# Patient Record
Sex: FEMALE | Race: WHITE | Employment: FULL TIME | URBAN - METROPOLITAN AREA
[De-identification: names, ages, dates, MRNs, and addresses within clinical notes are randomized per-mention and may not be internally consistent; named-entity substitution may affect disease eponyms.]

---

## 2017-03-20 ENCOUNTER — OFFICE VISIT (OUTPATIENT)
Dept: FAMILY MEDICINE CLINIC | Facility: CLINIC | Age: 52
End: 2017-03-20

## 2017-03-20 VITALS
DIASTOLIC BLOOD PRESSURE: 72 MMHG | WEIGHT: 163 LBS | HEART RATE: 84 BPM | BODY MASS INDEX: 28.88 KG/M2 | HEIGHT: 63 IN | SYSTOLIC BLOOD PRESSURE: 113 MMHG

## 2017-03-20 DIAGNOSIS — L71.9 ROSACEA: ICD-10-CM

## 2017-03-20 DIAGNOSIS — E78.00 HIGH CHOLESTEROL: ICD-10-CM

## 2017-03-20 DIAGNOSIS — E55.9 VITAMIN D DEFICIENCY: ICD-10-CM

## 2017-03-20 DIAGNOSIS — E66.09 NON MORBID OBESITY DUE TO EXCESS CALORIES: ICD-10-CM

## 2017-03-20 DIAGNOSIS — Z00.00 ANNUAL PHYSICAL EXAM: Primary | ICD-10-CM

## 2017-03-20 PROCEDURE — 99396 PREV VISIT EST AGE 40-64: CPT | Performed by: FAMILY MEDICINE

## 2017-03-20 RX ORDER — DOXEPIN HYDROCHLORIDE 50 MG/1
1 CAPSULE ORAL DAILY
COMMUNITY
End: 2020-11-16

## 2017-03-20 RX ORDER — GARLIC EXTRACT 500 MG
1 CAPSULE ORAL DAILY
COMMUNITY
End: 2018-08-03 | Stop reason: ALTCHOICE

## 2017-03-20 RX ORDER — DOXYCYCLINE 100 MG/1
CAPSULE ORAL
Refills: 1 | COMMUNITY
Start: 2017-01-30 | End: 2017-11-05 | Stop reason: ALTCHOICE

## 2017-03-20 NOTE — PROGRESS NOTES
Physical    Has been working out a lot. Patient's past medical surgical family social history was reviewed.     Review of Systems  Allergic: no environmental allergies or food allergies  Cardiovascular: no chest pain, irregular heartbeat, or palpitations 25-Hydroxy [E]; Future    3. Non morbid obesity due to excess calories  improving    4. High cholesterol  Will rechecking    5. Don Tran    In [place].

## 2017-03-27 ENCOUNTER — APPOINTMENT (OUTPATIENT)
Dept: LAB | Age: 52
End: 2017-03-27
Attending: FAMILY MEDICINE
Payer: COMMERCIAL

## 2017-03-27 DIAGNOSIS — Z00.00 ANNUAL PHYSICAL EXAM: ICD-10-CM

## 2017-03-27 DIAGNOSIS — E55.9 VITAMIN D DEFICIENCY: ICD-10-CM

## 2017-03-27 LAB
CHOLEST SERPL-MCNC: 258 MG/DL (ref 110–200)
GLUCOSE SERPL-MCNC: 93 MG/DL (ref 70–99)
HDLC SERPL-MCNC: 72 MG/DL
LDLC SERPL CALC-MCNC: 170 MG/DL (ref 0–99)
NONHDLC SERPL-MCNC: 186 MG/DL
TRIGL SERPL-MCNC: 82 MG/DL (ref 1–149)

## 2017-03-27 PROCEDURE — 80061 LIPID PANEL: CPT

## 2017-03-27 PROCEDURE — 36415 COLL VENOUS BLD VENIPUNCTURE: CPT

## 2017-03-27 PROCEDURE — 82306 VITAMIN D 25 HYDROXY: CPT

## 2017-03-27 PROCEDURE — 82947 ASSAY GLUCOSE BLOOD QUANT: CPT

## 2017-03-29 LAB — 25(OH)D3 SERPL-MCNC: 25.7 NG/ML

## 2017-04-27 RX ORDER — ERGOCALCIFEROL 1.25 MG/1
CAPSULE ORAL
Qty: 12 CAPSULE | Refills: 3 | Status: SHIPPED | OUTPATIENT
Start: 2017-04-27 | End: 2018-03-17

## 2017-05-30 ENCOUNTER — OFFICE VISIT (OUTPATIENT)
Dept: FAMILY MEDICINE CLINIC | Facility: CLINIC | Age: 52
End: 2017-05-30

## 2017-05-30 ENCOUNTER — TELEPHONE (OUTPATIENT)
Dept: FAMILY MEDICINE CLINIC | Facility: CLINIC | Age: 52
End: 2017-05-30

## 2017-05-30 VITALS
DIASTOLIC BLOOD PRESSURE: 80 MMHG | TEMPERATURE: 99 F | BODY MASS INDEX: 32.78 KG/M2 | RESPIRATION RATE: 14 BRPM | OXYGEN SATURATION: 98 % | HEART RATE: 82 BPM | HEIGHT: 63 IN | SYSTOLIC BLOOD PRESSURE: 120 MMHG | WEIGHT: 185 LBS

## 2017-05-30 DIAGNOSIS — J02.9 ACUTE PHARYNGITIS, UNSPECIFIED ETIOLOGY: ICD-10-CM

## 2017-05-30 DIAGNOSIS — Z87.891 FORMER SMOKER: ICD-10-CM

## 2017-05-30 DIAGNOSIS — J06.9 VIRAL URI WITH COUGH: Primary | ICD-10-CM

## 2017-05-30 PROCEDURE — 87880 STREP A ASSAY W/OPTIC: CPT | Performed by: NURSE PRACTITIONER

## 2017-05-30 PROCEDURE — 87081 CULTURE SCREEN ONLY: CPT | Performed by: NURSE PRACTITIONER

## 2017-05-30 PROCEDURE — 99202 OFFICE O/P NEW SF 15 MIN: CPT | Performed by: NURSE PRACTITIONER

## 2017-05-30 RX ORDER — BROMPHENIRAMINE MALEATE, PSEUDOEPHEDRINE HYDROCHLORIDE, AND DEXTROMETHORPHAN HYDROBROMIDE 2; 30; 10 MG/5ML; MG/5ML; MG/5ML
10 SYRUP ORAL 4 TIMES DAILY PRN
Qty: 120 ML | Refills: 0 | Status: SHIPPED | OUTPATIENT
Start: 2017-05-30 | End: 2017-11-05 | Stop reason: ALTCHOICE

## 2017-05-30 NOTE — TELEPHONE ENCOUNTER
Actions Requested: no appts available.   Advised [place]  Situation/Background   Problem: nasal congestion,    Onset: last friday   Associated Symptoms: nasal congestion, dry cough, sore throat   History of Same:    Precipitated By: travel out of the country

## 2017-05-30 NOTE — TELEPHONE ENCOUNTER
LESLI Pt seen in the clinic with Suzanna Horner NP today. Prescribed Tklnffaao-Ivxegics-LZ 30-2-10mg. Called pt to f/u if symptoms not better or worsen.

## 2017-05-30 NOTE — PROGRESS NOTES
CHIEF COMPLAINT:   Patient presents with:  Cough/URI      HPI:   Manisha Mcghee is a 54year old female who presents for uri symptoms for  4 days. Patient reports sore throat, congestion, dry cough. Symptoms have been worsening since onset.   Treating delmar /80 mmHg  Pulse 82  Temp(Src) 98.7 °F (37.1 °C) (Oral)  Resp 14  Ht 63\"  Wt 185 lb  BMI 32.78 kg/m2  SpO2 98%  LMP 05/20/2017 (Exact Date)  GENERAL: well developed, well nourished,in no apparent distress  SKIN: no rashes,no suspicious lesions  HEAD: · Hydrate! (cold or hot based on comfort). Drink lots of water or other non dehydrating liquids to help with illness. Salty foods, soups and tea can help with throat pain.    · Hand washing-use hand  or wash hands frequently, cover your cough or · You may use acetaminophen or ibuprofen to control pain and fever, unless another medicine was prescribed.  (Note: If you have chronic liver or kidney disease, have ever had a stomach ulcer or gastrointestinal bleeding, or are taking blood-thinning medicin Sore throats happen for many reasons, such as colds, allergies, and infections caused by viruses or bacteria. In any case, your throat becomes red and sore.  Your goal for self-care is to reduce your discomfort while giving your throat a chance to heal. · A temperature over 101°F (38.3°C)  · White spots on the throat  · Great difficulty swallowing  · Trouble breathing  · A skin rash  · Recent exposure to someone else with strep bacteria  · Severe hoarseness and swollen glands in the neck or jaw   Date [place]

## 2017-05-30 NOTE — PATIENT INSTRUCTIONS
· Hydrate! (cold or hot based on comfort). Drink lots of water or other non dehydrating liquids to help with illness. Salty foods, soups and tea can help with throat pain.    · Hand washing-use hand  or wash hands frequently, cover your cough o chronic liver or kidney disease, have ever had a stomach ulcer or gastrointestinal bleeding, or are taking blood-thinning medicines, talk with your healthcare provider before using these medicines.) Aspirin should never be given to anyone under 18 years of heal.    Moisten and soothe your throat  Tips include the following:  · Try a sip of water first thing after waking up. · Keep your throat moist by drinking 6 or more glasses of clear liquids every day. · Run a cool-air humidifier in your room overnight. Reviewed: 8/1/2016  © 0499-0603 [address], [address] [place], [place] [address] [employer]. All rights reserved. This information is not intended as a substitute for professional medical care.  Always follow your healthcare professional's instruct

## 2017-05-30 NOTE — TELEPHONE ENCOUNTER
Pt called in stating she has a dry cough and a lot of phlegm. Pt asking if Dr. [facility] can prescribe her something to help her relieve symptoms, please advise.

## 2017-06-14 ENCOUNTER — PATIENT MESSAGE (OUTPATIENT)
Dept: FAMILY MEDICINE CLINIC | Facility: CLINIC | Age: 52
End: 2017-06-14

## 2017-06-19 ENCOUNTER — TELEPHONE (OUTPATIENT)
Dept: FAMILY MEDICINE CLINIC | Facility: CLINIC | Age: 52
End: 2017-06-19

## 2017-06-19 DIAGNOSIS — E78.00 HIGH CHOLESTEROL: Primary | ICD-10-CM

## 2017-06-19 RX ORDER — ROSUVASTATIN CALCIUM 10 MG/1
10 TABLET, COATED ORAL NIGHTLY
Qty: 90 TABLET | Refills: 3 | Status: SHIPPED | OUTPATIENT
Start: 2017-06-19 | End: 2018-07-06

## 2017-06-19 NOTE — TELEPHONE ENCOUNTER
From: Yohana Mejía  To: Abhilash Cuevas DO  Sent: 6/14/2017 10:18 AM CDT  Subject: Prescription Question    Hello! In the past, I requested to be taken off the cholesterol pills do to a few side effects.  Now I would like to know if I can put back

## 2017-09-15 ENCOUNTER — OFFICE VISIT (OUTPATIENT)
Dept: PULMONOLOGY | Facility: CLINIC | Age: 52
End: 2017-09-15

## 2017-09-15 VITALS
SYSTOLIC BLOOD PRESSURE: 119 MMHG | OXYGEN SATURATION: 98 % | BODY MASS INDEX: 34.66 KG/M2 | RESPIRATION RATE: 18 BRPM | DIASTOLIC BLOOD PRESSURE: 76 MMHG | HEIGHT: 63 IN | WEIGHT: 195.63 LBS | HEART RATE: 84 BPM

## 2017-09-15 DIAGNOSIS — G47.33 OSA (OBSTRUCTIVE SLEEP APNEA): Primary | ICD-10-CM

## 2017-09-15 PROCEDURE — 99243 OFF/OP CNSLTJ NEW/EST LOW 30: CPT | Performed by: INTERNAL MEDICINE

## 2017-09-15 PROCEDURE — 99212 OFFICE O/P EST SF 10 MIN: CPT | Performed by: INTERNAL MEDICINE

## 2017-09-15 NOTE — PROGRESS NOTES
Annita Rodriguez , [address], [place]    Report of Consultation    Joan Day Patient Status:  Outpatient    10/31/1965 MRN [id number]   Location [address] [facility], [address] Attending No att. providers found   Hosp Day # 0 PCP Sheryl Wayne. Laboratory Data:    Lab Results  Component Value Date   CREATSERUM 0.69 09/21/2015   BUN 12 09/21/2015    09/21/2015   K 3.7 09/21/2015    (H) 09/21/2015   CO2 27 09/21/2015   GLU 93 03/27/2017   CA 8.7 09/21/2015   ALB 3.6 09/21/2015   TP

## 2017-09-18 ENCOUNTER — APPOINTMENT (OUTPATIENT)
Dept: LAB | Age: 52
End: 2017-09-18
Attending: FAMILY MEDICINE
Payer: COMMERCIAL

## 2017-09-18 DIAGNOSIS — E78.00 HIGH CHOLESTEROL: ICD-10-CM

## 2017-09-18 LAB
ALBUMIN SERPL BCP-MCNC: 3.9 G/DL (ref 3.5–4.8)
ALBUMIN/GLOB SERPL: 1.6 {RATIO} (ref 1–2)
ALP SERPL-CCNC: 54 U/L (ref 32–100)
ALT SERPL-CCNC: 15 U/L (ref 14–54)
ANION GAP SERPL CALC-SCNC: 9 MMOL/L (ref 0–18)
AST SERPL-CCNC: 15 U/L (ref 15–41)
BILIRUB SERPL-MCNC: 0.7 MG/DL (ref 0.3–1.2)
BUN SERPL-MCNC: 13 MG/DL (ref 8–20)
BUN/CREAT SERPL: 21 (ref 10–20)
CALCIUM SERPL-MCNC: 8.7 MG/DL (ref 8.5–10.5)
CHLORIDE SERPL-SCNC: 103 MMOL/L (ref 95–110)
CHOLEST SERPL-MCNC: 193 MG/DL (ref 110–200)
CO2 SERPL-SCNC: 24 MMOL/L (ref 22–32)
CREAT SERPL-MCNC: 0.62 MG/DL (ref 0.5–1.5)
GLOBULIN PLAS-MCNC: 2.5 G/DL (ref 2.5–3.7)
GLUCOSE SERPL-MCNC: 95 MG/DL (ref 70–99)
HDLC SERPL-MCNC: 77 MG/DL
LDLC SERPL CALC-MCNC: 100 MG/DL (ref 0–99)
NONHDLC SERPL-MCNC: 116 MG/DL
OSMOLALITY UR CALC.SUM OF ELEC: 282 MOSM/KG (ref 275–295)
POTASSIUM SERPL-SCNC: 3.8 MMOL/L (ref 3.3–5.1)
PROT SERPL-MCNC: 6.4 G/DL (ref 5.9–8.4)
SODIUM SERPL-SCNC: 136 MMOL/L (ref 136–144)
TRIGL SERPL-MCNC: 81 MG/DL (ref 1–149)

## 2017-09-18 PROCEDURE — 80053 COMPREHEN METABOLIC PANEL: CPT

## 2017-09-18 PROCEDURE — 80061 LIPID PANEL: CPT

## 2017-09-18 PROCEDURE — 36415 COLL VENOUS BLD VENIPUNCTURE: CPT

## 2017-09-21 ENCOUNTER — TELEPHONE (OUTPATIENT)
Dept: CASE MANAGEMENT | Age: 52
End: 2017-09-21

## 2017-09-21 DIAGNOSIS — G47.33 OSA (OBSTRUCTIVE SLEEP APNEA): Primary | ICD-10-CM

## 2017-09-21 NOTE — TELEPHONE ENCOUNTER
Pt's health plan denying \"In House\" sleep study, because pt does not have comorbid conditions that meet criteria. However, health plan does cover and will authorize a \"Home\" sleep study.  If you agree, please place new order for \"Home\" sleep study CPT

## 2017-09-26 NOTE — TELEPHONE ENCOUNTER
Managed Care: order for home sleep study entered, please process and contact pt when good to schedule.

## 2017-10-16 ENCOUNTER — HOSPITAL ENCOUNTER (OUTPATIENT)
Dept: MAMMOGRAPHY | Age: 52
Discharge: HOME | End: 2017-10-16
Attending: OBSTETRICS & GYNECOLOGY
Payer: COMMERCIAL

## 2017-10-16 DIAGNOSIS — Z12.31 VISIT FOR SCREENING MAMMOGRAM: ICD-10-CM

## 2017-10-16 PROCEDURE — 77067 SCR MAMMO BI INCL CAD: CPT | Performed by: OBSTETRICS & GYNECOLOGY

## 2017-10-24 ENCOUNTER — OFFICE VISIT (OUTPATIENT)
Dept: SLEEP CENTER | Age: 52
End: 2017-10-24
Attending: INTERNAL MEDICINE
Payer: COMMERCIAL

## 2017-10-24 DIAGNOSIS — G47.33 OSA (OBSTRUCTIVE SLEEP APNEA): Primary | ICD-10-CM

## 2017-10-24 PROCEDURE — 95806 SLEEP STUDY UNATT&RESP EFFT: CPT

## 2017-10-27 NOTE — PROCEDURES
[address] [place] [address]  Accredited by the [facility] ([facility])    PATIENT'S NAME: Daljit Wheatgerman   ATTENDING PHYSICIAN: Nas Weber. Adore Majano MD   REFERRING PHYSICIAN: Nas Weber.  Adore Majano MD   PATIENT ACCOUNT #: [id number] LOCATION:  titration. 2.   Weight loss. 3.   Avoid alcohol. 4.   Avoid sedating drug. 5.   Patient should not drive if at all sleepy. Please do not hesitate to contact me if there is any question whatsoever regarding interpretation of this study.     Dictated

## 2017-11-05 ENCOUNTER — OFFICE VISIT (OUTPATIENT)
Dept: FAMILY MEDICINE CLINIC | Facility: CLINIC | Age: 52
End: 2017-11-05

## 2017-11-05 VITALS
RESPIRATION RATE: 14 BRPM | TEMPERATURE: 98 F | SYSTOLIC BLOOD PRESSURE: 110 MMHG | OXYGEN SATURATION: 98 % | HEIGHT: 63 IN | DIASTOLIC BLOOD PRESSURE: 70 MMHG | WEIGHT: 191 LBS | BODY MASS INDEX: 33.84 KG/M2 | HEART RATE: 90 BPM

## 2017-11-05 DIAGNOSIS — J06.9 VIRAL URI WITH COUGH: Primary | ICD-10-CM

## 2017-11-05 PROCEDURE — 99213 OFFICE O/P EST LOW 20 MIN: CPT | Performed by: NURSE PRACTITIONER

## 2017-11-05 RX ORDER — BROMPHENIRAMINE MALEATE, PSEUDOEPHEDRINE HYDROCHLORIDE, AND DEXTROMETHORPHAN HYDROBROMIDE 2; 30; 10 MG/5ML; MG/5ML; MG/5ML
10 SYRUP ORAL 4 TIMES DAILY PRN
Qty: 120 ML | Refills: 0 | Status: SHIPPED | OUTPATIENT
Start: 2017-11-05 | End: 2018-08-03 | Stop reason: ALTCHOICE

## 2017-11-05 NOTE — PATIENT INSTRUCTIONS
· Take antibiotics as directed. Finish all the medication even if you feel better. · Probiotics or yogurt daily during antibiotic use will help decrease stomach upset and restore good bacteria to the gut. Separate times by at least 2-4 hours.     · Hydrat virus, and they are generally not prescribed for this condition. Home care  · If symptoms are severe, rest at home for the first 2 to 3 days. When you resume activity, don't let yourself get too tired.   · Avoid being exposed to cigarette smoke (yours or substitute for professional medical care. Always follow your healthcare professional's instructions.

## 2017-11-05 NOTE — PROGRESS NOTES
CHIEF COMPLAINT:   Patient presents with:  Sore Throat  Cough/URI      HPI:   Surinder Sotomayor is a 50year old female who presents for uri symptoms for  5 days. Patient reports sore throat only at the beginning of sx's, congestion, dry cough.  Symptoms ha /70 (BP Location: Right arm, Patient Position: Sitting, Cuff Size: large)   Pulse 90   Temp 98 °F (36.7 °C) (Oral)   Resp 14   Ht 63\"   Wt 191 lb   LMP 10/09/2017 (Within Days)   SpO2 98%   BMI 33.83 kg/m²   GENERAL: well developed, well nourished,i · Hydrate! (cold or hot based on comfort). Drink lots of water or other non dehydrating liquids to help with illness. Salty foods, soups and tea can help with throat pain.    · Hand washing-use hand  or wash hands frequently, cover your cough or sn · Avoid being exposed to cigarette smoke (yours or others’).   · You may use acetaminophen or ibuprofen to control pain and fever, unless another medicine was prescribed. If you have chronic liver or kidney disease, have ever had a stomach ulcer or gastroin

## 2017-11-10 ENCOUNTER — OFFICE VISIT (OUTPATIENT)
Dept: FAMILY MEDICINE CLINIC | Facility: CLINIC | Age: 52
End: 2017-11-10

## 2017-11-10 VITALS
BODY MASS INDEX: 33.84 KG/M2 | WEIGHT: 191 LBS | SYSTOLIC BLOOD PRESSURE: 124 MMHG | RESPIRATION RATE: 14 BRPM | HEART RATE: 90 BPM | DIASTOLIC BLOOD PRESSURE: 80 MMHG | OXYGEN SATURATION: 99 % | TEMPERATURE: 98 F | HEIGHT: 63 IN

## 2017-11-10 DIAGNOSIS — R05.9 COUGH: ICD-10-CM

## 2017-11-10 DIAGNOSIS — J01.40 ACUTE NON-RECURRENT PANSINUSITIS: Primary | ICD-10-CM

## 2017-11-10 PROCEDURE — 99213 OFFICE O/P EST LOW 20 MIN: CPT | Performed by: NURSE PRACTITIONER

## 2017-11-10 RX ORDER — PREDNISONE 20 MG/1
20 TABLET ORAL 2 TIMES DAILY
Qty: 6 TABLET | Refills: 0 | Status: SHIPPED | OUTPATIENT
Start: 2017-11-10 | End: 2017-11-13

## 2017-11-10 RX ORDER — CEFDINIR 300 MG/1
300 CAPSULE ORAL 2 TIMES DAILY
Qty: 20 CAPSULE | Refills: 0 | Status: SHIPPED | OUTPATIENT
Start: 2017-11-10 | End: 2017-11-20

## 2017-11-10 RX ORDER — BENZONATATE 200 MG/1
200 CAPSULE ORAL 3 TIMES DAILY PRN
Qty: 15 CAPSULE | Refills: 0 | Status: SHIPPED | OUTPATIENT
Start: 2017-11-10 | End: 2017-11-15

## 2017-11-10 RX ORDER — CEFDINIR 300 MG/1
300 CAPSULE ORAL 2 TIMES DAILY
Qty: 20 CAPSULE | Refills: 0 | Status: SHIPPED | OUTPATIENT
Start: 2017-11-10 | End: 2017-11-10

## 2017-11-10 NOTE — PROGRESS NOTES
CHIEF COMPLAINT:   Patient presents with:  URI      HPI:   Fatemeh Mcintosh is a 50year old female who presents for upper respiratory symptoms for  10 days.  Diagnosed with cold on 11/5 but symptoms persist.   Symptoms are described as scratchy,  a tickle GENERAL: patient feels sick, but does not report fever, chills, sweats or fatigue. SKIN: no rashes or abnormal skin lesions  HEAD: patient denies mild sinus pressure per HPI. EYES: patient reports stable vision, no eye symptoms.   EARS: patient reports mi LUNGS: Initially a mild wheeze in left upper lobe that cleared. Overall lungs are clear to auscultation bilaterally, no wheezes or rhonchi. Breathing is non labored.   CARDIO: RRR without murmur  NEURO: Motor function and sensation grossly intact bilaterall The sinuses are air-filled spaces within the bones of the face. They connect to the inside of the nose. Sinusitis is an inflammation of the tissue lining the sinus cavity. Sinus inflammation can occur during a cold.  It can also be due to allergies to polle · Do not use nasal rinses or irrigation during an acute sinus infection, unless told to by your health care provider. Rinsing may spread the infection to other sinuses.   · Use acetaminophen or ibuprofen to control pain, unless another pain medicine was pre · Sit on a chair with both feet on the floor. · Take a slow, deep breath through your nose. Hold for 2 counts. · Lean forward slightly. · Cough twice—2 short coughs. · Relax for a few seconds.   Repeat the steps as needed.   The “roberto” technique  Here i

## 2017-11-10 NOTE — PATIENT INSTRUCTIONS
Sinusitis (Antibiotic Treatment)    The sinuses are air-filled spaces within the bones of the face. They connect to the inside of the nose. Sinusitis is an inflammation of the tissue lining the sinus cavity. Sinus inflammation can occur during a cold.  It · Do not use nasal rinses or irrigation during an acute sinus infection, unless told to by your health care provider. Rinsing may spread the infection to other sinuses.   · Use acetaminophen or ibuprofen to control pain, unless another pain medicine was pre · Sit on a chair with both feet on the floor. · Take a slow, deep breath through your nose. Hold for 2 counts. · Lean forward slightly. · Cough twice—2 short coughs. · Relax for a few seconds.   Repeat the steps as needed.   The “roberto” technique  Here i

## 2017-11-14 ENCOUNTER — TELEPHONE (OUTPATIENT)
Dept: PULMONOLOGY | Facility: CLINIC | Age: 52
End: 2017-11-14

## 2017-11-14 DIAGNOSIS — G47.33 OSA (OBSTRUCTIVE SLEEP APNEA): Primary | ICD-10-CM

## 2017-11-14 NOTE — TELEPHONE ENCOUNTER
Pt stts it is ok to leave a detailed message. Pt aware that once Dr. Judson Dumont reviews PSG she will receive a follow up call.

## 2017-11-15 NOTE — TELEPHONE ENCOUNTER
Please inform the patient that she has mild sleep apnea  Still recommend CPAP therapy  Please give the patient order for CPAP titration sleep study  And then set up the patient with a CPAP as directed

## 2017-11-16 NOTE — TELEPHONE ENCOUNTER
Left pt a message notifying her of below. Pt aware to wait until she hears from our [facility] care [phone] dept to verify if a PA is needed.

## 2017-11-21 NOTE — TELEPHONE ENCOUNTER
Rylan notified that cpap titration was approved and she can schedule the titration. Kimberly barahona she was already notified.

## 2017-11-22 ENCOUNTER — TELEPHONE (OUTPATIENT)
Dept: PULMONOLOGY | Facility: CLINIC | Age: 52
End: 2017-11-22

## 2017-11-22 NOTE — TELEPHONE ENCOUNTER
Fatimah/[facility] states that she will fax form over to this office for prior auth. Cpap titration has not been approved (see 11/14/17 TE also) and is still in progress. Pt could be approved for APAP at home but CPAP will require clinicals/prior auth. Please call.

## 2017-11-29 ENCOUNTER — TELEPHONE (OUTPATIENT)
Dept: PULMONOLOGY | Facility: CLINIC | Age: 52
End: 2017-11-29

## 2017-11-29 NOTE — TELEPHONE ENCOUNTER
Ronny Golden from [facility] called and states sleep study has been denied; Reference # [id number]. If you would like a zyle-hp-aaat, please call [phone].  Thank you, [facility]

## 2017-11-29 NOTE — TELEPHONE ENCOUNTER
Lima Mcneal @ [facility] called & states sleep study has been denied. If you would like a zylf-dl-hfbg, please call [phone] (Reference # [id number]).  Thank you, Managed Care

## 2018-03-20 NOTE — TELEPHONE ENCOUNTER
Refill Protocol Appointment Criteria  · Appointment scheduled in the past 12 months or in the next 3 months  Recent Outpatient Visits            4 months ago Acute non-recurrent pansinusitis    Paul Read NP    Offic

## 2018-03-21 RX ORDER — ERGOCALCIFEROL 1.25 MG/1
CAPSULE ORAL
Qty: 12 CAPSULE | Refills: 11 | Status: CANCELLED
Start: 2018-03-21

## 2018-03-21 RX ORDER — ERGOCALCIFEROL 1.25 MG/1
CAPSULE ORAL
Qty: 12 CAPSULE | Refills: 11 | Status: SHIPPED | OUTPATIENT
Start: 2018-03-21 | End: 2018-08-03

## 2018-07-06 RX ORDER — ROSUVASTATIN CALCIUM 10 MG/1
10 TABLET, COATED ORAL NIGHTLY
Qty: 90 TABLET | Refills: 0 | Status: SHIPPED | OUTPATIENT
Start: 2018-07-06 | End: 2018-08-03

## 2018-07-06 NOTE — TELEPHONE ENCOUNTER
Refill passed per [facility] protocol.   Cholesterol Medications  Protocol Criteria:  · Appointment scheduled in the past 12 months or in the next 3 months  · ALT & LDL on file in the past 12 months  · ALT result < 80  · LDL result <130   Recent Outpat

## 2018-08-03 ENCOUNTER — LAB ENCOUNTER (OUTPATIENT)
Dept: LAB | Age: 53
End: 2018-08-03
Attending: FAMILY MEDICINE
Payer: COMMERCIAL

## 2018-08-03 ENCOUNTER — OFFICE VISIT (OUTPATIENT)
Dept: FAMILY MEDICINE CLINIC | Facility: CLINIC | Age: 53
End: 2018-08-03
Payer: COMMERCIAL

## 2018-08-03 VITALS
SYSTOLIC BLOOD PRESSURE: 117 MMHG | HEART RATE: 79 BPM | DIASTOLIC BLOOD PRESSURE: 76 MMHG | TEMPERATURE: 98 F | HEIGHT: 63 IN | BODY MASS INDEX: 34.55 KG/M2 | WEIGHT: 195 LBS

## 2018-08-03 DIAGNOSIS — E78.00 HIGH CHOLESTEROL: ICD-10-CM

## 2018-08-03 DIAGNOSIS — Z12.31 VISIT FOR SCREENING MAMMOGRAM: ICD-10-CM

## 2018-08-03 DIAGNOSIS — E55.9 VITAMIN D DEFICIENCY: ICD-10-CM

## 2018-08-03 DIAGNOSIS — Z00.00 ANNUAL PHYSICAL EXAM: ICD-10-CM

## 2018-08-03 DIAGNOSIS — Z72.0 TOBACCO ABUSE: ICD-10-CM

## 2018-08-03 DIAGNOSIS — Z00.00 ANNUAL PHYSICAL EXAM: Primary | ICD-10-CM

## 2018-08-03 DIAGNOSIS — Z12.11 SCREENING FOR COLON CANCER: ICD-10-CM

## 2018-08-03 LAB
ALBUMIN SERPL BCP-MCNC: 4.1 G/DL (ref 3.5–4.8)
ALBUMIN/GLOB SERPL: 1.7 {RATIO} (ref 1–2)
ALP SERPL-CCNC: 55 U/L (ref 32–100)
ALT SERPL-CCNC: 48 U/L (ref 14–54)
ANION GAP SERPL CALC-SCNC: 7 MMOL/L (ref 0–18)
AST SERPL-CCNC: 40 U/L (ref 15–41)
BASOPHILS # BLD: 0 K/UL (ref 0–0.2)
BASOPHILS NFR BLD: 0 %
BILIRUB SERPL-MCNC: 0.5 MG/DL (ref 0.3–1.2)
BUN SERPL-MCNC: 9 MG/DL (ref 8–20)
BUN/CREAT SERPL: 13.2 (ref 10–20)
CALCIUM SERPL-MCNC: 9.1 MG/DL (ref 8.5–10.5)
CHLORIDE SERPL-SCNC: 105 MMOL/L (ref 95–110)
CHOLEST SERPL-MCNC: 182 MG/DL (ref 110–200)
CO2 SERPL-SCNC: 27 MMOL/L (ref 22–32)
CREAT SERPL-MCNC: 0.68 MG/DL (ref 0.5–1.5)
EOSINOPHIL # BLD: 0.2 K/UL (ref 0–0.7)
EOSINOPHIL NFR BLD: 4 %
ERYTHROCYTE [DISTWIDTH] IN BLOOD BY AUTOMATED COUNT: 15.1 % (ref 11–15)
GLOBULIN PLAS-MCNC: 2.4 G/DL (ref 2.5–3.7)
GLUCOSE SERPL-MCNC: 91 MG/DL (ref 70–99)
HCT VFR BLD AUTO: 39.4 % (ref 35–48)
HDLC SERPL-MCNC: 80 MG/DL
HGB BLD-MCNC: 13 G/DL (ref 12–16)
LDLC SERPL CALC-MCNC: 84 MG/DL (ref 0–99)
LYMPHOCYTES # BLD: 1.7 K/UL (ref 1–4)
LYMPHOCYTES NFR BLD: 37 %
MCH RBC QN AUTO: 28.8 PG (ref 27–32)
MCHC RBC AUTO-ENTMCNC: 33.1 G/DL (ref 32–37)
MCV RBC AUTO: 87.2 FL (ref 80–100)
MONOCYTES # BLD: 0.4 K/UL (ref 0–1)
MONOCYTES NFR BLD: 9 %
NEUTROPHILS # BLD AUTO: 2.3 K/UL (ref 1.8–7.7)
NEUTROPHILS NFR BLD: 50 %
NONHDLC SERPL-MCNC: 102 MG/DL
OSMOLALITY UR CALC.SUM OF ELEC: 286 MOSM/KG (ref 275–295)
PATIENT FASTING: YES
PLATELET # BLD AUTO: 175 K/UL (ref 140–400)
PMV BLD AUTO: 11 FL (ref 7.4–10.3)
POTASSIUM SERPL-SCNC: 3.8 MMOL/L (ref 3.3–5.1)
PROT SERPL-MCNC: 6.5 G/DL (ref 5.9–8.4)
RBC # BLD AUTO: 4.52 M/UL (ref 3.7–5.4)
SODIUM SERPL-SCNC: 139 MMOL/L (ref 136–144)
TRIGL SERPL-MCNC: 91 MG/DL (ref 1–149)
TSH SERPL-ACNC: 1.8 UIU/ML (ref 0.45–5.33)
WBC # BLD AUTO: 4.6 K/UL (ref 4–11)

## 2018-08-03 PROCEDURE — 36415 COLL VENOUS BLD VENIPUNCTURE: CPT

## 2018-08-03 PROCEDURE — 80053 COMPREHEN METABOLIC PANEL: CPT

## 2018-08-03 PROCEDURE — 84443 ASSAY THYROID STIM HORMONE: CPT

## 2018-08-03 PROCEDURE — 80061 LIPID PANEL: CPT

## 2018-08-03 PROCEDURE — 99396 PREV VISIT EST AGE 40-64: CPT | Performed by: FAMILY MEDICINE

## 2018-08-03 PROCEDURE — 82306 VITAMIN D 25 HYDROXY: CPT

## 2018-08-03 PROCEDURE — 85025 COMPLETE CBC W/AUTO DIFF WBC: CPT

## 2018-08-03 RX ORDER — ROSUVASTATIN CALCIUM 10 MG/1
10 TABLET, COATED ORAL NIGHTLY
Qty: 90 TABLET | Refills: 3 | Status: SHIPPED | OUTPATIENT
Start: 2018-08-03 | End: 2018-10-29

## 2018-08-03 RX ORDER — ERGOCALCIFEROL 1.25 MG/1
CAPSULE ORAL
Qty: 12 CAPSULE | Refills: 11 | Status: SHIPPED | OUTPATIENT
Start: 2018-08-03 | End: 2019-02-25 | Stop reason: ALTCHOICE

## 2018-08-03 NOTE — PROGRESS NOTES
REASON FOR VISIT:    Rosetta Fried is a 50year old female who presents for an [address].         Patient Active Problem List:     Rosacea     High cholesterol     Vitamin D deficiency     Non morbid obesity due to excess calories    Gene specific diagnoses    ALLERGIES:     Contrave [Naltrexon*    RASH    Comment:rash  Wellbutrin [Bupropi*    RASH    Comment:rash  Simvastatin             RASH  CURRENT MEDICATIONS:     Current Outpatient Prescriptions:  Rosuvastatin Calcium 10 MG Oral Tab T distress, obese  SKIN: no rashes, no suspicious lesions  HEENT: atraumatic, normocephalic, ears and throat are clear  EYES:PERRLA, EOMI, conjunctiva are clear.  normal optic disc  NECK: supple, no adenopathy, no bruits  CHEST: no chest tenderness  BREAST: d reminders to display for this patient. HPV: There are no preventive care reminders to display for this patient. Tdap: There are no preventive care reminders to display for this patient.   Shingles: Shingrix shingles vaccine is due    Influenza Annually

## 2018-08-06 LAB — 25(OH)D3 SERPL-MCNC: 40.5 NG/ML

## 2018-08-30 ENCOUNTER — TELEPHONE (OUTPATIENT)
Dept: GASTROENTEROLOGY | Facility: CLINIC | Age: 53
End: 2018-08-30

## 2018-08-30 ENCOUNTER — OFFICE VISIT (OUTPATIENT)
Dept: GASTROENTEROLOGY | Facility: CLINIC | Age: 53
End: 2018-08-30
Payer: COMMERCIAL

## 2018-08-30 VITALS
HEIGHT: 63 IN | BODY MASS INDEX: 34.37 KG/M2 | DIASTOLIC BLOOD PRESSURE: 85 MMHG | WEIGHT: 194 LBS | HEART RATE: 85 BPM | SYSTOLIC BLOOD PRESSURE: 138 MMHG

## 2018-08-30 DIAGNOSIS — R19.8 GLOBUS SENSATION: ICD-10-CM

## 2018-08-30 DIAGNOSIS — R19.5 LOOSE STOOLS: ICD-10-CM

## 2018-08-30 DIAGNOSIS — Z12.11 ENCOUNTER FOR SCREENING COLONOSCOPY: Primary | ICD-10-CM

## 2018-08-30 DIAGNOSIS — Z12.11 SCREEN FOR COLON CANCER: Primary | ICD-10-CM

## 2018-08-30 PROCEDURE — 99244 OFF/OP CNSLTJ NEW/EST MOD 40: CPT | Performed by: PHYSICIAN ASSISTANT

## 2018-08-30 PROCEDURE — 99212 OFFICE O/P EST SF 10 MIN: CPT | Performed by: PHYSICIAN ASSISTANT

## 2018-08-30 RX ORDER — OMEGA-3-ACID ETHYL ESTERS 1 G/1
1 CAPSULE, LIQUID FILLED ORAL DAILY
COMMUNITY
End: 2019-12-27 | Stop reason: ALTCHOICE

## 2018-08-30 RX ORDER — POLYETHYLENE GLYCOL 3350, SODIUM CHLORIDE, SODIUM BICARBONATE, POTASSIUM CHLORIDE 420; 11.2; 5.72; 1.48 G/4L; G/4L; G/4L; G/4L
POWDER, FOR SOLUTION ORAL
Qty: 1 BOTTLE | Refills: 0 | Status: SHIPPED | OUTPATIENT
Start: 2018-08-30 | End: 2019-01-29 | Stop reason: ALTCHOICE

## 2018-08-30 NOTE — H&P
[address] [facility] [address] Gastroenterology                                                                                                  Clinic History and Physical     Pa pains other than knee pain from working. A few years ago, she felt her throat would close when she would eat soup with rice. She cannot eat anything spicy because her \"mouth closes\" and she is not able to eat it. She denies history of food impaction. 3   ergocalciferol 36892 units Oral Cap TAKE 1 CAPSULE BY MOUTH ONCE A WEEK. Disp: 12 capsule Rfl: 11   multivitamin Oral Tab Take 1 tablet by mouth daily.  Disp:  Rfl:        Allergies:    Contrave [Naltrexon*    RASH    Comment:rash  Wellbutrin [Bupropi* anemia noted on most recent labs. Patient does report a vague GI history of ?experiencing rectal bleeding in 2001 and was diagnosed with ?fistula and had a proctoscopy and anal fistulectomy performed at [facility].  This was perhaps repeated in 2008 however t including but not limited to the risks of bleeding, infection, pain, death, as well as the risks of anesthesia and perforation all leading to prolonged hospitalization, surgical intervention, or even death.  I also specifically mentioned the miss rate of EG

## 2018-08-30 NOTE — TELEPHONE ENCOUNTER
Faxed signed PRISCILLA form to [facility] to see if we can obtain records from pt's proctoscopy and anal fistulectomy from 2001 and possibly 2008.     Fax: [phone]

## 2018-08-30 NOTE — TELEPHONE ENCOUNTER
Scheduled for:  Colonoscopy 36672 w/ random bx of colon to r/o microscopic colitis & EGD 73350 with possible diliation  Provider Name: Dr. Cobb Paci  Date:  12/13/18  Location:  [facility]  Sedation:  MAC  Time:  8:00 am, arrival 7:00 am  Prep: Trilyte  Meds/Allergies

## 2018-08-30 NOTE — PATIENT INSTRUCTIONS
1. Schedule colonoscopy with random biopsies of the colon and upper endoscopy with possible dilatation with MAC sedation @ [facility] with Dr. Angle Moyer. 2.  bowel prep from pharmacy - I have prescribed Trilyte split dose preparation    3.  Continue all medic

## 2018-08-31 ENCOUNTER — TELEPHONE (OUTPATIENT)
Dept: GASTROENTEROLOGY | Facility: CLINIC | Age: 53
End: 2018-08-31

## 2018-08-31 NOTE — TELEPHONE ENCOUNTER
Pt wanted to know if she had to fast with labs, I wasn't sure I called lab patient was put on hold but she stated she could get a call back and if wasn't able to pick to leave a detailed message on her cell.      Spoke to lab they stated no fasting was need

## 2018-08-31 NOTE — TELEPHONE ENCOUNTER
Fax confirmation came back as \"failure\". Found alternative fax number.  Signed PRISCILLA faxed to [phone]

## 2018-09-17 ENCOUNTER — APPOINTMENT (OUTPATIENT)
Dept: LAB | Age: 53
End: 2018-09-17
Attending: PHYSICIAN ASSISTANT
Payer: COMMERCIAL

## 2018-09-17 DIAGNOSIS — R19.8 GLOBUS SENSATION: ICD-10-CM

## 2018-09-17 DIAGNOSIS — Z12.11 ENCOUNTER FOR SCREENING COLONOSCOPY: ICD-10-CM

## 2018-09-17 DIAGNOSIS — R19.5 LOOSE STOOLS: ICD-10-CM

## 2018-09-17 LAB — IGA SERPL-MCNC: 169 MG/DL (ref 68–378)

## 2018-09-17 PROCEDURE — 82784 ASSAY IGA/IGD/IGG/IGM EACH: CPT

## 2018-09-17 PROCEDURE — 36415 COLL VENOUS BLD VENIPUNCTURE: CPT

## 2018-09-17 PROCEDURE — 83516 IMMUNOASSAY NONANTIBODY: CPT

## 2018-09-19 LAB — TTG IGA SER-ACNC: 0.3 U/ML (ref ?–7)

## 2018-10-22 ENCOUNTER — HOSPITAL ENCOUNTER (OUTPATIENT)
Dept: MAMMOGRAPHY | Age: 53
Discharge: HOME | End: 2018-10-22
Attending: FAMILY MEDICINE
Payer: COMMERCIAL

## 2018-10-22 DIAGNOSIS — Z12.31 VISIT FOR SCREENING MAMMOGRAM: ICD-10-CM

## 2018-10-22 DIAGNOSIS — Z00.00 ANNUAL PHYSICAL EXAM: ICD-10-CM

## 2018-10-22 PROCEDURE — 77063 BREAST TOMOSYNTHESIS BI: CPT | Performed by: FAMILY MEDICINE

## 2018-10-22 PROCEDURE — 77067 SCR MAMMO BI INCL CAD: CPT | Performed by: FAMILY MEDICINE

## 2018-10-29 ENCOUNTER — TELEPHONE (OUTPATIENT)
Dept: FAMILY MEDICINE CLINIC | Facility: CLINIC | Age: 53
End: 2018-10-29

## 2018-10-29 RX ORDER — ATORVASTATIN CALCIUM 10 MG/1
10 TABLET, FILM COATED ORAL NIGHTLY
Qty: 90 TABLET | Refills: 3 | Status: SHIPPED | OUTPATIENT
Start: 2018-10-29 | End: 2019-10-08

## 2018-10-29 NOTE — TELEPHONE ENCOUNTER
Pharmacy stating that pt os requesting a alternative to Rosuvastatin Calcium 10 mg tab. Per pharmacy pt is currently paying $90 out of pocket for medication.  Per pharmacy possible alternatives would be Simvastatin 40MG tab ($6 out of pocket) or Atorvastati

## 2018-11-04 ENCOUNTER — OFFICE VISIT (OUTPATIENT)
Dept: FAMILY MEDICINE CLINIC | Facility: CLINIC | Age: 53
End: 2018-11-04
Payer: COMMERCIAL

## 2018-11-04 DIAGNOSIS — H61.21 IMPACTED CERUMEN OF RIGHT EAR: Primary | ICD-10-CM

## 2018-11-04 PROCEDURE — 99213 OFFICE O/P EST LOW 20 MIN: CPT | Performed by: NURSE PRACTITIONER

## 2018-11-04 NOTE — PATIENT INSTRUCTIONS
Earwax Removal    The ear canal makes earwax from the canal’s lining. The ears make wax to lubricate and protect the ear canal. The ear canal is the tube that connects the middle ear to the outside of the ear.  The wax protects the ear from bacteria, infe · Don’t use cotton swabs in your ears. Cotton swabs may push wax deeper into the ear canal or damage the eardrum.  Use cotton gauze or a wet washcloth  to gently remove wax on the outside of the ear and around the opening to the ear canal.  · Don't use any © 0550-3626 [employer] [address] [facility], [facility] [place]. All rights reserved. This information is not intended as a substitute for professional medical care. Always follow your healthcare professional's instructions.         Impacte Objects repeatedly placed in the ear can also cause impacted earwax. For example, putting cotton swabs in the ear may push the wax deeper into the ear. Over time, this may cause blockage.  Hearing aids, swimming plugs, and swim molds can cause the same prob · Not using cotton swabs in the ear  When to call the healthcare provider  Call your healthcare provider if you have symptoms of impacted earwax.  Also call right away if you have severe symptoms after earwax removal. These may include bleeding or severe ea

## 2018-11-04 NOTE — PROGRESS NOTES
CHIEF COMPLAINT:   Patient presents with:  Ear Problem      HPI:   Duong Jernigan is a 48year old female who presents to clinic today with complaints of ear feeling clogged for 1 week.  Patient reports feeling like there was water in her ear one week Right TM: large amount of impacted cerumen occluding ear canal. Unable to visualize TM. Left TM: clear gray, no bulging, no retraction,no effusion, bony landmarks intact.   NOSE: nostrils patent, no nasal discharge, nasal mucosa pink and noninflamed  THRO Healthcare providers can remove earwax safely. It is important to stay still during the procedure to avoid damage to the ear canal. But removing earwax generally doesn’t hurt.  You will not usually need anesthesia or pain medicine when the provider removes 3. Place the recommended number of drops in the ear. Wet a cotton ball with the medicine. Gently put the cotton ball into the ear opening. Follow-up care  Follow up with your healthcare provider, or as directed.   When to seek medical advice  Call the prov Earwax can build up because of many health conditions. Some cause a physical blockage. Others cause too much earwax to be made.  Health conditions that can cause earwax buildup include:  · Use of cotton swabs to clean deep in the ear canal  · Bony blockage · Ringing in the ears  · Bleeding from the ear  Talk with your healthcare provider about which risks apply most to you. Don’t use these at home  Healthcare providers do not advise use of ear candles or ear vacuum kits.  These methods are not shown to work

## 2018-12-12 ENCOUNTER — TELEPHONE (OUTPATIENT)
Dept: GASTROENTEROLOGY | Facility: CLINIC | Age: 53
End: 2018-12-12

## 2018-12-12 NOTE — TELEPHONE ENCOUNTER
I spoke to Emir at [facility]. After sending an urgent request for tomorrow's Colon and EGD, the procedure is approved. Ref # [id number].

## 2018-12-13 ENCOUNTER — HOSPITAL ENCOUNTER (OUTPATIENT)
Facility: HOSPITAL | Age: 53
Setting detail: HOSPITAL OUTPATIENT SURGERY
Discharge: HOME | End: 2018-12-13
Attending: INTERNAL MEDICINE | Admitting: INTERNAL MEDICINE
Payer: COMMERCIAL

## 2018-12-13 ENCOUNTER — ANESTHESIA (OUTPATIENT)
Dept: ENDOSCOPY | Facility: HOSPITAL | Age: 53
End: 2018-12-13
Payer: COMMERCIAL

## 2018-12-13 ENCOUNTER — ANESTHESIA EVENT (OUTPATIENT)
Dept: ENDOSCOPY | Facility: HOSPITAL | Age: 53
End: 2018-12-13
Payer: COMMERCIAL

## 2018-12-13 DIAGNOSIS — Z12.11 SCREEN FOR COLON CANCER: ICD-10-CM

## 2018-12-13 DIAGNOSIS — R19.8 GLOBUS SENSATION: ICD-10-CM

## 2018-12-13 DIAGNOSIS — R19.5 LOOSE STOOLS: ICD-10-CM

## 2018-12-13 PROBLEM — K31.7 GASTRIC POLYPS: Status: ACTIVE | Noted: 2018-12-13

## 2018-12-13 PROBLEM — K64.8 INTERNAL HEMORRHOIDS WITHOUT COMPLICATION: Status: ACTIVE | Noted: 2018-12-13

## 2018-12-13 PROBLEM — K31.9 GASTROPATHY: Status: ACTIVE | Noted: 2018-12-13

## 2018-12-13 PROBLEM — K57.30 DIVERTICULOSIS LARGE INTESTINE W/O PERFORATION OR ABSCESS W/O BLEEDING: Status: ACTIVE | Noted: 2018-12-13

## 2018-12-13 PROCEDURE — 0DB68ZX EXCISION OF STOMACH, VIA NATURAL OR ARTIFICIAL OPENING ENDOSCOPIC, DIAGNOSTIC: ICD-10-PCS | Performed by: INTERNAL MEDICINE

## 2018-12-13 PROCEDURE — 45378 DIAGNOSTIC COLONOSCOPY: CPT | Performed by: INTERNAL MEDICINE

## 2018-12-13 PROCEDURE — 43239 EGD BIOPSY SINGLE/MULTIPLE: CPT | Performed by: INTERNAL MEDICINE

## 2018-12-13 PROCEDURE — 0DB78ZX EXCISION OF STOMACH, PYLORUS, VIA NATURAL OR ARTIFICIAL OPENING ENDOSCOPIC, DIAGNOSTIC: ICD-10-PCS | Performed by: INTERNAL MEDICINE

## 2018-12-13 PROCEDURE — 0DJD8ZZ INSPECTION OF LOWER INTESTINAL TRACT, VIA NATURAL OR ARTIFICIAL OPENING ENDOSCOPIC: ICD-10-PCS | Performed by: INTERNAL MEDICINE

## 2018-12-13 RX ORDER — SODIUM CHLORIDE, SODIUM LACTATE, POTASSIUM CHLORIDE, CALCIUM CHLORIDE 600; 310; 30; 20 MG/100ML; MG/100ML; MG/100ML; MG/100ML
INJECTION, SOLUTION INTRAVENOUS CONTINUOUS
Status: DISCONTINUED | OUTPATIENT
Start: 2018-12-13 | End: 2018-12-13

## 2018-12-13 RX ORDER — MIDAZOLAM HYDROCHLORIDE 1 MG/ML
INJECTION INTRAMUSCULAR; INTRAVENOUS AS NEEDED
Status: DISCONTINUED | OUTPATIENT
Start: 2018-12-13 | End: 2018-12-13 | Stop reason: SURG

## 2018-12-13 RX ORDER — SODIUM CHLORIDE, SODIUM LACTATE, POTASSIUM CHLORIDE, CALCIUM CHLORIDE 600; 310; 30; 20 MG/100ML; MG/100ML; MG/100ML; MG/100ML
INJECTION, SOLUTION INTRAVENOUS CONTINUOUS
Status: CANCELLED | OUTPATIENT
Start: 2018-12-13

## 2018-12-13 RX ORDER — NALOXONE HYDROCHLORIDE 0.4 MG/ML
80 INJECTION, SOLUTION INTRAMUSCULAR; INTRAVENOUS; SUBCUTANEOUS AS NEEDED
Status: CANCELLED | OUTPATIENT
Start: 2018-12-13 | End: 2018-12-13

## 2018-12-13 RX ADMIN — MIDAZOLAM HYDROCHLORIDE 2 MG: 1 INJECTION INTRAMUSCULAR; INTRAVENOUS at 08:05:00

## 2018-12-13 RX ADMIN — SODIUM CHLORIDE, SODIUM LACTATE, POTASSIUM CHLORIDE, CALCIUM CHLORIDE: 600; 310; 30; 20 INJECTION, SOLUTION INTRAVENOUS at 08:04:00

## 2018-12-13 RX ADMIN — SODIUM CHLORIDE, SODIUM LACTATE, POTASSIUM CHLORIDE, CALCIUM CHLORIDE: 600; 310; 30; 20 INJECTION, SOLUTION INTRAVENOUS at 08:37:00

## 2018-12-13 NOTE — OPERATIVE REPORT
[facility]    PATIENT'S NAME: Dorina Raygoza   ATTENDING PHYSICIAN: Hailey Chang MD   OPERATING PHYSICIAN: Hailey Chang MD   PATIENT ACCOUNT#:   [id number]    LOCATION:  [facility] ENDO POOL ROOM [address] [place] [address] RECOMMENDATIONS:    1. Next colonoscopy for screening purposes in 10 years, unless other signs, symptoms or family history develop in the interim. 2.   See EGD report to follow.      Dictated By Edd Manzano MD  d: 12/13/2018 08:28:3

## 2018-12-13 NOTE — ANESTHESIA POSTPROCEDURE EVALUATION
Patient: Manisha Mcghee    Procedure Summary     Date:  12/13/18 Room / Location:  [address] [place] [address] ENDOSCOPY 05 / [address] [place] [address] ENDOSCOPY    Anesthesia Start:  0804 Anesthesia Stop:  0845    Procedures:       COLONOSCOPY (N/A )      ESOPHAGOGASTRODUODENOSCOPY (EGD) (N/A ) Dorian Meredith

## 2018-12-13 NOTE — H&P
History & Physical Examination    Patient Name: Vickey Reid  MRN: [id number]  [facility]: [id number]  YOB: 1965    Diagnosis: Colorectal screening, change in bowel habits, globus dysphagia      Medications Prior to Admission:  atorvastatin 10 Francisca Corrigan, [address] [place] [address] - Gastroenterology  12/13/2018  7:56 AM

## 2018-12-13 NOTE — BRIEF OP NOTE
Pre-Operative Diagnosis: Globus dyphagia, Screen for colon cancer     Post-Operative Diagnosis:   Uncomplicated diverticulosis, internal hemorrhoids; gastropathy, fundic glandular appearing polyps     Procedure Performed:   Procedure(s):  COLONOSCOPY   eso

## 2018-12-13 NOTE — OPERATIVE REPORT
[facility]    PATIENT'S NAME: Shilakenya Susy   ATTENDING PHYSICIAN: Victor Manuel Larson MD   OPERATING PHYSICIAN: Victor Manuel Larson MD   PATIENT ACCOUNT#:   [id number]    LOCATION:  [facility] ROOM [address] [place] [address] gastric fundus. Biopsies x4 were taken of the gastric antrum, and biopsies x4 were taken of the gastric body. Retroflexion with the endoscope showed a normal cardia and GE junction.   The pylorus was normal.    4.   The duodenum showed a normal bulb and s

## 2018-12-13 NOTE — ANESTHESIA PREPROCEDURE EVALUATION
Anesthesia PreOp Note    HPI:     Jeanne Jain is a 48year old female who presents for preoperative consultation requested by: Suha Mckeon MD    Date of Surgery: 12/13/2018    Procedure(s):  COLONOSCOPY  ESOPHAGOGASTRODUODENOSCOPY (EG Number of children: Not on file      Years of education: Not on file      Highest education level: Not on file    Social Needs      Financial resource strain: Not on file      Food insecurity - worry: Not on file      Food insecurity - inability: Not on fi patient and answered all questions. The patient desires to proceed with surgery and anesthesia as planned.    Discussed plan with:  CRNA      I have informed [place] and/or legal guardian or family member of the nature of the anesthetic plan, bene

## 2018-12-14 VITALS
WEIGHT: 185 LBS | HEIGHT: 63 IN | DIASTOLIC BLOOD PRESSURE: 82 MMHG | SYSTOLIC BLOOD PRESSURE: 119 MMHG | BODY MASS INDEX: 32.78 KG/M2 | HEART RATE: 69 BPM | RESPIRATION RATE: 13 BRPM | OXYGEN SATURATION: 100 %

## 2018-12-16 ENCOUNTER — TELEPHONE (OUTPATIENT)
Dept: GASTROENTEROLOGY | Facility: CLINIC | Age: 53
End: 2018-12-16

## 2018-12-17 NOTE — TELEPHONE ENCOUNTER
Entered into Epic:Recall colon in 10 years per Dr. Alexx Hoskins. Last Colon done 12/13/18, next due 12/13/28. Snapshot updated. Letter mailed.

## 2019-01-29 ENCOUNTER — OFFICE VISIT (OUTPATIENT)
Dept: FAMILY MEDICINE CLINIC | Facility: CLINIC | Age: 54
End: 2019-01-29
Payer: COMMERCIAL

## 2019-01-29 VITALS
SYSTOLIC BLOOD PRESSURE: 132 MMHG | RESPIRATION RATE: 18 BRPM | OXYGEN SATURATION: 98 % | HEIGHT: 63 IN | HEART RATE: 94 BPM | TEMPERATURE: 99 F | DIASTOLIC BLOOD PRESSURE: 70 MMHG | WEIGHT: 193 LBS | BODY MASS INDEX: 34.2 KG/M2

## 2019-01-29 DIAGNOSIS — J11.1 INFLUENZA DUE TO UNIDENTIFIED INFLUENZA VIRUS WITH OTHER RESPIRATORY MANIFESTATIONS: Primary | ICD-10-CM

## 2019-01-29 PROCEDURE — 99213 OFFICE O/P EST LOW 20 MIN: CPT | Performed by: NURSE PRACTITIONER

## 2019-01-29 RX ORDER — BROMPHENIRAMINE MALEATE, PSEUDOEPHEDRINE HYDROCHLORIDE, AND DEXTROMETHORPHAN HYDROBROMIDE 2; 30; 10 MG/5ML; MG/5ML; MG/5ML
10 SYRUP ORAL 4 TIMES DAILY PRN
Qty: 120 ML | Refills: 0 | Status: SHIPPED | OUTPATIENT
Start: 2019-01-29 | End: 2019-02-19 | Stop reason: ALTCHOICE

## 2019-01-29 NOTE — PROGRESS NOTES
CHIEF COMPLAINT:   Patient presents with:  Cough/URI      HPI:   Elvira De Guzman is a 48year old female who presents for sudden onset of flu-like symptoms upon arrival in [facility]. Symptoms began 4 days ago.   Patient reports body aches, chills,  congestio Tobacco comment: Social smoker. Pt does not remember when she stopped    Alcohol use: No    Drug use: No        REVIEW OF SYSTEMS:   GENERAL: feels chilled, feverish.   normal appetite  SKIN: no rashes or abnormal skin lesions  HEENT: See HPI  LUNGS: de Signed Prescriptions Disp Refills   • Ytczropwq-Klaxffju-MK (BROMFED DM) 30-2-10 MG/5ML Oral Syrup 120 mL 0     Sig: Take 10 mL by mouth 4 (four) times daily as needed (Take mainly at bedtime.  Causes drowsiness. ).        Risks, benefits, and side effects o The flu starts 1 to 3 days after you are exposed to the flu virus. It may last for 1 to 2 weeks but many people feel tired or fatigued for many weeks afterward. You usually don’t need to take antibiotics unless you have a complication.  This might be an ear · Cough with lots of colored mucus (sputum) or blood in your mucus  · Chest pain, shortness of breath, wheezing, or trouble breathing  · Severe headache, or face, neck, or ear pain  · New rash with fever  · Fever of 100.4°F (38°C) or higher, or as directed How a flu vaccine protects you    There are many types (strains) of flu viruses. Medical experts predict which strains are most likely to make people sick each year. This varies from year to year. Flu vaccines are made from these strains.  With the shot, in The flu vaccine is available as a regular shot and a high-strength shot. Your healthcare provider will recommend the vaccine that is best for you. Flu shot  The shot is available in a few different forms.  There is a high-dose vaccine for those over age 72

## 2019-01-29 NOTE — PATIENT INSTRUCTIONS
Many symptoms of Influenza/Flu. Flu is a virus, and not helped by antibiotics  The  antiviral Tamiflu can help shorten symptoms if started within 48 hrs of onset of symptoms. Consider OSCILLOCOCCINUM to decrease flu symptoms/duration.   Take it 3 TI these guidelines when caring for yourself at home:  · Avoid being around cigarette smoke, whether yours or other people’s. · Acetaminophen or ibuprofen will help ease your fever, muscle aches, and headache.  Don’t give aspirin to anyone younger than 18 who information is not intended as a substitute for professional medical care. Always follow your healthcare professional's instructions. Getting a Flu Vaccine  The flu (influenza) is caused by a virus that is easily spread.  A flu vaccine is your best c antibodies as well as younger people do. So a special high-strength flu vaccine is given to people older than 65. Your healthcare provider can tell you which type of flu vaccine is right for you. Who should get the flu vaccine?   The CDC recommends that in over the last several flu seasons. In the past, it was meant for people ages 3 to 52. Date Last Reviewed: 10/1/2017  © 6069-9168 [employer]. [address] [facility], [address] [place] [address]. All rights reserved.  This information is not intended as

## 2019-02-19 ENCOUNTER — OFFICE VISIT (OUTPATIENT)
Dept: FAMILY MEDICINE CLINIC | Facility: CLINIC | Age: 54
End: 2019-02-19
Payer: COMMERCIAL

## 2019-02-19 VITALS
SYSTOLIC BLOOD PRESSURE: 115 MMHG | OXYGEN SATURATION: 100 % | TEMPERATURE: 100 F | HEART RATE: 90 BPM | DIASTOLIC BLOOD PRESSURE: 78 MMHG | BODY MASS INDEX: 34.02 KG/M2 | RESPIRATION RATE: 18 BRPM | HEIGHT: 63 IN | WEIGHT: 192 LBS

## 2019-02-19 DIAGNOSIS — J06.9 VIRAL URI WITH COUGH: Primary | ICD-10-CM

## 2019-02-19 PROCEDURE — 99213 OFFICE O/P EST LOW 20 MIN: CPT | Performed by: NURSE PRACTITIONER

## 2019-02-19 RX ORDER — BENZONATATE 200 MG/1
200 CAPSULE ORAL 3 TIMES DAILY PRN
Qty: 30 CAPSULE | Refills: 0 | Status: SHIPPED | OUTPATIENT
Start: 2019-02-19 | End: 2019-03-01

## 2019-02-19 NOTE — PROGRESS NOTES
CHIEF COMPLAINT:   Patient presents with:  Cough/URI      HPI:   Heavenly Boykin is a 48year old female who presents for uri symptoms for  4 days. Patient reports sore throat only at the beginning of sx's, congestion, dry cough.  Symptoms have been worse Tobacco comment: Social smoker.  Pt does not remember when she stopped    Alcohol use: No    Drug use: No        REVIEW OF SYSTEMS:   GENERAL:  feels well otherwise  SKIN: no rashes or abnormal skin lesions  HEENT: See HPI  LUNGS: denies shortness of br The patient is asked to return if sx's persist or worsen. The patient indicates understanding of these issues and agrees to the plan.     OTC decongestants, throat lozenges and Tylenol     Patient Instructions           Comfort measures:  · Hydrate! (cold You have a viral upper respiratory illness (URI), which is another term for the common cold. This illness is contagious during the first few days. It is spread through the air by coughing and sneezing.  It may also be spread by direct contact (touching the · Cough with lots of colored sputum (mucus)  · Severe headache; face, neck, or ear pain  · Difficulty swallowing due to throat pain  · Fever of 100.4°F (38°C) or higher, or as directed by your healthcare provider  Call 911  Call 911 if any of these occur: · Cough medicines are available but it is unclear how well they actually work. · Take acetaminophen or an NSAID, such as ibuprofen, to ease throat pain  Ease digestive problems  · Put fluids back into your body.  Take frequent sips of clear liquids such as

## 2019-02-19 NOTE — PATIENT INSTRUCTIONS
Comfort measures:  · Hydrate! (cold or hot based on comfort). Drink lots of water or other non dehydrating liquids to help with illness. Salty foods, soups and tea can help with throat pain.    · Hand washing-use hand  or wash hands frequentl You have a viral upper respiratory illness (URI), which is another term for the common cold. This illness is contagious during the first few days. It is spread through the air by coughing and sneezing.  It may also be spread by direct contact (touching the · Cough with lots of colored sputum (mucus)  · Severe headache; face, neck, or ear pain  · Difficulty swallowing due to throat pain  · Fever of 100.4°F (38°C) or higher, or as directed by your healthcare provider  Call 911  Call 911 if any of these occur: · Cough medicines are available but it is unclear how well they actually work. · Take acetaminophen or an NSAID, such as ibuprofen, to ease throat pain  Ease digestive problems  · Put fluids back into your body.  Take frequent sips of clear liquids such as

## 2019-02-25 ENCOUNTER — OFFICE VISIT (OUTPATIENT)
Dept: FAMILY MEDICINE CLINIC | Facility: CLINIC | Age: 54
End: 2019-02-25
Payer: COMMERCIAL

## 2019-02-25 VITALS
HEIGHT: 64 IN | SYSTOLIC BLOOD PRESSURE: 120 MMHG | HEART RATE: 88 BPM | DIASTOLIC BLOOD PRESSURE: 85 MMHG | BODY MASS INDEX: 32.78 KG/M2 | WEIGHT: 192 LBS | TEMPERATURE: 98 F

## 2019-02-25 DIAGNOSIS — Z00.00 ANNUAL PHYSICAL EXAM: Primary | ICD-10-CM

## 2019-02-25 DIAGNOSIS — K31.7 GASTRIC POLYPS: ICD-10-CM

## 2019-02-25 DIAGNOSIS — E78.00 HIGH CHOLESTEROL: ICD-10-CM

## 2019-02-25 DIAGNOSIS — Z72.0 TOBACCO ABUSE: ICD-10-CM

## 2019-02-25 DIAGNOSIS — E66.09 NON MORBID OBESITY DUE TO EXCESS CALORIES: ICD-10-CM

## 2019-02-25 DIAGNOSIS — K57.30 DIVERTICULOSIS LARGE INTESTINE W/O PERFORATION OR ABSCESS W/O BLEEDING: ICD-10-CM

## 2019-02-25 DIAGNOSIS — E55.9 VITAMIN D DEFICIENCY: ICD-10-CM

## 2019-02-25 PROCEDURE — 99396 PREV VISIT EST AGE 40-64: CPT | Performed by: FAMILY MEDICINE

## 2019-02-25 RX ORDER — ALBUTEROL SULFATE 90 UG/1
2 AEROSOL, METERED RESPIRATORY (INHALATION) EVERY 6 HOURS PRN
Qty: 1 INHALER | Refills: 0 | Status: SHIPPED | OUTPATIENT
Start: 2019-02-25 | End: 2019-07-26

## 2019-02-25 NOTE — PROGRESS NOTES
Has uri  I've been getting better. REASON FOR VISIT:    Andrew Handy is a 48year old female who presents for an [address]         Patient Active Problem List:     Rosacea     High cholesterol     Vitamin D deficiency     Non morbid diagnoses    ALLERGIES:     Contrave [Naltrexon*    RASH    Comment:rash  Wellbutrin [Bupropi*    RASH    Comment:rash  Simvastatin             RASH  CURRENT MEDICATIONS:     Current Outpatient Medications:  Albuterol Sulfate  (90 Base) MCG/[facility] shortness of breath with exertion  CARDIOVASCULAR: denies chest pain on exertion  GI: denies abdominal pain, denies heartburn  : denies dysuria, vaginal discharge or itching, periods regular   MUSCULOSKELETAL: denies back pain  NEURO: denies headaches  P HORMONE; Future  - VITAMIN D, 25-HYDROXY; Future    4. Tobacco abuse  discussed    5. Non morbid obesity due to excess calories  Declined referral    6. Diverticulosis large intestine w/o perforation or abscess w/o bleeding  Continue fiber    7.  Gastric po

## 2019-03-11 ENCOUNTER — HOSPITAL (OUTPATIENT)
Dept: OTHER | Age: 54
End: 2019-03-11

## 2019-03-25 ENCOUNTER — LAB ENCOUNTER (OUTPATIENT)
Dept: LAB | Age: 54
End: 2019-03-25
Attending: FAMILY MEDICINE
Payer: COMMERCIAL

## 2019-03-25 DIAGNOSIS — E55.9 VITAMIN D DEFICIENCY: ICD-10-CM

## 2019-03-25 DIAGNOSIS — E78.00 HIGH CHOLESTEROL: ICD-10-CM

## 2019-03-25 DIAGNOSIS — Z00.00 ANNUAL PHYSICAL EXAM: ICD-10-CM

## 2019-03-25 LAB
ALBUMIN SERPL-MCNC: 3.9 G/DL (ref 3.4–5)
ALBUMIN/GLOB SERPL: 1.1 {RATIO} (ref 1–2)
ALP LIVER SERPL-CCNC: 71 U/L (ref 41–108)
ALT SERPL-CCNC: 19 U/L (ref 13–56)
ANION GAP SERPL CALC-SCNC: 5 MMOL/L (ref 0–18)
AST SERPL-CCNC: 13 U/L (ref 15–37)
BASOPHILS # BLD AUTO: 0.02 X10(3) UL (ref 0–0.2)
BASOPHILS NFR BLD AUTO: 0.4 %
BILIRUB SERPL-MCNC: 0.4 MG/DL (ref 0.1–2)
BUN BLD-MCNC: 12 MG/DL (ref 7–18)
BUN/CREAT SERPL: 20.3 (ref 10–20)
CALCIUM BLD-MCNC: 9.1 MG/DL (ref 8.5–10.1)
CHLORIDE SERPL-SCNC: 108 MMOL/L (ref 98–107)
CHOLEST SMN-MCNC: 193 MG/DL (ref ?–200)
CO2 SERPL-SCNC: 28 MMOL/L (ref 21–32)
CREAT BLD-MCNC: 0.59 MG/DL (ref 0.55–1.02)
DEPRECATED RDW RBC AUTO: 48.2 FL (ref 35.1–46.3)
EOSINOPHIL # BLD AUTO: 0.17 X10(3) UL (ref 0–0.7)
EOSINOPHIL NFR BLD AUTO: 3.6 %
ERYTHROCYTE [DISTWIDTH] IN BLOOD BY AUTOMATED COUNT: 14.7 % (ref 11–15)
GLOBULIN PLAS-MCNC: 3.4 G/DL (ref 2.8–4.4)
GLUCOSE BLD-MCNC: 99 MG/DL (ref 70–99)
HCT VFR BLD AUTO: 41.1 % (ref 35–48)
HDLC SERPL-MCNC: 94 MG/DL (ref 40–59)
HGB BLD-MCNC: 13.2 G/DL (ref 12–16)
IMM GRANULOCYTES # BLD AUTO: 0.01 X10(3) UL (ref 0–1)
IMM GRANULOCYTES NFR BLD: 0.2 %
LDLC SERPL CALC-MCNC: 84 MG/DL (ref ?–100)
LYMPHOCYTES # BLD AUTO: 1.78 X10(3) UL (ref 1–4)
LYMPHOCYTES NFR BLD AUTO: 38.2 %
M PROTEIN MFR SERPL ELPH: 7.3 G/DL (ref 6.4–8.2)
MCH RBC QN AUTO: 28.7 PG (ref 26–34)
MCHC RBC AUTO-ENTMCNC: 32.1 G/DL (ref 31–37)
MCV RBC AUTO: 89.3 FL (ref 80–100)
MONOCYTES # BLD AUTO: 0.41 X10(3) UL (ref 0.1–1)
MONOCYTES NFR BLD AUTO: 8.8 %
NEUTROPHILS # BLD AUTO: 2.27 X10 (3) UL (ref 1.5–7.7)
NEUTROPHILS # BLD AUTO: 2.27 X10(3) UL (ref 1.5–7.7)
NEUTROPHILS NFR BLD AUTO: 48.8 %
NONHDLC SERPL-MCNC: 99 MG/DL (ref ?–130)
OSMOLALITY SERPL CALC.SUM OF ELEC: 292 MOSM/KG (ref 275–295)
PLATELET # BLD AUTO: 219 10(3)UL (ref 150–450)
POTASSIUM SERPL-SCNC: 4.1 MMOL/L (ref 3.5–5.1)
RBC # BLD AUTO: 4.6 X10(6)UL (ref 3.8–5.3)
SODIUM SERPL-SCNC: 141 MMOL/L (ref 136–145)
TRIGL SERPL-MCNC: 75 MG/DL (ref 30–149)
TSI SER-ACNC: 1.12 MIU/ML (ref 0.36–3.74)
VLDLC SERPL CALC-MCNC: 15 MG/DL (ref 0–30)
WBC # BLD AUTO: 4.7 X10(3) UL (ref 4–11)

## 2019-03-25 PROCEDURE — 82306 VITAMIN D 25 HYDROXY: CPT

## 2019-03-25 PROCEDURE — 80061 LIPID PANEL: CPT

## 2019-03-25 PROCEDURE — 36415 COLL VENOUS BLD VENIPUNCTURE: CPT

## 2019-03-25 PROCEDURE — 84443 ASSAY THYROID STIM HORMONE: CPT

## 2019-03-25 PROCEDURE — 80053 COMPREHEN METABOLIC PANEL: CPT

## 2019-03-25 PROCEDURE — 85025 COMPLETE CBC W/AUTO DIFF WBC: CPT

## 2019-03-27 ENCOUNTER — TELEPHONE (OUTPATIENT)
Dept: INTERNAL MEDICINE CLINIC | Facility: CLINIC | Age: 54
End: 2019-03-27

## 2019-03-27 DIAGNOSIS — E78.5 HYPERLIPIDEMIA, UNSPECIFIED HYPERLIPIDEMIA TYPE: Primary | ICD-10-CM

## 2019-03-27 LAB — 25(OH)D3 SERPL-MCNC: 40.7 NG/ML (ref 30–100)

## 2019-03-27 NOTE — TELEPHONE ENCOUNTER
----- Message from Tawanna Hayward DO sent at 3/26/2019  5:26 PM CDT -----  CBC thyroid cholesterol CMP all excellent. Continue on the current medication regimen repeat labs 6 months.   Lipids CMP diagnosis hyper lipidemia      Jitendra KING DO  P

## 2019-03-28 NOTE — TELEPHONE ENCOUNTER
Patient informed of results ( identified name and ) and recommendations, as per provider's result note copied below. Patient voiced understanding and agrees.   Pt asking CT done at [place]; have been scanned to chart, please review and

## 2019-03-28 NOTE — TELEPHONE ENCOUNTER
Calcium score was good. There is no significant calcium buildup around the heart as one would see if there was a lot of cholesterol being positive.   Very good continue on her current medication

## 2019-07-26 ENCOUNTER — NURSE TRIAGE (OUTPATIENT)
Dept: FAMILY MEDICINE CLINIC | Facility: CLINIC | Age: 54
End: 2019-07-26

## 2019-07-26 ENCOUNTER — OFFICE VISIT (OUTPATIENT)
Dept: FAMILY MEDICINE CLINIC | Facility: CLINIC | Age: 54
End: 2019-07-26
Payer: COMMERCIAL

## 2019-07-26 VITALS
OXYGEN SATURATION: 97 % | SYSTOLIC BLOOD PRESSURE: 128 MMHG | DIASTOLIC BLOOD PRESSURE: 79 MMHG | RESPIRATION RATE: 16 BRPM | TEMPERATURE: 98 F | HEIGHT: 63 IN | WEIGHT: 185 LBS | HEART RATE: 72 BPM | BODY MASS INDEX: 32.78 KG/M2

## 2019-07-26 DIAGNOSIS — S39.012A LOW BACK STRAIN, INITIAL ENCOUNTER: Primary | ICD-10-CM

## 2019-07-26 PROCEDURE — 99213 OFFICE O/P EST LOW 20 MIN: CPT | Performed by: NURSE PRACTITIONER

## 2019-07-26 RX ORDER — DOXYCYCLINE 100 MG/1
CAPSULE ORAL
Refills: 1 | COMMUNITY
Start: 2019-07-04 | End: 2019-12-27 | Stop reason: ALTCHOICE

## 2019-07-26 RX ORDER — CYCLOBENZAPRINE HCL 10 MG
10 TABLET ORAL 3 TIMES DAILY PRN
Qty: 21 TABLET | Refills: 0 | Status: SHIPPED | OUTPATIENT
Start: 2019-07-26 | End: 2019-08-02

## 2019-07-26 RX ORDER — NAPROXEN 500 MG/1
500 TABLET ORAL 2 TIMES DAILY WITH MEALS
Qty: 14 TABLET | Refills: 0 | Status: SHIPPED | OUTPATIENT
Start: 2019-07-26 | End: 2019-08-02

## 2019-07-26 NOTE — TELEPHONE ENCOUNTER
Action Requested: Summary for Provider     []  Critical Lab, Recommendations Needed  [x] Need Additional Advice  []   FYI    []   Need Orders  [x] Need Medications Sent to Pharmacy  []  Other     SUMMARY: Per protocol advised home care or OV.  Patient denie

## 2019-07-26 NOTE — PROGRESS NOTES
CHIEF COMPLAINT:     Patient presents with:  Low Back Pain      HPI:   Jeronimo Musa is a 48year old female who is here for complaints of back pain for 2 days. Pain is located at right low back. Pain is described as aching and tight.  Severity is moder REVIEW OF SYSTEMS:   GENERAL: feels well otherwise, good appetite  SKIN: denies any unusual skin lesions  LUNGS: denies shortness of breath   CARDIOVASCULAR: denies chest pain or palpitations  GI: denies abdominal pain, N/VC/D.   Denies heartburn  : no dy Sig: Take 1 tablet (10 mg total) by mouth 3 (three) times daily as needed for Muscle spasms. • naproxen 500 MG Oral Tab 14 tablet 0     Sig: Take 1 tablet (500 mg total) by mouth 2 (two) times daily with meals for 7 days.          Patient Instructions Seek medical care right away if:  · You can't stand or walk. · You have a temperature over 100.4°F (38.0°C)  · You have frequent, painful, or bloody urination. · You have severe abdominal pain. · You have a sharp, stabbing pain.   · Your pain is constant

## 2019-07-26 NOTE — PATIENT INSTRUCTIONS
Self-Care for Low Back Pain    Most people have low back pain now and then. In many cases, it isn’t serious and self-care can help. Sometimes low back pain can be a sign of a bigger problem.  Call your healthcare provider if your pain returns often or get · You have pain, tingling, or numbness in your leg. · You feel pain in a new area of your back. · You notice that the pain isn’t decreasing after more than a week. Date Last Reviewed: 3/1/2018  © 0148-7234 [employer].  [address] [facility]

## 2019-07-29 RX ORDER — NAPROXEN 500 MG/1
500 TABLET ORAL 2 TIMES DAILY WITH MEALS
Qty: 14 TABLET | Refills: 0 | OUTPATIENT
Start: 2019-07-29 | End: 2019-08-05

## 2019-07-29 NOTE — TELEPHONE ENCOUNTER
Pt requesting refill of Naproxen, protocol failed, unable to approve:     Patient's PCP, is not any of the  providers in office       Future Appointments   Date Time Provider Gabby Bunn   7/29/2019  8:50 AM [facility] Collins [facility]

## 2019-08-09 NOTE — TELEPHONE ENCOUNTER
[employer] message sent. RN please call pt and clarify if she is requesting refill as was d/c'd from med list on 2/25/19:    ergocalciferol 67432 units Oral Cap (Discontinued) 12 capsule 11 8/3/2018 2/25/2019    Sig: TAKE 1 CAPSULE BY MOUTH ONCE A WEEK.     Se

## 2019-08-12 RX ORDER — ERGOCALCIFEROL 1.25 MG/1
CAPSULE ORAL
Qty: 12 CAPSULE | Refills: 3 | Status: SHIPPED | OUTPATIENT
Start: 2019-08-12 | End: 2020-06-24

## 2019-08-12 NOTE — TELEPHONE ENCOUNTER
Aia 16, per pt reply on MyChart she has continued taking ergocalciferol that is being requested for refill, although it shows as being d/c'd from med lsit on 2/25/19 as \"therapy completed\" by a CMA. Please advise regarding pended Rx.      Last vitamin D leve

## 2019-09-09 ENCOUNTER — APPOINTMENT (OUTPATIENT)
Dept: LAB | Age: 54
End: 2019-09-09
Attending: FAMILY MEDICINE
Payer: COMMERCIAL

## 2019-09-09 DIAGNOSIS — E78.5 HYPERLIPIDEMIA, UNSPECIFIED HYPERLIPIDEMIA TYPE: ICD-10-CM

## 2019-09-09 LAB
ALBUMIN SERPL-MCNC: 3.9 G/DL (ref 3.4–5)
ALBUMIN/GLOB SERPL: 1.2 {RATIO} (ref 1–2)
ALP LIVER SERPL-CCNC: 67 U/L (ref 41–108)
ALT SERPL-CCNC: 19 U/L (ref 13–56)
ANION GAP SERPL CALC-SCNC: 6 MMOL/L (ref 0–18)
AST SERPL-CCNC: 10 U/L (ref 15–37)
BILIRUB SERPL-MCNC: 0.5 MG/DL (ref 0.1–2)
BUN BLD-MCNC: 12 MG/DL (ref 7–18)
BUN/CREAT SERPL: 16.9 (ref 10–20)
CALCIUM BLD-MCNC: 9.2 MG/DL (ref 8.5–10.1)
CHLORIDE SERPL-SCNC: 109 MMOL/L (ref 98–112)
CHOLEST SMN-MCNC: 224 MG/DL (ref ?–200)
CO2 SERPL-SCNC: 27 MMOL/L (ref 21–32)
CREAT BLD-MCNC: 0.71 MG/DL (ref 0.55–1.02)
GLOBULIN PLAS-MCNC: 3.3 G/DL (ref 2.8–4.4)
GLUCOSE BLD-MCNC: 98 MG/DL (ref 70–99)
HDLC SERPL-MCNC: 82 MG/DL (ref 40–59)
LDLC SERPL CALC-MCNC: 119 MG/DL (ref ?–100)
M PROTEIN MFR SERPL ELPH: 7.2 G/DL (ref 6.4–8.2)
NONHDLC SERPL-MCNC: 142 MG/DL (ref ?–130)
OSMOLALITY SERPL CALC.SUM OF ELEC: 294 MOSM/KG (ref 275–295)
PATIENT FASTING: YES
PATIENT FASTING: YES
POTASSIUM SERPL-SCNC: 3.9 MMOL/L (ref 3.5–5.1)
SODIUM SERPL-SCNC: 142 MMOL/L (ref 136–145)
TRIGL SERPL-MCNC: 113 MG/DL (ref 30–149)
VLDLC SERPL CALC-MCNC: 23 MG/DL (ref 0–30)

## 2019-09-09 PROCEDURE — 36415 COLL VENOUS BLD VENIPUNCTURE: CPT

## 2019-09-09 PROCEDURE — 80053 COMPREHEN METABOLIC PANEL: CPT

## 2019-09-09 PROCEDURE — 80061 LIPID PANEL: CPT

## 2019-09-10 DIAGNOSIS — E78.00 HIGH CHOLESTEROL: Primary | ICD-10-CM

## 2019-10-08 ENCOUNTER — OFFICE VISIT (OUTPATIENT)
Dept: FAMILY MEDICINE CLINIC | Facility: CLINIC | Age: 54
End: 2019-10-08
Payer: COMMERCIAL

## 2019-10-08 ENCOUNTER — TELEPHONE (OUTPATIENT)
Dept: OTHER | Age: 54
End: 2019-10-08

## 2019-10-08 VITALS
HEIGHT: 63 IN | HEART RATE: 74 BPM | BODY MASS INDEX: 33.31 KG/M2 | WEIGHT: 188 LBS | DIASTOLIC BLOOD PRESSURE: 81 MMHG | SYSTOLIC BLOOD PRESSURE: 126 MMHG

## 2019-10-08 DIAGNOSIS — L71.9 ROSACEA: ICD-10-CM

## 2019-10-08 DIAGNOSIS — E78.00 HIGH CHOLESTEROL: Primary | ICD-10-CM

## 2019-10-08 PROCEDURE — 99214 OFFICE O/P EST MOD 30 MIN: CPT | Performed by: FAMILY MEDICINE

## 2019-10-08 PROCEDURE — 99212 OFFICE O/P EST SF 10 MIN: CPT | Performed by: FAMILY MEDICINE

## 2019-10-08 RX ORDER — ATORVASTATIN CALCIUM 10 MG/1
10 TABLET, FILM COATED ORAL NIGHTLY
Qty: 90 TABLET | Refills: 3 | Status: SHIPPED | OUTPATIENT
Start: 2019-10-08 | End: 2020-10-21

## 2019-10-08 RX ORDER — METRONIDAZOLE 7.5 MG/G
GEL TOPICAL
Qty: 1 TUBE | Refills: 2 | Status: SHIPPED | OUTPATIENT
Start: 2019-10-08 | End: 2019-12-27 | Stop reason: ALTCHOICE

## 2019-10-08 RX ORDER — METRONIDAZOLE 7.5 MG/G
GEL VAGINAL
Qty: 70 G | Refills: 5 | Status: SHIPPED | OUTPATIENT
Start: 2019-10-08 | End: 2020-01-28

## 2019-10-08 NOTE — TELEPHONE ENCOUNTER
Th patient was seen today 10/8/19 :  Has rosacea  Has redness on face.     She went to the pharmacy to  the script and when she got home  The medication was for;  metRONIDAZOLE 0.75 % Vaginal Gel    Attempted to contact the pharmacy to see if can be

## 2019-10-08 NOTE — PROGRESS NOTES
Was off meds  A few days before the test  Ct rock score 0  Is on 10 mg of atorvastatin. Has rosacea  Has redness on face. Exam  Has redness on face    A/p  1. High cholesterol  Continue med    2.  Rosacea  metrogel  Discussed tx options  Wanted topical

## 2019-10-28 ENCOUNTER — HOSPITAL ENCOUNTER (OUTPATIENT)
Dept: MAMMOGRAPHY | Age: 54
Discharge: HOME | End: 2019-10-28
Attending: OBSTETRICS & GYNECOLOGY
Payer: COMMERCIAL

## 2019-10-28 DIAGNOSIS — Z12.31 ENCOUNTER FOR SCREENING MAMMOGRAM FOR MALIGNANT NEOPLASM OF BREAST: ICD-10-CM

## 2019-10-28 PROCEDURE — 77067 SCR MAMMO BI INCL CAD: CPT | Performed by: OBSTETRICS & GYNECOLOGY

## 2019-10-28 PROCEDURE — 77063 BREAST TOMOSYNTHESIS BI: CPT | Performed by: OBSTETRICS & GYNECOLOGY

## 2019-12-27 ENCOUNTER — OFFICE VISIT (OUTPATIENT)
Dept: FAMILY MEDICINE CLINIC | Facility: CLINIC | Age: 54
End: 2019-12-27
Payer: COMMERCIAL

## 2019-12-27 VITALS
BODY MASS INDEX: 32.78 KG/M2 | TEMPERATURE: 98 F | SYSTOLIC BLOOD PRESSURE: 115 MMHG | WEIGHT: 185 LBS | HEART RATE: 80 BPM | HEIGHT: 63 IN | RESPIRATION RATE: 16 BRPM | DIASTOLIC BLOOD PRESSURE: 76 MMHG | OXYGEN SATURATION: 98 %

## 2019-12-27 DIAGNOSIS — J06.9 VIRAL URI WITH COUGH: Primary | ICD-10-CM

## 2019-12-27 PROCEDURE — 99213 OFFICE O/P EST LOW 20 MIN: CPT | Performed by: NURSE PRACTITIONER

## 2019-12-27 RX ORDER — BENZONATATE 200 MG/1
200 CAPSULE ORAL 3 TIMES DAILY PRN
Qty: 30 CAPSULE | Refills: 0 | Status: SHIPPED | OUTPATIENT
Start: 2019-12-27 | End: 2020-01-06

## 2019-12-27 NOTE — PROGRESS NOTES
CHIEF COMPLAINT:   Patient presents with:  Sore Throat  Cough      HPI:   Daysi Boyd is a 48year old female who presents for uri symptoms for  3 days. Patient reports sore throat only at the beginning of sx's, congestion, dry cough.  Symptoms have b no rashes or abnormal skin lesions  HEENT: See HPI  LUNGS: denies shortness of breath or wheezing, See HPI  CARDIOVASCULAR: denies chest pain or palpitations   GI: denies N/V/C or abdominal pain, appetite normal  NEURO: no headaches    EXAM:   /76 (B measures:  · Hydrate! (cold or hot based on comfort). Drink lots of water or other non dehydrating liquids to help with illness. Salty foods, soups and tea can help with throat pain.    · Hand washing-use hand  or wash hands frequently, cover your Antibiotics will not kill a virus, and they are generally not prescribed for this condition. Home care  · If symptoms are severe, rest at home for the first 2 to 3 days. When you resume activity, don't let yourself get too tired. · Don't smoke.  If you ne breathing  · Coughing up blood  · Very severe pain with swallowing, especially if it goes along with a muffled voice   Date Last Reviewed: 6/1/2018  © 0274-9359 [employer]. 1407 [facility], [facility] [place]. All rights reserved.  This Avoid drinks that have a lot of sugar in them, such as juices and sodas. These can make diarrhea worse. Older children and adults can drink sports drinks. · As your appetite returns, you can resume your normal diet.  Ask your healthcare provider if there a

## 2019-12-27 NOTE — PATIENT INSTRUCTIONS
Comfort measures:  · Hydrate! (cold or hot based on comfort). Drink lots of water or other non dehydrating liquids to help with illness. Salty foods, soups and tea can help with throat pain.    · Hand washing-use hand  or wash hands frequentl several weeks. Antibiotics will not kill a virus, and they are generally not prescribed for this condition. Home care  · If symptoms are severe, rest at home for the first 2 to 3 days. When you resume activity, don't let yourself get too tired.   · Don't s breathing  · Coughing up blood  · Very severe pain with swallowing, especially if it goes along with a muffled voice   Date Last Reviewed: 6/1/2018  © 8401-0345 [employer]. 1407 [facility] [place]. All rights reserved.  This Avoid drinks that have a lot of sugar in them, such as juices and sodas. These can make diarrhea worse. Older children and adults can drink sports drinks. · As your appetite returns, you can resume your normal diet.  Ask your healthcare provider if there a

## 2020-01-13 ENCOUNTER — OFFICE VISIT (OUTPATIENT)
Dept: FAMILY MEDICINE CLINIC | Facility: CLINIC | Age: 55
End: 2020-01-13
Payer: COMMERCIAL

## 2020-01-13 VITALS
RESPIRATION RATE: 16 BRPM | DIASTOLIC BLOOD PRESSURE: 59 MMHG | HEART RATE: 97 BPM | SYSTOLIC BLOOD PRESSURE: 101 MMHG | TEMPERATURE: 100 F

## 2020-01-13 DIAGNOSIS — R68.89 FLU-LIKE SYMPTOMS: Primary | ICD-10-CM

## 2020-01-13 DIAGNOSIS — J10.1 INFLUENZA A: ICD-10-CM

## 2020-01-13 DIAGNOSIS — J11.1 BRONCHITIS WITH FLU: ICD-10-CM

## 2020-01-13 LAB
OPERATOR ID: ABNORMAL
POCT INFLUENZA A: POSITIVE
POCT INFLUENZA B: NEGATIVE

## 2020-01-13 PROCEDURE — 87502 INFLUENZA DNA AMP PROBE: CPT | Performed by: NURSE PRACTITIONER

## 2020-01-13 PROCEDURE — 99213 OFFICE O/P EST LOW 20 MIN: CPT | Performed by: NURSE PRACTITIONER

## 2020-01-13 RX ORDER — OSELTAMIVIR PHOSPHATE 75 MG/1
75 CAPSULE ORAL 2 TIMES DAILY
Qty: 10 CAPSULE | Refills: 0 | Status: SHIPPED | OUTPATIENT
Start: 2020-01-13 | End: 2020-01-18

## 2020-01-13 RX ORDER — ALBUTEROL SULFATE 90 UG/1
1 AEROSOL, METERED RESPIRATORY (INHALATION) EVERY 6 HOURS PRN
Qty: 1 INHALER | Refills: 0 | Status: SHIPPED | OUTPATIENT
Start: 2020-01-13 | End: 2020-01-20

## 2020-01-13 NOTE — PROGRESS NOTES
CHIEF COMPLAINT:   Patient presents with:  URI: seen 12/27/19 not better      HPI:   Jeronimo Musa is a 48year old female who presents subsequent visit for upper respiratory symptoms for > 3+ weeks.  Patient evaluated 12/27/19 diagnosed with viral URI, areas  HEAD: atraumatic, normocephalic.     EYES: conjunctiva clear, EOM intact  EARS: TM's intact, non- bulging,  No retraction, no fluid, bony landmarks present  NOSE: Nostrils patent, scant nasal discharge, nasal mucosa mildly inflamed and edematous  THR

## 2020-01-28 ENCOUNTER — TELEPHONE (OUTPATIENT)
Dept: FAMILY MEDICINE CLINIC | Facility: CLINIC | Age: 55
End: 2020-01-28

## 2020-01-28 RX ORDER — METRONIDAZOLE 7.5 MG/G
GEL TOPICAL
Qty: 1 TUBE | Refills: 1 | Status: SHIPPED | OUTPATIENT
Start: 2020-01-28 | End: 2020-11-16

## 2020-01-28 NOTE — TELEPHONE ENCOUNTER
Per pharmacy alternative is being requesting for the following medication. •  metRONIDAZOLE 0.75 % Vaginal Gel, Apply daily, Disp: 70 g, Rfl: 5    Pharmacy comments:  Alternative requested: Pt is stating this is to be applied topically, not vaginal. Pl

## 2020-01-28 NOTE — TELEPHONE ENCOUNTER
Called the pharmacy and they advised that this is not supposed to be vaginal it is topical. Called the patient to ask what she is using this for and she advised it's for her face and that she usually uses gel.  Per Uyen at [facility], it comes as a cream, topica

## 2020-01-30 NOTE — TELEPHONE ENCOUNTER
The patient sent a mychart about the medication. I called the [facility] pharmacy and is ready for  the patient was informed via ams AGhart.

## 2020-06-24 RX ORDER — ERGOCALCIFEROL 1.25 MG/1
50000 CAPSULE ORAL WEEKLY
Qty: 12 CAPSULE | Refills: 0 | Status: SHIPPED | OUTPATIENT
Start: 2020-06-24 | End: 2020-10-21

## 2020-06-24 NOTE — TELEPHONE ENCOUNTER
This is being sent to you without review by the Triage staff due to the high call volumes created by the COVID-19 virus, per the email sent by Dr. Douglas Jacob on 3/19/20. Thank you for your support.     Centralized Nurse Triage Team

## 2020-08-11 ENCOUNTER — OFFICE VISIT (OUTPATIENT)
Dept: FAMILY MEDICINE CLINIC | Facility: CLINIC | Age: 55
End: 2020-08-11
Payer: COMMERCIAL

## 2020-08-11 VITALS
WEIGHT: 188 LBS | DIASTOLIC BLOOD PRESSURE: 83 MMHG | SYSTOLIC BLOOD PRESSURE: 126 MMHG | HEIGHT: 63 IN | BODY MASS INDEX: 33.31 KG/M2 | HEART RATE: 83 BPM

## 2020-08-11 DIAGNOSIS — E78.00 HIGH CHOLESTEROL: ICD-10-CM

## 2020-08-11 DIAGNOSIS — Z76.89 ENCOUNTER TO ESTABLISH CARE: ICD-10-CM

## 2020-08-11 DIAGNOSIS — L71.9 ROSACEA: Primary | ICD-10-CM

## 2020-08-11 PROCEDURE — 99213 OFFICE O/P EST LOW 20 MIN: CPT | Performed by: FAMILY MEDICINE

## 2020-08-11 PROCEDURE — 3008F BODY MASS INDEX DOCD: CPT | Performed by: FAMILY MEDICINE

## 2020-08-11 PROCEDURE — 3079F DIAST BP 80-89 MM HG: CPT | Performed by: FAMILY MEDICINE

## 2020-08-11 PROCEDURE — 99212 OFFICE O/P EST SF 10 MIN: CPT | Performed by: FAMILY MEDICINE

## 2020-08-11 PROCEDURE — 3074F SYST BP LT 130 MM HG: CPT | Performed by: FAMILY MEDICINE

## 2020-08-11 RX ORDER — DOXYCYCLINE HYCLATE 100 MG/1
100 CAPSULE ORAL 2 TIMES DAILY
Qty: 60 CAPSULE | Refills: 4 | Status: SHIPPED | OUTPATIENT
Start: 2020-08-11 | End: 2020-10-21

## 2020-08-11 NOTE — PROGRESS NOTES
Surinder Sotomayor is a 48year old female. Patient presents with:  Establish Care  Rosacea: requesting doxycycline     HPI:   New pt to me. Saw Dr. Kari Nagy   Reports metrogel is drying out her skin too much for the rosacea.  She would like to go anita apparent distress  SKIN: bilateral cheeks and foreheads mild erythema and scattered acne    ASSESSMENT AND PLAN:   1. Rosacea  Trial of doxycycline BID. Explained increased sensitivity to the sun. 2. Encounter to establish care      3.  High cholesterol

## 2020-08-17 ENCOUNTER — LAB ENCOUNTER (OUTPATIENT)
Dept: LAB | Age: 55
End: 2020-08-17
Attending: FAMILY MEDICINE
Payer: COMMERCIAL

## 2020-08-17 DIAGNOSIS — E78.00 HIGH CHOLESTEROL: ICD-10-CM

## 2020-08-17 LAB
ALBUMIN SERPL-MCNC: 3.7 G/DL (ref 3.4–5)
ALBUMIN/GLOB SERPL: 1 {RATIO} (ref 1–2)
ALP LIVER SERPL-CCNC: 86 U/L (ref 41–108)
ALT SERPL-CCNC: 17 U/L (ref 13–56)
ANION GAP SERPL CALC-SCNC: 5 MMOL/L (ref 0–18)
AST SERPL-CCNC: 6 U/L (ref 15–37)
BILIRUB SERPL-MCNC: 0.4 MG/DL (ref 0.1–2)
BUN BLD-MCNC: 17 MG/DL (ref 7–18)
BUN/CREAT SERPL: 24.6 (ref 10–20)
CALCIUM BLD-MCNC: 8.8 MG/DL (ref 8.5–10.1)
CHLORIDE SERPL-SCNC: 109 MMOL/L (ref 98–112)
CHOLEST SMN-MCNC: 194 MG/DL (ref ?–200)
CO2 SERPL-SCNC: 28 MMOL/L (ref 21–32)
CREAT BLD-MCNC: 0.69 MG/DL (ref 0.55–1.02)
GLOBULIN PLAS-MCNC: 3.6 G/DL (ref 2.8–4.4)
GLUCOSE BLD-MCNC: 98 MG/DL (ref 70–99)
HDLC SERPL-MCNC: 85 MG/DL (ref 40–59)
LDLC SERPL CALC-MCNC: 93 MG/DL (ref ?–100)
M PROTEIN MFR SERPL ELPH: 7.3 G/DL (ref 6.4–8.2)
NONHDLC SERPL-MCNC: 109 MG/DL (ref ?–130)
OSMOLALITY SERPL CALC.SUM OF ELEC: 296 MOSM/KG (ref 275–295)
PATIENT FASTING Y/N/NP: YES
PATIENT FASTING Y/N/NP: YES
POTASSIUM SERPL-SCNC: 3.9 MMOL/L (ref 3.5–5.1)
SODIUM SERPL-SCNC: 142 MMOL/L (ref 136–145)
TRIGL SERPL-MCNC: 81 MG/DL (ref 30–149)
TSI SER-ACNC: 2.22 MIU/ML (ref 0.36–3.74)
VLDLC SERPL CALC-MCNC: 16 MG/DL (ref 0–30)

## 2020-08-17 PROCEDURE — 80053 COMPREHEN METABOLIC PANEL: CPT

## 2020-08-17 PROCEDURE — 84443 ASSAY THYROID STIM HORMONE: CPT

## 2020-08-17 PROCEDURE — 36415 COLL VENOUS BLD VENIPUNCTURE: CPT

## 2020-08-17 PROCEDURE — 80061 LIPID PANEL: CPT

## 2020-08-24 NOTE — PROGRESS NOTES
Kimberly - Cholesterol is much better on the low dose atorvastatin.  Continue the same. - Dr. Michael Cline

## 2020-10-01 ENCOUNTER — TELEPHONE (OUTPATIENT)
Dept: FAMILY MEDICINE CLINIC | Facility: CLINIC | Age: 55
End: 2020-10-01

## 2020-10-01 DIAGNOSIS — Z12.31 BREAST CANCER SCREENING BY MAMMOGRAM: Primary | ICD-10-CM

## 2020-10-01 NOTE — TELEPHONE ENCOUNTER
Dr. Delvin Andre, patient is requesting a mammogram order. Last mammogram was completed 10/28/2019. Please advise on order. CONCLUSION:       BI-RADS CATEGORY:     DIAGNOSTIC CATEGORY 2--BENIGN FINDING NO CHANGE FROM COMPARISON ASSESSMENT.        RECOMMENDATION

## 2020-10-03 NOTE — TELEPHONE ENCOUNTER
From   Francis Ramos To   Arthur Farmer Sent and Delivered   10/1/2020 10:33 AM   Last Read in MyChart   10/2/2020  9:07 AM by Fatemeh Mcintosh

## 2020-10-20 ENCOUNTER — TELEPHONE (OUTPATIENT)
Dept: FAMILY MEDICINE CLINIC | Facility: CLINIC | Age: 55
End: 2020-10-20

## 2020-10-20 NOTE — TELEPHONE ENCOUNTER
Patient is changing pharmacies, the pharmacy told her to contact us to have scripts sent over    ergocalciferol 1.25 MG (03235 UT) Oral Cap    Doxycycline Hyclate 100 MG Oral Cap     atorvastatin 10 MG Oral Tab

## 2020-10-21 ENCOUNTER — IMMUNIZATION (OUTPATIENT)
Dept: FAMILY MEDICINE CLINIC | Facility: CLINIC | Age: 55
End: 2020-10-21
Payer: COMMERCIAL

## 2020-10-21 DIAGNOSIS — Z23 NEED FOR VACCINATION: ICD-10-CM

## 2020-10-21 PROCEDURE — 90471 IMMUNIZATION ADMIN: CPT | Performed by: PHYSICIAN ASSISTANT

## 2020-10-21 PROCEDURE — 90686 IIV4 VACC NO PRSV 0.5 ML IM: CPT | Performed by: PHYSICIAN ASSISTANT

## 2020-10-21 RX ORDER — ATORVASTATIN CALCIUM 10 MG/1
10 TABLET, FILM COATED ORAL NIGHTLY
Qty: 90 TABLET | Refills: 3 | Status: SHIPPED | OUTPATIENT
Start: 2020-10-21

## 2020-10-21 RX ORDER — DOXYCYCLINE HYCLATE 100 MG/1
100 CAPSULE ORAL 2 TIMES DAILY
Qty: 60 CAPSULE | Refills: 4 | Status: SHIPPED | OUTPATIENT
Start: 2020-10-21 | End: 2021-02-08

## 2020-10-21 RX ORDER — ERGOCALCIFEROL 1.25 MG/1
50000 CAPSULE ORAL WEEKLY
Qty: 12 CAPSULE | Refills: 0 | Status: SHIPPED | OUTPATIENT
Start: 2020-10-21 | End: 2021-01-11

## 2020-11-09 ENCOUNTER — HOSPITAL ENCOUNTER (OUTPATIENT)
Dept: MAMMOGRAPHY | Age: 55
Discharge: HOME | End: 2020-11-09
Attending: FAMILY MEDICINE
Payer: COMMERCIAL

## 2020-11-09 DIAGNOSIS — Z12.31 BREAST CANCER SCREENING BY MAMMOGRAM: ICD-10-CM

## 2020-11-09 PROCEDURE — 77067 SCR MAMMO BI INCL CAD: CPT | Performed by: FAMILY MEDICINE

## 2020-11-09 PROCEDURE — 77063 BREAST TOMOSYNTHESIS BI: CPT | Performed by: FAMILY MEDICINE

## 2020-11-16 ENCOUNTER — OFFICE VISIT (OUTPATIENT)
Dept: FAMILY MEDICINE CLINIC | Facility: CLINIC | Age: 55
End: 2020-11-16
Payer: COMMERCIAL

## 2020-11-16 VITALS
HEIGHT: 63 IN | HEART RATE: 80 BPM | WEIGHT: 194 LBS | TEMPERATURE: 98 F | SYSTOLIC BLOOD PRESSURE: 111 MMHG | BODY MASS INDEX: 34.37 KG/M2 | DIASTOLIC BLOOD PRESSURE: 73 MMHG

## 2020-11-16 DIAGNOSIS — E55.9 VITAMIN D DEFICIENCY: ICD-10-CM

## 2020-11-16 DIAGNOSIS — Z00.00 ANNUAL PHYSICAL EXAM: Primary | ICD-10-CM

## 2020-11-16 DIAGNOSIS — M65.312 TRIGGER FINGER OF LEFT THUMB: ICD-10-CM

## 2020-11-16 DIAGNOSIS — E78.00 HIGH CHOLESTEROL: ICD-10-CM

## 2020-11-16 PROCEDURE — 83036 HEMOGLOBIN GLYCOSYLATED A1C: CPT | Performed by: FAMILY MEDICINE

## 2020-11-16 PROCEDURE — 3078F DIAST BP <80 MM HG: CPT | Performed by: FAMILY MEDICINE

## 2020-11-16 PROCEDURE — 3008F BODY MASS INDEX DOCD: CPT | Performed by: FAMILY MEDICINE

## 2020-11-16 PROCEDURE — 3074F SYST BP LT 130 MM HG: CPT | Performed by: FAMILY MEDICINE

## 2020-11-16 PROCEDURE — 36416 COLLJ CAPILLARY BLOOD SPEC: CPT | Performed by: FAMILY MEDICINE

## 2020-11-16 PROCEDURE — 99396 PREV VISIT EST AGE 40-64: CPT | Performed by: FAMILY MEDICINE

## 2020-11-16 NOTE — PROGRESS NOTES
Juan De Souza - Your hemoglobin A1C was in the pre-diabetes range. Diabetes is greater than 6.5 % so not diabetic. Be better with diet and exercise for weight loss.  Will monitor yearly. - Dr. Norma Escobar

## 2020-11-16 NOTE — PROGRESS NOTES
HPI:   Edwina Stafford is a 50year old female who presents for a complete physical exam.    Pt here for regular follow up. Staying active at work. Reports swelling in left hand thumb for 2 months. Reports off and on.  Finger seems to get stuck   [place] denies shortness of breath with exertion, denies orthopnea  CARDIOVASCULAR: denies chest pain on exertion  GI: denies abdominal pain,denies heartburn  : denies dysuria, vaginal discharge or itching,irregular menses  MUSCULOSKELETAL: denies back pain  DEXTER

## 2020-11-24 ENCOUNTER — TELEPHONE (OUTPATIENT)
Dept: FAMILY MEDICINE CLINIC | Facility: CLINIC | Age: 55
End: 2020-11-24

## 2020-11-24 NOTE — TELEPHONE ENCOUNTER
Patient states the form, \"Health Rewards\", has to be updated due to the form was rejected because the dates of the tests was not entered on the form, requesting callback to discuss.

## 2020-11-25 ENCOUNTER — PATIENT MESSAGE (OUTPATIENT)
Dept: FAMILY MEDICINE CLINIC | Facility: CLINIC | Age: 55
End: 2020-11-25

## 2020-11-25 NOTE — TELEPHONE ENCOUNTER
Pt calling back and informed of msg below and she will try to upload it if not she will drop it off at the clinic this week.

## 2020-11-25 NOTE — TELEPHONE ENCOUNTER
Patient called requesting  that the date on the health rewards form be updated. Per patient, the form was rejected because the date on the form was missing. EM FM ADO LPN/CMA:   Please print form and put date on form.   Please send patient a message w

## 2020-12-28 ENCOUNTER — OFFICE VISIT (OUTPATIENT)
Dept: SURGERY | Facility: CLINIC | Age: 55
End: 2020-12-28
Payer: COMMERCIAL

## 2020-12-28 VITALS — HEART RATE: 88 BPM | SYSTOLIC BLOOD PRESSURE: 133 MMHG | DIASTOLIC BLOOD PRESSURE: 88 MMHG | RESPIRATION RATE: 18 BRPM

## 2020-12-28 DIAGNOSIS — M67.442 GANGLION OF LEFT HAND: ICD-10-CM

## 2020-12-28 DIAGNOSIS — M65.312 TRIGGER THUMB OF LEFT HAND: Primary | ICD-10-CM

## 2020-12-28 PROCEDURE — 99212 OFFICE O/P EST SF 10 MIN: CPT | Performed by: PLASTIC SURGERY

## 2020-12-28 PROCEDURE — 3079F DIAST BP 80-89 MM HG: CPT | Performed by: PLASTIC SURGERY

## 2020-12-28 PROCEDURE — 3075F SYST BP GE 130 - 139MM HG: CPT | Performed by: PLASTIC SURGERY

## 2020-12-28 PROCEDURE — 20550 NJX 1 TENDON SHEATH/LIGAMENT: CPT | Performed by: PLASTIC SURGERY

## 2020-12-28 PROCEDURE — 99203 OFFICE O/P NEW LOW 30 MIN: CPT | Performed by: PLASTIC SURGERY

## 2020-12-28 RX ORDER — BETAMETHASONE SODIUM PHOSPHATE AND BETAMETHASONE ACETATE 3; 3 MG/ML; MG/ML
6 INJECTION, SUSPENSION INTRA-ARTICULAR; INTRALESIONAL; INTRAMUSCULAR; SOFT TISSUE ONCE
Status: COMPLETED | OUTPATIENT
Start: 2020-12-28 | End: 2020-12-28

## 2020-12-28 RX ADMIN — BETAMETHASONE SODIUM PHOSPHATE AND BETAMETHASONE ACETATE 6 MG: 3; 3 INJECTION, SUSPENSION INTRA-ARTICULAR; INTRALESIONAL; INTRAMUSCULAR; SOFT TISSUE at 14:30:00

## 2020-12-28 NOTE — H&P
Portia Lizama is a 50year old female that presents with Patient presents with:  Pain: Trigger LTH   .     REFERRED BY:  Altagracia Mcfarland    Pacemaker: No  Latex Allergy: no  Coumadin: No  Plavix: No  Other anticoagulants: No  Cardiac stents: No    RICHARDSON MEDICATIONS  Current Outpatient Medications   Medication Sig Dispense Refill   • Diclofenac Sodium 1 % Transdermal Gel Apply 2 g topically 4 (four) times daily.  1 Tube 0   • Doxycycline Hyclate 100 MG Oral Cap Take 1 capsule (100 mg total) by mouth 2 ( necessitating another injection or surgery. The entire digit may be numb for one to two hours after the injection. It may take several days for the injection to take effect.   If symptoms persist or triggering is still bothersome, the patient will make an

## 2020-12-28 NOTE — PROGRESS NOTES
After evaluation by Dr. Ev Chavez, orders verified for 2cc celestone injection to left thumb  1cc/6mg Betamethasone and 1cc/10mg 1% Lidocaine drawn up into one syringe for injection  Consent obtained and witnessed, and time-out performed by RN.   Pt's anitra

## 2021-01-11 RX ORDER — ERGOCALCIFEROL 1.25 MG/1
CAPSULE ORAL
Qty: 12 CAPSULE | Refills: 0 | Status: SHIPPED | OUTPATIENT
Start: 2021-01-11 | End: 2021-04-05

## 2021-01-25 ENCOUNTER — OFFICE VISIT (OUTPATIENT)
Dept: SURGERY | Facility: CLINIC | Age: 56
End: 2021-01-25
Payer: COMMERCIAL

## 2021-01-25 DIAGNOSIS — M67.442 GANGLION OF LEFT HAND: ICD-10-CM

## 2021-01-25 DIAGNOSIS — M65.312 TRIGGER THUMB OF LEFT HAND: Primary | ICD-10-CM

## 2021-01-25 PROCEDURE — 99214 OFFICE O/P EST MOD 30 MIN: CPT | Performed by: PLASTIC SURGERY

## 2021-01-25 NOTE — PROGRESS NOTES
Received injection LOV 12/28 [address] [place] [address] trigger  Pt states improvement in pain but complains LTH continues to lock and is painful when gripping objects.   Ganglion volar MP no changes    Left thumb trigger injection 28 December with some improvement, but she charley

## 2021-02-08 ENCOUNTER — TELEPHONE (OUTPATIENT)
Dept: FAMILY MEDICINE CLINIC | Facility: CLINIC | Age: 56
End: 2021-02-08

## 2021-02-08 RX ORDER — DOXYCYCLINE HYCLATE 100 MG/1
100 CAPSULE ORAL 2 TIMES DAILY
Qty: 60 CAPSULE | Refills: 0 | Status: SHIPPED | OUTPATIENT
Start: 2021-02-08 | End: 2021-05-19

## 2021-02-08 NOTE — TELEPHONE ENCOUNTER
Patient requesting for medocation to be transferred to [facility] on [address] in [facility]     Doxycycline Hyclate 100 MG Oral Cap

## 2021-02-09 NOTE — TELEPHONE ENCOUNTER
Confirmed that there was a refill left at [facility].  Cancelled it and sent rx to [facility] per patient request.

## 2021-04-05 RX ORDER — ERGOCALCIFEROL 1.25 MG/1
CAPSULE ORAL
Qty: 12 CAPSULE | Refills: 0 | Status: SHIPPED | OUTPATIENT
Start: 2021-04-05 | End: 2021-06-26

## 2021-05-19 RX ORDER — DOXYCYCLINE HYCLATE 100 MG/1
CAPSULE ORAL
Qty: 60 CAPSULE | Refills: 0 | Status: SHIPPED | OUTPATIENT
Start: 2021-05-19 | End: 2021-08-20

## 2021-06-26 RX ORDER — ERGOCALCIFEROL 1.25 MG/1
CAPSULE ORAL
Qty: 12 CAPSULE | Refills: 0 | Status: SHIPPED | OUTPATIENT
Start: 2021-06-26 | End: 2021-09-22

## 2021-08-20 ENCOUNTER — TELEPHONE (OUTPATIENT)
Dept: FAMILY MEDICINE CLINIC | Facility: CLINIC | Age: 56
End: 2021-08-20

## 2021-08-20 NOTE — TELEPHONE ENCOUNTER
Please review. Protocol failed / No Protocol. Requested Prescriptions   Pending Prescriptions Disp Refills    doxycycline Hyclate 100 MG Oral Cap 60 capsule 0     Sig: Take 1 capsule (100 mg total) by mouth 2 (two) times daily.         There is no refill

## 2021-08-21 RX ORDER — DOXYCYCLINE HYCLATE 100 MG/1
100 CAPSULE ORAL 2 TIMES DAILY
Qty: 60 CAPSULE | Refills: 3 | Status: SHIPPED | OUTPATIENT
Start: 2021-08-21 | End: 2022-01-24

## 2021-09-22 RX ORDER — ERGOCALCIFEROL 1.25 MG/1
CAPSULE ORAL
Qty: 12 CAPSULE | Refills: 0 | Status: SHIPPED | OUTPATIENT
Start: 2021-09-22 | End: 2021-12-13

## 2021-09-28 NOTE — TELEPHONE ENCOUNTER
The patient called back. Informed needs an appointment. Looks like a physical appointment in November. The call was transferred to the call center to schedule a physical appointment.

## 2021-12-13 RX ORDER — ERGOCALCIFEROL 1.25 MG/1
50000 CAPSULE ORAL WEEKLY
Qty: 12 CAPSULE | Refills: 0 | Status: SHIPPED | OUTPATIENT
Start: 2021-12-13 | End: 2022-03-09

## 2022-01-24 ENCOUNTER — OFFICE VISIT (OUTPATIENT)
Dept: FAMILY MEDICINE CLINIC | Facility: CLINIC | Age: 57
End: 2022-01-24
Payer: COMMERCIAL

## 2022-01-24 VITALS
DIASTOLIC BLOOD PRESSURE: 71 MMHG | HEART RATE: 77 BPM | BODY MASS INDEX: 34.95 KG/M2 | TEMPERATURE: 97 F | SYSTOLIC BLOOD PRESSURE: 110 MMHG | HEIGHT: 62.75 IN | WEIGHT: 194.81 LBS

## 2022-01-24 DIAGNOSIS — Z00.00 ANNUAL PHYSICAL EXAM: Primary | ICD-10-CM

## 2022-01-24 DIAGNOSIS — E55.9 VITAMIN D DEFICIENCY: ICD-10-CM

## 2022-01-24 DIAGNOSIS — L71.9 ROSACEA: ICD-10-CM

## 2022-01-24 DIAGNOSIS — E78.00 HIGH CHOLESTEROL: ICD-10-CM

## 2022-01-24 DIAGNOSIS — Z12.31 VISIT FOR SCREENING MAMMOGRAM: ICD-10-CM

## 2022-01-24 PROCEDURE — 3074F SYST BP LT 130 MM HG: CPT | Performed by: FAMILY MEDICINE

## 2022-01-24 PROCEDURE — 99396 PREV VISIT EST AGE 40-64: CPT | Performed by: FAMILY MEDICINE

## 2022-01-24 PROCEDURE — 3008F BODY MASS INDEX DOCD: CPT | Performed by: FAMILY MEDICINE

## 2022-01-24 PROCEDURE — 3078F DIAST BP <80 MM HG: CPT | Performed by: FAMILY MEDICINE

## 2022-01-24 RX ORDER — DOXYCYCLINE HYCLATE 100 MG
100 TABLET ORAL 2 TIMES DAILY
COMMUNITY

## 2022-01-24 NOTE — PROGRESS NOTES
HPI:   Marina Higginbotham is a 49year old female who presents for a complete physical exam.    Taking doxy twice a day for rosacea and working well. Been walking for exercise. Was having issues with her feet. Reports it was bad and saw physician for it. pain on exertion  GI: denies abdominal pain,denies heartburn  : denies dysuria, vaginal discharge or itching,irregular menses  MUSCULOSKELETAL: denies back pain  NEURO: denies headaches  PSYCHE: denies depression or anxiety  HEMATOLOGIC: denies hx of ane

## 2022-02-07 ENCOUNTER — TELEPHONE (OUTPATIENT)
Dept: FAMILY MEDICINE CLINIC | Facility: CLINIC | Age: 57
End: 2022-02-07

## 2022-02-08 LAB
ABSOLUTE BASOPHILS: 22 CELLS/UL (ref 0–200)
ABSOLUTE EOSINOPHILS: 99 CELLS/UL (ref 15–500)
ABSOLUTE LYMPHOCYTES: 1260 CELLS/UL (ref 850–3900)
ABSOLUTE MONOCYTES: 366 CELLS/UL (ref 200–950)
ABSOLUTE NEUTROPHILS: 2554 CELLS/UL (ref 1500–7800)
ALBUMIN/GLOBULIN RATIO: 1.8 (CALC) (ref 1–2.5)
ALBUMIN: 4.3 G/DL (ref 3.6–5.1)
ALKALINE PHOSPHATASE: 67 U/L (ref 37–153)
ALT: 13 U/L (ref 6–29)
AST: 16 U/L (ref 10–35)
BASOPHILS: 0.5 %
BILIRUBIN, TOTAL: 0.4 MG/DL (ref 0.2–1.2)
BUN: 13 MG/DL (ref 7–25)
CALCIUM: 9.4 MG/DL (ref 8.6–10.4)
CARBON DIOXIDE: 29 MMOL/L (ref 20–32)
CHLORIDE: 107 MMOL/L (ref 98–110)
CHOL/HDLC RATIO: 2.7 (CALC)
CHOLESTEROL, TOTAL: 207 MG/DL
CREATININE: 0.6 MG/DL (ref 0.5–1.05)
EGFR IF AFRICN AM: 120 ML/MIN/1.73M2
EGFR IF NONAFRICN AM: 103 ML/MIN/1.73M2
EOSINOPHILS: 2.3 %
GLOBULIN: 2.4 G/DL (CALC) (ref 1.9–3.7)
GLUCOSE: 98 MG/DL (ref 65–99)
HDL CHOLESTEROL: 77 MG/DL
HEMATOCRIT: 36.6 % (ref 35–45)
HEMOGLOBIN: 12 G/DL (ref 11.7–15.5)
LDL-CHOLESTEROL: 104 MG/DL (CALC)
LYMPHOCYTES: 29.3 %
MCH: 27.6 PG (ref 27–33)
MCHC: 32.8 G/DL (ref 32–36)
MCV: 84.1 FL (ref 80–100)
MONOCYTES: 8.5 %
MPV: 12.4 FL (ref 7.5–12.5)
NEUTROPHILS: 59.4 %
NON-HDL CHOLESTEROL: 130 MG/DL (CALC)
PLATELET COUNT: 225 THOUSAND/UL (ref 140–400)
POTASSIUM: 4.1 MMOL/L (ref 3.5–5.3)
PROTEIN, TOTAL: 6.7 G/DL (ref 6.1–8.1)
RDW: 14.4 % (ref 11–15)
RED BLOOD CELL COUNT: 4.35 MILLION/UL (ref 3.8–5.1)
SODIUM: 140 MMOL/L (ref 135–146)
TRIGLYCERIDES: 150 MG/DL
TSH W/REFLEX TO FT4: 1.8 MIU/L
VITAMIN B12: 426 PG/ML (ref 200–1100)
VITAMIN D, 25-OH, TOTAL: 64 NG/ML (ref 30–100)
WHITE BLOOD CELL COUNT: 4.3 THOUSAND/UL (ref 3.8–10.8)

## 2022-02-14 ENCOUNTER — HOSPITAL ENCOUNTER (OUTPATIENT)
Dept: MAMMOGRAPHY | Age: 57
Discharge: HOME | End: 2022-02-14
Attending: FAMILY MEDICINE
Payer: COMMERCIAL

## 2022-02-14 DIAGNOSIS — Z12.31 VISIT FOR SCREENING MAMMOGRAM: ICD-10-CM

## 2022-02-14 PROCEDURE — 77063 BREAST TOMOSYNTHESIS BI: CPT | Performed by: FAMILY MEDICINE

## 2022-02-14 PROCEDURE — 77067 SCR MAMMO BI INCL CAD: CPT | Performed by: FAMILY MEDICINE

## 2022-02-14 RX ORDER — ATORVASTATIN CALCIUM 10 MG/1
10 TABLET, FILM COATED ORAL NIGHTLY
Qty: 90 TABLET | Refills: 3 | Status: SHIPPED | OUTPATIENT
Start: 2022-02-14

## 2022-02-14 NOTE — PROGRESS NOTES
Juan Harris - Labs look good except elevated cholesterol.  Please be consistent with taking the statin in evenings. - Dr. Coco Gomez

## 2022-03-09 RX ORDER — ERGOCALCIFEROL 1.25 MG/1
CAPSULE ORAL
Qty: 12 CAPSULE | Refills: 0 | Status: SHIPPED | OUTPATIENT
Start: 2022-03-09

## 2022-06-17 ENCOUNTER — OFFICE VISIT (OUTPATIENT)
Dept: FAMILY MEDICINE CLINIC | Facility: CLINIC | Age: 57
End: 2022-06-17
Payer: COMMERCIAL

## 2022-06-17 VITALS
WEIGHT: 198 LBS | BODY MASS INDEX: 35.08 KG/M2 | OXYGEN SATURATION: 96 % | TEMPERATURE: 97 F | HEART RATE: 98 BPM | SYSTOLIC BLOOD PRESSURE: 120 MMHG | HEIGHT: 63 IN | DIASTOLIC BLOOD PRESSURE: 69 MMHG | RESPIRATION RATE: 16 BRPM

## 2022-06-17 DIAGNOSIS — R09.81 NASAL CONGESTION: ICD-10-CM

## 2022-06-17 DIAGNOSIS — Z11.52 ENCOUNTER FOR SCREENING FOR COVID-19: ICD-10-CM

## 2022-06-17 DIAGNOSIS — U07.1 COVID-19: Primary | ICD-10-CM

## 2022-06-17 DIAGNOSIS — R05.9 COUGH: ICD-10-CM

## 2022-06-17 LAB
OPERATOR ID: ABNORMAL
POCT LOT NUMBER: ABNORMAL
RAPID SARS-COV-2 BY PCR: DETECTED

## 2022-06-17 PROCEDURE — 3008F BODY MASS INDEX DOCD: CPT | Performed by: PHYSICIAN ASSISTANT

## 2022-06-17 PROCEDURE — 3078F DIAST BP <80 MM HG: CPT | Performed by: PHYSICIAN ASSISTANT

## 2022-06-17 PROCEDURE — 3074F SYST BP LT 130 MM HG: CPT | Performed by: PHYSICIAN ASSISTANT

## 2022-06-17 PROCEDURE — 99213 OFFICE O/P EST LOW 20 MIN: CPT | Performed by: PHYSICIAN ASSISTANT

## 2022-06-17 PROCEDURE — U0002 COVID-19 LAB TEST NON-CDC: HCPCS | Performed by: PHYSICIAN ASSISTANT

## 2022-07-31 RX ORDER — ERGOCALCIFEROL 1.25 MG/1
50000 CAPSULE ORAL WEEKLY
Qty: 12 CAPSULE | Refills: 4 | Status: SHIPPED | OUTPATIENT
Start: 2022-07-31 | End: 2023-07-12

## 2022-08-26 ENCOUNTER — OFFICE VISIT (OUTPATIENT)
Dept: FAMILY MEDICINE CLINIC | Facility: CLINIC | Age: 57
End: 2022-08-26
Payer: COMMERCIAL

## 2022-08-26 ENCOUNTER — TELEPHONE (OUTPATIENT)
Dept: FAMILY MEDICINE CLINIC | Facility: CLINIC | Age: 57
End: 2022-08-26

## 2022-08-26 VITALS
RESPIRATION RATE: 22 BRPM | TEMPERATURE: 98 F | OXYGEN SATURATION: 96 % | WEIGHT: 199 LBS | BODY MASS INDEX: 35.26 KG/M2 | SYSTOLIC BLOOD PRESSURE: 113 MMHG | DIASTOLIC BLOOD PRESSURE: 62 MMHG | HEIGHT: 63 IN | HEART RATE: 81 BPM

## 2022-08-26 DIAGNOSIS — H69.82 ACUTE DYSFUNCTION OF LEFT EUSTACHIAN TUBE: Primary | ICD-10-CM

## 2022-08-26 PROCEDURE — 3008F BODY MASS INDEX DOCD: CPT | Performed by: NURSE PRACTITIONER

## 2022-08-26 PROCEDURE — 3074F SYST BP LT 130 MM HG: CPT | Performed by: NURSE PRACTITIONER

## 2022-08-26 PROCEDURE — 99213 OFFICE O/P EST LOW 20 MIN: CPT | Performed by: NURSE PRACTITIONER

## 2022-08-26 PROCEDURE — 3078F DIAST BP <80 MM HG: CPT | Performed by: NURSE PRACTITIONER

## 2022-08-26 NOTE — TELEPHONE ENCOUNTER
Last office visit 1/24/22  Taking doxy twice a day for rosacea and working well. Please review; protocol failed/no protocol.      Requested Prescriptions   Pending Prescriptions Disp Refills    DOXYCYCLINE 100 MG Oral Cap [Pharmacy Med Name: DOXYCYCLINE HYCLATE 100 MG CAP] 60 capsule 3     Sig: TAKE ONE CAPSULE BY MOUTH TWICE A DAY        There is no refill protocol information for this order            [facility] Out[facility]its              [facility]    [facility]    7 months ago Annual physical exam    Lilly Méndez MD    Office Visit    1 year ago Trigger thumb of left hand    [address] [facility],[address], Lissy Rubio MD    Office Visit    1 year ago Trigger thumb of left hand    [address] [place] [address],[address], Lissy Rubio MD    Office Visit    1 year ago Annual physical exam    Jeniffer Mcdonnell, Blanche Chakraborty MD    Office Visit

## 2022-08-27 RX ORDER — DOXYCYCLINE HYCLATE 100 MG/1
100 CAPSULE ORAL 2 TIMES DAILY
Qty: 180 CAPSULE | Refills: 1 | Status: SHIPPED | OUTPATIENT
Start: 2022-08-27

## 2022-08-31 ENCOUNTER — OFFICE VISIT (OUTPATIENT)
Dept: FAMILY MEDICINE CLINIC | Facility: CLINIC | Age: 57
End: 2022-08-31
Payer: COMMERCIAL

## 2022-08-31 VITALS
DIASTOLIC BLOOD PRESSURE: 80 MMHG | HEIGHT: 63 IN | WEIGHT: 200 LBS | HEART RATE: 93 BPM | SYSTOLIC BLOOD PRESSURE: 122 MMHG | BODY MASS INDEX: 35.44 KG/M2

## 2022-08-31 DIAGNOSIS — H65.92 FLUID LEVEL BEHIND TYMPANIC MEMBRANE OF LEFT EAR: Primary | ICD-10-CM

## 2022-08-31 DIAGNOSIS — M54.31 RIGHT SIDED SCIATICA: ICD-10-CM

## 2022-08-31 PROCEDURE — 3008F BODY MASS INDEX DOCD: CPT | Performed by: NURSE PRACTITIONER

## 2022-08-31 PROCEDURE — 3074F SYST BP LT 130 MM HG: CPT | Performed by: NURSE PRACTITIONER

## 2022-08-31 PROCEDURE — 99214 OFFICE O/P EST MOD 30 MIN: CPT | Performed by: NURSE PRACTITIONER

## 2022-08-31 PROCEDURE — 3079F DIAST BP 80-89 MM HG: CPT | Performed by: NURSE PRACTITIONER

## 2022-08-31 RX ORDER — CYCLOBENZAPRINE HCL 5 MG
5 TABLET ORAL 3 TIMES DAILY PRN
Qty: 30 TABLET | Refills: 0 | Status: SHIPPED | OUTPATIENT
Start: 2022-08-31

## 2022-08-31 RX ORDER — METHYLPREDNISOLONE 4 MG/1
TABLET ORAL
Qty: 21 EACH | Refills: 0 | Status: SHIPPED | OUTPATIENT
Start: 2022-08-31

## 2022-09-12 ENCOUNTER — HOSPITAL ENCOUNTER (OUTPATIENT)
Dept: GENERAL RADIOLOGY | Age: 57
Discharge: HOME | End: 2022-09-12
Attending: NURSE PRACTITIONER
Payer: COMMERCIAL

## 2022-09-12 DIAGNOSIS — M54.31 RIGHT SIDED SCIATICA: ICD-10-CM

## 2022-09-12 PROCEDURE — 72110 X-RAY EXAM L-2 SPINE 4/>VWS: CPT | Performed by: NURSE PRACTITIONER

## 2022-10-10 ENCOUNTER — TELEPHONE (OUTPATIENT)
Dept: FAMILY MEDICINE CLINIC | Facility: CLINIC | Age: 57
End: 2022-10-10

## 2022-10-10 NOTE — TELEPHONE ENCOUNTER
Pt will have emily turn in [address] [employer] paperwork for her intermittent FMLA . Pt Is requesting 3 days a week for appts 1 hour a day for 1 month.  Start 9/26/22 - 10/19/22

## 2022-10-13 NOTE — TELEPHONE ENCOUNTER
Pt called to check if we received forms. Informed pt nothing has come through yet. Also informed her she has the option to drop her forms off pt expressed understanding and said she would call back tomorrow to see if we received them.

## 2022-10-13 NOTE — TELEPHONE ENCOUNTER
Pt called to check status of forms. Called pt back and informed her nothing has yet been received from [facility]. Confirmed fax number with pt and reminded her of our turn around time. Pt stated she would contact emily.

## 2022-10-18 NOTE — TELEPHONE ENCOUNTER
I do not understand this FMLA. Is it for those appointments she had for ear pain? Then should just be off for 2 appointments.

## 2022-10-18 NOTE — TELEPHONE ENCOUNTER
Dr Vielka Huitron    Pt is requesting [facility] for Appts. Pt is requesting 3 appts a week lasting 1 hour. Date range 9/26/22 - end 10/19/22. Do you support?

## 2022-10-19 NOTE — TELEPHONE ENCOUNTER
Pt stated its for physical therapy for her back. Pt seen Laurel Shaw regarding this issue should I send to her?

## 2022-10-19 NOTE — TELEPHONE ENCOUNTER
Dr. Primo Irby     Please sign off on form: FMLA  -Highlight the patient and hit \"Chart\" button. -In Chart Review, w/in the Encounter tab - click 1 time on the Telephone call encounter for 10/10/22 Scroll down the telephone encounter.  -Click \"scan on\" blue Hyperlink under \"Media\" heading for FMLA Dr Primo Irby 10/19/22  w/in the telephone enc.  -Click on Acknowledge button at the bottom right corner and left-click onto image, signature stamp appears and drag signature to Provider signature line. Stamp will turn blue. Close window.      Thank you,  Sharon Howard

## 2022-10-24 NOTE — TELEPHONE ENCOUNTER
Patient calling to state Fransisca Beckford has stated they have not received [facility] paperwork asking to have that sent as soon as possible.

## 2022-11-14 NOTE — TELEPHONE ENCOUNTER
Per pt request forms were revised and faxed to [facility] at [phone].

## 2022-12-16 RX ORDER — ERGOCALCIFEROL 1.25 MG/1
CAPSULE ORAL
COMMUNITY

## 2022-12-16 RX ORDER — ROSUVASTATIN CALCIUM 10 MG/1
TABLET, COATED ORAL
COMMUNITY

## 2022-12-16 RX ORDER — ATORVASTATIN CALCIUM 10 MG/1
TABLET, FILM COATED ORAL
COMMUNITY

## 2023-01-24 ENCOUNTER — OFFICE VISIT (OUTPATIENT)
Dept: OBGYN | Age: 58
End: 2023-01-24

## 2023-01-24 VITALS
WEIGHT: 196 LBS | SYSTOLIC BLOOD PRESSURE: 121 MMHG | BODY MASS INDEX: 36.07 KG/M2 | HEART RATE: 98 BPM | DIASTOLIC BLOOD PRESSURE: 85 MMHG | TEMPERATURE: 97.6 F | HEIGHT: 62 IN

## 2023-01-24 DIAGNOSIS — Z12.31 ENCOUNTER FOR SCREENING MAMMOGRAM FOR MALIGNANT NEOPLASM OF BREAST: ICD-10-CM

## 2023-01-24 DIAGNOSIS — Z01.419 GYNECOLOGIC EXAM NORMAL: Primary | ICD-10-CM

## 2023-01-24 PROCEDURE — 99386 PREV VISIT NEW AGE 40-64: CPT | Performed by: OBSTETRICS & GYNECOLOGY

## 2023-01-24 RX ORDER — DOXYCYCLINE HYCLATE 100 MG
100 TABLET ORAL
COMMUNITY

## 2023-01-24 ASSESSMENT — ENCOUNTER SYMPTOMS
CONSTITUTIONAL NEGATIVE: 1
NEUROLOGICAL NEGATIVE: 1
ENDOCRINE NEGATIVE: 1
GASTROINTESTINAL NEGATIVE: 1
RESPIRATORY NEGATIVE: 1
EYES NEGATIVE: 1
ALLERGIC/IMMUNOLOGIC NEGATIVE: 1
HEMATOLOGIC/LYMPHATIC NEGATIVE: 1
PSYCHIATRIC NEGATIVE: 1

## 2023-01-24 ASSESSMENT — PATIENT HEALTH QUESTIONNAIRE - PHQ9
2. FEELING DOWN, DEPRESSED OR HOPELESS: NOT AT ALL
CLINICAL INTERPRETATION OF PHQ2 SCORE: NO FURTHER SCREENING NEEDED
SUM OF ALL RESPONSES TO PHQ9 QUESTIONS 1 AND 2: 0
1. LITTLE INTEREST OR PLEASURE IN DOING THINGS: NOT AT ALL
SUM OF ALL RESPONSES TO PHQ9 QUESTIONS 1 AND 2: 0

## 2023-02-13 ENCOUNTER — OFFICE VISIT (OUTPATIENT)
Dept: FAMILY MEDICINE CLINIC | Facility: CLINIC | Age: 58
End: 2023-02-13

## 2023-02-13 VITALS
BODY MASS INDEX: 35.02 KG/M2 | DIASTOLIC BLOOD PRESSURE: 84 MMHG | SYSTOLIC BLOOD PRESSURE: 131 MMHG | HEIGHT: 63 IN | HEART RATE: 86 BPM | TEMPERATURE: 97 F | WEIGHT: 197.63 LBS

## 2023-02-13 DIAGNOSIS — E55.9 VITAMIN D DEFICIENCY: Primary | ICD-10-CM

## 2023-02-13 DIAGNOSIS — K29.50 MILD CHRONIC GASTRITIS: ICD-10-CM

## 2023-02-13 DIAGNOSIS — Z00.00 ANNUAL PHYSICAL EXAM: ICD-10-CM

## 2023-02-13 DIAGNOSIS — Z12.31 VISIT FOR SCREENING MAMMOGRAM: ICD-10-CM

## 2023-02-13 DIAGNOSIS — M54.31 RIGHT SIDED SCIATICA: ICD-10-CM

## 2023-02-27 ENCOUNTER — HOSPITAL ENCOUNTER (OUTPATIENT)
Dept: MAMMOGRAPHY | Age: 58
Discharge: HOME | End: 2023-02-27
Attending: FAMILY MEDICINE
Payer: COMMERCIAL

## 2023-02-27 DIAGNOSIS — Z12.31 VISIT FOR SCREENING MAMMOGRAM: ICD-10-CM

## 2023-02-27 PROCEDURE — 77067 SCR MAMMO BI INCL CAD: CPT | Performed by: FAMILY MEDICINE

## 2023-02-27 PROCEDURE — 77063 BREAST TOMOSYNTHESIS BI: CPT | Performed by: FAMILY MEDICINE

## 2023-03-28 LAB
ABSOLUTE BASOPHILS: 18 CELLS/UL (ref 0–200)
ABSOLUTE EOSINOPHILS: 152 CELLS/UL (ref 15–500)
ABSOLUTE LYMPHOCYTES: 1279 CELLS/UL (ref 850–3900)
ABSOLUTE MONOCYTES: 423 CELLS/UL (ref 200–950)
ABSOLUTE NEUTROPHILS: 2728 CELLS/UL (ref 1500–7800)
ALBUMIN/GLOBULIN RATIO: 1.6 (CALC) (ref 1–2.5)
ALBUMIN: 4.2 G/DL (ref 3.6–5.1)
ALKALINE PHOSPHATASE: 84 U/L (ref 37–153)
ALT: 12 U/L (ref 6–29)
AST: 14 U/L (ref 10–35)
BASOPHILS: 0.4 %
BILIRUBIN, TOTAL: 0.3 MG/DL (ref 0.2–1.2)
BUN: 14 MG/DL (ref 7–25)
CALCIUM: 9.5 MG/DL (ref 8.6–10.4)
CARBON DIOXIDE: 27 MMOL/L (ref 20–32)
CHLORIDE: 106 MMOL/L (ref 98–110)
CHOL/HDLC RATIO: 2.5 (CALC)
CHOLESTEROL, TOTAL: 186 MG/DL
CREATININE: 0.65 MG/DL (ref 0.5–1.03)
EGFR: 104 ML/MIN/1.73M2
EOSINOPHILS: 3.3 %
GLOBULIN: 2.7 G/DL (CALC) (ref 1.9–3.7)
GLUCOSE: 99 MG/DL (ref 65–99)
HDL CHOLESTEROL: 75 MG/DL
HEMATOCRIT: 36.2 % (ref 35–45)
HEMOGLOBIN: 11.4 G/DL (ref 11.7–15.5)
LDL-CHOLESTEROL: 90 MG/DL (CALC)
LYMPHOCYTES: 27.8 %
MCH: 25.7 PG (ref 27–33)
MCHC: 31.5 G/DL (ref 32–36)
MCV: 81.5 FL (ref 80–100)
MONOCYTES: 9.2 %
MPV: 11.6 FL (ref 7.5–12.5)
NEUTROPHILS: 59.3 %
NON-HDL CHOLESTEROL: 111 MG/DL (CALC)
PLATELET COUNT: 276 THOUSAND/UL (ref 140–400)
POTASSIUM: 4.2 MMOL/L (ref 3.5–5.3)
PROTEIN, TOTAL: 6.9 G/DL (ref 6.1–8.1)
RDW: 15.6 % (ref 11–15)
RED BLOOD CELL COUNT: 4.44 MILLION/UL (ref 3.8–5.1)
SODIUM: 141 MMOL/L (ref 135–146)
TRIGLYCERIDES: 112 MG/DL
TSH W/REFLEX TO FT4: 2.16 MIU/L
VITAMIN B12: 424 PG/ML (ref 200–1100)
VITAMIN D, 25-OH, TOTAL: 74 NG/ML (ref 30–100)
WHITE BLOOD CELL COUNT: 4.6 THOUSAND/UL (ref 3.8–10.8)

## 2023-05-26 ENCOUNTER — PATIENT MESSAGE (OUTPATIENT)
Dept: FAMILY MEDICINE CLINIC | Facility: CLINIC | Age: 58
End: 2023-05-26

## 2023-05-28 NOTE — TELEPHONE ENCOUNTER
Torey Allen RN 5/28/2023 8:46 AM CDT        ----- Message -----  From: Jordan Rodriguez  Sent: 5/26/2023 11:26 AM CDT  To: Em Triage Support  Subject: MRI results     Good morning,  Will I be receiving a call from the doctor so that we can discuss my results? Is therapy necessary ? Or any supplements that I may need to start taking? Please advise. Thank you.   Reji Agarwal

## 2023-06-27 ENCOUNTER — OFFICE VISIT (OUTPATIENT)
Dept: PHYSICAL MEDICINE AND REHAB | Facility: CLINIC | Age: 58
End: 2023-06-27
Payer: COMMERCIAL

## 2023-06-27 VITALS
SYSTOLIC BLOOD PRESSURE: 132 MMHG | HEART RATE: 81 BPM | DIASTOLIC BLOOD PRESSURE: 84 MMHG | OXYGEN SATURATION: 97 % | BODY MASS INDEX: 35 KG/M2 | WEIGHT: 198 LBS

## 2023-06-27 DIAGNOSIS — R29.2 HYPERREFLEXIA: ICD-10-CM

## 2023-06-27 DIAGNOSIS — M54.2 CERVICALGIA: ICD-10-CM

## 2023-06-27 DIAGNOSIS — M54.41 CHRONIC BILATERAL LOW BACK PAIN WITH RIGHT-SIDED SCIATICA: Primary | ICD-10-CM

## 2023-06-27 DIAGNOSIS — G89.29 CHRONIC BILATERAL LOW BACK PAIN WITH RIGHT-SIDED SCIATICA: Primary | ICD-10-CM

## 2023-06-27 PROCEDURE — 3079F DIAST BP 80-89 MM HG: CPT | Performed by: PHYSICAL MEDICINE & REHABILITATION

## 2023-06-27 PROCEDURE — 99204 OFFICE O/P NEW MOD 45 MIN: CPT | Performed by: PHYSICAL MEDICINE & REHABILITATION

## 2023-06-27 PROCEDURE — 3075F SYST BP GE 130 - 139MM HG: CPT | Performed by: PHYSICAL MEDICINE & REHABILITATION

## 2023-06-27 RX ORDER — MELOXICAM 15 MG/1
TABLET ORAL
Qty: 30 TABLET | Refills: 0 | Status: SHIPPED | OUTPATIENT
Start: 2023-06-27

## 2023-07-05 ENCOUNTER — TELEPHONE (OUTPATIENT)
Dept: PHYSICAL MEDICINE AND REHAB | Facility: CLINIC | Age: 58
End: 2023-07-05

## 2023-07-05 NOTE — TELEPHONE ENCOUNTER
Noted, offer patient a follow up to discuss further treatment - may need to consider a trial of a different medication or an injection.

## 2023-07-05 NOTE — TELEPHONE ENCOUNTER
Spoke with patient who was prescribed Meloxicam on 6/27/23. States today was her 8th day taking this and she noticed a cluster of sores that are very sore under her tongue about 3 days ago and thinks she may be allergic to the Meloxicam. States she gets this allergic reaction to citrus as well. Patient is going to stop taking the Meloxicam. States the Meloxicam has been helping her back, neck and shoulders. Patient also report on Monday she bent over while getting dressed and thinks she through out her low back. States turning in bed is painful and she cannot sit or stand for any prolonged period of time. States the low back feels heavy, numb with muscle tightness. Patient has been using solanpas and heat. The heat helps when she is using it. Patient is calling PT to see if she can start her PT sessions sooner. (Currently scheduled to start 7/17/23.)      NOV: 8/8/23    Informed patient update will be relayed on to [facility] for recommendations going forward. Patient understood and was appreciative.

## 2023-07-06 ENCOUNTER — TELEPHONE (OUTPATIENT)
Dept: PHYSICAL MEDICINE AND REHAB | Facility: CLINIC | Age: 58
End: 2023-07-06

## 2023-07-06 RX ORDER — METHYLPREDNISOLONE 4 MG/1
TABLET ORAL
Qty: 1 EACH | Refills: 0 | Status: SHIPPED | OUTPATIENT
Start: 2023-07-06

## 2023-07-06 NOTE — TELEPHONE ENCOUNTER
Spoke with patient to relay below message. Patient stated she has already been using Orajel and the pain has not improved and the sores are getting bigger. Patient also asked if there is anything else she can take for the neck/back pain since she had to stop the Meloxicam. Informed patient we would want to get the mouth sores under control before adding on a new medication. Message sent to [facility] to see if patient can start the Medrol dosepack now. Patient pharmacy was also confirmed. Per [facility] ok for patient to start the medrol dosepack now. Rx sent to patient's pharmacy. Patient informed to start the medrol dosepack now and to contact our office with an update once she completes the medrol dosepack. Patient understood and was very appreciative.

## 2023-07-06 NOTE — TELEPHONE ENCOUNTER
Patient calling for on update on her medication request do to her allergic reaction .  Please call to advice

## 2023-07-06 NOTE — TELEPHONE ENCOUNTER
Patient was notified of message below and an appointment was scheduled for 7/12/23. Patient was also asking for something to help with the sores in her mouth. Will review with  to see if there are any specific recommendations for the mouth sores (Orajel oral rinse?, NSAIDS?  Etc.)

## 2023-07-10 ENCOUNTER — TELEPHONE (OUTPATIENT)
Dept: PHYSICAL THERAPY | Facility: HOSPITAL | Age: 58
End: 2023-07-10

## 2023-07-12 ENCOUNTER — OFFICE VISIT (OUTPATIENT)
Dept: PHYSICAL MEDICINE AND REHAB | Facility: CLINIC | Age: 58
End: 2023-07-12
Payer: COMMERCIAL

## 2023-07-12 ENCOUNTER — TELEPHONE (OUTPATIENT)
Dept: PHYSICAL THERAPY | Facility: HOSPITAL | Age: 58
End: 2023-07-12

## 2023-07-12 VITALS — BODY MASS INDEX: 35 KG/M2 | WEIGHT: 196 LBS | OXYGEN SATURATION: 98 % | HEART RATE: 76 BPM

## 2023-07-12 DIAGNOSIS — M51.16 LUMBAR DISC HERNIATION WITH RADICULOPATHY: Primary | ICD-10-CM

## 2023-07-12 DIAGNOSIS — R29.2 HYPERREFLEXIA: ICD-10-CM

## 2023-07-12 PROCEDURE — 99213 OFFICE O/P EST LOW 20 MIN: CPT | Performed by: PHYSICAL MEDICINE & REHABILITATION

## 2023-07-12 NOTE — PATIENT INSTRUCTIONS
If symptoms worsen you may contact me either through [employer] or call the office and I will send a prescription of gabapentin to your pharmacy. If you find that the symptoms are easing once you start physical therapy you may move the appointment back to late August and September.

## 2023-07-14 ENCOUNTER — TELEPHONE (OUTPATIENT)
Dept: PHYSICAL MEDICINE AND REHAB | Facility: CLINIC | Age: 58
End: 2023-07-14

## 2023-07-14 ENCOUNTER — MED REC SCAN ONLY (OUTPATIENT)
Dept: PHYSICAL MEDICINE AND REHAB | Facility: CLINIC | Age: 58
End: 2023-07-14

## 2023-07-17 ENCOUNTER — APPOINTMENT (OUTPATIENT)
Dept: PHYSICAL THERAPY | Age: 58
End: 2023-07-17
Attending: PHYSICAL MEDICINE & REHABILITATION
Payer: COMMERCIAL

## 2023-07-17 ENCOUNTER — TELEPHONE (OUTPATIENT)
Dept: PHYSICAL THERAPY | Facility: HOSPITAL | Age: 58
End: 2023-07-17

## 2023-07-25 DIAGNOSIS — M54.41 CHRONIC BILATERAL LOW BACK PAIN WITH RIGHT-SIDED SCIATICA: ICD-10-CM

## 2023-07-25 DIAGNOSIS — G89.29 CHRONIC BILATERAL LOW BACK PAIN WITH RIGHT-SIDED SCIATICA: ICD-10-CM

## 2023-07-25 RX ORDER — MELOXICAM 15 MG/1
TABLET ORAL
Qty: 30 TABLET | Refills: 0 | OUTPATIENT
Start: 2023-07-25

## 2023-07-25 NOTE — TELEPHONE ENCOUNTER
The original prescription was discontinued on 7/6/2023 by Bryn Rodriguez RN for the following reason: Allergic response. Renewing this prescription may not be appropriate.

## 2023-07-28 ENCOUNTER — APPOINTMENT (OUTPATIENT)
Dept: PHYSICAL THERAPY | Age: 58
End: 2023-07-28
Attending: PHYSICAL MEDICINE & REHABILITATION
Payer: COMMERCIAL

## 2023-07-28 ENCOUNTER — PATIENT MESSAGE (OUTPATIENT)
Dept: PHYSICAL MEDICINE AND REHAB | Facility: CLINIC | Age: 58
End: 2023-07-28

## 2023-07-31 ENCOUNTER — OFFICE VISIT (OUTPATIENT)
Dept: DERMATOLOGY CLINIC | Facility: CLINIC | Age: 58
End: 2023-07-31

## 2023-07-31 ENCOUNTER — APPOINTMENT (OUTPATIENT)
Dept: PHYSICAL THERAPY | Age: 58
End: 2023-07-31
Attending: PHYSICAL MEDICINE & REHABILITATION
Payer: COMMERCIAL

## 2023-07-31 DIAGNOSIS — L91.8 SKIN TAG: Primary | ICD-10-CM

## 2023-07-31 NOTE — PROGRESS NOTES
HPI:    Patient ID: Nash Gordon is a 56year old female. Patient presents for skin tags on her neck and bilateral under arms for the past 2-3 years. Denies pain. Patient denies personal or family hx of skin cancer. Has personal hx of acne vulgaris and rosacea. No draining or tenderness noted. Hx of allergies to medications noted. Review of Systems   Constitutional:  Negative for chills and fever. Musculoskeletal:  Negative for arthralgias and myalgias. Skin:  Positive for rash. Negative for color change and wound. Current Outpatient Medications   Medication Sig Dispense Refill    atorvastatin 10 MG Oral Tab Take 1 tablet (10 mg total) by mouth nightly. 90 tablet 3    Doxycycline Hyclate 100 MG Oral Tab Take 1 tablet (100 mg total) by mouth 2 (two) times daily. Allergies:  Meloxicam               ANGIOEDEMA    Comment:Upper and lower lip throbbing, tongue swelling,             tongue ulcers  Contrave [Naltrexon*    RASH    Comment:rash  Wellbutrin [Bupropi*    RASH    Comment:rash  Simvastatin             RASH   There were no vitals taken for this visit. There is no height or weight on file to calculate BMI. PHYSICAL EXAM:   Physical Exam  Constitutional:       General: She is not in acute distress. Appearance: Normal appearance. Skin:     General: Skin is warm and dry. Findings: Rash present. Comments: Pedunculated lesions noted on the neck and under the arms. No draining or tenderness noted. About 2-3 mm in size. Neurological:      Mental Status: She is alert and oriented to person, place, and time. ASSESSMENT/PLAN:   1. Skin tag  -After discussion with patient, advised the following:  -Removed skin tags  -See procedure note.   -care instructions given. -To call or follow-up with worsening symptoms or concerns.   -Pt was agreeable to plan and will comply with discussion above.          No orders of the defined types were placed in this encounter.       Meds This Visit:  Requested Prescriptions      No prescriptions requested or ordered in this encounter       Imaging & Referrals:  None         [facility]#[id number]

## 2023-07-31 NOTE — PROCEDURES
Procedure:  Skin tag removal  With the patient is a supine position, the skin was scrubbed with alcohol. Anesthesia was obtained by injecting 2 mL of 1% Xylocaine with Epinephrine around the neck and underarms. Scissors and forceps were used to remove the skin tags. Skin tags were not sent for histopathology. Hemostasis achieved with cautery. The estimated blood loss was < 1mL. Clinical Dx & Size of lesion(s):    Skin tags were about 2-3 mm in size. Kimberly tolerated the procedure well.   Complications:  none

## 2023-08-02 ENCOUNTER — TELEPHONE (OUTPATIENT)
Dept: PHYSICAL MEDICINE AND REHAB | Facility: CLINIC | Age: 58
End: 2023-08-02

## 2023-08-02 NOTE — TELEPHONE ENCOUNTER
From: Viraj Reach  To: Maggy Ackerman   Sent: 7/28/2023 8:33 AM CDT  Subject: Balbir Monreal paperwork     Good morning, I requested an extension to my Leave as I recently started my PT. I want to make sure that I see an improvement before I go back to work. Balbir Monreal advised new paperwork was sent over to you on July 20th for my extension. Your help is greatly appreciated. Thank you.

## 2023-08-09 ENCOUNTER — APPOINTMENT (OUTPATIENT)
Dept: PHYSICAL THERAPY | Age: 58
End: 2023-08-09
Attending: PHYSICAL MEDICINE & REHABILITATION
Payer: COMMERCIAL

## 2023-08-10 ENCOUNTER — OFFICE VISIT (OUTPATIENT)
Dept: PHYSICAL MEDICINE AND REHAB | Facility: CLINIC | Age: 58
End: 2023-08-10
Payer: COMMERCIAL

## 2023-08-10 VITALS
SYSTOLIC BLOOD PRESSURE: 114 MMHG | WEIGHT: 196 LBS | BODY MASS INDEX: 34.73 KG/M2 | DIASTOLIC BLOOD PRESSURE: 70 MMHG | HEIGHT: 63 IN

## 2023-08-10 DIAGNOSIS — M51.16 LUMBAR DISC HERNIATION WITH RADICULOPATHY: Primary | ICD-10-CM

## 2023-08-10 PROCEDURE — 3078F DIAST BP <80 MM HG: CPT | Performed by: PHYSICAL MEDICINE & REHABILITATION

## 2023-08-10 PROCEDURE — 3008F BODY MASS INDEX DOCD: CPT | Performed by: PHYSICAL MEDICINE & REHABILITATION

## 2023-08-10 PROCEDURE — 99214 OFFICE O/P EST MOD 30 MIN: CPT | Performed by: PHYSICAL MEDICINE & REHABILITATION

## 2023-08-10 PROCEDURE — 3074F SYST BP LT 130 MM HG: CPT | Performed by: PHYSICAL MEDICINE & REHABILITATION

## 2023-08-10 RX ORDER — GABAPENTIN 300 MG/1
CAPSULE ORAL
Qty: 60 CAPSULE | Refills: 0 | Status: SHIPPED | OUTPATIENT
Start: 2023-08-10

## 2023-08-10 NOTE — TELEPHONE ENCOUNTER
Forms completed by Dr. Brynn Hernandez 8/10/2023 during patient f/u appt. Forms faxed to [place] @ [phone]. Copy sent to scanning.

## 2023-08-14 ENCOUNTER — APPOINTMENT (OUTPATIENT)
Dept: PHYSICAL THERAPY | Age: 58
End: 2023-08-14
Attending: PHYSICAL MEDICINE & REHABILITATION
Payer: COMMERCIAL

## 2023-08-21 ENCOUNTER — APPOINTMENT (OUTPATIENT)
Dept: PHYSICAL THERAPY | Age: 58
End: 2023-08-21
Attending: PHYSICAL MEDICINE & REHABILITATION
Payer: COMMERCIAL

## 2023-08-28 ENCOUNTER — APPOINTMENT (OUTPATIENT)
Dept: PHYSICAL THERAPY | Age: 58
End: 2023-08-28
Attending: PHYSICAL MEDICINE & REHABILITATION
Payer: COMMERCIAL

## 2023-09-05 ENCOUNTER — APPOINTMENT (OUTPATIENT)
Dept: PHYSICAL THERAPY | Age: 58
End: 2023-09-05
Attending: PHYSICAL MEDICINE & REHABILITATION
Payer: COMMERCIAL

## 2023-09-05 ENCOUNTER — MED REC SCAN ONLY (OUTPATIENT)
Dept: PHYSICAL MEDICINE AND REHAB | Facility: CLINIC | Age: 58
End: 2023-09-05

## 2023-09-12 ENCOUNTER — OFFICE VISIT (OUTPATIENT)
Dept: PHYSICAL MEDICINE AND REHAB | Facility: CLINIC | Age: 58
End: 2023-09-12
Payer: COMMERCIAL

## 2023-09-12 ENCOUNTER — TELEPHONE (OUTPATIENT)
Dept: PHYSICAL MEDICINE AND REHAB | Facility: CLINIC | Age: 58
End: 2023-09-12

## 2023-09-12 VITALS
BODY MASS INDEX: 34.73 KG/M2 | SYSTOLIC BLOOD PRESSURE: 112 MMHG | WEIGHT: 196 LBS | HEIGHT: 63 IN | DIASTOLIC BLOOD PRESSURE: 80 MMHG

## 2023-09-12 DIAGNOSIS — M79.652 LEFT THIGH PAIN: Primary | ICD-10-CM

## 2023-09-12 DIAGNOSIS — M51.16 LUMBAR DISC HERNIATION WITH RADICULOPATHY: ICD-10-CM

## 2023-09-12 PROCEDURE — 99214 OFFICE O/P EST MOD 30 MIN: CPT | Performed by: PHYSICAL MEDICINE & REHABILITATION

## 2023-09-12 PROCEDURE — 3008F BODY MASS INDEX DOCD: CPT | Performed by: PHYSICAL MEDICINE & REHABILITATION

## 2023-09-12 PROCEDURE — 3074F SYST BP LT 130 MM HG: CPT | Performed by: PHYSICAL MEDICINE & REHABILITATION

## 2023-09-12 PROCEDURE — 3079F DIAST BP 80-89 MM HG: CPT | Performed by: PHYSICAL MEDICINE & REHABILITATION

## 2023-09-12 RX ORDER — IBUPROFEN 600 MG/1
600 TABLET ORAL 2 TIMES DAILY PRN
Qty: 20 TABLET | Refills: 0 | Status: SHIPPED | OUTPATIENT
Start: 2023-09-12

## 2023-09-27 NOTE — TELEPHONE ENCOUNTER
Pt returning phone call. Type of Leave: Intermittent FMLA   Reason for Leave: to continue physical therapy for lower back (lumbar disc herneation)   Start date of leave: 9/18/23 through 11/30/23  How much time needed?: Pt requesting to be able to leave work early or come into work late for 2 x per week for physical therapy.    Forms Due Date:  Was Fee and Turnaround info Given?:

## 2023-09-27 NOTE — TELEPHONE ENCOUNTER
Dr Chandler Wilson,    Pt is seeking Intermittent FMLA to continue to go to physical therapy for her lower back lumbar disc herniation. She is requesting to be able to come into work late or leave work early twice per week for physical therapy. Start: 9/18/23, end 11/30/23.  Do you support this request?    Thank Kisha Turner

## 2023-10-02 NOTE — TELEPHONE ENCOUNTER
Dr. Praveen Roger,     Please sign off on form if you agree to: Intermittent FMLA (PT appointments, 2x a week - start late and/or leave early), start date: 9/18/23, end date: 11/30/23  (Please place your signature on the first page only)    -From your Inbasket, Highlight the patient and click Chart   -Double click the [birth year] Forms Completion telephone encounter  -[employer] down to the Media section   -Click the blue Hyperlink:  FMLA Dr. Praveen Roger 34/5/02  -Click Acknowledge located at the bottom right corner (if you do not see acknowledge, try maximizing your window)   -Drag the mouse into the blank space of the document and a + sign will appear. Left click to   electronically sign the document. Thank you,    Sita Candelaria.

## 2023-10-04 NOTE — TELEPHONE ENCOUNTER
FMLA forms completed & faxed to [facility] at # [phone]. Fax confirmation received. [employer] message sent to pt.

## 2023-10-09 ENCOUNTER — PATIENT MESSAGE (OUTPATIENT)
Dept: PHYSICAL MEDICINE AND REHAB | Facility: CLINIC | Age: 58
End: 2023-10-09

## 2023-10-10 NOTE — TELEPHONE ENCOUNTER
From: Taras Ahumada  To: Gisella Conteh  Sent: 10/9/2023 3:30 PM CDT  Subject: Kevon Mood form due 10/24    Kevon Wilde called me today and advise that section E 2A needs to show a duration  Ex: up to 2 hours for therapy sessions    Thank you.

## 2023-10-25 NOTE — TELEPHONE ENCOUNTER
MyChart message received from pt stating revisions to previous FMLA forms need to be done.  Forms sent for revision

## 2023-10-25 NOTE — TELEPHONE ENCOUNTER
FMLA forms revised & faxed to [facility] at # [phone]. Fax confirmation received. Scanned into pt's chart. [employer] message sent to pt.

## 2023-11-13 ENCOUNTER — MED REC SCAN ONLY (OUTPATIENT)
Dept: PHYSICAL MEDICINE AND REHAB | Facility: CLINIC | Age: 58
End: 2023-11-13

## 2024-01-07 NOTE — TELEPHONE ENCOUNTER
Vitamin D level in March of 2023 was WNL, patient reported medication.  Please advise on refill request.    Requested Prescriptions     Pending Prescriptions Disp Refills    ERGOCALCIFEROL 1.25 MG (97935 UT) Oral Cap [Pharmacy Med Name: VITAMIN D2 1.25MG(50,000 UNIT)] 12 capsule 0     Sig: TAKE 1 CAPSULE BY MOUTH ONE TIME PER WEEK      Recent Visits  Date Type Provider Dept   02/13/23 Office Visit Hillary Prado MD [facility]   08/31/22 Office Visit Evelina Fox APRN [facility]   Showing recent visits within past 540 days with a meds authorizing provider and meeting all other requirements  Future Appointments  Date Type Provider Dept   03/06/24 Appointment Hillary Prado MD [facility]   Showing future appointments within next 150 days with a meds authorizing provider and meeting all other requirements     Requested Prescriptions   Pending Prescriptions Disp Refills    ERGOCALCIFEROL 1.25 MG (50569 UT) Oral Cap [Pharmacy Med Name: VITAMIN D2 1.25MG(50,000 UNIT)] 12 capsule 0     Sig: TAKE 1 CAPSULE BY MOUTH ONE TIME PER WEEK       There is no refill protocol information for this order         Future Appointments         Provider Department Appt Notes    In 1 month Hillary Prado MD [facility] last 2/13/23           Recent Out[facility] Visits              [facility]    [facility] Visit    5 months ago Lumbar disc [facility]icul[facility]    Office Visit    5 months ago Skin tag    [facility] Marie Cardenas PA-C    Office Visit    5 months ago Lumbar disc herniation with radiculopathy    [facility], [address] [place] [address] Tomy Christiansen DO    Office Visit    6 months ago Chronic bilateral low back pain with  right-sided sciatica    [facility], [facility], [place] Tomy Christiansen DO    Office Visit

## 2024-01-08 RX ORDER — ERGOCALCIFEROL 1.25 MG/1
50000 CAPSULE ORAL WEEKLY
Qty: 12 CAPSULE | Refills: 0 | Status: SHIPPED | OUTPATIENT
Start: 2024-01-08

## 2024-03-06 ENCOUNTER — OFFICE VISIT (OUTPATIENT)
Dept: FAMILY MEDICINE CLINIC | Facility: CLINIC | Age: 59
End: 2024-03-06
Payer: COMMERCIAL

## 2024-03-06 VITALS
SYSTOLIC BLOOD PRESSURE: 112 MMHG | DIASTOLIC BLOOD PRESSURE: 76 MMHG | WEIGHT: 171.81 LBS | HEIGHT: 63 IN | BODY MASS INDEX: 30.44 KG/M2 | HEART RATE: 106 BPM

## 2024-03-06 DIAGNOSIS — Z12.31 VISIT FOR SCREENING MAMMOGRAM: Primary | ICD-10-CM

## 2024-03-06 DIAGNOSIS — Z00.00 ANNUAL PHYSICAL EXAM: ICD-10-CM

## 2024-03-06 DIAGNOSIS — E55.9 VITAMIN D DEFICIENCY: ICD-10-CM

## 2024-03-06 DIAGNOSIS — R32 URINARY INCONTINENCE, UNSPECIFIED TYPE: ICD-10-CM

## 2024-03-06 DIAGNOSIS — R05.2 SUBACUTE COUGH: ICD-10-CM

## 2024-03-06 PROCEDURE — 99396 PREV VISIT EST AGE 40-64: CPT | Performed by: FAMILY MEDICINE

## 2024-03-06 RX ORDER — OMEPRAZOLE 40 MG/1
40 CAPSULE, DELAYED RELEASE ORAL DAILY
Qty: 30 CAPSULE | Refills: 0 | Status: SHIPPED | OUTPATIENT
Start: 2024-03-06

## 2024-03-06 NOTE — PROGRESS NOTES
HPI:   Kimberly Quezada is a 58 year old female who presents for a complete physical exam.    Was off work for 2 months for physical therapy - appetite is less. Loss 25 lbs. Reports urinary incontinence with laughing or sneezing.   Has cramps in legs when working and nighttime -   Has not been taking atorvastatin.   Dry cough at times when drinking or eating.   Wt Readings from Last 3 Encounters:   03/06/24 171 lb 12.8 oz (77.9 kg)   09/12/23 196 lb (88.9 kg)   08/10/23 196 lb (88.9 kg)     Body mass index is 30.43 kg/m².       Current Outpatient Medications   Medication Sig Dispense Refill    COLLAGEN OR Take by mouth.      NON FORMULARY Ad Multi-Minerals      ergocalciferol 1.25 MG (73958 UT) Oral Cap Take 1 capsule (50,000 Units total) by mouth once a week. 12 capsule 0    Cholecalciferol 125 MCG (5000 UT) Oral Tab Take 1 tablet (5,000 Units total) by mouth daily.      atorvastatin 10 MG Oral Tab Take 1 tablet (10 mg total) by mouth nightly. 90 tablet 3      Past Medical History:   Diagnosis Date    Diverticulosis large intestine w/o perforation or abscess w/o bleeding 12/13/2018    Gastropathy 12/13/2018    High cholesterol     Hyperlipidemia     Internal hemorrhoids without complication 12/13/2018    Rosacea     Seasonal allergies       Past Surgical History:   Procedure Laterality Date    COLONOSCOPY N/A 12/13/2018    Procedure: COLONOSCOPY;  Surgeon: Elizabet Gordon MD;  Location: [facility] ENDOSCOPY    OTHER SURGICAL HISTORY      Repair of anal fistula 2011      History reviewed. No pertinent family history.   Social History:   Social History     Socioeconomic History    Marital status: Single   Tobacco Use    Smoking status: Never     Passive exposure: Yes    Smokeless tobacco: Never    Tobacco comments:     Social smoker. Pt does not remember when she stopped   Vaping Use    Vaping Use: Never used   Substance and Sexual Activity    Alcohol use: Yes     Comment: social    Drug use: No   Other  Topics Concern    Pt has a pacemaker No    Pt has a defibrillator No    Reaction to local anesthetic No    Right Handed Yes          REVIEW OF SYSTEMS:   GENERAL: feels well otherwise  SKIN: denies any skin lesions  EYES:denies blurred vision or double vision  HEENT: denies nasal congestion, sinus pain or ST  LUNGS: denies shortness of breath with exertion, denies orthopnea  Pos cough.   CARDIOVASCULAR: denies chest pain on exertion  GI: denies abdominal pain,denies heartburn  : pos incontinence       EXAM:   /76   Pulse 106   Ht 5' 3\" (1.6 m)   Wt 171 lb 12.8 oz (77.9 kg)   BMI 30.43 kg/m²     GENERAL: well developed, well nourished,in no apparent distress  SKIN: no rashes,no suspicious lesions  HEENT: atraumatic, normocephalic,ears and throat are clear  EYES:PERRLA, EOMI, conjunctiva are clear  LUNGS: clear to auscultation  CARDIO: RRR without murmur  GI: good BS's,no masses, HSM or tenderness  EXTREMITIES: no cyanosis, clubbing or edema  NEURO: Oriented times three,cranial nerves are intact,motor and sensory are grossly intact    ASSESSMENT AND PLAN:   Kimberly Quezada is a 58 year old female who presents for a complete physical exam.    1. Visit for screening mammogram    - [facility] FELIPE 2D+3D SCREENING BILAT (CPT=77067/43130); Future    2. Vitamin D deficiency    - Vitamin D; Future  - Vitamin B12 [E]; Future    3. Urinary incontinence, unspecified type    - Pelvic Floor Therapy - [place] Location    4. Annual physical exam    - Comp Metabolic Panel (14); Future  - CBC With Differential With Platelet; Future  - Lipid Panel; Future  - TSH W Reflex To Free T4; Future  - Vitamin D; Future  - Vitamin B12 [E]; Future    5. Subacute cough  Trial of omeprazole - c/w reflux        Hillary Prado MD  3/6/2024  9:32 AM

## 2024-03-18 ENCOUNTER — TELEPHONE (OUTPATIENT)
Dept: FAMILY MEDICINE CLINIC | Facility: CLINIC | Age: 59
End: 2024-03-18

## 2024-03-18 DIAGNOSIS — R53.83 OTHER FATIGUE: Primary | ICD-10-CM

## 2024-03-18 DIAGNOSIS — Z00.00 LABORATORY EXAMINATION ORDERED AS PART OF A ROUTINE GENERAL MEDICAL EXAMINATION: ICD-10-CM

## 2024-03-18 DIAGNOSIS — E55.9 VITAMIN D DEFICIENCY: ICD-10-CM

## 2024-03-18 NOTE — TELEPHONE ENCOUNTER
Per Lupe patient is at [facility]. Needs orders. RN faxed to [phone], confirmation received.     She also asked RN to reorder electronically, this was completed.      Cancelled previous orders for [facility].

## 2024-03-19 LAB
ABSOLUTE BASOPHILS: 26 CELLS/UL (ref 0–200)
ABSOLUTE EOSINOPHILS: 26 CELLS/UL (ref 15–500)
ABSOLUTE LYMPHOCYTES: 896 CELLS/UL (ref 850–3900)
ABSOLUTE MONOCYTES: 626 CELLS/UL (ref 200–950)
ABSOLUTE NEUTROPHILS: 7125 CELLS/UL (ref 1500–7800)
ALBUMIN/GLOBULIN RATIO: 0.8 (CALC) (ref 1–2.5)
ALBUMIN: 3.1 G/DL (ref 3.6–5.1)
ALKALINE PHOSPHATASE: 165 U/L (ref 37–153)
ALT: 26 U/L (ref 6–29)
AST: 21 U/L (ref 10–35)
BASOPHILS: 0.3 %
BILIRUBIN, TOTAL: 0.5 MG/DL (ref 0.2–1.2)
BUN: 10 MG/DL (ref 7–25)
CALCIUM: 9.1 MG/DL (ref 8.6–10.4)
CARBON DIOXIDE: 26 MMOL/L (ref 20–32)
CHLORIDE: 102 MMOL/L (ref 98–110)
CHOL/HDLC RATIO: 3.3 (CALC)
CHOLESTEROL, TOTAL: 166 MG/DL
CREATININE: 0.7 MG/DL (ref 0.5–1.03)
EGFR: 101 ML/MIN/1.73M2
EOSINOPHILS: 0.3 %
GLOBULIN: 4.1 G/DL (CALC) (ref 1.9–3.7)
GLUCOSE: 101 MG/DL (ref 65–99)
HDL CHOLESTEROL: 51 MG/DL
HEMATOCRIT: 25.6 % (ref 35–45)
HEMOGLOBIN: 7.5 G/DL (ref 11.7–15.5)
LDL-CHOLESTEROL: 97 MG/DL (CALC)
LYMPHOCYTES: 10.3 %
MCH: 21.2 PG (ref 27–33)
MCHC: 29.3 G/DL (ref 32–36)
MCV: 72.3 FL (ref 80–100)
MONOCYTES: 7.2 %
MPV: 11.1 FL (ref 7.5–12.5)
NEUTROPHILS: 81.9 %
NON-HDL CHOLESTEROL: 115 MG/DL (CALC)
PLATELET COUNT: 477 THOUSAND/UL (ref 140–400)
POTASSIUM: 4.3 MMOL/L (ref 3.5–5.3)
PROTEIN, TOTAL: 7.2 G/DL (ref 6.1–8.1)
RDW: 17.2 % (ref 11–15)
RED BLOOD CELL COUNT: 3.54 MILLION/UL (ref 3.8–5.1)
SODIUM: 139 MMOL/L (ref 135–146)
TRIGLYCERIDES: 86 MG/DL
TSH W/REFLEX TO FT4: 1.25 MIU/L (ref 0.4–4.5)
VITAMIN B12: 507 PG/ML (ref 200–1100)
VITAMIN D, 25-OH, TOTAL: 103 NG/ML (ref 30–100)
WHITE BLOOD CELL COUNT: 8.7 THOUSAND/UL (ref 3.8–10.8)

## 2024-03-25 DIAGNOSIS — D50.0 IRON DEFICIENCY ANEMIA DUE TO CHRONIC BLOOD LOSS: Primary | ICD-10-CM

## 2024-03-25 DIAGNOSIS — D50.9 IRON DEFICIENCY ANEMIA, UNSPECIFIED IRON DEFICIENCY ANEMIA TYPE: Primary | ICD-10-CM

## 2024-03-25 RX ORDER — DOCUSATE SODIUM 100 MG/1
100 CAPSULE, LIQUID FILLED ORAL 2 TIMES DAILY
Qty: 60 CAPSULE | Refills: 5 | Status: SHIPPED | OUTPATIENT
Start: 2024-03-25

## 2024-03-25 RX ORDER — FERROUS SULFATE 325(65) MG
325 TABLET ORAL 2 TIMES DAILY
Qty: 60 TABLET | Refills: 5 | Status: SHIPPED | OUTPATIENT
Start: 2024-03-25

## 2024-03-25 NOTE — PROGRESS NOTES
Post menopausal with hemoglobin of 7.5. please get patient in right away for scope. Did send in iron so please let her know if should hold off until seen.

## 2024-03-26 ENCOUNTER — TELEPHONE (OUTPATIENT)
Facility: CLINIC | Age: 59
End: 2024-03-26

## 2024-03-26 DIAGNOSIS — R10.9 ABDOMINAL PAIN, UNSPECIFIED ABDOMINAL LOCATION: Primary | ICD-10-CM

## 2024-03-26 DIAGNOSIS — R63.4 ABNORMAL WEIGHT LOSS: ICD-10-CM

## 2024-03-26 DIAGNOSIS — Z12.11 SPECIAL SCREENING FOR MALIGNANT NEOPLASMS, COLON: ICD-10-CM

## 2024-03-26 DIAGNOSIS — R63.0 LOSS OF APPETITE: ICD-10-CM

## 2024-03-26 DIAGNOSIS — D50.9 IRON DEFICIENCY ANEMIA, UNSPECIFIED IRON DEFICIENCY ANEMIA TYPE: ICD-10-CM

## 2024-03-26 NOTE — TELEPHONE ENCOUNTER
Dr Patel,    I have an opening for a procedure on hold for 04/02/2024 but the pt can't make the appt    Can we use the opening for this pt of Dr Prado's    Has MICHAEL and weight loss     03/06/24 171 lb 12.8 oz (77.9 kg)   09/12/23 196 lb (88.9 kg)   08/10/23 196 lb (88.9 kg)        Takes oral iron, no blood thinners and she is not diabetic

## 2024-03-26 NOTE — TELEPHONE ENCOUNTER
----- Message from Hillary Prado MD sent at 3/25/2024  3:27 PM CDT -----  Sending message to GI - staff please let pt know below plan   Post menopausal patient with hemoglobin of 7.5. please get patient in right away for scope - first available provider is fine. Did send in iron so please let her know if should hold off until seen. Also will place referral for hematologist for IV iron replacement. Would be faster for patient.

## 2024-03-27 ENCOUNTER — TELEPHONE (OUTPATIENT)
Dept: HEMATOLOGY/ONCOLOGY | Facility: HOSPITAL | Age: 59
End: 2024-03-27

## 2024-03-27 NOTE — TELEPHONE ENCOUNTER
Patient called for new consult with  for- Iron deficiency anemia due to chronic blood loss. Referring provider:LACI SOSA. INS:     [facility]

## 2024-03-28 RX ORDER — SODIUM, POTASSIUM,MAG SULFATES 17.5-3.13G
SOLUTION, RECONSTITUTED, ORAL ORAL
Qty: 1 EACH | Refills: 0 | Status: SHIPPED | OUTPATIENT
Start: 2024-03-28

## 2024-03-28 NOTE — TELEPHONE ENCOUNTER
YES that would be AWESOME.    Apparently LMP approximately October 2021.    Labs of 3/18/2024 showing anemia hemoglobin 7.5g mcv 72.3; ,000; serum albumin 3.1;  LFTs show AST 21 ALT 26 alk phosphatase 165 high    She needs an EGD/CLN.    She needs UA now or after EGD/CLN.    No recent cross-sectional imaging seen here or \"Care Everywhere\" tab.    EGD/colonoscopy exam reports Elizabet Kenny MD 12/13/2018 reviewed  Pre-op diagnosis: Globus symptoms and dysphagia; screening colonoscopy examination  Findings:  Normal esophagus  Mild nonerosive gastritis/gastropathy and glandular polyps  Colonoscopy w excellent prep shows only left-sided diverticulosis, internal hemorrhoids        GI schedulers: please call Ms Quezada to Schedule EGD & colonoscopy exam at [facility] ([facility])    BMI Readings from Last 1 Encounters:   03/06/24 30.43 kg/m²      MAC anesthesia     Suprep small volume bowel prep if covered by insurance, otherwise   Golytely (PEG) 4L bowel prep  [facility]    Dx = abdominal pain, iron deficiency anemia, loss of appetite, abnormal weight loss, screening colonoscopy examination    Medication instructions:    None

## 2024-03-29 NOTE — TELEPHONE ENCOUNTER
Omeprazole 40 MG Oral Capsule Delayed Release, Take 1 capsule (40 mg total) by mouth daily. QTY 90.0

## 2024-03-29 NOTE — TELEPHONE ENCOUNTER
Please review; protocol failed. Or has no protocol    Requested Prescriptions   Pending Prescriptions Disp Refills    Omeprazole 40 MG Oral Capsule Delayed Release 30 capsule 0     Sig: Take 1 capsule (40 mg total) by mouth daily.       Gastrointestional Medication Protocol Passed - 3/29/2024  4:37 PM        Passed - In person appointment or virtual visit in the past 12 mos or appointment in next 3 mos     Recent Outpatient Visits              3 weeks ago Visit for screening mammogram    [facility], Laci Shelby MD    Office Visit    6 months ago [facility]    [facility]    7 months ago Lumbar disc herniation with radiculopathy    [facility] Tomy Christiansen DO    Office Visit    8 months ago Skin tag    [facility] Marie Cardenas PA-C    Office Visit    8 months ago Lumbar disc herniation with radiculopathy    [address] Tomy Christiansen,     Office Visit          Future Appointments         Provider Department Appt Notes    In 6 days [facility], [facility]     In 1 week Martha Reynolds MD [facility] Hematology Oncology consult  Iron deficiency anemia due to chronic blood loss. Referring provider:LACI SOSA.referring kg    In 1 week [facility] Hematology Oncology consult  Iron deficiency anemia due to chronic blood loss. Referring provider:LACI SOSA.referring kg    In 1 week [facility] PT [facility]     In 1 month [facility] PT [facility]     In 1 month Tahmina Lema APRN [facility] Iron deficiency anemia, unspecified iron deficiency anemia  type                     Recent Outpatient Visits              3 weeks ago Visit for screening mammogram    [facility], [address], [place] Laci Sosa MD    Office Visit    6 months ago [facility]    Office Visit    7 months ago Lumbar disc herniation with radiculopathy    [facility], [facility] Tomy Christiansen, DO    Office Visit    8 months ago Skin tag    [facility], [facility] Marie Cardenas PA-C    Office Visit    8 months ago Lumbar disc herniation with radiculopathy    [facility], [address] [place] [address] Tomy Christiansen, DO    Office Visit           Future Appointments         Provider Department Appt Notes    In 6 days Julisa Hamilton, PT [facility]     In 1 week Martha Reynolds MD [facility] Hematology Oncology consult  Iron deficiency anemia due to chronic blood loss. Referring provider:LACI SOSA.referring kg    In 1 week [facility] Hematology Oncology consult  Iron deficiency anemia due to chronic blood loss. Referring provider:LACI SOSA.referring kg    In 1 week Julisa Hamilton, PT [facility]     In 1 month Julisa Hamilton PT [facility]     In 1 month Tahmina Lema APRN [facility] Iron deficiency anemia, unspecified iron deficiency anemia type

## 2024-04-01 ENCOUNTER — TELEPHONE (OUTPATIENT)
Facility: CLINIC | Age: 59
End: 2024-04-01

## 2024-04-01 RX ORDER — OMEPRAZOLE 40 MG/1
40 CAPSULE, DELAYED RELEASE ORAL DAILY
Qty: 90 CAPSULE | Refills: 0 | Status: SHIPPED | OUTPATIENT
Start: 2024-04-01

## 2024-04-01 NOTE — TELEPHONE ENCOUNTER
Lmtcb please transfer to [id number]       Holding 04/02/2024 @ [facility] Per OSITO Pat

## 2024-04-01 NOTE — TELEPHONE ENCOUNTER
Scheduled for:  Colonoscopy 47647 EGD 31835  Provider Name:    Date:  05/30/2024  Location:  [facility]  Sedation:  Mac  Time:  12:30pm (pt is aware [facility] will call with arrival time     Prep:  Suprep   Meds/Allergies Reconciled?:  Yes    Diagnosis with codes:  abdominal pain, R10.9 iron deficiency anemia,D50.9  loss of appetite, R63.0 abnormal weight loss, R63.4 screening colonoscopy examination Z12.11  Was patient informed to call insurance with codes (Y/N):  Yes     Referral sent?:  Referral was sent at the time of electronic surgical scheduling.    [facility] or [facility] notified?:  I sent an electronic request to Endo Scheduling and received a confirmation today.       Medication Orders:  None  Misc Orders:  None     Further instructions given by staff:  I discussed the prep instructions with the patient which she verbally understood and is aware that I will send the instructions today.via [employer]

## 2024-04-01 NOTE — TELEPHONE ENCOUNTER
Pt states she can't make colonoscopy that was offered for tomorrow.  Pt does not have transportation.  Please call.

## 2024-04-01 NOTE — TELEPHONE ENCOUNTER
I spoke to the pt    She is going to try and see if she has a ride for tomorrow's Colon/EGD    She has only eaten oatmeal so far today. I told her that would not be an issue    She hasn't stopped her iron supplement but Dr Patel would still proceed with Colon/EGD    Pt will try to get a ride for tomorrow's procedure and call us back.    *When pt calls back please transfer her to Nevaeh at EXT: [id number]*

## 2024-04-01 NOTE — TELEPHONE ENCOUNTER
Moved to May sukumar Pat  Scheduled for:  Colonoscopy 54741 EGD [id number]  Provider Name:    Date:  Mon, 08/12/2024  Location:  [facility]  Sedation:  Mac  Time:  8:45am (pt is aware [facility] will call with arrival time     Prep:  Suprep   Meds/Allergies Reconciled?:  Yes    Diagnosis with codes:  abdominal pain, R10.9 iron deficiency anemia,D50.9  loss of appetite, R63.0 abnormal weight loss, R63.4 screening colonoscopy examination Z12.11  Was patient informed to call insurance with codes (Y/N):  Yes     Referral sent?:  Referral was sent at the time of electronic surgical scheduling.    [facility] or [facility] notified?:  I sent an electronic request to Endo Scheduling and received a confirmation today.       Medication Orders:  None  Misc Orders:  None     Further instructions given by staff:  I discussed the prep instructions with the patient which she verbally understood and is aware that I will send the instructions today.via [employer]

## 2024-04-04 ENCOUNTER — APPOINTMENT (OUTPATIENT)
Dept: PHYSICAL THERAPY | Age: 59
End: 2024-04-04
Attending: FAMILY MEDICINE
Payer: COMMERCIAL

## 2024-04-11 ENCOUNTER — NURSE ONLY (OUTPATIENT)
Dept: HEMATOLOGY/ONCOLOGY | Facility: HOSPITAL | Age: 59
End: 2024-04-11
Attending: INTERNAL MEDICINE
Payer: COMMERCIAL

## 2024-04-11 ENCOUNTER — APPOINTMENT (OUTPATIENT)
Dept: PHYSICAL THERAPY | Age: 59
End: 2024-04-11
Attending: FAMILY MEDICINE
Payer: COMMERCIAL

## 2024-04-11 VITALS
WEIGHT: 164 LBS | HEIGHT: 63 IN | SYSTOLIC BLOOD PRESSURE: 109 MMHG | HEART RATE: 109 BPM | TEMPERATURE: 99 F | RESPIRATION RATE: 16 BRPM | OXYGEN SATURATION: 96 % | DIASTOLIC BLOOD PRESSURE: 69 MMHG | BODY MASS INDEX: 29.06 KG/M2

## 2024-04-11 DIAGNOSIS — D50.0 IRON DEFICIENCY ANEMIA DUE TO CHRONIC BLOOD LOSS: Primary | ICD-10-CM

## 2024-04-11 DIAGNOSIS — D50.0 IRON DEFICIENCY ANEMIA DUE TO CHRONIC BLOOD LOSS: ICD-10-CM

## 2024-04-11 DIAGNOSIS — R77.9 ELEVATED BLOOD PROTEIN: Primary | ICD-10-CM

## 2024-04-11 LAB
BASOPHILS # BLD AUTO: 0.03 X10(3) UL (ref 0–0.2)
BASOPHILS NFR BLD AUTO: 0.3 %
DEPRECATED HBV CORE AB SER IA-ACNC: 764.6 NG/ML
DEPRECATED RDW RBC AUTO: 47.8 FL (ref 35.1–46.3)
EOSINOPHIL # BLD AUTO: 0.06 X10(3) UL (ref 0–0.7)
EOSINOPHIL NFR BLD AUTO: 0.7 %
ERYTHROCYTE [DISTWIDTH] IN BLOOD BY AUTOMATED COUNT: 19 % (ref 11–15)
HCT VFR BLD AUTO: 21.2 %
HGB BLD-MCNC: 6.6 G/DL
IMM GRANULOCYTES # BLD AUTO: 0.06 X10(3) UL (ref 0–1)
IMM GRANULOCYTES NFR BLD: 0.7 %
IRON SATN MFR SERPL: 11 %
IRON SERPL-MCNC: 16 UG/DL
LYMPHOCYTES # BLD AUTO: 0.84 X10(3) UL (ref 1–4)
LYMPHOCYTES NFR BLD AUTO: 9.7 %
MCH RBC QN AUTO: 21.9 PG (ref 26–34)
MCHC RBC AUTO-ENTMCNC: 31.1 G/DL (ref 31–37)
MCV RBC AUTO: 70.2 FL
MONOCYTES # BLD AUTO: 0.75 X10(3) UL (ref 0.1–1)
MONOCYTES NFR BLD AUTO: 8.7 %
NEUTROPHILS # BLD AUTO: 6.92 X10 (3) UL (ref 1.5–7.7)
NEUTROPHILS # BLD AUTO: 6.92 X10(3) UL (ref 1.5–7.7)
NEUTROPHILS NFR BLD AUTO: 79.9 %
PLATELET # BLD AUTO: 471 10(3)UL (ref 150–450)
RBC # BLD AUTO: 3.02 X10(6)UL
TIBC SERPL-MCNC: 152 UG/DL (ref 250–425)
TRANSFERRIN SERPL-MCNC: 102 MG/DL (ref 250–380)
WBC # BLD AUTO: 8.7 X10(3) UL (ref 4–11)

## 2024-04-11 PROCEDURE — 83540 ASSAY OF IRON: CPT

## 2024-04-11 PROCEDURE — 36415 COLL VENOUS BLD VENIPUNCTURE: CPT

## 2024-04-11 PROCEDURE — 85025 COMPLETE CBC W/AUTO DIFF WBC: CPT

## 2024-04-11 PROCEDURE — 82728 ASSAY OF FERRITIN: CPT

## 2024-04-11 PROCEDURE — 99205 OFFICE O/P NEW HI 60 MIN: CPT | Performed by: INTERNAL MEDICINE

## 2024-04-11 PROCEDURE — 85060 BLOOD SMEAR INTERPRETATION: CPT

## 2024-04-11 PROCEDURE — 84466 ASSAY OF TRANSFERRIN: CPT

## 2024-04-11 RX ORDER — FOLIC ACID 1 MG/1
1 TABLET ORAL DAILY
Qty: 30 TABLET | Refills: 3 | Status: SHIPPED | OUTPATIENT
Start: 2024-04-11

## 2024-04-11 RX ORDER — MULTIVITAMIN WITH IRON
1 TABLET ORAL DAILY
COMMUNITY

## 2024-04-11 NOTE — CONSULTS
STEPHEN Quezada is a 58 year old female here at the request of Hillary Prado MD for evaluation of Iron deficiency anemia due to chronic blood loss.    Patient noted with new onset of anemia since March 27, 2023.  Her baseline was between 12 and 13.2 in the past, and at that time, her hemoglobin was 11.4.  Her MCV was normal, but she did have a slight increase of the RDW.  She then had labs on 3/18/2024 and her hemoglobin was down to 7.5 with MCV of 72.3, RDW 17.2 and platelet count of 477.  Patient has not had iron studies performed.  She did have a B12 level which was normal.    At the time of visit with her primary care doctor on 3/6/2024 patient had complaint of decreased appetite, and weight loss of 25 pounds.  He was also having leg cramps when working and at nighttime.  She also complained of a dry cough at times when drinking or eating.  Went the patient was noted to have a hemoglobin of 7.5, she was recommended to take iron replacement therapy over-the-counter, twice daily.  The patient is postmenopausal.    Patient scheduled for EGD and colonoscopy on May 30, 2024.  Has past h/o hemorrhoids that were removed.  States always had problems with her stomach, sensitivities not sure if gluten or lactose problems.     She states she was having chills and was feeling tired.  She states that she has lost 30 lbs since the beginning of the year.  She did not have appetite and did not realize that she was losing weight.  She states she was told at work her uniform was too big.  She was eating once a day.  States that was not eating late at night because would get full and then have bad dreams.      States noticed more hair loss.  Denies pica.    Review of Systems:   Review of Systems   Constitutional:  Positive for appetite change, chills, fatigue and unexpected weight change.   HENT:   Positive for nosebleeds (one day after using Afrin). Negative for trouble swallowing.         New post nasal gtt    Respiratory:  Positive for cough (after drinking anything that is not water.) and shortness of breath (EDWARDS).    Cardiovascular:  Positive for palpitations (when going up the stairs). Negative for chest pain.   Gastrointestinal:  Negative for abdominal pain, blood in stool, constipation, diarrhea, nausea, rectal pain and vomiting.        No GERD, was prescribed omeprazole but stopped as did not make a difference.    Stool black and more loose since on iron and colace.  One BM a day   Genitourinary:  Negative for hematuria.    Musculoskeletal:  Positive for myalgias (on the legs x 1 month, no restless legs). Negative for arthralgias and back pain.   Skin:  Negative for rash.   Neurological:  Negative for dizziness and headaches.   Hematological:  Does not bruise/bleed easily.   Psychiatric/Behavioral:  Negative for sleep disturbance.          Current Outpatient Medications   Medication Sig Dispense Refill    Multiple Vitamins Oral Tab Take 1 tablet by mouth daily.      folic acid 1 MG Oral Tab Take 1 tablet (1 mg total) by mouth daily. 30 tablet 3    Ferrous Sulfate 325 (65 Fe) MG Oral Tab Take 1 tablet (325 mg total) by mouth 2 (two) times daily. 60 tablet 5    docusate sodium (COLACE) 100 MG Oral Cap Take 1 capsule (100 mg total) by mouth 2 (two) times daily. 60 capsule 5    NON FORMULARY Ad Multi-Minerals      Na Sulfate-K Sulfate-Mg Sulf (SUPREP BOWEL PREP KIT) 17.5-3.13-1.6 GM/177ML Oral Solution Take as directed (Patient not taking: Reported on 4/11/2024) 1 each 0     Allergies:   Allergies   Allergen Reactions    Meloxicam ANGIOEDEMA     Upper and lower lip throbbing, tongue swelling, tongue ulcers    Contrave [Naltrexone-Bupropion Hcl Er] RASH     rash    Wellbutrin [Bupropion] RASH     rash    Simvastatin RASH       Past Medical History:    Diverticulosis large intestine w/o perforation or abscess w/o bleeding    Gastropathy    High cholesterol    Hyperlipidemia    Internal hemorrhoids without  complication    Rosacea    Seasonal allergies     Past Surgical History:   Procedure Laterality Date    Colonoscopy N/A 12/13/2018    Procedure: COLONOSCOPY;  Surgeon: Elizabet Gordon MD;  Location: [facility] ENDOSCOPY    Other surgical history      Repair of anal fistula 2011     Social History     Socioeconomic History    Marital status: Single   Tobacco Use    Smoking status: Never     Passive exposure: Yes    Smokeless tobacco: Never    Tobacco comments:     Social smoker. Pt does not remember when she stopped   Vaping Use    Vaping status: Never Used   Substance and Sexual Activity    Alcohol use: Yes     Comment: social    Drug use: No   Other Topics Concern    Pt has a pacemaker No    Pt has a defibrillator No    Reaction to local anesthetic No    Right Handed Yes       History reviewed. No pertinent family history.      PHYSICAL EXAM:    /69 (BP Location: Left arm, Patient Position: Sitting, Cuff Size: large)   Pulse 109   Temp 99.2 °F (37.3 °C) (Oral)   Resp 16   Ht 1.6 m (5' 3\")   Wt 74.4 kg (164 lb)   SpO2 96%   BMI 29.05 kg/m²   Wt Readings from Last 6 Encounters:   04/11/24 74.4 kg (164 lb)   03/06/24 77.9 kg (171 lb 12.8 oz)   09/12/23 88.9 kg (196 lb)   08/10/23 88.9 kg (196 lb)   07/12/23 88.9 kg (196 lb)   06/27/23 89.8 kg (198 lb)     Physical Exam  General: Patient is alert, not in acute distress.  HEENT: EOMs intact. PERRL. Oropharynx is clear.   Neck: No JVD. No palpable lymphadenopathy. Neck is supple.  Chest: Clear to auscultation.  Heart: Regular rate and rhythm.   Abdomen: Soft, mild epigastric tenderness with good bowel sounds.  Extremities: No edema.  Neurological: Grossly intact.   Lymphatics: There is no palpable lymphadenopathy throughout in the cervical, supraclavicular, axillary, or inguinal regions.  Psych/Depression: nl        ASSESSMENT/PLAN:     1. Iron deficiency anemia due to chronic blood loss      -- Patient with subacute onset of microcytic anemia, likely  iron deficiency anemia.  Labs ordered below to document iron deficiency.  She has been on oral iron therapy since about a month ago.  Discussed with the patient that she will likely need intravenous iron replacement, which I ordered and she will receive a call from infusion center once iron deficiency is documented and will be scheduled.  She will receive 200 mg x 5 doses of Venofer, I discussed with the patient potential side effects of treatment including infusion related reactions, and allergic reactions, which would include anaphylaxis.  I also prescribed a folic acid to take in combination with the IV iron to improve her hematopoiesis.  -- Patient is already scheduled for upper and lower GI scopes.  The patient's appointment is not till the end of May, given the severity of her iron deficiency anemia, and her involuntary weight loss as well as epigastric tenderness, I sent a message to GI in hopes of expediting her endoscopies.    Patient will have follow-up in 1 month to assess response to therapy, in addition, hopefully by then she will have completed her endoscopies and we will review her results.    Orders Placed This Encounter   Procedures    CBC With Differential With Platelet    Ferritin    Iron And Tibc     MDM high risk.    Results From Past 48 Hours:  No results found for this or any previous visit (from the past 48 hour(s)).    Imaging & Referrals:  None   No orders of the defined types were placed in this encounter.      Component      Latest Ref Rng 8/3/2018 3/25/2019 2/7/2022 3/27/2023 3/18/2024   WBC      3.8 - 10.8 Thousand/uL 4.6  4.7  4.3  4.6  8.7    RBC      3.80 - 5.10 Million/uL 4.52  4.60  4.35  4.44  3.54 (L)    Hemoglobin      11.7 - 15.5 g/dL 13.0  13.2  12.0  11.4 (L)  7.5 (L)    Hematocrit      35.0 - 45.0 % 39.4  41.1  36.6  36.2  25.6 (L)    MCV      80.0 - 100.0 fL 87.2  89.3  84.1  81.5  72.3 (L)    MCH      27.0 - 33.0 pg 28.8  28.7  27.6  25.7 (L)  21.2 (L)    MCHC      32.0 -  36.0 g/dL 33.1  32.1  32.8  31.5 (L)  29.3 (L)    RDW-SD      35.1 - 46.3 fL  48.2 (H)       RDW      11.0 - 15.0 % 15.1 (H)  14.7  14.4  15.6 (H)  17.2 (H)    Platelet Count      140 - 400 Thousand/uL 175  219.0  225  276  477 (H)    Prelim Neutrophil Abs      1.50 - 7.70 x10 (3) uL  2.27       Neutrophils Absolute      1,500 - 7,800 cells/uL 2.3  2.27  2,554  2,728  7,125    Lymphocytes Absolute      850 - 3,900 cells/uL 1.7  1.78  1,260  1,279  896    Monocytes Absolute      200 - 950 cells/uL 0.4  0.41  366  423  626    Eosinophils Absolute      15 - 500 cells/uL 0.2  0.17  99  152  26    Basophils Absolute      0 - 200 cells/uL 0.0  0.02  22  18  26    Immature Granulocyte Absolute      0.00 - 1.00 x10(3) uL  0.01       Neutrophils %      % 50  48.8  59.4  59.3  81.9    Lymphocytes %      % 37  38.2  29.3  27.8  10.3    Monocytes %      % 9  8.8  8.5  9.2  7.2    Eosinophils %      % 4  3.6  2.3  3.3  0.3    Basophils %      % 0  0.4  0.5  0.4  0.3    Immature Granulocyte %      %  0.2       MEAN PLATELET VOLUME      7.4 - 10.3 fL 11.0 (H)        MPV      7.5 - 12.5 fL   12.4  11.6  11.1    Vitamin B12      200 - 1,100 pg/mL   [id number]       Legend:  (H) High  (L) Low

## 2024-04-15 ENCOUNTER — TELEPHONE (OUTPATIENT)
Dept: HEMATOLOGY/ONCOLOGY | Facility: HOSPITAL | Age: 59
End: 2024-04-15

## 2024-04-17 ENCOUNTER — TELEPHONE (OUTPATIENT)
Dept: HEMATOLOGY/ONCOLOGY | Facility: HOSPITAL | Age: 59
End: 2024-04-17

## 2024-04-22 ENCOUNTER — TELEPHONE (OUTPATIENT)
Facility: CLINIC | Age: 59
End: 2024-04-22

## 2024-04-22 ENCOUNTER — TELEPHONE (OUTPATIENT)
Dept: HEMATOLOGY/ONCOLOGY | Facility: HOSPITAL | Age: 59
End: 2024-04-22

## 2024-04-22 NOTE — TELEPHONE ENCOUNTER
Patient is requesting to speak with RN.  She is scheduled with MD for a CLN on 5/30/24.  She states that she has not consulted with MD but she is scheduled 5/1/24 with APN.  She needs to know if she still needs to see APN.  Please call

## 2024-04-23 NOTE — TELEPHONE ENCOUNTER
I tried to call the pt    She did not answer    I left a detailed message stating she does not need that appt    I cancelled the appt with Tahmina VILLAR    If she has further questions she should call me back

## 2024-04-29 ENCOUNTER — TELEPHONE (OUTPATIENT)
Dept: HEMATOLOGY/ONCOLOGY | Facility: HOSPITAL | Age: 59
End: 2024-04-29

## 2024-04-29 ENCOUNTER — APPOINTMENT (OUTPATIENT)
Dept: PHYSICAL THERAPY | Age: 59
End: 2024-04-29
Attending: FAMILY MEDICINE
Payer: COMMERCIAL

## 2024-04-29 NOTE — TELEPHONE ENCOUNTER
Patient called and left message cancelling appointment with Dr. Reynolds on 5/6 due to just starting venofer on 5/6. Informed will draw labs at 5/6 appointment. Instructed to please reschedule lab and follow up in 3 mos post venofer infusions.

## 2024-05-06 ENCOUNTER — OFFICE VISIT (OUTPATIENT)
Dept: HEMATOLOGY/ONCOLOGY | Facility: HOSPITAL | Age: 59
End: 2024-05-06
Attending: INTERNAL MEDICINE
Payer: COMMERCIAL

## 2024-05-06 VITALS
BODY MASS INDEX: 28 KG/M2 | HEART RATE: 117 BPM | RESPIRATION RATE: 16 BRPM | OXYGEN SATURATION: 97 % | SYSTOLIC BLOOD PRESSURE: 131 MMHG | TEMPERATURE: 100 F | WEIGHT: 160 LBS | DIASTOLIC BLOOD PRESSURE: 71 MMHG

## 2024-05-06 DIAGNOSIS — D50.0 IRON DEFICIENCY ANEMIA DUE TO CHRONIC BLOOD LOSS: Primary | ICD-10-CM

## 2024-05-06 DIAGNOSIS — R77.9 ELEVATED BLOOD PROTEIN: ICD-10-CM

## 2024-05-06 LAB
ALBUMIN SERPL-MCNC: 4 G/DL (ref 3.2–4.8)
ALBUMIN/GLOB SERPL: 0.9 {RATIO} (ref 1–2)
ALP LIVER SERPL-CCNC: 222 U/L
ALT SERPL-CCNC: 24 U/L
ANION GAP SERPL CALC-SCNC: 7 MMOL/L (ref 0–18)
AST SERPL-CCNC: 21 U/L (ref ?–34)
BILIRUB SERPL-MCNC: 0.5 MG/DL (ref 0.3–1.2)
BUN BLD-MCNC: 10 MG/DL (ref 9–23)
BUN/CREAT SERPL: 15.2 (ref 10–20)
CALCIUM BLD-MCNC: 9.7 MG/DL (ref 8.7–10.4)
CHLORIDE SERPL-SCNC: 104 MMOL/L (ref 98–112)
CO2 SERPL-SCNC: 26 MMOL/L (ref 21–32)
CREAT BLD-MCNC: 0.66 MG/DL
EGFRCR SERPLBLD CKD-EPI 2021: 103 ML/MIN/1.73M2 (ref 60–?)
FASTING STATUS PATIENT QL REPORTED: NO
GLOBULIN PLAS-MCNC: 4.3 G/DL (ref 2–3.5)
GLUCOSE BLD-MCNC: 121 MG/DL (ref 70–99)
IGA SERPL-MCNC: 395.6 MG/DL (ref 40–350)
IGM SERPL-MCNC: 69.1 MG/DL (ref 50–300)
IMMUNOGLOBULIN PNL SER-MCNC: 1911 MG/DL (ref 650–1600)
OSMOLALITY SERPL CALC.SUM OF ELEC: 284 MOSM/KG (ref 275–295)
POTASSIUM SERPL-SCNC: 3.7 MMOL/L (ref 3.5–5.1)
PROT SERPL-MCNC: 8.3 G/DL (ref 5.7–8.2)
SODIUM SERPL-SCNC: 137 MMOL/L (ref 136–145)

## 2024-05-06 PROCEDURE — 82784 ASSAY IGA/IGD/IGG/IGM EACH: CPT

## 2024-05-06 PROCEDURE — 84165 PROTEIN E-PHORESIS SERUM: CPT

## 2024-05-06 PROCEDURE — 96374 THER/PROPH/DIAG INJ IV PUSH: CPT

## 2024-05-06 PROCEDURE — 80053 COMPREHEN METABOLIC PANEL: CPT

## 2024-05-06 PROCEDURE — 86334 IMMUNOFIX E-PHORESIS SERUM: CPT

## 2024-05-06 PROCEDURE — 83521 IG LIGHT CHAINS FREE EACH: CPT

## 2024-05-06 NOTE — PROGRESS NOTES
Patient here for 200mg Venofer dose 1 of 5. Patient denies any issues or concerns. Reviewed patient education for Venofer including possible side effects and s/s of adverse reaction. Patient scheduled for 5 total infusions and instructed to have labs drawn prior to MD follow-up on 8/7. Orders active.     Ordering MD: Rodolfo  Order Exp: 5 doses in order     Venofer administered IVP over 5 mins with free flowing NS. Reviewed next apt date/time: 5/13 1:30pm    Education Record  Learner:  Patient  Disease / Diagnosis: iron deficiency anemia  Barriers / Limitations:  None  Method:  Reinforcement  General Topics:  Medication and Plan of care reviewed  Outcome:  Shows understanding

## 2024-05-07 LAB
KAPPA LC FREE SER-MCNC: 5.47 MG/DL (ref 0.33–1.94)
KAPPA LC FREE/LAMBDA FREE SER NEPH: 0.99 {RATIO} (ref 0.26–1.65)
LAMBDA LC FREE SERPL-MCNC: 5.54 MG/DL (ref 0.57–2.63)

## 2024-05-08 ENCOUNTER — TELEPHONE (OUTPATIENT)
Dept: HEMATOLOGY/ONCOLOGY | Facility: HOSPITAL | Age: 59
End: 2024-05-08

## 2024-05-08 LAB
ALBUMIN SERPL ELPH-MCNC: 2.48 G/DL (ref 3.75–5.21)
ALBUMIN/GLOB SERPL: 0.52 {RATIO} (ref 1–2)
ALPHA1 GLOB SERPL ELPH-MCNC: 0.76 G/DL (ref 0.19–0.46)
ALPHA2 GLOB SERPL ELPH-MCNC: 1.42 G/DL (ref 0.48–1.05)
B-GLOBULIN SERPL ELPH-MCNC: 0.99 G/DL (ref 0.68–1.23)
GAMMA GLOB SERPL ELPH-MCNC: 1.65 G/DL (ref 0.62–1.7)
PROT SERPL-MCNC: 7.3 G/DL (ref 5.7–8.2)

## 2024-05-08 NOTE — TELEPHONE ENCOUNTER
----- Message from [employer]  sent at 5/8/2024  3:59 PM CDT -----  Regarding: Bumps /swelling   Contact: [phone]  Hi,  I developed a bump on my lower left neckline and now I have one under my left armpit. Should I be worry about this or do I need to do something about it? Please advise.     Thank you.

## 2024-05-08 NOTE — TELEPHONE ENCOUNTER
Called Kimberly- bumps under L arm is tender but mild. Not sure when it started. Size of an egg. Lump on neck, not tender just noticed it. Does not look like a pimple.  Same arm as IV for iron infusion.  No fevers or chills. No itching.    Pt has appointment Monday for infusion- have nurse call me to look at your arm. In meantime take allergy med OTC. No other complaints. No pain to L breast.

## 2024-05-13 ENCOUNTER — APPOINTMENT (OUTPATIENT)
Dept: PHYSICAL THERAPY | Age: 59
End: 2024-05-13
Attending: FAMILY MEDICINE

## 2024-05-13 ENCOUNTER — OFFICE VISIT (OUTPATIENT)
Dept: HEMATOLOGY/ONCOLOGY | Facility: HOSPITAL | Age: 59
End: 2024-05-13
Attending: INTERNAL MEDICINE
Payer: COMMERCIAL

## 2024-05-13 VITALS
HEART RATE: 111 BPM | RESPIRATION RATE: 16 BRPM | OXYGEN SATURATION: 98 % | TEMPERATURE: 100 F | WEIGHT: 156.63 LBS | BODY MASS INDEX: 28 KG/M2 | SYSTOLIC BLOOD PRESSURE: 125 MMHG | DIASTOLIC BLOOD PRESSURE: 63 MMHG

## 2024-05-13 DIAGNOSIS — R59.1 LYMPHADENOPATHY: Primary | ICD-10-CM

## 2024-05-13 DIAGNOSIS — D50.0 IRON DEFICIENCY ANEMIA DUE TO CHRONIC BLOOD LOSS: Primary | ICD-10-CM

## 2024-05-13 PROCEDURE — 96374 THER/PROPH/DIAG INJ IV PUSH: CPT

## 2024-05-13 NOTE — PROGRESS NOTES
Patient here for 200mg Venofer dose 2 of 5. Reinforced plan of care with patient, discussed that iron levels will be drawn few months after final dose of iron. Has follow up with Dr. Reynolds in August. Clare MATHIS visited patient in infusion regarding lump in supraclavicular area and left armpit.      Ordering MD: Rodolfo  Order Exp: 5 doses ordered     Patient tolerated infusion without difficulty or complaint. Reviewed next apt date/time: 5/20 1pm    Education Record  Learner:  Patient  Disease / Diagnosis: iron deficiency anemia  Barriers / Limitations:  None  Method:  Reinforcement  General Topics:  Plan of care reviewed  Outcome:  Shows understanding

## 2024-05-14 ENCOUNTER — TELEPHONE (OUTPATIENT)
Dept: HEMATOLOGY/ONCOLOGY | Facility: HOSPITAL | Age: 59
End: 2024-05-14

## 2024-05-14 NOTE — TELEPHONE ENCOUNTER
Pt asked to be seen while in infusion center on 5/13. Pt is receiving her venofer infusions. C/o new swelling to L axilla and supraclavicular area. No tenderness but new swelling noted. Pt c/o fatigue and weight loss, decrease appetite. Denies breast pain or discomfort. Discussed with Dr Reynolds- CT scan order placed.

## 2024-05-20 ENCOUNTER — OFFICE VISIT (OUTPATIENT)
Dept: HEMATOLOGY/ONCOLOGY | Facility: HOSPITAL | Age: 59
End: 2024-05-20
Attending: INTERNAL MEDICINE
Payer: COMMERCIAL

## 2024-05-20 VITALS
WEIGHT: 156.31 LBS | OXYGEN SATURATION: 98 % | BODY MASS INDEX: 28 KG/M2 | DIASTOLIC BLOOD PRESSURE: 67 MMHG | RESPIRATION RATE: 16 BRPM | SYSTOLIC BLOOD PRESSURE: 112 MMHG | HEART RATE: 111 BPM | TEMPERATURE: 98 F

## 2024-05-20 DIAGNOSIS — D50.0 IRON DEFICIENCY ANEMIA DUE TO CHRONIC BLOOD LOSS: Primary | ICD-10-CM

## 2024-05-20 PROCEDURE — 96374 THER/PROPH/DIAG INJ IV PUSH: CPT

## 2024-05-20 NOTE — PROGRESS NOTES
Kimberly to infusion today for 200mg Venofer 3 of 5. Arrives alert and independent. Patient denies any issues or concerns, tolerated previous doses well.     Ordering MD: Dr. Reynolds  Most recent labs 4/11/24- Hgb/hematocrit: 6.6/21.2    Fe: 16    TIBC: 152    %sat: 11    Transferrin: 102    Ferritin: 764.6    PIV established to left AC. Venofer given slow IVP over 6 minutes with free-flowing NS and blood return noted throughout. PIV flushed and removed, applied 2x2 gauze and coban to site.   Patient tolerated infusion without difficulty or complaint. Reviewed next apt date/time: June 3rd at 11:30am.      Education Record  Learner:  Patient  Disease / Diagnosis: iron deficiency anemia  Barriers / Limitations:  None  Method:  Discussion and Reinforcement  General Topics:  Medication, Side effects and symptom management, and Plan of care reviewed  Outcome:  Shows understanding

## 2024-05-23 ENCOUNTER — TELEPHONE (OUTPATIENT)
Facility: CLINIC | Age: 59
End: 2024-05-23

## 2024-05-23 DIAGNOSIS — R63.4 ABNORMAL WEIGHT LOSS: ICD-10-CM

## 2024-05-23 DIAGNOSIS — D50.9 IRON DEFICIENCY ANEMIA, UNSPECIFIED IRON DEFICIENCY ANEMIA TYPE: Primary | ICD-10-CM

## 2024-05-23 NOTE — TELEPHONE ENCOUNTER
Yes, we have been trying since 3/26/2024 to get this patient an expedited outpatient workup for abdominal pain, iron deficiency anemia, loss of appetite, abnormal weight loss.  CBC 4/11/2024 showed Hgb 6.6g.  Iron studies 4/11/2024 consistent with iron deficiency.    Wow.  If these chart reviewers had read anything beyond what they wanted to find to cancel her, they would have seen that this patient has been receiving iron infusions.    Certainly okay to reschedule with next available provider at [facility].  This patient needs these exams scheduled ASAP.    CT scan of chest has been scheduled for 5/25/2024.    Please ask her to come in for another CBC.  It would be too soon to repeat iron studies.    - cb

## 2024-05-23 NOTE — TELEPHONE ENCOUNTER
Received e-mail from [facility] stating:    Patient is unable to go up 1 flight of stairs without having shortness of breath.  Patient to be done at the hospital.  Sees hem oncology - for chronic blood loss/ iron deficiency anemia.  Last Hgb on file is 6.6 (critical value)  Sure that it was treated, but needs higher level of care then an ASC.    [facility] cancelled case from [facility].         Dr. Patel - Please see above message from [facility]. Please advise where patient can be r/s to [facility] or if ok to schedule with a different provider to get patient in ASAP. Thank you!

## 2024-05-25 ENCOUNTER — HOSPITAL ENCOUNTER (OUTPATIENT)
Dept: CT IMAGING | Age: 59
Discharge: HOME | End: 2024-05-25
Attending: NURSE PRACTITIONER

## 2024-05-25 DIAGNOSIS — R59.1 LYMPHADENOPATHY: ICD-10-CM

## 2024-05-25 PROCEDURE — 70491 CT SOFT TISSUE NECK W/DYE: CPT | Performed by: NURSE PRACTITIONER

## 2024-05-25 PROCEDURE — 71260 CT THORAX DX C+: CPT | Performed by: NURSE PRACTITIONER

## 2024-05-28 NOTE — TELEPHONE ENCOUNTER
Returned patient call; Rescheduled with Dr He at [facility]    See Telephone Encounter dated 3/26/2024

## 2024-05-30 ENCOUNTER — TELEPHONE (OUTPATIENT)
Dept: HEMATOLOGY/ONCOLOGY | Facility: HOSPITAL | Age: 59
End: 2024-05-30

## 2024-05-30 ENCOUNTER — TELEPHONE (OUTPATIENT)
Facility: CLINIC | Age: 59
End: 2024-05-30

## 2024-05-30 DIAGNOSIS — R59.1 LYMPHADENOPATHY: Primary | ICD-10-CM

## 2024-05-30 NOTE — TELEPHONE ENCOUNTER
Chest CT report reviewed and reveals extensive adenopathy suggestive of lymphoma.  I will send a note to Dr. Reynolds and to DEL Godfrey who ordered the scan as to whether they wish to proceed with the endoscopies as scheduled or any alternative imaging first.    Saloni, please advise.

## 2024-05-30 NOTE — TELEPHONE ENCOUNTER
Dr Sotelo    The patient is scheduled for EGD and colonoscopy on 6/4/2024.    CT chest completed, please see results in Epic.    I will ask her to cancel the iron infusion on 6/3/2024 if ok to proceed.    Thank you

## 2024-05-30 NOTE — TELEPHONE ENCOUNTER
Patient called to make sure that Dr. He reviews results of CT that was ordered by Dr. Reynolds.  Patient would like to know if she is still OK to have colonoscopy on 6/4/24.   Please call.

## 2024-05-30 NOTE — TELEPHONE ENCOUNTER
Called patient and left message to call back. CT scan abnormal will review. Please call back in am to discuss next steps.

## 2024-05-31 NOTE — TELEPHONE ENCOUNTER
Called and spoke to the patient, date of birth and name verified.    Advised the patient to call back Dr Reynolds's office to discuss the CT scan results so oncology  can provide recommendations whether to proceed the endoscopies.    She agreed and verbalized understanding.

## 2024-05-31 NOTE — TELEPHONE ENCOUNTER
Called patient back. Reviewed CT scan findings. Discussed with Dr Reynolds will proceed with endoscopy. Will sed message to Dr He.  Cancel Mondays iron infusion. Order biopsy of lymph node. Pt states- No night sweats. Losing weight, decreased appetite, feels full fast.  Add protein drink daily. Hold iron infusion scheduled for Monday.

## 2024-05-31 NOTE — TELEPHONE ENCOUNTER
Martha, should I proceed with the endoscopies or do you want to proceed with the lymph node biopsy and additional imaging first?

## 2024-06-03 ENCOUNTER — TELEPHONE (OUTPATIENT)
Dept: HEMATOLOGY/ONCOLOGY | Facility: HOSPITAL | Age: 59
End: 2024-06-03

## 2024-06-03 ENCOUNTER — DOCUMENTATION ONLY (OUTPATIENT)
Dept: HEMATOLOGY/ONCOLOGY | Facility: HOSPITAL | Age: 59
End: 2024-06-03

## 2024-06-03 ENCOUNTER — LAB ENCOUNTER (OUTPATIENT)
Dept: LAB | Facility: HOSPITAL | Age: 59
End: 2024-06-03
Attending: INTERNAL MEDICINE
Payer: COMMERCIAL

## 2024-06-03 DIAGNOSIS — D64.9 ANEMIA: ICD-10-CM

## 2024-06-03 DIAGNOSIS — D50.0 IRON DEFICIENCY ANEMIA DUE TO CHRONIC BLOOD LOSS: Primary | ICD-10-CM

## 2024-06-03 DIAGNOSIS — D64.9 ANEMIA: Primary | ICD-10-CM

## 2024-06-03 DIAGNOSIS — Z01.818 PRE-OP TESTING: ICD-10-CM

## 2024-06-03 DIAGNOSIS — D50.0 IRON DEFICIENCY ANEMIA DUE TO CHRONIC BLOOD LOSS: ICD-10-CM

## 2024-06-03 LAB
ANTIBODY SCREEN: NEGATIVE
BASOPHILS # BLD AUTO: 0.03 X10(3) UL (ref 0–0.2)
BASOPHILS NFR BLD AUTO: 0.3 %
DEPRECATED RDW RBC AUTO: 56.5 FL (ref 35.1–46.3)
DIRECT COOMBS POLY: NEGATIVE
EOSINOPHIL # BLD AUTO: 0.03 X10(3) UL (ref 0–0.7)
EOSINOPHIL NFR BLD AUTO: 0.3 %
ERYTHROCYTE [DISTWIDTH] IN BLOOD BY AUTOMATED COUNT: 20.3 % (ref 11–15)
HCT VFR BLD AUTO: 23.3 %
HGB BLD-MCNC: 6.6 G/DL
HGB RETIC QN AUTO: 22.8 PG (ref 28.2–36.6)
IMM GRANULOCYTES # BLD AUTO: 0.1 X10(3) UL (ref 0–1)
IMM GRANULOCYTES NFR BLD: 0.8 %
IMM RETICS NFR: 0.33 RATIO (ref 0.1–0.3)
LYMPHOCYTES # BLD AUTO: 1.26 X10(3) UL (ref 1–4)
LYMPHOCYTES NFR BLD AUTO: 10.5 %
MCH RBC QN AUTO: 21.9 PG (ref 26–34)
MCHC RBC AUTO-ENTMCNC: 28.3 G/DL (ref 31–37)
MCV RBC AUTO: 77.2 FL
MONOCYTES # BLD AUTO: 0.79 X10(3) UL (ref 0.1–1)
MONOCYTES NFR BLD AUTO: 6.6 %
NEUTROPHILS # BLD AUTO: 9.79 X10 (3) UL (ref 1.5–7.7)
NEUTROPHILS # BLD AUTO: 9.79 X10(3) UL (ref 1.5–7.7)
NEUTROPHILS NFR BLD AUTO: 81.5 %
PLATELET # BLD AUTO: 624 10(3)UL (ref 150–450)
RBC # BLD AUTO: 3.02 X10(6)UL
RETICS # AUTO: 72.8 X10(3) UL (ref 22.5–147.5)
RETICS/RBC NFR AUTO: 2.4 %
RH BLOOD TYPE: POSITIVE
SARS-COV-2 RNA RESP QL NAA+PROBE: NOT DETECTED
UNIT VOLUME: 350 ML
WBC # BLD AUTO: 12 X10(3) UL (ref 4–11)

## 2024-06-03 PROCEDURE — 36415 COLL VENOUS BLD VENIPUNCTURE: CPT

## 2024-06-03 PROCEDURE — 86901 BLOOD TYPING SEROLOGIC RH(D): CPT

## 2024-06-03 PROCEDURE — 86900 BLOOD TYPING SEROLOGIC ABO: CPT

## 2024-06-03 PROCEDURE — 85025 COMPLETE CBC W/AUTO DIFF WBC: CPT

## 2024-06-03 PROCEDURE — 86850 RBC ANTIBODY SCREEN: CPT

## 2024-06-03 PROCEDURE — 86880 COOMBS TEST DIRECT: CPT

## 2024-06-03 PROCEDURE — 85045 AUTOMATED RETICULOCYTE COUNT: CPT

## 2024-06-03 NOTE — TELEPHONE ENCOUNTER
Kimberly  [phone]  Would like a call back she has questions about her blood work. Thanks Jonna

## 2024-06-03 NOTE — TELEPHONE ENCOUNTER
Called and was bale to speak to the patient.    She is doing ok and have started the bowel prep.    She is aware of the blood transfusion scheduled tomorrow at 0830 then proceed with EGD and colonoscopy.

## 2024-06-03 NOTE — TELEPHONE ENCOUNTER
Dr Deepak BALLESTEROS    Called and spoke to the patient, date of birth and name verified.    I called the patient to remind her to complete the COVID 19 test today since the endoscopies is scheduled for tomorrow.    She is concerned of on going cough for 3 weeks, she reported shortness of breath when climbing a flight of stairs.  Noted coughing during our conversation.  She denies fever, dyspnea when ambulating, congestion or lightheadedness.    She feels she needs to get the iron infusions first before the procedures.    Advised to reach out to Dr Reynolds or the APN before I would cancel the procedures.    Thank you    Dr Reynolds and LESLI Amin

## 2024-06-03 NOTE — TELEPHONE ENCOUNTER
As per Clare VILLAR, the patient had 3 iron infusions and would like the patient to proceed with the procedures. She can check CBC today with the COVID 19 test.    Called and spoke to the patient, date of birth and name verified.    She was informed of notes above. She would like to have blood drawn today.    Advised to go to [facility].    She agreed and verbalized understanding.

## 2024-06-03 NOTE — TELEPHONE ENCOUNTER
I spoke to Ana Garcia.  We discussed the patient's clinical presentation and current hemoglobin of 6.6 g/dL with symptoms.  Ana Garcia would like to proceed with endoscopic evaluation so as not to delay the patient's diagnosis of a probable lymphoma and its treatment.  The patient will therefore receive 1 unit of packed red blood cells tomorrow morning and proceed with endoscopic evaluation tomorrow afternoon if stable.    LESLI Hillman

## 2024-06-03 NOTE — TELEPHONE ENCOUNTER
Left message on the patient's voicemail per release of information.    She was informed that the endoscopic evaluation will proceed as scheduled.    She asked to call the office in the morning if she has any questions.

## 2024-06-04 ENCOUNTER — ANESTHESIA (OUTPATIENT)
Dept: ENDOSCOPY | Facility: HOSPITAL | Age: 59
End: 2024-06-04
Payer: COMMERCIAL

## 2024-06-04 ENCOUNTER — HOSPITAL ENCOUNTER (OUTPATIENT)
Facility: HOSPITAL | Age: 59
Setting detail: HOSPITAL OUTPATIENT SURGERY
Discharge: HOME | End: 2024-06-04
Attending: INTERNAL MEDICINE | Admitting: INTERNAL MEDICINE
Payer: COMMERCIAL

## 2024-06-04 ENCOUNTER — ANESTHESIA EVENT (OUTPATIENT)
Dept: ENDOSCOPY | Facility: HOSPITAL | Age: 59
End: 2024-06-04
Payer: COMMERCIAL

## 2024-06-04 ENCOUNTER — OFFICE VISIT (OUTPATIENT)
Dept: HEMATOLOGY/ONCOLOGY | Facility: HOSPITAL | Age: 59
End: 2024-06-04
Attending: INTERNAL MEDICINE
Payer: COMMERCIAL

## 2024-06-04 VITALS
TEMPERATURE: 98 F | OXYGEN SATURATION: 97 % | SYSTOLIC BLOOD PRESSURE: 111 MMHG | HEART RATE: 92 BPM | RESPIRATION RATE: 16 BRPM | DIASTOLIC BLOOD PRESSURE: 66 MMHG

## 2024-06-04 DIAGNOSIS — D50.9 IRON DEFICIENCY ANEMIA, UNSPECIFIED IRON DEFICIENCY ANEMIA TYPE: ICD-10-CM

## 2024-06-04 DIAGNOSIS — D64.9 ANEMIA: Primary | ICD-10-CM

## 2024-06-04 DIAGNOSIS — Z12.11 SPECIAL SCREENING FOR MALIGNANT NEOPLASMS, COLON: ICD-10-CM

## 2024-06-04 DIAGNOSIS — D50.0 IRON DEFICIENCY ANEMIA DUE TO CHRONIC BLOOD LOSS: ICD-10-CM

## 2024-06-04 DIAGNOSIS — Z01.818 PRE-OP TESTING: Primary | ICD-10-CM

## 2024-06-04 DIAGNOSIS — R63.0 LOSS OF APPETITE: ICD-10-CM

## 2024-06-04 DIAGNOSIS — R63.4 ABNORMAL WEIGHT LOSS: ICD-10-CM

## 2024-06-04 DIAGNOSIS — R10.9 ABDOMINAL PAIN, UNSPECIFIED ABDOMINAL LOCATION: ICD-10-CM

## 2024-06-04 LAB
HCT VFR BLD AUTO: 26.1 %
HGB BLD-MCNC: 7.9 G/DL
RH BLOOD TYPE: POSITIVE

## 2024-06-04 PROCEDURE — 43239 EGD BIOPSY SINGLE/MULTIPLE: CPT | Performed by: INTERNAL MEDICINE

## 2024-06-04 PROCEDURE — 0DB78ZX EXCISION OF STOMACH, PYLORUS, VIA NATURAL OR ARTIFICIAL OPENING ENDOSCOPIC, DIAGNOSTIC: ICD-10-PCS | Performed by: INTERNAL MEDICINE

## 2024-06-04 PROCEDURE — 45385 COLONOSCOPY W/LESION REMOVAL: CPT | Performed by: INTERNAL MEDICINE

## 2024-06-04 PROCEDURE — 0DB28ZX EXCISION OF MIDDLE ESOPHAGUS, VIA NATURAL OR ARTIFICIAL OPENING ENDOSCOPIC, DIAGNOSTIC: ICD-10-PCS | Performed by: INTERNAL MEDICINE

## 2024-06-04 PROCEDURE — 0DBK8ZX EXCISION OF ASCENDING COLON, VIA NATURAL OR ARTIFICIAL OPENING ENDOSCOPIC, DIAGNOSTIC: ICD-10-PCS | Performed by: INTERNAL MEDICINE

## 2024-06-04 PROCEDURE — 0DB98ZX EXCISION OF DUODENUM, VIA NATURAL OR ARTIFICIAL OPENING ENDOSCOPIC, DIAGNOSTIC: ICD-10-PCS | Performed by: INTERNAL MEDICINE

## 2024-06-04 PROCEDURE — 0DB38ZX EXCISION OF LOWER ESOPHAGUS, VIA NATURAL OR ARTIFICIAL OPENING ENDOSCOPIC, DIAGNOSTIC: ICD-10-PCS | Performed by: INTERNAL MEDICINE

## 2024-06-04 PROCEDURE — 36430 TRANSFUSION BLD/BLD COMPNT: CPT

## 2024-06-04 RX ORDER — SODIUM CHLORIDE, SODIUM LACTATE, POTASSIUM CHLORIDE, CALCIUM CHLORIDE 600; 310; 30; 20 MG/100ML; MG/100ML; MG/100ML; MG/100ML
INJECTION, SOLUTION INTRAVENOUS CONTINUOUS
Status: DISCONTINUED | OUTPATIENT
Start: 2024-06-04 | End: 2024-06-04

## 2024-06-04 RX ORDER — NALOXONE HYDROCHLORIDE 0.4 MG/ML
0.08 INJECTION, SOLUTION INTRAMUSCULAR; INTRAVENOUS; SUBCUTANEOUS ONCE AS NEEDED
Status: DISCONTINUED | OUTPATIENT
Start: 2024-06-04 | End: 2024-06-04

## 2024-06-04 RX ORDER — LIDOCAINE HYDROCHLORIDE 10 MG/ML
INJECTION, SOLUTION EPIDURAL; INFILTRATION; INTRACAUDAL; PERINEURAL AS NEEDED
Status: DISCONTINUED | OUTPATIENT
Start: 2024-06-04 | End: 2024-06-04 | Stop reason: SURG

## 2024-06-04 RX ADMIN — LIDOCAINE HYDROCHLORIDE 25 MG: 10 INJECTION, SOLUTION EPIDURAL; INFILTRATION; INTRACAUDAL; PERINEURAL at 14:31:00

## 2024-06-04 NOTE — ANESTHESIA PREPROCEDURE EVALUATION
Anesthesia PreOp Note    HPI:     Kimberly Quezada is a 58 year old female who presents for preoperative consultation requested by: Deepak He MD    Date of Surgery: 6/4/2024    Procedure(s):  COLONOSCOPY / ESOPHAGOGASTRODUODENOSCOPY  ESOPHAGOGASTRODUODENOSCOPY (EGD)  Indication: Abdominal pain, unspecified abdominal location / Iron deficiency anemia, unspecified iron deficiency anemia type / Loss of appetite / Special screening for malignant neoplasms, colon / Abnormal weight loss    Relevant Problems   No relevant active problems       NPO:  Last Liquid Consumption Date: 06/04/24  Last Liquid Consumption Time: 0830  Last Solid Consumption Date: 06/03/24  Last Solid Consumption Time: 1000  Last Liquid Consumption Date: 06/04/24          History Review:  Patient Active Problem List    Diagnosis Date Noted    Anemia 06/03/2024    Iron deficiency anemia due to chronic blood loss 04/11/2024    Diverticulosis large intestine w/o perforation or abscess w/o bleeding 12/13/2018    Internal hemorrhoids without complication 12/13/2018    Gastric polyps 12/13/2018    Rosacea 03/20/2017    High cholesterol 03/20/2017    Vitamin D deficiency 03/20/2017    Non morbid obesity due to excess calories 03/20/2017       Past Medical History:    Diverticulosis large intestine w/o perforation or abscess w/o bleeding    Gastropathy    High cholesterol    History of blood transfusion    Hyperlipidemia    Internal hemorrhoids without complication    Rosacea    Seasonal allergies       Past Surgical History:   Procedure Laterality Date    Colonoscopy N/A 12/13/2018    Procedure: COLONOSCOPY;  Surgeon: Elizabet Gordon MD;  Location: [facility] ENDOSCOPY    Other surgical history      Repair of anal fistula 2011       Medications Prior to Admission   Medication Sig Dispense Refill Last Dose    folic acid 1 MG Oral Tab Take 1 tablet (1 mg total) by mouth daily. 30 tablet 3 6/2/2024    Multiple Vitamins Oral Tab Take 1 tablet  by mouth daily.       Na Sulfate-K Sulfate-Mg Sulf (SUPREP BOWEL PREP KIT) 17.5-3.13-1.6 GM/177ML Oral Solution Take as directed (Patient not taking: Reported on 4/11/2024) 1 each 0     Ferrous Sulfate 325 (65 Fe) MG Oral Tab Take 1 tablet (325 mg total) by mouth 2 (two) times daily. 60 tablet 5     docusate sodium (COLACE) 100 MG Oral Cap Take 1 capsule (100 mg total) by mouth 2 (two) times daily. 60 capsule 5     NON FORMULARY Ad Multi-Minerals        Current Facility-Administered Medications Ordered in Epic   Medication Dose Route Frequency Provider Last Rate Last Admin    lactated ringers infusion   Intravenous Continuous StatDeepak oreilly MD 20 mL/hr at 06/04/24 1304 New Bag at 06/04/24 1304     No current [facility]-ordered outpatient medications on file.       Allergies   Allergen Reactions    Meloxicam ANGIOEDEMA     Upper and lower lip throbbing, tongue swelling, tongue ulcers    Contrave [Naltrexone-Bupropion Hcl Er] RASH     rash    Wellbutrin [Bupropion] RASH     rash    Simvastatin RASH       History reviewed. No pertinent family history.  Social History     Socioeconomic History    Marital status: Single   Tobacco Use    Smoking status: Never     Passive exposure: Yes    Smokeless tobacco: Never    Tobacco comments:     Social smoker. Pt does not remember when she stopped   Vaping Use    Vaping status: Never Used   Substance and Sexual Activity    Alcohol use: Yes     Comment: social    Drug use: No   Other Topics Concern    Pt has a pacemaker No    Pt has a defibrillator No    Reaction to local anesthetic No    Right Handed Yes       Available pre-op labs reviewed.  Lab Results   Component Value Date    WBC 12.0 (H) 06/03/2024    WBC 8.7 03/18/2024    RBC 3.02 (L) 06/03/2024    RBC 3.54 (L) 03/18/2024    HGB 7.9 (L) 06/04/2024    HGB 7.5 (L) 03/18/2024    HCT 26.1 (L) 06/04/2024    HCT 25.6 (L) 03/18/2024    MCV 77.2 (L) 06/03/2024    MCV 72.3 (L) 03/18/2024    MCH 21.9 (L) 06/03/2024    MCH 21.2 (L)  03/18/2024    MCHC 28.3 (L) 06/03/2024    MCHC 29.3 (L) 03/18/2024    RDW 20.3 (H) 06/03/2024    RDW 17.2 (H) 03/18/2024    .0 (H) 06/03/2024     (H) 03/18/2024     Lab Results   Component Value Date     05/06/2024     03/18/2024    K 3.7 05/06/2024    K 4.3 03/18/2024     05/06/2024     03/18/2024    CO2 26.0 05/06/2024    CO2 26 03/18/2024    BUN 10 05/06/2024    BUN 10 03/18/2024    CREATSERUM 0.66 05/06/2024    CREATSERUM 0.70 03/18/2024     (H) 05/06/2024     (H) 03/18/2024    CA 9.7 05/06/2024    CA 9.1 03/18/2024          Vital Signs:  Body mass index is 26.57 kg/m².   height is 1.6 m (5' 3\") and weight is 68 kg (150 lb). Her blood pressure is 128/81 and her pulse is 112. Her respiration is 26 and oxygen saturation is 99%.   Vitals:    05/29/24 1219 06/04/24 1254   BP:  128/81   Pulse:  112   Resp:  26   SpO2:  99%   Weight: 70.8 kg (156 lb) 68 kg (150 lb)   Height: 1.6 m (5' 3\") 1.6 m (5' 3\")        Anesthesia Evaluation     Patient summary reviewed and Nursing notes reviewed    No history of anesthetic complications   Airway   Mallampati: II  TM distance: >3 FB  Neck ROM: full  Dental - Dentition appears grossly intact     Pulmonary - negative ROS and normal exam   Cardiovascular - normal exam  Exercise tolerance: good    ROS comment: High cholesterol    Neuro/Psych - negative ROS     GI/Hepatic/Renal    (+) bowel prep    Comments: gastropathy    Endo/Other    (+) blood dyscrasia (anemia s/p PRBC transfusion this AM, HGB 7.9)  Abdominal  - normal exam                 Anesthesia Plan:   ASA:  2  Plan:   MAC and general  Informed Consent Plan and Risks Discussed With:  Patient  Discussed plan with:  CRNA and surgeon      I have informed Kimberly FATIMAH Damien and/or legal guardian or family member of the nature of the anesthetic plan, benefits, risks including possible dental damage if relevant, major complications, and any alternative forms of anesthetic  management.   All of the patient's questions were answered to the best of my ability. The patient desires the anesthetic management as planned.  Mayra Zavala CRNA  6/4/2024 2:24 PM  Present on Admission:  **None**

## 2024-06-04 NOTE — OPERATIVE REPORT
[facility] Endoscopy Report      Date of Procedure:  06/04/24      Preoperative Diagnosis:  1.  Iron deficiency anemia  2.  Abnormal cervical, axillary, mediastinal and retroperitoneal adenopathy      Postoperative Diagnosis:  1.  Colon polyp  2.  Minimal left-sided colonic diverticulosis  3.  Diminutive gastric polyps  4.  Small hiatal hernia      Procedure:    Colonoscopy with polypectomy  Esophagogastroduodenoscopy with biopsy      Surgeon:  Deepak He M.D.      Anesthesia:  Monitored anesthesia care  Cecal withdrawal time: 16 minutes  EBL:  Insignificant      Brief History:  This is a 58 year old female who was recently evaluated for anorexia, weight loss, fatigue and shortness of breath.  She was found to have an iron deficiency anemia.  CT scanning revealed abnormal cervical, mediastinal, axillary and retroperitoneal adenopathy.  Despite iron intravenous iron repletion, the patient remains anemic with a hemoglobin of 6.6 g/dL.  She was transfused earlier today.  Upper and lower endoscopy are requested by hematology/oncology.  The patient has no localizing gastrointestinal tract symptoms or other signs.      Technique:  After informed consent, the patient was placed in the left lateral recumbent position.  Digital rectal examination revealed no palpable intraluminal abnormalities.  An Olympus variable stiffness 190 series HD colonoscope was inserted into the rectum and advanced under direct vision by following the lumen to the terminal ileum.  The colon was examined upon withdrawal in the left lateral recumbent position.    Following colonoscopy, an Olympus adult HD gastroscope was inserted into the hypopharynx and advanced under direct vision into the esophagus, stomach and duodenum.  The endoscope was withdrawn to the stomach where retroflexion of the angulus, body, cardia and fundus was performed.  The instrument was straightened, insufflated air and fluid were suctioned and the  endoscope was withdrawn.  The procedures were well tolerated without immediate complication.      Findings:  The preparation of the colon was excellent.  The terminal ileum was examined for 5 cm and visually normal.  The ileocecal valve was well preserved. The visualized colonic mucosa from the cecum to the anal verge was normal with an intact vascular pattern.  In the ascending colon there was a 3-4 mm sessile polyp which was cold snare excised and retrieved.  No ongoing bleeding.  There were a few tiny diverticula seen in the sigmoid colon without current signs of complication.  There were no other colonic polyps, mass lesions, vascular anomalies or signs of inflammation seen.  Retroflexion in the rectum revealed no abnormalities.    The esophagus appeared somewhat granular with linear furrows.  No ulcerations were seen.  Biopsies from the mid and distal esophagus were obtained.  The GE junction and diaphragmatic impression were at 37 cm with a 1-1.5 cm sliding hiatal hernia.  The stomach distended appropriately with insufflated air.  The mucosa of the stomach including cardia, fundus, gastric body and antrum was normal with the exception of #2 3 mm polyps along the greater curvature and a few spots of acid hematin.  Biopsies from the gastric body were obtained.  The duodenal bulb and post bulbar regions were normal without definitive features of celiac disease.  Biopsies from the duodenum were obtained.      Impression:  1.  Diminutive colon polyp  2.  Uncomplicated sigmoid colon diverticulosis  3.  Otherwise normal colonoscopy to the terminal ileum  4.  Diminutive gastric polyps  5.  Small hiatal hernia    Recommendations:  1.  Follow-up biopsy results.  2.  Management of lymphadenopathy as per hematology/oncology.          Deepak He MD  6/4/2024  3:08 PM

## 2024-06-04 NOTE — DISCHARGE INSTRUCTIONS
Home Care Instructions for Colonoscopy and/or Gastroscopy with Sedation    Diet:  - Resume your regular diet as tolerated unless otherwise instructed.  - Start with light meals to minimize bloating.  - Do not drink alcohol today.    Medication:  - If you have questions about resuming your normal medications, please contact your Primary Care Physician.    Activities:  - Take it easy today. Do not return to work today.  - Do not drive today.  - Do not operate any machinery today (including kitchen equipment).    Colonoscopy:  - You may notice some rectal \"spotting\" (a little blood on the toilet tissue) for a day or two after the exam. This is normal.  - If you experience any rectal bleeding (not spotting), persistent tenderness or sharp severe abdominal pains, oral temperature over 100 degrees Fahrenheit, light-headedness or dizziness, or any other problems, contact your doctor.    Gastroscopy:  - You may have a sore throat for 2-3 days following the exam. This is normal. Gargling with warm salt water (1/2 tsp salt to 1 glass warm water) or using throat lozenges will help.  - If you experience any sharp pain in your neck, abdomen or chest, vomiting of blood, oral temperature over 100 degrees Fahrenheit, light-headedness or dizziness, or any other problems, contact your doctor.    **If unable to reach your doctor, please go to the [facility] Emergency Room**    - Your referring physician will receive a full report of your examination.  - If you do not hear from your doctor's office within two weeks of your biopsy, please call them for your results.    You may be able to see your laboratory results in [employer] between 4 and 7 business days.  In some cases, your physician may not have viewed the results before they are released to [employer].  If you have questions regarding your results contact the physician who ordered the test/exam by phone or via [employer] by choosing \"Ask a Medical Question.\"

## 2024-06-04 NOTE — H&P
History & Physical Examination    Patient Name: Kimberly Quezada  MRN: [id number]  CSN: [id number]  YOB: 1965    Diagnosis: Iron deficiency anemia, abnormal neck, axillary, chest and retroperitoneal adenopathy      Medications Prior to Admission   Medication Sig Dispense Refill Last Dose    folic acid 1 MG Oral Tab Take 1 tablet (1 mg total) by mouth daily. 30 tablet 3 6/2/2024    Multiple Vitamins Oral Tab Take 1 tablet by mouth daily.       Na Sulfate-K Sulfate-Mg Sulf (SUPREP BOWEL PREP KIT) 17.5-3.13-1.6 GM/177ML Oral Solution Take as directed (Patient not taking: Reported on 4/11/2024) 1 each 0     Ferrous Sulfate 325 (65 Fe) MG Oral Tab Take 1 tablet (325 mg total) by mouth 2 (two) times daily. 60 tablet 5     docusate sodium (COLACE) 100 MG Oral Cap Take 1 capsule (100 mg total) by mouth 2 (two) times daily. 60 capsule 5     NON FORMULARY Ad Multi-Minerals        Current Facility-Administered Medications   Medication Dose Route Frequency    lactated ringers infusion   Intravenous Continuous       Allergies:   Allergies   Allergen Reactions    Meloxicam ANGIOEDEMA     Upper and lower lip throbbing, tongue swelling, tongue ulcers    Contrave [Naltrexone-Bupropion Hcl Er] RASH     rash    Wellbutrin [Bupropion] RASH     rash    Simvastatin RASH       Past Medical History:    Diverticulosis large intestine w/o perforation or abscess w/o bleeding    Gastropathy    High cholesterol    History of blood transfusion    Hyperlipidemia    Internal hemorrhoids without complication    Rosacea    Seasonal allergies     Past Surgical History:   Procedure Laterality Date    Colonoscopy N/A 12/13/2018    Procedure: COLONOSCOPY;  Surgeon: Elizabet Gordon MD;  Location: [facility] ENDOSCOPY    Other surgical history      Repair of anal fistula 2011     History reviewed. No pertinent family history.  Social History     Tobacco Use    Smoking status: Never     Passive exposure: Yes    Smokeless tobacco:  Never    Tobacco comments:     Social smoker. Pt does not remember when she stopped   Substance Use Topics    Alcohol use: Yes     Comment: social       SYSTEM Check if Review is Normal Check if Physical Exam is Normal If not normal, please explain:   HEENT [X ] [ X]    NECK  [X ] [ X]    HEART [X ] [ X]    LUNGS [X ] [ X]    ABDOMEN [X ] [ X]    EXTREMITIES [X ] [ X]    OTHER        [ x ] I have discussed the risks and benefits and alternatives with the patient/family.  They understand and agree to proceed with plan of care.  [ x ] I have reviewed the History and Physical done within the last 30 days.  Any changes noted above.    Deepak He MD  6/4/2024  2:17 PM

## 2024-06-04 NOTE — ANESTHESIA POSTPROCEDURE EVALUATION
Patient: Kimberly Quezada    Procedure Summary       Date: 06/04/24 Room / Location: [facility] ENDOSCOPY 04 / EM ENDOSCOPY    Anesthesia Start: 1427 Anesthesia Stop: 1508    Procedures:       COLONOSCOPY and Polypectomy      ESOPHAGOGASTRODUODENOSCOPY (EGD) with biopsy Diagnosis:       Abdominal pain, unspecified abdominal location      Iron deficiency anemia, unspecified iron deficiency anemia type      Loss of appetite      Special screening for malignant neoplasms, colon      Abnormal weight loss      (Colon Polyp, Hiatal Hernia, Gastric Polyp)    Surgeons: Deepak He MD Anesthesiologist: Mayra Zavala CRNA    Anesthesia Type: MAC, general ASA Status: 2            Anesthesia Type: MAC, general    Vitals Value Taken Time   BP 85/56 06/04/24 1507   Temp 98 06/04/24 1508   Pulse 84 06/04/24 1507   Resp 32 06/04/24 1507   SpO2 99 % 06/04/24 1507   Vitals shown include unfiled device data.    [facility] AN Post Evaluation:   Patient Evaluated in PACU  Patient Participation: complete - patient participated  Level of Consciousness: awake  Pain Management: adequate  Airway Patency:patent  Dental exam unchanged from preop  Yes    Cardiovascular Status: acceptable  Respiratory Status: acceptable  Postoperative Hydration acceptable      Mayra Zavala CRNA  6/4/2024 3:08 PM

## 2024-06-04 NOTE — PATIENT INSTRUCTIONS
Post Transfusion Instructions for Out-Patients    Most recipients of blood transfusions do not experience any adverse effects.  You may resume your normal activities 4 to 6 hours after your blood transfusion.  Occasionally, reactions of blood transfusions may be delayed (for several weeks).    Symptoms of a transfusion reaction can include:    Faintness  Weakness  Nausea  Vomiting  Shortness of breath  Chest pain  Back pain  Hives  Rash  Itch  Flushing  Fever  Chills  Jaundice (yellowish tint to eyes/skin)  Dark or red urine    Though these symptoms may not be related to the blood transfusions, they should be reported to your physician.  Contact both your physician and the laboratory Blood Bank (see information below) so that your symptoms can be thoroughly evaluated.    Your physician's name:   Your physician's phone number: [phone]    If your physician is unavailable, contact the Emergency Department.    [facility] Blood Bank:  [phone]  [facility] Emergency Department:  [phone]    [facility] Blood Bank: [phone]  [facility] Emergency Department: [phone]

## 2024-06-05 VITALS
HEART RATE: 93 BPM | WEIGHT: 150 LBS | OXYGEN SATURATION: 98 % | HEIGHT: 63 IN | SYSTOLIC BLOOD PRESSURE: 100 MMHG | BODY MASS INDEX: 26.58 KG/M2 | DIASTOLIC BLOOD PRESSURE: 71 MMHG | RESPIRATION RATE: 22 BRPM

## 2024-06-05 LAB
BLOOD TYPE BARCODE: 5100
UNIT VOLUME: 350 ML

## 2024-06-10 ENCOUNTER — OFFICE VISIT (OUTPATIENT)
Dept: HEMATOLOGY/ONCOLOGY | Facility: HOSPITAL | Age: 59
End: 2024-06-10
Attending: INTERNAL MEDICINE
Payer: COMMERCIAL

## 2024-06-10 VITALS
RESPIRATION RATE: 16 BRPM | HEART RATE: 100 BPM | SYSTOLIC BLOOD PRESSURE: 117 MMHG | TEMPERATURE: 99 F | OXYGEN SATURATION: 98 % | DIASTOLIC BLOOD PRESSURE: 61 MMHG

## 2024-06-10 DIAGNOSIS — D50.0 IRON DEFICIENCY ANEMIA DUE TO CHRONIC BLOOD LOSS: Primary | ICD-10-CM

## 2024-06-10 PROCEDURE — 96374 THER/PROPH/DIAG INJ IV PUSH: CPT

## 2024-06-10 NOTE — PROGRESS NOTES
Pt here for Venofer 200mg . Pt denies any issues or concerns.      Ordering MD: Rodolfo       Pt tolerated infusion without difficulty or complaint. Reviewed next apt date/time: Pt uses mychart and request for 6/24/24 placed      Education Record  Learner:  Patient  Disease / Diagnosis: anemia  Barriers / Limitations:  None  Method:  Discussion  General Topics:  Plan of care reviewed  Outcome:  Shows understanding

## 2024-06-14 ENCOUNTER — LAB ENCOUNTER (OUTPATIENT)
Dept: LAB | Age: 59
End: 2024-06-14
Attending: INTERNAL MEDICINE

## 2024-06-14 DIAGNOSIS — Z01.818 PRE-OP TESTING: ICD-10-CM

## 2024-06-14 LAB
BASOPHILS # BLD AUTO: 0.04 X10(3) UL (ref 0–0.2)
BASOPHILS NFR BLD AUTO: 0.3 %
DEPRECATED RDW RBC AUTO: 59.7 FL (ref 35.1–46.3)
EOSINOPHIL # BLD AUTO: 0.06 X10(3) UL (ref 0–0.7)
EOSINOPHIL NFR BLD AUTO: 0.5 %
ERYTHROCYTE [DISTWIDTH] IN BLOOD BY AUTOMATED COUNT: 20.3 % (ref 11–15)
HCT VFR BLD AUTO: 26.3 %
HGB BLD-MCNC: 7.6 G/DL
IMM GRANULOCYTES # BLD AUTO: 0.03 X10(3) UL (ref 0–1)
IMM GRANULOCYTES NFR BLD: 0.3 %
LYMPHOCYTES # BLD AUTO: 0.99 X10(3) UL (ref 1–4)
LYMPHOCYTES NFR BLD AUTO: 8.4 %
MCH RBC QN AUTO: 23.4 PG (ref 26–34)
MCHC RBC AUTO-ENTMCNC: 28.9 G/DL (ref 31–37)
MCV RBC AUTO: 80.9 FL
MONOCYTES # BLD AUTO: 0.9 X10(3) UL (ref 0.1–1)
MONOCYTES NFR BLD AUTO: 7.7 %
NEUTROPHILS # BLD AUTO: 9.74 X10 (3) UL (ref 1.5–7.7)
NEUTROPHILS # BLD AUTO: 9.74 X10(3) UL (ref 1.5–7.7)
NEUTROPHILS NFR BLD AUTO: 82.8 %
PLATELET # BLD AUTO: 490 10(3)UL (ref 150–450)
RBC # BLD AUTO: 3.25 X10(6)UL
WBC # BLD AUTO: 11.8 X10(3) UL (ref 4–11)

## 2024-06-14 PROCEDURE — 36415 COLL VENOUS BLD VENIPUNCTURE: CPT

## 2024-06-14 PROCEDURE — 85025 COMPLETE CBC W/AUTO DIFF WBC: CPT

## 2024-06-17 ENCOUNTER — HOSPITAL ENCOUNTER (OUTPATIENT)
Dept: INTERVENTIONAL RADIOLOGY/VASCULAR | Facility: HOSPITAL | Age: 59
Discharge: HOME | End: 2024-06-17
Attending: INTERNAL MEDICINE | Admitting: RADIOLOGY

## 2024-06-17 VITALS
DIASTOLIC BLOOD PRESSURE: 87 MMHG | TEMPERATURE: 97 F | OXYGEN SATURATION: 96 % | RESPIRATION RATE: 12 BRPM | BODY MASS INDEX: 26.58 KG/M2 | HEIGHT: 63 IN | HEART RATE: 117 BPM | WEIGHT: 150 LBS | SYSTOLIC BLOOD PRESSURE: 137 MMHG

## 2024-06-17 DIAGNOSIS — R59.1 LYMPHADENOPATHY: ICD-10-CM

## 2024-06-17 DIAGNOSIS — Z01.818 PRE-OP TESTING: Primary | ICD-10-CM

## 2024-06-17 PROCEDURE — 88334 PATH CONSLTJ SURG CYTO XM EA: CPT | Performed by: INTERNAL MEDICINE

## 2024-06-17 PROCEDURE — 88189 FLOWCYTOMETRY/READ 16 & >: CPT | Performed by: RADIOLOGY

## 2024-06-17 PROCEDURE — 88184 FLOWCYTOMETRY/ TC 1 MARKER: CPT | Performed by: RADIOLOGY

## 2024-06-17 PROCEDURE — 88185 FLOWCYTOMETRY/TC ADD-ON: CPT | Performed by: RADIOLOGY

## 2024-06-17 PROCEDURE — 88305 TISSUE EXAM BY PATHOLOGIST: CPT | Performed by: INTERNAL MEDICINE

## 2024-06-17 PROCEDURE — 38505 NEEDLE BIOPSY LYMPH NODES: CPT | Performed by: RADIOLOGY

## 2024-06-17 PROCEDURE — 88360 TUMOR IMMUNOHISTOCHEM/MANUAL: CPT | Performed by: INTERNAL MEDICINE

## 2024-06-17 PROCEDURE — 76942 ECHO GUIDE FOR BIOPSY: CPT | Performed by: RADIOLOGY

## 2024-06-17 PROCEDURE — 07B23ZX EXCISION OF LEFT NECK LYMPHATIC, PERCUTANEOUS APPROACH, DIAGNOSTIC: ICD-10-PCS | Performed by: RADIOLOGY

## 2024-06-17 PROCEDURE — 81340 TRB@ GENE REARRANGE AMPLIFY: CPT | Performed by: RADIOLOGY

## 2024-06-17 PROCEDURE — 81342 TRG GENE REARRANGEMENT ANAL: CPT | Performed by: RADIOLOGY

## 2024-06-17 PROCEDURE — 88342 IMHCHEM/IMCYTCHM 1ST ANTB: CPT | Performed by: INTERNAL MEDICINE

## 2024-06-17 PROCEDURE — 88341 IMHCHEM/IMCYTCHM EA ADD ANTB: CPT | Performed by: INTERNAL MEDICINE

## 2024-06-17 PROCEDURE — 88333 PATH CONSLTJ SURG CYTO XM 1: CPT | Performed by: INTERNAL MEDICINE

## 2024-06-17 NOTE — H&P
[facility]  part of [facility]   History & Physical    Kimberly Quezada Patient Status:  Outpatient    10/31/1965 MRN [id number]   Location [facility] INTERVENTIONAL SUITES Attending Deepak He MD   Hosp Day # 0 PCP Hillary Prado MD     Admitting Diagnosis:   Lymphadenopathy    History of Present Illness:   59y/o woman with diffuse lymphadenopathy presenting for biopsy of left supraclavicular lymph node.    History   Past Medical History:  Past Medical History:    Diverticulosis large intestine w/o perforation or abscess w/o bleeding    Gastropathy    High cholesterol    History of blood transfusion    Hyperlipidemia    Internal hemorrhoids without complication    Rosacea    Seasonal allergies       Past Surgical History:  Past Surgical History:   Procedure Laterality Date    Colonoscopy N/A 2018    Procedure: COLONOSCOPY;  Surgeon: Elizabet Gordon MD;  Location: [facility] ENDOSCOPY    Colonoscopy N/A 2024    Procedure: COLONOSCOPY and Polypectomy;  Surgeon: Deepak He MD;  Location: [facility] ENDOSCOPY    Other surgical history      Repair of anal fistula        Social History:  Social History     Tobacco Use    Smoking status: Never     Passive exposure: Yes    Smokeless tobacco: Never    Tobacco comments:     Social smoker. Pt does not remember when she stopped   Substance Use Topics    Alcohol use: Yes     Comment: social        Family History:  No family history on file.    Allergies/Medications:   Allergies:  Allergies   Allergen Reactions    Meloxicam ANGIOEDEMA     Upper and lower lip throbbing, tongue swelling, tongue ulcers    Contrave [Naltrexone-Bupropion Hcl Er] RASH     rash    Wellbutrin [Bupropion] RASH     rash    Simvastatin RASH       Medications:  No current outpatient medications on file.    Physical Exam & Review of Systems:   Physical Exam:    /81 (BP Location: Right arm)   Pulse 115   Temp 96.9 °F (36.1 °C)  (Tympanic)   Resp 13   Ht 63\"   Wt 150 lb (68 kg)   LMP 08/09/2018   SpO2 95%   BMI 26.57 kg/m²     General: NAD  Neck: Palpable left supraclavicular lymphadenopathy  Lungs: normal respiratory effort  Heart: RRR, S1, S2  Abdomen: Soft, NT/ND, BS+x4  Extremities: Warm, dry, no LE edema bilat  Neuro: No focal deficits    Results:   Labs:  Recent Labs   Lab 06/14/24  0827   RBC 3.25*   HGB 7.6*   HCT 26.3*   MCV 80.9   MCH 23.4*   MCHC 28.9*   RDW 20.3*   NEPRELIM 9.74*   WBC 11.8*   .0*     No results for input(s): \"PTP\", \"INR\", \"PTT\" in the last 168 hours.  No results for input(s): \"GLU\", \"BUN\", \"CREATSERUM\", \"GFRAA\", \"GFRNAA\", \"CA\", \"NA\", \"K\", \"CL\", \"CO2\" in the last 168 hours.    Assessment/Plan:   Impression: 57y/o woman with generalized lymphadenopathy.    Recommendations: Left supraclavicular lymph node biopsy.    ARYA MIR MD  6/17/2024  9:13 AM

## 2024-06-17 NOTE — PROCEDURES
[facility]  part of [facility]  Procedure Note    Kimberly Quezada Patient Status:  Outpatient    10/31/1965 MRN [id number]   Location [facility] INTERVENTIONAL SUITES Attending Deepak He MD   Hosp Day # 0 PCP Hillary Prado MD     Procedure: Ultrasound guided left supraclavicular lymph node biopsy    Pre-Procedure Diagnosis: Lymphadenopathy [R59.1]      Post-Procedure Diagnosis: Lymphadenopathy [R59.1]    Anesthesia:  Local    Findings:  Under ultrasound guidance, three 18G core biopsy samples were obtained from a 1.5cm left supraclavicular lymph node.  Adequacy confirmed by pathology.    Specimens: To pathology    Blood Loss:  2mL      Complications:  None    Plan:  Discharge home.    ARYA MIR MD  2024

## 2024-06-17 NOTE — DISCHARGE INSTRUCTIONS
INTERVENTIONAL RADIOLOGY  [facility]  [phone]     Patient Name:  Kimberly Quezada    Procedure:  Lymph Node Biopsy     Site Care: Remove your band-aid or dressing in 24 hours.  Gently wash area with soap and water.                                       Activity/Diet  No heavy lifting or strenuous activity for 48 hours.  Drink plenty of fluids, unless you have otherwise been told to restrict your fluid intake.  Do not drink alcohol for 24 hours.  Do not drive,  operate heavy machinery, make important decisions or sign legal documents today.    Medications:  Take acetaminophen if needed for pain. Do not exceed 4000mg of acetaminophen in a 24-hour period. and Make no changes to your existing medications.    Contact Interventional Radiology at [phone] if you have severe/unrelieved pain, fever, chills, dizziness/lightheadedness, or drainage/bleeding from your incision site.

## 2024-06-17 NOTE — IVS NOTE
DISCHARGE NOTE      Pt is able to sit up and ambulate without difficulty.   No sedation given.  Procedural site remains dry and intact with good circulation, motion, and sensation.   No signs and symptoms of bleeding/hematoma noted.   Band-aid applied over site.  Instruction provided, patient/family verbalizes understanding.   Dr. Mendoza spoke with patient/family post procedure.      No Follow up Appointment  No medication changes

## 2024-06-17 NOTE — IVS NOTE
Patient was identified and procedure verified, patient was cleaned and draped in sterile fashion. Time out was completed.  During the procedure Dr. Mendoza  administered 5 ml of lidocaine 2%. Patient was monitored throughout the procedure. Report given to Lisseth MCNEILL. 3 samples given directly to pathology for evaluation. Site clean and dry, clean dressing in place.

## 2024-06-18 LAB
CD10 CELLS NFR SPEC: <1 %
CD10/CD19: <1 %
CD19 CELLS NFR SPEC: 2 %
CD19+/CD200+: <1 %
CD2 CELLS NFR SPEC: 98 %
CD20 CELLS NFR SPEC: 2 %
CD200 CELLS: 74 %
CD3 CELLS NFR SPEC: 97 %
CD3+/TCRGD+: <1 %
CD3+CD4+ CELLS NFR SPEC: 91 %
CD3+CD4+ CELLS/CD3+CD8+ CLL SPEC: 15.2
CD3+CD8+ CELLS NFR SPEC: 6 %
CD3-/CD56+: 1 %
CD34 CELLS NFR SPEC: <1 %
CD38 CELLS NFR SPEC: 12 %
CD38+/CD19+: <1 %
CD45 CELLS NFR SPEC: 100 %
CD5 CELLS NFR SPEC: 97 %
CD5/CD19 CELLS: <1 %
CD7 CELLS NFR SPEC: 98 %
CELL SURF KAPPA/LAMBDA RATIO: 1
CELL SURF LAMBDA LIGHT CHAIN: 1 %
CELL SURFACE KAPPA LIGHT CHAIN: 1 %
TCR G-D CELLS NFR SPEC: <1 %

## 2024-06-20 DIAGNOSIS — D64.9 ANEMIA: ICD-10-CM

## 2024-06-20 DIAGNOSIS — D50.0 IRON DEFICIENCY ANEMIA DUE TO CHRONIC BLOOD LOSS: Primary | ICD-10-CM

## 2024-06-21 ENCOUNTER — OFFICE VISIT (OUTPATIENT)
Dept: HEMATOLOGY/ONCOLOGY | Facility: HOSPITAL | Age: 59
End: 2024-06-21
Attending: INTERNAL MEDICINE
Payer: COMMERCIAL

## 2024-06-21 ENCOUNTER — TELEPHONE (OUTPATIENT)
Dept: SURGERY | Facility: CLINIC | Age: 59
End: 2024-06-21

## 2024-06-21 VITALS
RESPIRATION RATE: 16 BRPM | TEMPERATURE: 98 F | SYSTOLIC BLOOD PRESSURE: 130 MMHG | OXYGEN SATURATION: 97 % | DIASTOLIC BLOOD PRESSURE: 78 MMHG | HEART RATE: 97 BPM | HEIGHT: 62.99 IN | WEIGHT: 151.19 LBS | BODY MASS INDEX: 26.79 KG/M2

## 2024-06-21 DIAGNOSIS — D50.0 IRON DEFICIENCY ANEMIA DUE TO CHRONIC BLOOD LOSS: ICD-10-CM

## 2024-06-21 DIAGNOSIS — D47.9 LYMPHOPROLIFERATIVE DISORDER (HCC): Primary | ICD-10-CM

## 2024-06-21 PROCEDURE — 99215 OFFICE O/P EST HI 40 MIN: CPT | Performed by: INTERNAL MEDICINE

## 2024-06-21 RX ORDER — BENZONATATE 100 MG/1
100 CAPSULE ORAL 3 TIMES DAILY PRN
Qty: 30 CAPSULE | Refills: 0 | Status: SHIPPED | OUTPATIENT
Start: 2024-06-21

## 2024-06-21 NOTE — TELEPHONE ENCOUNTER
Called patient and left message to assist in scheduling apt with Dr. Schaefer, per Dr. Reynolds's request[facility]

## 2024-06-21 NOTE — PROGRESS NOTES
STEPHEN Quezada is a 58 year old female here for follow up of Lymphoproliferative disorder (HCC)    Iron deficiency anemia due to chronic blood loss.    Patient noted with new onset of anemia since March 27, 2023.  Her baseline was between 12 and 13.2 in the past, and at that time, her hemoglobin was 11.4.  Her MCV was normal, but she did have a slight increase of the RDW.  She then had labs on 3/18/2024 and her hemoglobin was down to 7.5 with MCV of 72.3, RDW 17.2 and platelet count of 477.  Patient has not had iron studies performed.  She did have a B12 level which was normal.    At the time of visit with her primary care doctor on 3/6/2024 patient had complaint of decreased appetite, and weight loss of 25 pounds.  He was also having leg cramps when working and at nighttime.  She also complained of a dry cough at times when drinking or eating.  Went the patient was noted to have a hemoglobin of 7.5, she was recommended to take iron replacement therapy over-the-counter, twice daily.  The patient is postmenopausal.    Patient scheduled for EGD and colonoscopy on May 30, 2024.  Has past h/o hemorrhoids that were removed.  States always had problems with her stomach, sensitivities not sure if gluten or lactose problems.     She states she was having chills and was feeling tired.  She states that she has lost 30 lbs since the beginning of the year.  She did not have appetite and did not realize that she was losing weight.  She states she was told at work her uniform was too big.  She was eating once a day.  States that was not eating late at night because would get full and then have bad dreams.      During workup, patient completed GI workup with Dr. He which was negative for source of bleeding.    She had a CT scan performed of the chest abdomen and pelvis, after she noted a neck mass.  This confirmed lymphadenopathy bilateral neck, chest, left axilla and partly visualized retroperitoneal  suspicious for lymphoma.  She underwent a fine-needle biopsy of the supraclavicular lymphadenopathy which was consistent with a undetermined lymph flow proliferative disorder.  Patient here to discuss results.    States that supraclavicular region on the L harder after biopsy.    States for the past 2 weeks, she has developed dry cough with cold beverages and phlegm.  Drinking warm fluids.      Feels better from infusions, has 1 more infusion.  Taking folic acid.      She has lost weight, 9 lbs since May.  NS since recently.  Appetite has decreased too.      Hair loss as well.      Review of Systems:   Review of Systems - Oncology  Pertinent positives per HPI.      Current Outpatient Medications   Medication Sig Dispense Refill    Multiple Vitamins Oral Tab Take 1 tablet by mouth daily.      folic acid 1 MG Oral Tab Take 1 tablet (1 mg total) by mouth daily. 30 tablet 3    docusate sodium (COLACE) 100 MG Oral Cap Take 1 capsule (100 mg total) by mouth 2 (two) times daily. 60 capsule 5    NON FORMULARY Ad Multi-Minerals      Ferrous Sulfate 325 (65 Fe) MG Oral Tab Take 1 tablet (325 mg total) by mouth 2 (two) times daily. (Patient not taking: Reported on 6/21/2024) 60 tablet 5     Allergies:   Allergies   Allergen Reactions    Meloxicam ANGIOEDEMA     Upper and lower lip throbbing, tongue swelling, tongue ulcers    Contrave [Naltrexone-Bupropion Hcl Er] RASH     rash    Wellbutrin [Bupropion] RASH     rash    Simvastatin RASH       Past Medical History:    Diverticulosis large intestine w/o perforation or abscess w/o bleeding    Gastropathy    High cholesterol    History of blood transfusion    Hyperlipidemia    Internal hemorrhoids without complication    Rosacea    Seasonal allergies     Past Surgical History:   Procedure Laterality Date    Colonoscopy N/A 12/13/2018    Procedure: COLONOSCOPY;  Surgeon: Elizabet Gordon MD;  Location: [facility] ENDOSCOPY    Colonoscopy N/A 6/4/2024    Procedure: COLONOSCOPY  and Polypectomy;  Surgeon: Deepak He MD;  Location: [facility] ENDOSCOPY    Other surgical history      Repair of anal fistula 2011     Social History     Socioeconomic History    Marital status: Single   Tobacco Use    Smoking status: Never     Passive exposure: Yes    Smokeless tobacco: Never    Tobacco comments:     Social smoker. Pt does not remember when she stopped   Vaping Use    Vaping status: Never Used   Substance and Sexual Activity    Alcohol use: Yes     Comment: social    Drug use: No   Other Topics Concern    Pt has a pacemaker No    Pt has a defibrillator No    Reaction to local anesthetic No    Right Handed Yes       No family history on file.      PHYSICAL EXAM:    /78 (BP Location: Right arm, Patient Position: Sitting, Cuff Size: adult)   Pulse 97   Temp 98.3 °F (36.8 °C) (Oral)   Resp 16   Ht 1.6 m (5' 2.99\")   Wt 68.6 kg (151 lb 3.2 oz)   LMP 08/09/2018   SpO2 97%   BMI 26.79 kg/m²   Wt Readings from Last 6 Encounters:   06/21/24 68.6 kg (151 lb 3.2 oz)   06/05/24 68 kg (150 lb)   06/04/24 68 kg (150 lb)   05/20/24 70.9 kg (156 lb 4.8 oz)   05/13/24 71 kg (156 lb 9.6 oz)   05/06/24 72.6 kg (160 lb)     Physical Exam  General: Patient is alert, not in acute distress.  HEENT: EOMs intact. PERRL. Oropharynx is clear.   Neck: L supraclavicular fossa with mass with overlying ecchymosis.   Psych/Depression: nl        ASSESSMENT/PLAN:     1. Lymphoproliferative disorder (HCC)    2. Iron deficiency anemia due to chronic blood loss      -- Patient with subacute onset of microcytic anemia, likely iron deficiency anemia.  Labs ordered below to document iron deficiency.  She has been on oral iron therapy since about a month ago.  Discussed with the patient that she will likely need intravenous iron replacement, which I ordered and she will receive a call from infusion center once iron deficiency is documented and will be scheduled.  She will receive 200 mg x 5 doses of Venofer, I  discussed with the patient potential side effects of treatment including infusion related reactions, and allergic reactions, which would include anaphylaxis.  I also prescribed a folic acid to take in combination with the IV iron to improve her hematopoiesis.  -- Patient is already scheduled for upper and lower GI scopes.  The patient's appointment is not till the end of May, given the severity of her iron deficiency anemia, and her involuntary weight loss as well as epigastric tenderness, I sent a message to GI in hopes of expediting her endoscopies.    Patient will have follow-up in 1 month to assess response to therapy, in addition, hopefully by then she will have completed her endoscopies and we will review her results.      No orders of the defined types were placed in this encounter.    MDM high risk.    Results From Past 48 Hours:  No results found for this or any previous visit (from the past 48 hour(s)).    Imaging & Referrals:  None   No orders of the defined types were placed in this encounter.    Collected 6/17/2024 09:33     1 Result Note        Component  Ref Range & Units      Case Report     Surgical Pathology                                Case: [id number]                                     Authorizing Provider:  Deepak He MD   Collected:           06/17/2024 09:33 AM             Ordering Location:     [facility]          Received:            06/17/2024 09:51 AM                                    Interventional Suites                                                           Pathologist:           Donna Moyer MD                                                                              Specimen:    Lymph node, 3 passes left supraclavicular lymph node biopsy                                    Final Diagnosis:        Left supraclavicular lymph node; biopsy:   Atypical lymphoid infiltrates,  highly suspicious for lymphoma, see comment.        Electronically signed by Donna Moyer MD on 6/18/2024 at 1535        Final Diagnosis Comment      The patient's history of lymphadenopathy is noted in EMR.  A recent CT scan (May, 2024) showed lymphadenopathy in bilateral neck, chest, left axilla, and partly visualized retroperitoneum most suspicious for lymphoma.      The current left supraclavicular lymph node biopsy shows infiltration with small cleaved cells and scattered atypical large cells. Immunohistochemical stains (CD5, CD10, CD3, CD20, Ki-67, BCL2, BCL6, CD45, CD30, and CD15) are performed on the block for further characterization.  The small cleaved cells are positive for CD3, CD5, CD45, BCL6 and BCL-2, and they are negative for CD10, CD20, CD15 and CD30. The large atypical cells are positive for CD20, CD30, CD45, BCL2 and BCL6, and they are negative for CD15, CD10, CD5 and CD3. Ki-67 shows a proliferation index of approximately 76%.      Overall, the morphology, immunohistochemical profile, and the patient's history are all compatible with atypical lymphoid infiltrate, highly suspicious for lymphoma.  Differential considerations include classical Hodgkin lymphoma, angioimmunoblastic T-cell lymphoma, T-cell/histiocyte rich large B-cell lymphoma and B-cell lymphoma, unclassified with features intermediate between diffuse large B-cell lymphoma and classical Hodgkin lymphoma.  Further clinical correlation and complete excision are recommended for further classification.     The specimen block is been sent to T-cell receptor gene rearrangement profile analysis.  The results of this analysis will be conveyed in a separate linked report.     For  purposes, this case was reviewed by a second pathologist who concurred with the diagnosis.     The immunohistochemical stains interpreted in this case demonstrate appropriate reactivity of positive controls.  The stains were performed  on formalin-fixed, paraffin-embedded tissue sections with each antibody analysis being performed on a separate slide.        Intraoperative consultation         1. Left supraclavicular lymph node, touch preparation of core biopsy; immediate      evaluation:  Satisfactory for evaluation: additional tissue required.     2. Left supraclavicular lymph node, touch preparation of core biopsy; immediate      evaluation:  Satisfactory for evaluation: additional tissue required.     Additional comment:   One additional core into RPMI for flow.         Clinical Information      Lymphadenopathy            Gross Description      The specimen is submitted in formalin labeled “Damien, left supraclavicular lymph node” and consists of two pink-tan needle cores measuring 1.2 and 1.3 cm in length x < 0.1 cm in diameter.   Two immediate evaluations of the specimen are performed and reported as: \"Satisfactory for evaluation: additional tissue required.\" One additional core is received fresh, placed in RPMI and submitted for flow cytometry. The formalin fixed tissue is inked with eosin and submitted entirely in cassette A1. (jq)     Donna Moyer M.D./[facility]        Interpretation     Abnormal Abnormal      Electronically signed by Donna Moyer MD on 6/18/2024 at 1535          View Full Report        Specimen Collected: 06/17/24 09:33 Last Resulted: 06/18/24 15:35           Component      Latest Ref Rng 6/3/2024 6/4/2024 6/14/2024   WBC      4.0 - 11.0 x10(3) uL 12.0 (H)   11.8 (H)    RBC      3.80 - 5.30 x10(6)uL 3.02 (L)   3.25 (L)    Hemoglobin      12.0 - 16.0 g/dL 6.6 (LL)  7.9 (L)  7.6 (L)    Hematocrit      35.0 - 48.0 % 23.3 (L)  26.1 (L)  26.3 (L)    MCV      80.0 - 100.0 fL 77.2 (L)   80.9    MCH      26.0 - 34.0 pg 21.9 (L)   23.4 (L)    MCHC      31.0 - 37.0 g/dL 28.3 (L)   28.9 (L)    RDW      11.0 - 15.0 % 20.3 (H)   20.3 (H)    Platelet Count      150.0 - 450.0 10(3)uL 624.0 (H)   490.0 (H)     Neutrophils Absolute      1.50 - 7.70 x10(3) uL 9.79 (H)   9.74 (H)    Lymphocytes Absolute      1.00 - 4.00 x10(3) uL 1.26   0.99 (L)    Monocytes Absolute      0.10 - 1.00 x10(3) uL 0.79   0.90    Eosinophils Absolute      0.00 - 0.70 x10(3) uL 0.03   0.06    Basophils Absolute      0.00 - 0.20 x10(3) uL 0.03   0.04    Neutrophils %      % 81.5   82.8    Lymphocytes %      % 10.5   8.4    Monocytes %      % 6.6   7.7    Eosinophils %      % 0.3   0.5    Basophils %      % 0.3   0.3    RDW-SD      35.1 - 46.3 fL 56.5 (H)   59.7 (H)    Prelim Neutrophil Abs      1.50 - 7.70 x10 (3) uL 9.79 (H)   9.74 (H)    Immature Granulocyte Absolute      0.00 - 1.00 x10(3) uL 0.10   0.03    Immature Granulocyte %      % 0.8   0.3      PROCEDURE: CT STN/CHEST W CONTRAST (CPT=70491/37364)     COMPARISON: None.     INDICATIONS: R59.1 Lymphadenopathy     TECHNIQUE:   CT images of the neck and chest were obtained with intravenous contrast material.  Automated exposure control for dose reduction was used. Adjustment of the mA and/or kV was done based on the patient's size. Use of iterative reconstruction  technique for dose reduction was used.  Dose information is transmitted to the ACR (American College of Radiology) NRDR (National Radiology Data Registry) which includes the Dose Index Registry.        NECK FINDINGS:     UPPER AERODIGESTIVE TRACT: Larynx normal. No suspect mass in the upper aerodigestive tract.  PAROTID GLANDS:  Normal.    SUBMANDIBULAR GLANDS:Normal.    THYROID GLAND: Peripherally calcified 1.2 cm right upper pole thyroid nodule.  Based on ACR guidelines, incidental thyroid nodules measuring less than 1.5 cm do not require additional imaging evaluation.  LYMPHADENOPATHY: Bilateral supraclavicular lymphadenopathy with representative nodes as follows:  Left level 5 supraclavicular lymphadenopathy with the largest node measuring 18 x 30 mm on axial image 22 series 4 with a cranial caudal dimension of 2.7 cm   on  image 31 series 9. More medial left supraclavicular lymph node measures 18 x 21 mm on image 22 series 4 and has a height of 2.8 cm on image 35 series 9. A 12 x 17 mm right level 5 supraclavicular lymph node image 24 series 4.  A 15 x 15 mm right level 4 lymph node image 32 series 4.  Multiple enlarged lymph nodes along the left subclavian vein with a a representative node measuring 15 x 20 mm on image 32 series 9, image 16 series 4.    SINONASAL: No evidence for sinusitis. No nasal passage mass.  MASTOIDS: No evidence for mastoiditis.  ORBITS: Limited views normal.  BRAIN: No suspect mass or enhancement in visualized portion of brain..  VASCULAR: Normal.    OTHER: Negative.     CHEST FINDINGS:  CARDIAC: No coronary artery calcification.  MEDIASTINUM/GRACE: Mediastinal lymphadenopathy extends from the thoracic inlet to the level of the right mainstem bronchus..  A representative right paratracheal lymph node measures 17 x 24 mm on image 36 series 6. A lower pretracheal lymph node measures  15 x 18 mm image 39 series 7.  Lower left paratracheal lymph node measures 17 x 23 mm image 41 series 7. A precarinal lymph node measures 16 x 26 mm image 44 series 7. Right retrocrural lymphadenopathy measures 17 x 23 mm.    PULMONARY ARTERIES: Borderline enlargement of main pulmonary artery measuring 3 cm.  AORTA: Atherosclerotic vascular calcification.  No aneurysm or dissection.  LUNGS/PLEURA: Calcified 5 mm right lower lobe pulmonary granuloma.  Subsegmental atelectasis and or scarring in the lower lungs.  No consolidation, or pleural effusion.  Trachea and mainstem bronchi are patent.  CHEST WALL: Left axillary lymphadenopathy with a a representative measurable node measuring 19 x 21 mm  LIMITED ABDOMEN:  Conglomerate of incompletely imaged left para-aortic lymphadenopathy at the level of the left renal vein measures 49 x 52 mm image 109 series 7. Small focus of fatty infiltration in segment 4. Normal spleen. .     OTHER:         BONES: No suspect bone lesion or fracture.  Degenerative changes in the spine.              Impression  CONCLUSION:  1. Lymphadenopathy in bilateral neck, chest, left axilla, and partly visualized retroperitoneum most suspicious for lymphoma.  Recommend complete evaluation of the abdomen and pelvis and or PET scan for complete staging.           Dictated by (CST): Kennedy Flores MD on 5/28/2024 at 5:37 PM      Finalized by (CST): Kennedy Flores MD on 5/28/2024 at 6:12 PM

## 2024-06-25 ENCOUNTER — APPOINTMENT (OUTPATIENT)
Dept: HEMATOLOGY/ONCOLOGY | Facility: HOSPITAL | Age: 59
End: 2024-06-25
Attending: INTERNAL MEDICINE
Payer: COMMERCIAL

## 2024-06-28 DIAGNOSIS — D50.0 IRON DEFICIENCY ANEMIA DUE TO CHRONIC BLOOD LOSS: Primary | ICD-10-CM

## 2024-06-28 DIAGNOSIS — D47.9 LYMPHOPROLIFERATIVE DISORDER (HCC): ICD-10-CM

## 2024-07-01 ENCOUNTER — OFFICE VISIT (OUTPATIENT)
Dept: HEMATOLOGY/ONCOLOGY | Facility: HOSPITAL | Age: 59
End: 2024-07-01
Attending: INTERNAL MEDICINE
Payer: COMMERCIAL

## 2024-07-01 ENCOUNTER — OFFICE VISIT (OUTPATIENT)
Dept: SURGERY | Facility: CLINIC | Age: 59
End: 2024-07-01
Payer: COMMERCIAL

## 2024-07-01 VITALS
SYSTOLIC BLOOD PRESSURE: 115 MMHG | HEIGHT: 62.99 IN | HEART RATE: 115 BPM | TEMPERATURE: 100 F | DIASTOLIC BLOOD PRESSURE: 71 MMHG | OXYGEN SATURATION: 97 % | BODY MASS INDEX: 26.7 KG/M2 | RESPIRATION RATE: 16 BRPM | WEIGHT: 150.69 LBS

## 2024-07-01 VITALS — WEIGHT: 151 LBS | HEIGHT: 62.99 IN | BODY MASS INDEX: 26.75 KG/M2

## 2024-07-01 DIAGNOSIS — M79.622 PAIN IN LEFT AXILLA: ICD-10-CM

## 2024-07-01 DIAGNOSIS — R22.32 AXILLARY MASS, LEFT: Primary | ICD-10-CM

## 2024-07-01 DIAGNOSIS — D47.9 LYMPHOPROLIFERATIVE DISORDER (HCC): ICD-10-CM

## 2024-07-01 DIAGNOSIS — R61 NIGHT SWEATS: ICD-10-CM

## 2024-07-01 DIAGNOSIS — D50.0 IRON DEFICIENCY ANEMIA DUE TO CHRONIC BLOOD LOSS: Primary | ICD-10-CM

## 2024-07-01 DIAGNOSIS — R22.2 SUPRACLAVICULAR MASS: ICD-10-CM

## 2024-07-01 LAB
ANTIBODY SCREEN: NEGATIVE
BASOPHILS # BLD AUTO: 0.05 X10(3) UL (ref 0–0.2)
BASOPHILS NFR BLD AUTO: 0.4 %
DEPRECATED RDW RBC AUTO: 55.9 FL (ref 35.1–46.3)
EOSINOPHIL # BLD AUTO: 0.03 X10(3) UL (ref 0–0.7)
EOSINOPHIL NFR BLD AUTO: 0.2 %
ERYTHROCYTE [DISTWIDTH] IN BLOOD BY AUTOMATED COUNT: 19.3 % (ref 11–15)
HCT VFR BLD AUTO: 25 %
HGB BLD-MCNC: 7.3 G/DL
IMM GRANULOCYTES # BLD AUTO: 0.07 X10(3) UL (ref 0–1)
IMM GRANULOCYTES NFR BLD: 0.6 %
LYMPHOCYTES # BLD AUTO: 0.99 X10(3) UL (ref 1–4)
LYMPHOCYTES NFR BLD AUTO: 8.1 %
MCH RBC QN AUTO: 23.2 PG (ref 26–34)
MCHC RBC AUTO-ENTMCNC: 29.2 G/DL (ref 31–37)
MCV RBC AUTO: 79.4 FL
MONOCYTES # BLD AUTO: 0.95 X10(3) UL (ref 0.1–1)
MONOCYTES NFR BLD AUTO: 7.7 %
NEUTROPHILS # BLD AUTO: 10.18 X10 (3) UL (ref 1.5–7.7)
NEUTROPHILS # BLD AUTO: 10.18 X10(3) UL (ref 1.5–7.7)
NEUTROPHILS NFR BLD AUTO: 83 %
PLATELET # BLD AUTO: 511 10(3)UL (ref 150–450)
RBC # BLD AUTO: 3.15 X10(6)UL
RH BLOOD TYPE: POSITIVE
WBC # BLD AUTO: 12.3 X10(3) UL (ref 4–11)

## 2024-07-01 PROCEDURE — 86901 BLOOD TYPING SEROLOGIC RH(D): CPT

## 2024-07-01 PROCEDURE — 96374 THER/PROPH/DIAG INJ IV PUSH: CPT

## 2024-07-01 PROCEDURE — 85025 COMPLETE CBC W/AUTO DIFF WBC: CPT

## 2024-07-01 PROCEDURE — 99204 OFFICE O/P NEW MOD 45 MIN: CPT | Performed by: SURGERY

## 2024-07-01 PROCEDURE — 86900 BLOOD TYPING SEROLOGIC ABO: CPT

## 2024-07-01 PROCEDURE — 86850 RBC ANTIBODY SCREEN: CPT

## 2024-07-01 NOTE — H&P
Chief complaint:   Chief Complaint   Patient presents with    Mass     Referred by Dr. Reynolds for possible biopsy of Left axilla.       HPI: Kimberly is here for consult for a left axillary biopsy of a painful, clinically suspicious mass.     Past medical history:   Past Medical History:    Diverticulosis large intestine w/o perforation or abscess w/o bleeding    Gastropathy    High cholesterol    History of blood transfusion    Hyperlipidemia    Internal hemorrhoids without complication    Rosacea    Seasonal allergies       Past surgical history:   Past Surgical History:   Procedure Laterality Date    Colonoscopy N/A 12/13/2018    Procedure: COLONOSCOPY;  Surgeon: Elizabet Gordon MD;  Location: [facility] ENDOSCOPY    Colonoscopy N/A 6/4/2024    Procedure: COLONOSCOPY and Polypectomy;  Surgeon: Deepak He MD;  Location: [facility] ENDOSCOPY    Other surgical history      Repair of anal fistula 2011       Allergies:   Allergies   Allergen Reactions    Meloxicam ANGIOEDEMA     Upper and lower lip throbbing, tongue swelling, tongue ulcers    Contrave [Naltrexone-Bupropion Hcl Er] RASH     rash    Wellbutrin [Bupropion] RASH     rash    Simvastatin RASH       Medications:   Current Outpatient Medications   Medication Sig Dispense Refill    benzonatate 100 MG Oral Cap Take 1 capsule (100 mg total) by mouth 3 (three) times daily as needed for cough. 30 capsule 0    Multiple Vitamins Oral Tab Take 1 tablet by mouth daily.      folic acid 1 MG Oral Tab Take 1 tablet (1 mg total) by mouth daily. 30 tablet 3    Ferrous Sulfate 325 (65 Fe) MG Oral Tab Take 1 tablet (325 mg total) by mouth 2 (two) times daily. (Patient not taking: Reported on 6/21/2024) 60 tablet 5    docusate sodium (COLACE) 100 MG Oral Cap Take 1 capsule (100 mg total) by mouth 2 (two) times daily. 60 capsule 5    NON FORMULARY Ad Multi-Minerals         Social history:   Social History     Socioeconomic History    Marital status: Single   Tobacco  Use    Smoking status: Never     Passive exposure: Yes    Smokeless tobacco: Never    Tobacco comments:     Social smoker. Pt does not remember when she stopped   Vaping Use    Vaping status: Never Used   Substance and Sexual Activity    Alcohol use: Yes     Comment: social    Drug use: No        Family history:  History reviewed. No pertinent family history.     Review of Systems:   GENERAL: feels generally well  SKIN: no ulcerated or worrisome skin lesions  EYES:denies blurred vision or double vision  HEENT: denies new nasal congestion, sinus pain or ST  LUNGS: denies shortness of breath with exertion  CARDIOVASCULAR: denies chest pain on exertion  GI: no hematemesis, no BRBPR, no worsening heartburn  : no dysuria, no blood in urine, no difficulty urinating  MUSCULOSKELETAL: no new musculoskeletal complaints  NEURO: no persistent, recurrent  headaches  PSYCHE:no depression or anxiety  HEMATOLOGIC: no hx of blood dyscrasia  ENDOCRINE: no new endocrine problems  ALL/ASTHMA: no new hx of severe allergy or asthma  BACK: normal, no spinal deformity, no CVA tenderness    Physical examination:     Constitutional: appears well hydrated alert and responsive no acute distress noted  HEENT wnl, anicteric, PERRL, normocephalic, atraumatic  Neck supple, norm ROM, no JVD, left supraclavicular mass  L CTA B  H Reg rate  Ax Left axillary mass  Abd soft, NT, ND, no masses, no hernias, no HSM.  Extr no c/c/e  Skin intact, no jaundice, no rashes, no lesions  Neuro grossly intact, no focal deficits, no tremors  Back no deformity, no CVA tnd.         Assessment and plan:  Diagnoses and all orders for this visit:    Axillary mass, left    Pain in left axilla    Night sweats    Supraclavicular mass       Plan surgical biopsy per oncology request. Ddx discussed. We have discussed the surgical risks, benefits, alternatives, and expected recovery. All of the patient's questions have been answered to her satisfaction.    Thank  you.    Marycruz Schaefer MD  7/1/2024  11:41 AM

## 2024-07-01 NOTE — PROGRESS NOTES
Pt here for Venofer 200mg 5/5 . Pt denies any issues or concerns.      Ordering Provider: Rodolfo Ramey Exp: After this dose     Pt tolerated infusion without difficulty or complaint. Reviewed next apt date/time: yes      Education Record  Learner:  Patient  Disease / Diagnosis: MICHAEL  Barriers / Limitations:  None  Method:  Reinforcement  General Topics:  Plan of care reviewed  Outcome:  Shows understanding

## 2024-07-01 NOTE — H&P (VIEW-ONLY)
Chief complaint:   Chief Complaint   Patient presents with    Mass     Referred by Dr. Reynolds for possible biopsy of Left axilla.       HPI: Kimberly is here for consult for a left axillary biopsy of a painful, clinically suspicious mass.     Past medical history:   Past Medical History:    Diverticulosis large intestine w/o perforation or abscess w/o bleeding    Gastropathy    High cholesterol    History of blood transfusion    Hyperlipidemia    Internal hemorrhoids without complication    Rosacea    Seasonal allergies       Past surgical history:   Past Surgical History:   Procedure Laterality Date    Colonoscopy N/A 12/13/2018    Procedure: COLONOSCOPY;  Surgeon: Elizabet Gordon MD;  Location: [facility] ENDOSCOPY    Colonoscopy N/A 6/4/2024    Procedure: COLONOSCOPY and Polypectomy;  Surgeon: Deepak He MD;  Location: [facility] ENDOSCOPY    Other surgical history      Repair of anal fistula 2011       Allergies:   Allergies   Allergen Reactions    Meloxicam ANGIOEDEMA     Upper and lower lip throbbing, tongue swelling, tongue ulcers    Contrave [Naltrexone-Bupropion Hcl Er] RASH     rash    Wellbutrin [Bupropion] RASH     rash    Simvastatin RASH       Medications:   Current Outpatient Medications   Medication Sig Dispense Refill    benzonatate 100 MG Oral Cap Take 1 capsule (100 mg total) by mouth 3 (three) times daily as needed for cough. 30 capsule 0    Multiple Vitamins Oral Tab Take 1 tablet by mouth daily.      folic acid 1 MG Oral Tab Take 1 tablet (1 mg total) by mouth daily. 30 tablet 3    Ferrous Sulfate 325 (65 Fe) MG Oral Tab Take 1 tablet (325 mg total) by mouth 2 (two) times daily. (Patient not taking: Reported on 6/21/2024) 60 tablet 5    docusate sodium (COLACE) 100 MG Oral Cap Take 1 capsule (100 mg total) by mouth 2 (two) times daily. 60 capsule 5    NON FORMULARY Ad Multi-Minerals         Social history:   Social History     Socioeconomic History    Marital status: Single   Tobacco  Use    Smoking status: Never     Passive exposure: Yes    Smokeless tobacco: Never    Tobacco comments:     Social smoker. Pt does not remember when she stopped   Vaping Use    Vaping status: Never Used   Substance and Sexual Activity    Alcohol use: Yes     Comment: social    Drug use: No        Family history:  History reviewed. No pertinent family history.     Review of Systems:   GENERAL: feels generally well  SKIN: no ulcerated or worrisome skin lesions  EYES:denies blurred vision or double vision  HEENT: denies new nasal congestion, sinus pain or ST  LUNGS: denies shortness of breath with exertion  CARDIOVASCULAR: denies chest pain on exertion  GI: no hematemesis, no BRBPR, no worsening heartburn  : no dysuria, no blood in urine, no difficulty urinating  MUSCULOSKELETAL: no new musculoskeletal complaints  NEURO: no persistent, recurrent  headaches  PSYCHE:no depression or anxiety  HEMATOLOGIC: no hx of blood dyscrasia  ENDOCRINE: no new endocrine problems  ALL/ASTHMA: no new hx of severe allergy or asthma  BACK: normal, no spinal deformity, no CVA tenderness    Physical examination:     Constitutional: appears well hydrated alert and responsive no acute distress noted  HEENT wnl, anicteric, PERRL, normocephalic, atraumatic  Neck supple, norm ROM, no JVD, left supraclavicular mass  L CTA B  H Reg rate  Ax Left axillary mass  Abd soft, NT, ND, no masses, no hernias, no HSM.  Extr no c/c/e  Skin intact, no jaundice, no rashes, no lesions  Neuro grossly intact, no focal deficits, no tremors  Back no deformity, no CVA tnd.         Assessment and plan:  Diagnoses and all orders for this visit:    Axillary mass, left    Pain in left axilla    Night sweats    Supraclavicular mass       Plan surgical biopsy per oncology request. Ddx discussed. We have discussed the surgical risks, benefits, alternatives, and expected recovery. All of the patient's questions have been answered to her satisfaction.    Thank  you.    Marycruz Schaefer MD  7/1/2024  11:41 AM

## 2024-07-09 ENCOUNTER — HOSPITAL ENCOUNTER (OUTPATIENT)
Facility: HOSPITAL | Age: 59
Setting detail: HOSPITAL OUTPATIENT SURGERY
Discharge: HOME | End: 2024-07-09
Attending: SURGERY | Admitting: SURGERY
Payer: COMMERCIAL

## 2024-07-09 ENCOUNTER — ANESTHESIA (OUTPATIENT)
Dept: SURGERY | Facility: HOSPITAL | Age: 59
End: 2024-07-09
Payer: COMMERCIAL

## 2024-07-09 ENCOUNTER — ANESTHESIA EVENT (OUTPATIENT)
Dept: SURGERY | Facility: HOSPITAL | Age: 59
End: 2024-07-09
Payer: COMMERCIAL

## 2024-07-09 VITALS
HEIGHT: 63 IN | BODY MASS INDEX: 25.87 KG/M2 | RESPIRATION RATE: 16 BRPM | TEMPERATURE: 99 F | HEART RATE: 88 BPM | WEIGHT: 146 LBS | OXYGEN SATURATION: 93 % | DIASTOLIC BLOOD PRESSURE: 58 MMHG | SYSTOLIC BLOOD PRESSURE: 103 MMHG

## 2024-07-09 DIAGNOSIS — G89.18 POST-OP PAIN: Primary | ICD-10-CM

## 2024-07-09 DIAGNOSIS — M79.622 PAIN IN LEFT AXILLA: ICD-10-CM

## 2024-07-09 DIAGNOSIS — R22.32 AXILLARY MASS, LEFT: ICD-10-CM

## 2024-07-09 LAB
CD10 CELLS NFR SPEC: <1 %
CD10/CD19: 91 %
CD19 CELLS NFR SPEC: 8 %
CD19+/CD200+: 5 %
CD2 CELLS NFR SPEC: 91 %
CD20 CELLS NFR SPEC: 8 %
CD200 CELLS: 77 %
CD3 CELLS NFR SPEC: 90 %
CD3+/TCRGD+: <1 %
CD3+CD4+ CELLS NFR SPEC: 83 %
CD3+CD4+ CELLS/CD3+CD8+ CLL SPEC: 11.9
CD3+CD8+ CELLS NFR SPEC: 7 %
CD3-/CD56+: <1 %
CD34 CELLS NFR SPEC: <1 %
CD38 CELLS NFR SPEC: 12 %
CD38+/CD19+: 1 %
CD45 CELLS NFR SPEC: 100 %
CD5 CELLS NFR SPEC: 92 %
CD5/CD19 CELLS: 1 %
CD7 CELLS NFR SPEC: 91 %
CELL SURF KAPPA/LAMBDA RATIO: 1.7
CELL SURF LAMBDA LIGHT CHAIN: 3 %
CELL SURFACE KAPPA LIGHT CHAIN: 5 %
TCR G-D CELLS NFR SPEC: <1 %

## 2024-07-09 PROCEDURE — 38525 BIOPSY/REMOVAL LYMPH NODES: CPT | Performed by: SURGERY

## 2024-07-09 PROCEDURE — 07B60ZX EXCISION OF LEFT AXILLARY LYMPHATIC, OPEN APPROACH, DIAGNOSTIC: ICD-10-PCS | Performed by: SURGERY

## 2024-07-09 RX ORDER — ONDANSETRON 2 MG/ML
4 INJECTION INTRAMUSCULAR; INTRAVENOUS EVERY 6 HOURS PRN
Status: DISCONTINUED | OUTPATIENT
Start: 2024-07-09 | End: 2024-07-09

## 2024-07-09 RX ORDER — MORPHINE SULFATE 4 MG/ML
4 INJECTION, SOLUTION INTRAMUSCULAR; INTRAVENOUS EVERY 10 MIN PRN
Status: DISCONTINUED | OUTPATIENT
Start: 2024-07-09 | End: 2024-07-09

## 2024-07-09 RX ORDER — ACETAMINOPHEN 500 MG
1000 TABLET ORAL ONCE
Status: COMPLETED | OUTPATIENT
Start: 2024-07-09 | End: 2024-07-09

## 2024-07-09 RX ORDER — SCOLOPAMINE TRANSDERMAL SYSTEM 1 MG/1
1 PATCH, EXTENDED RELEASE TRANSDERMAL
Status: DISCONTINUED | OUTPATIENT
Start: 2024-07-09 | End: 2024-07-09 | Stop reason: HOSPADM

## 2024-07-09 RX ORDER — ONDANSETRON 2 MG/ML
INJECTION INTRAMUSCULAR; INTRAVENOUS AS NEEDED
Status: DISCONTINUED | OUTPATIENT
Start: 2024-07-09 | End: 2024-07-09 | Stop reason: SURG

## 2024-07-09 RX ORDER — LIDOCAINE HYDROCHLORIDE 10 MG/ML
INJECTION, SOLUTION EPIDURAL; INFILTRATION; INTRACAUDAL; PERINEURAL AS NEEDED
Status: DISCONTINUED | OUTPATIENT
Start: 2024-07-09 | End: 2024-07-09 | Stop reason: SURG

## 2024-07-09 RX ORDER — MIDAZOLAM HYDROCHLORIDE 1 MG/ML
INJECTION INTRAMUSCULAR; INTRAVENOUS AS NEEDED
Status: DISCONTINUED | OUTPATIENT
Start: 2024-07-09 | End: 2024-07-09 | Stop reason: SURG

## 2024-07-09 RX ORDER — ROCURONIUM BROMIDE 10 MG/ML
INJECTION, SOLUTION INTRAVENOUS AS NEEDED
Status: DISCONTINUED | OUTPATIENT
Start: 2024-07-09 | End: 2024-07-09 | Stop reason: SURG

## 2024-07-09 RX ORDER — SODIUM CHLORIDE, SODIUM LACTATE, POTASSIUM CHLORIDE, CALCIUM CHLORIDE 600; 310; 30; 20 MG/100ML; MG/100ML; MG/100ML; MG/100ML
INJECTION, SOLUTION INTRAVENOUS CONTINUOUS
Status: DISCONTINUED | OUTPATIENT
Start: 2024-07-09 | End: 2024-07-09

## 2024-07-09 RX ORDER — HYDROMORPHONE HYDROCHLORIDE 1 MG/ML
0.4 INJECTION, SOLUTION INTRAMUSCULAR; INTRAVENOUS; SUBCUTANEOUS EVERY 5 MIN PRN
Status: DISCONTINUED | OUTPATIENT
Start: 2024-07-09 | End: 2024-07-09

## 2024-07-09 RX ORDER — HYDROMORPHONE HYDROCHLORIDE 1 MG/ML
0.6 INJECTION, SOLUTION INTRAMUSCULAR; INTRAVENOUS; SUBCUTANEOUS EVERY 5 MIN PRN
Status: DISCONTINUED | OUTPATIENT
Start: 2024-07-09 | End: 2024-07-09

## 2024-07-09 RX ORDER — DEXAMETHASONE SODIUM PHOSPHATE 4 MG/ML
VIAL (ML) INJECTION AS NEEDED
Status: DISCONTINUED | OUTPATIENT
Start: 2024-07-09 | End: 2024-07-09 | Stop reason: SURG

## 2024-07-09 RX ORDER — MORPHINE SULFATE 4 MG/ML
2 INJECTION, SOLUTION INTRAMUSCULAR; INTRAVENOUS EVERY 10 MIN PRN
Status: DISCONTINUED | OUTPATIENT
Start: 2024-07-09 | End: 2024-07-09

## 2024-07-09 RX ORDER — HYDROMORPHONE HYDROCHLORIDE 1 MG/ML
0.2 INJECTION, SOLUTION INTRAMUSCULAR; INTRAVENOUS; SUBCUTANEOUS EVERY 5 MIN PRN
Status: DISCONTINUED | OUTPATIENT
Start: 2024-07-09 | End: 2024-07-09

## 2024-07-09 RX ORDER — HYDROCODONE BITARTRATE AND ACETAMINOPHEN 5; 325 MG/1; MG/1
1 TABLET ORAL EVERY 6 HOURS PRN
Qty: 12 TABLET | Refills: 0 | Status: SHIPPED | OUTPATIENT
Start: 2024-07-09 | End: 2024-07-16 | Stop reason: ALTCHOICE

## 2024-07-09 RX ORDER — BUPIVACAINE HYDROCHLORIDE AND EPINEPHRINE 2.5; 5 MG/ML; UG/ML
INJECTION, SOLUTION INFILTRATION; PERINEURAL AS NEEDED
Status: DISCONTINUED | OUTPATIENT
Start: 2024-07-09 | End: 2024-07-09 | Stop reason: HOSPADM

## 2024-07-09 RX ORDER — PHENYLEPHRINE HCL 10 MG/ML
VIAL (ML) INJECTION AS NEEDED
Status: DISCONTINUED | OUTPATIENT
Start: 2024-07-09 | End: 2024-07-09 | Stop reason: SURG

## 2024-07-09 RX ORDER — NALOXONE HYDROCHLORIDE 0.4 MG/ML
80 INJECTION, SOLUTION INTRAMUSCULAR; INTRAVENOUS; SUBCUTANEOUS AS NEEDED
Status: DISCONTINUED | OUTPATIENT
Start: 2024-07-09 | End: 2024-07-09

## 2024-07-09 RX ADMIN — PHENYLEPHRINE HCL 100 MCG: 10 MG/ML VIAL (ML) INJECTION at 08:05:00

## 2024-07-09 RX ADMIN — PHENYLEPHRINE HCL 100 MCG: 10 MG/ML VIAL (ML) INJECTION at 07:56:00

## 2024-07-09 RX ADMIN — PHENYLEPHRINE HCL 100 MCG: 10 MG/ML VIAL (ML) INJECTION at 08:15:00

## 2024-07-09 RX ADMIN — SODIUM CHLORIDE, SODIUM LACTATE, POTASSIUM CHLORIDE, CALCIUM CHLORIDE: 600; 310; 30; 20 INJECTION, SOLUTION INTRAVENOUS at 07:36:00

## 2024-07-09 RX ADMIN — ROCURONIUM BROMIDE 10 MG: 10 INJECTION, SOLUTION INTRAVENOUS at 07:39:00

## 2024-07-09 RX ADMIN — PHENYLEPHRINE HCL 100 MCG: 10 MG/ML VIAL (ML) INJECTION at 08:13:00

## 2024-07-09 RX ADMIN — MIDAZOLAM HYDROCHLORIDE 2 MG: 1 INJECTION INTRAMUSCULAR; INTRAVENOUS at 07:36:00

## 2024-07-09 RX ADMIN — SODIUM CHLORIDE, SODIUM LACTATE, POTASSIUM CHLORIDE, CALCIUM CHLORIDE: 600; 310; 30; 20 INJECTION, SOLUTION INTRAVENOUS at 08:32:00

## 2024-07-09 RX ADMIN — ROCURONIUM BROMIDE 30 MG: 10 INJECTION, SOLUTION INTRAVENOUS at 07:51:00

## 2024-07-09 RX ADMIN — ONDANSETRON 4 MG: 2 INJECTION INTRAMUSCULAR; INTRAVENOUS at 08:07:00

## 2024-07-09 RX ADMIN — PHENYLEPHRINE HCL 100 MCG: 10 MG/ML VIAL (ML) INJECTION at 07:46:00

## 2024-07-09 RX ADMIN — DEXAMETHASONE SODIUM PHOSPHATE 8 MG: 4 MG/ML VIAL (ML) INJECTION at 08:03:00

## 2024-07-09 RX ADMIN — LIDOCAINE HYDROCHLORIDE 50 MG: 10 INJECTION, SOLUTION EPIDURAL; INFILTRATION; INTRACAUDAL; PERINEURAL at 07:38:00

## 2024-07-09 NOTE — H&P
Chief complaint:   No chief complaint on file.      HPI: Kimberly is here for consult for a left axillary biopsy of a painful, clinically suspicious mass.     Past medical history:   Past Medical History:    Diverticulosis large intestine w/o perforation or abscess w/o bleeding    Gastropathy    High cholesterol    History of blood transfusion    NO ADVERSE REACTIONS    Hx of motion sickness    Hyperlipidemia    Internal hemorrhoids without complication    Rosacea    Seasonal allergies    Visual impairment    GLASSES       Past surgical history:   Past Surgical History:   Procedure Laterality Date    Colonoscopy N/A 12/13/2018    Procedure: COLONOSCOPY;  Surgeon: Elizabet Gordon MD;  Location: [facility] ENDOSCOPY    Colonoscopy N/A 6/4/2024    Procedure: COLONOSCOPY and Polypectomy;  Surgeon: Deepak He MD;  Location: [facility] ENDOSCOPY    Other surgical history      Repair of anal fistula 2011       Allergies:   Allergies   Allergen Reactions    Meloxicam ANGIOEDEMA     Upper and lower lip throbbing, tongue swelling, tongue ulcers    Contrave [Naltrexone-Bupropion Hcl Er] RASH    Wellbutrin [Bupropion] RASH    Simvastatin RASH       Medications:   No current outpatient medications on file.       Social history:   Social History     Socioeconomic History    Marital status: Single   Tobacco Use    Smoking status: Never     Passive exposure: Yes    Smokeless tobacco: Never    Tobacco comments:     Social smoker. Pt does not remember when she stopped   Vaping Use    Vaping status: Never Used   Substance and Sexual Activity    Alcohol use: Yes     Comment: social    Drug use: No        Family history:  History reviewed. No pertinent family history.     Review of Systems:   GENERAL: feels generally well  SKIN: no ulcerated or worrisome skin lesions  EYES:denies blurred vision or double vision  HEENT: denies new nasal congestion, sinus pain or ST  LUNGS: denies shortness of breath with  exertion  CARDIOVASCULAR: denies chest pain on exertion  GI: no hematemesis, no BRBPR, no worsening heartburn  : no dysuria, no blood in urine, no difficulty urinating  MUSCULOSKELETAL: no new musculoskeletal complaints  NEURO: no persistent, recurrent  headaches  PSYCHE:no depression or anxiety  HEMATOLOGIC: no hx of blood dyscrasia  ENDOCRINE: no new endocrine problems  ALL/ASTHMA: no new hx of severe allergy or asthma  BACK: normal, no spinal deformity, no CVA tenderness    Physical examination:     Constitutional: appears well hydrated alert and responsive no acute distress noted  HEENT wnl, anicteric, PERRL, normocephalic, atraumatic  Neck supple, norm ROM, no JVD, left supraclavicular mass  L CTA B  H Reg rate  Ax Left axillary mass  Abd soft, NT, ND, no masses, no hernias, no HSM.  Extr no c/c/e  Skin intact, no jaundice, no rashes, no lesions  Neuro grossly intact, no focal deficits, no tremors  Back no deformity, no CVA tnd.         Assessment and plan:  There are no diagnoses linked to this encounter.     Plan surgical biopsy per oncology request. Ddx discussed. We have discussed the surgical risks, benefits, alternatives, and expected recovery. All of the patient's questions have been answered to her satisfaction.    Thank you.    Marycruz Schaefer MD  7/1/2024  11:41 AM

## 2024-07-09 NOTE — ANESTHESIA POSTPROCEDURE EVALUATION
Patient: Kimberly Quezada    Procedure Summary       Date: 07/09/24 Room / Location: [facility] MAIN OR  / [facility] MAIN OR    Anesthesia Start: 0734 Anesthesia Stop: 0835    Procedure: Biopsy of left axillary mass (Left: Axilla) Diagnosis:       Axillary mass, left      Pain in left axilla      (Axillary mass, left [R22.32]Pain in left axilla [M79.622])    Surgeons: Marycruz Schaefer MD Anesthesiologist: Michelle Baez MD    Anesthesia Type: general ASA Status: 2            Anesthesia Type: general    Vitals Value Taken Time   BP 89/60 07/09/24 0834   Temp 97.8 °F (36.6 °C) 07/09/24 0834   Pulse 78 07/09/24 0834   Resp 14 07/09/24 0834   SpO2 96 % 07/09/24 0834   Vitals shown include unfiled device data.    [facility] AN Post Evaluation:   Patient Evaluated in PACU  Patient Participation: complete - patient participated  Level of Consciousness: awake and alert  Pain Score: 0  Pain Management: adequate  Airway Patency:patent  Dental exam unchanged from preop  Yes    Nausea/Vomiting: none  Cardiovascular Status: acceptable and hemodynamically stable  Respiratory Status: acceptable, nasal cannula, nonlabored ventilation and spontaneous ventilation  Postoperative Hydration acceptable  Comments: Was given Scopolamine patch, Decadron, Ondansetron and Propofol gtt for PONV prophylaxis.   BP slightly well, asymptomatic, will bolus 500 mL NaCl.       Michelle Baez MD  7/9/2024 8:35 AM

## 2024-07-09 NOTE — INTERVAL H&P NOTE
Pre-op Diagnosis: Axillary mass, left [R22.32]  Pain in left axilla [M79.622]    The above referenced H&P was reviewed by Marycruz Schaefer MD on 7/9/2024, the patient was examined and no significant changes have occurred in the patient's condition since the H&P was performed.  I discussed with the patient and/or legal representative the potential benefits, risks and side effects of this procedure; the likelihood of the patient achieving goals; and potential problems that might occur during recuperation.  I discussed reasonable alternatives to the procedure, including risks, benefits and side effects related to the alternatives and risks related to not receiving this procedure.  We will proceed with procedure as planned.

## 2024-07-09 NOTE — OPERATIVE REPORT
Pre-Operative Diagnosis: Axillary mass, left [R22.32]  Pain in left axilla [M79.622]     Post-Operative Diagnosis: Axillary mass, left [R22.32]Pain in left axilla [M79.622]      Procedure Performed: Excision of enlarged deep left axillary lymph node       Surgeons and Role:     * Marycruz Schaefer MD - Primary    Assistant(s):  Surgical Assistant.: Zachary Sun     Surgical Findings: Elarged LN     Specimen: LN     Estimated Blood Loss: Blood Output: 5 mL (7/9/2024  8:10 AM)    Indications: Kimberly has an enlarging and painful left axillary lymph node for which oncology has requested excisional biopsy. We have discussed the surgical risks, benefits, alternatives, and expected recovery. All of the patient's questions have been answered to her satisfaction.      Description: The patient was taken to the operating room and was placed on the OR table in the supine position.  After the induction of general anesthesia, perioperative antibiotics were given. The incision was made in the axilla and the subcutaneous tissues divided. The fascia was incised and the axilla was entered. Deep in the axilla was an enlarged lymph node.  This was freed up and a right angle clamp was placed at the pedicle. The node was excised and passed off the table as specimen, sent fresh for routine pathology.  The pedicle was tied with 3.0 vicryl suture ligasure. The wound was irrigated and closed in a layered fashion with 3.0 and then 4.0 vicryl. Marcaine was injected and dermabond applied. A gauze dressing was placed. The patient was awakened and brought to the recovery room. The spnge and instrument count was correct.

## 2024-07-09 NOTE — DISCHARGE INSTRUCTIONS
Keep steri strips on, change gauze if saturated. If gauze remains dry and comfortable, may leave it on for a few days.   Diet, clear liquids then advance to a high fiber diet as tolerated .  Drink plenty of fluids.  No lifting > 10 lbs. Walking, stairs, normal activities as tolerated.   No driving.  See Stan DA SILVA  in 1-2 wk,  May use ice packs liberally.   Shower starting tomorrow, no submersion in bath/hot tub/pool.  Norco or Tylenol can be taken along with Ibuprofen.  Stool softeners and laxatives as needed.    HOME INSTRUCTIONS  [facility] HOME CARE INSTRUCTIONS: POST-OP ANESTHESIA  The medication that you received for sedation or general anesthesia can last up to 24 hours. Your judgment and reflexes may be altered, even if you feel like your normal self.      We Recommend:   Do not drive any motor vehicle or bicycle   Avoid mowing the lawn, playing sports, or working with power tools/applicances (power saws, electric knives or mixers)   That you have someone stay with you on your first night home   Do not drink alcohol or take sleeping pills or tranquilizers   Do not sign legal documents within 24 hours of your procedure   If you had a nerve block for your surgery, take extra care not to put any pressure on your arm or hand for 24 hours    It is normal:  For you to have a sore throat if you had a breathing tube during surgery (while you were asleep!). The sore throat should get better within 48 hours. You can gargle with warm salt water (1/2 tsp in 4 oz warm water) or use a throat lozenge for comfort  To feel muscle aches or soreness especially in the abdomen, chest or neck. The achy feeling should go away in the next 24 hours  To feel weak, sleepy or \"wiped out\". Your should start feeling better in the next 24 hours.   To experience mild discomforts such as sore lip or tongue, headache, cramps, gas pains or a bloated feeling in your abdomen.   To experience mild back pain or soreness for a day or two if you  had spinal or epidural anesthesia.   If you had laparoscopic surgery, to feel shoulder pain or discomfort on the day of surgery.   For some patients to have nausea after surgery/anesthesia    If you feel nausea or experience vomiting:   Try to move around less.   Eat less than usual or drink only liquids until the next morning   Nausea should resolve in about 24 hours    If you have a problem when you are at home:    Call your surgeons office

## 2024-07-09 NOTE — ANESTHESIA PROCEDURE NOTES
Airway  Date/Time: 7/9/2024 7:40 AM  Urgency: Elective    Airway not difficult    General Information and Staff    Patient location during procedure: OR  Anesthesiologist: Michelle Baez MD  Performed: anesthesiologist   Performed by: Michelle Baez MD  Authorized by: Michelle Baez MD      Indications and Patient Condition  Indications for airway management: anesthesia  Spontaneous Ventilation: absent  Sedation level: deep  Preoxygenated: yes  Patient position: sniffing  Mask difficulty assessment: 1 - vent by mask  Planned trial extubation    Final Airway Details  Final airway type: endotracheal airway      Successful airway: ETT  Cuffed: yes   Successful intubation technique: direct laryngoscopy  Facilitating devices/methods: intubating stylet and cricoid pressure  Endotracheal tube insertion site: oral  Blade: Lyndon  Blade size: #3  ETT size (mm): 7.0    Cormack-Lehane Classification: grade IIB - view of arytenoids or posterior of glottis only  Placement verified by: capnometry   Measured from: lips  ETT to lips (cm): 22  Number of attempts at approach: 1  Number of other approaches attempted: 0    Additional Comments  Intubated easily on first attempt, no dental or soft tissue damage. No signs of aspiration.

## 2024-07-09 NOTE — ANESTHESIA PREPROCEDURE EVALUATION
Anesthesia PreOp Note    HPI:     Kimberly Quezada is a 58 year old female who presents for preoperative consultation requested by: Marycruz Schaefer MD    Date of Surgery: 7/9/2024    Procedure(s):  Biopsy of left axillary mass  Indication: Axillary mass, left [R22.32]  Pain in left axilla [M79.622]    Relevant Problems   No relevant active problems       NPO:  Last Liquid Consumption Date: 07/08/24  Last Liquid Consumption Time: 1930  Last Solid Consumption Date: 07/08/24  Last Solid Consumption Time: 1930  Last Liquid Consumption Date: 07/08/24          History Review:  Patient Active Problem List    Diagnosis Date Noted    Anemia 06/03/2024    Iron deficiency anemia due to chronic blood loss 04/11/2024    Diverticulosis large intestine w/o perforation or abscess w/o bleeding 12/13/2018    Internal hemorrhoids without complication 12/13/2018    Gastric polyps 12/13/2018    Rosacea 03/20/2017    High cholesterol 03/20/2017    Vitamin D deficiency 03/20/2017    Non morbid obesity due to excess calories 03/20/2017    Disorder of bone and cartilage 01/21/2013    Allergic rhinitis 10/01/2011    Throat pain 10/01/2011    Acute bronchiolitis 12/31/2010    Low back pain 02/01/2010    Acute maxillary sinusitis 08/11/2008    Acute upper respiratory infection 07/29/2008    Dermatophytosis of nail 06/20/2008    Sleep disturbances 10/20/2006    Renal colic 06/13/2006    Sebaceous cyst 12/27/2005    Benign neoplasm of skin of face 11/08/2005    Nevus, non-neoplastic 10/18/2005       Past Medical History:    Diverticulosis large intestine w/o perforation or abscess w/o bleeding    Gastropathy    High cholesterol    History of blood transfusion    NO ADVERSE REACTIONS    Hx of motion sickness    Hyperlipidemia    Internal hemorrhoids without complication    Rosacea    Seasonal allergies    Visual impairment    GLASSES       Past Surgical History:   Procedure Laterality Date    Colonoscopy N/A 12/13/2018    Procedure:  COLONOSCOPY;  Surgeon: Elizabet Gordon MD;  Location: [facility] ENDOSCOPY    Colonoscopy N/A 6/4/2024    Procedure: COLONOSCOPY and Polypectomy;  Surgeon: Deepak He MD;  Location: [facility] ENDOSCOPY    Other surgical history      Repair of anal fistula 2011       Medications Prior to Admission   Medication Sig Dispense Refill Last Dose    folic acid 1 MG Oral Tab Take 1 tablet (1 mg total) by mouth daily. 30 tablet 3 7/8/2024 at 0800    Ferrous Sulfate 325 (65 Fe) MG Oral Tab Take 1 tablet (325 mg total) by mouth 2 (two) times daily. 60 tablet 5 7/8/2024 at 0800    docusate sodium (COLACE) 100 MG Oral Cap Take 1 capsule (100 mg total) by mouth 2 (two) times daily. 60 capsule 5     benzonatate 100 MG Oral Cap Take 1 capsule (100 mg total) by mouth 3 (three) times daily as needed for cough. (Patient not taking: Reported on 7/5/2024) 30 capsule 0 Not Taking    Multiple Vitamins Oral Tab Take 1 tablet by mouth daily. (Patient not taking: Reported on 7/5/2024)   Not Taking    NON FORMULARY Ad Multi-Minerals (Patient not taking: Reported on 7/5/2024)   Not Taking     Current Facility-Administered Medications Ordered in Epic   Medication Dose Route Frequency Provider Last Rate Last Admin    lactated ringers infusion   Intravenous Continuous Marycruz Schaefer MD 20 mL/hr at 07/09/24 0657 New Bag at 07/09/24 0657    ceFAZolin (Ancef) 2g in 10mL IV syringe premix  2 g Intravenous Once Marycruz Schaefer MD        scopolamine (Transderm-Scop) 1 MG/3DAYS patch 1 patch  1 patch Transdermal Q72H Michelle Baez MD         No current [facility]-ordered outpatient medications on file.       Allergies   Allergen Reactions    Meloxicam ANGIOEDEMA     Upper and lower lip throbbing, tongue swelling, tongue ulcers    Contrave [Naltrexone-Bupropion Hcl Er] RASH    Wellbutrin [Bupropion] RASH    Simvastatin RASH       History reviewed. No pertinent family history.  Social History     Socioeconomic History    Marital status:  Single   Tobacco Use    Smoking status: Never     Passive exposure: Yes    Smokeless tobacco: Never    Tobacco comments:     Social smoker. Pt does not remember when she stopped   Vaping Use    Vaping status: Never Used   Substance and Sexual Activity    Alcohol use: Yes     Comment: social    Drug use: No   Other Topics Concern    Pt has a pacemaker No    Pt has a defibrillator No    Reaction to local anesthetic No    Right Handed Yes       Available pre-op labs reviewed.  Lab Results   Component Value Date    WBC 12.3 (H) 07/01/2024    RBC 3.15 (L) 07/01/2024    HGB 7.3 (L) 07/01/2024    HCT 25.0 (L) 07/01/2024    MCV 79.4 (L) 07/01/2024    MCH 23.2 (L) 07/01/2024    MCHC 29.2 (L) 07/01/2024    RDW 19.3 (H) 07/01/2024    .0 (H) 07/01/2024     Lab Results   Component Value Date     05/06/2024    K 3.7 05/06/2024     05/06/2024    CO2 26.0 05/06/2024    BUN 10 05/06/2024    CREATSERUM 0.66 05/06/2024     (H) 05/06/2024    CA 9.7 05/06/2024          Vital Signs:  Body mass index is 25.86 kg/m².   height is 1.6 m (5' 3\") and weight is 66.2 kg (146 lb). Her oral temperature is 98.4 °F (36.9 °C). Her blood pressure is 115/70 and her pulse is 112. Her respiration is 18 and oxygen saturation is 98%.   Vitals:    07/05/24 0957 07/09/24 0654   BP:  115/70   Pulse:  112   Resp:  18   Temp:  98.4 °F (36.9 °C)   TempSrc:  Oral   SpO2:  98%   Weight: 68 kg (150 lb) 66.2 kg (146 lb)   Height: 1.6 m (5' 3\") 1.6 m (5' 3\")        Anesthesia Evaluation     Patient summary reviewed and Nursing notes reviewed    No history of anesthetic complications   Airway   Mallampati: II  TM distance: >3 FB  Neck ROM: full  Dental - Dentition appears grossly intact     Pulmonary - negative ROS and normal exam     ROS comment: Denies smoking or marijuana use  Cardiovascular - normal exam  Exercise tolerance: good    Neuro/Psych - negative ROS     GI/Hepatic/Renal      Comments: + gastropathy    Endo/Other    (+) blood  dyscrasia (anemia)  Abdominal  - normal exam                 Anesthesia Plan:   ASA:  2  Plan:   General  Monitors and Lines:   BIS  Airway:  ETT  Post-op Pain Management: IV analgesics  Plan Comments: Scopolamine patch ordered for PONV prophylaxis  Informed Consent Plan and Risks Discussed With:  Patient  Use of Blood Products Discussed With:  Patient  Blood Product Use Consented    Discussed plan with:  Surgeon      I have informed Kimberly Quezada and/or legal guardian or family member of the nature of the anesthetic plan, benefits, risks including possible dental damage if relevant, major complications, and any alternative forms of anesthetic management.   All of the patient's questions were answered to the best of my ability. The patient desires the anesthetic management as planned.  Michelle Baez MD  7/9/2024 7:18 AM  Present on Admission:  **None**

## 2024-07-15 ENCOUNTER — HOSPITAL ENCOUNTER (OUTPATIENT)
Dept: NUCLEAR MEDICINE | Facility: HOSPITAL | Age: 59
Discharge: HOME | End: 2024-07-15
Attending: INTERNAL MEDICINE
Payer: COMMERCIAL

## 2024-07-15 DIAGNOSIS — D47.9 LYMPHOPROLIFERATIVE DISORDER (HCC): ICD-10-CM

## 2024-07-15 LAB — GLUCOSE BLDC GLUCOMTR-MCNC: 112 MG/DL (ref 70–99)

## 2024-07-15 PROCEDURE — 82962 GLUCOSE BLOOD TEST: CPT

## 2024-07-15 PROCEDURE — 78815 PET IMAGE W/CT SKULL-THIGH: CPT | Performed by: INTERNAL MEDICINE

## 2024-07-16 ENCOUNTER — OFFICE VISIT (OUTPATIENT)
Dept: HEMATOLOGY/ONCOLOGY | Facility: HOSPITAL | Age: 59
End: 2024-07-16
Attending: INTERNAL MEDICINE
Payer: COMMERCIAL

## 2024-07-16 VITALS
HEIGHT: 63 IN | OXYGEN SATURATION: 98 % | BODY MASS INDEX: 25.69 KG/M2 | DIASTOLIC BLOOD PRESSURE: 68 MMHG | SYSTOLIC BLOOD PRESSURE: 112 MMHG | TEMPERATURE: 98 F | WEIGHT: 145 LBS | HEART RATE: 113 BPM | RESPIRATION RATE: 16 BRPM

## 2024-07-16 DIAGNOSIS — C85.98 LYMPHOMA OF LYMPH NODES OF MULTIPLE REGIONS, UNSPECIFIED LYMPHOMA TYPE (HCC): Primary | ICD-10-CM

## 2024-07-16 DIAGNOSIS — R11.2 NAUSEA AND VOMITING, UNSPECIFIED VOMITING TYPE: ICD-10-CM

## 2024-07-16 DIAGNOSIS — D50.0 IRON DEFICIENCY ANEMIA DUE TO CHRONIC BLOOD LOSS: ICD-10-CM

## 2024-07-16 PROCEDURE — 99215 OFFICE O/P EST HI 40 MIN: CPT | Performed by: INTERNAL MEDICINE

## 2024-07-16 RX ORDER — LORAZEPAM 0.5 MG/1
0.5 TABLET ORAL EVERY 8 HOURS PRN
Qty: 60 TABLET | Refills: 0 | Status: SHIPPED | OUTPATIENT
Start: 2024-07-16

## 2024-07-16 NOTE — PROGRESS NOTES
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    Patient noted with new onset of anemia since March 27, 2023.  Her baseline was between 12 and 13.2 in the past, and at that time, her hemoglobin was 11.4.  Her MCV was normal, but she did have a slight increase of the RDW.  She then had labs on 3/18/2024 and her hemoglobin was down to 7.5 with MCV of 72.3, RDW 17.2 and platelet count of 477.  Patient has not had iron studies performed.  She did have a B12 level which was normal.    At the time of visit with her primary care doctor on 3/6/2024 patient had complaint of decreased appetite, and weight loss of 25 pounds.  He was also having leg cramps when working and at nighttime.  She also complained of a dry cough at times when drinking or eating.  Went the patient was noted to have a hemoglobin of 7.5, she was recommended to take iron replacement therapy over-the-counter, twice daily.  The patient is postmenopausal.    Patient scheduled for EGD and colonoscopy on May 30, 2024.  Has past h/o hemorrhoids that were removed.  States always had problems with her stomach, sensitivities not sure if gluten or lactose problems.     She states she was having chills and was feeling tired.  She states that she has lost 30 lbs since the beginning of the year.  She did not have appetite and did not realize that she was losing weight.  She states she was told at work her uniform was too big.  She was eating once a day.  States that was not eating late at night because would get full and then have bad dreams.      During workup, patient completed GI workup with Dr. He which was negative for source of bleeding.    She had a CT scan performed of the chest abdomen and pelvis, after she noted a neck mass.  This confirmed  lymphadenopathy bilateral neck, chest, left axilla and partly visualized retroperitoneal suspicious for lymphoma.  She underwent a fine-needle biopsy of the supraclavicular lymphadenopathy which was consistent with a undetermined lymph flow proliferative disorder.      Given that there was no definitive diagnosis on the fine-needle biopsy, on 7/9/2024 patient underwent left axillary mass excisional biopsy.  Per pathology discussion with Dr. Schaefer, this is very likely a lymphoma.  However, this was sent for an extra departmental pathology consultation at [facility] for additional stains, to determine the exact type of lymphoma.    Patient had PET/CT performed yesterday.    States for the past 2 weeks, she has developed dry cough with cold beverages and phlegm.  Drinking warm fluids.      Feels better from infusions, has 1 more infusion.  Taking folic acid.      She has lost weight, 5 lbs since last visit.  States no appetite, states that looking or smelling food gives her nausea.  NS a couple of times this week.  Also having feeling hot in her private parts, states felt burning hot.  Did not take her temperature.  She is very tired and fatigued.  Still going to work.  States urine is darker at times and strong odor, but no frequency burning or pain.      ECOG PS 1    Review of Systems:   Review of Systems - Oncology  Pertinent positives per HPI.      Current Outpatient Medications   Medication Sig Dispense Refill    LORazepam 0.5 MG Oral Tab Take 1 tablet (0.5 mg total) by mouth every 8 (eight) hours as needed (prior to meals for anticipatory N/V.). 60 tablet 0    folic acid 1 MG Oral Tab Take 1 tablet (1 mg total) by mouth daily. 30 tablet 3    Ferrous Sulfate 325 (65 Fe) MG Oral Tab Take 1 tablet (325 mg total) by mouth 2 (two) times daily. (Patient taking differently: Take 1 tablet (325 mg total) by mouth daily with breakfast.) 60 tablet 5    docusate sodium (COLACE) 100 MG Oral Cap Take 1 capsule (100 mg total)  by mouth 2 (two) times daily. 60 capsule 5     Allergies:   Allergies   Allergen Reactions    Meloxicam ANGIOEDEMA     Upper and lower lip throbbing, tongue swelling, tongue ulcers    Contrave [Naltrexone-Bupropion Hcl Er] RASH    Wellbutrin [Bupropion] RASH    Simvastatin RASH       Past Medical History:    Diverticulosis large intestine w/o perforation or abscess w/o bleeding    Gastropathy    High cholesterol    History of blood transfusion    NO ADVERSE REACTIONS    Hx of motion sickness    Hyperlipidemia    Internal hemorrhoids without complication    Rosacea    Seasonal allergies    Visual impairment    GLASSES     Past Surgical History:   Procedure Laterality Date    Colonoscopy N/A 12/13/2018    Procedure: COLONOSCOPY;  Surgeon: Elizabet Gordon MD;  Location: [facility] ENDOSCOPY    Colonoscopy N/A 6/4/2024    Procedure: COLONOSCOPY and Polypectomy;  Surgeon: Deepak He MD;  Location: [facility] ENDOSCOPY    Other surgical history      Repair of anal fistula 2011     Social History     Socioeconomic History    Marital status: Single   Tobacco Use    Smoking status: Never     Passive exposure: Yes    Smokeless tobacco: Never    Tobacco comments:     Social smoker. Pt does not remember when she stopped   Vaping Use    Vaping status: Never Used   Substance and Sexual Activity    Alcohol use: Yes     Comment: social    Drug use: No   Other Topics Concern    Pt has a pacemaker No    Pt has a defibrillator No    Reaction to local anesthetic No    Right Handed Yes       No family history on file.      PHYSICAL EXAM:    /68 (BP Location: Left arm, Patient Position: Sitting, Cuff Size: adult)   Pulse 113   Temp 98.4 °F (36.9 °C) (Oral)   Resp 16   Ht 1.6 m (5' 3\")   Wt 65.8 kg (145 lb)   LMP 08/09/2018   SpO2 98%   BMI 25.69 kg/m²   Wt Readings from Last 6 Encounters:   07/16/24 65.8 kg (145 lb)   07/09/24 66.2 kg (146 lb)   07/01/24 68.4 kg (150 lb 11.2 oz)   07/01/24 68.5 kg (151 lb)    06/21/24 68.6 kg (151 lb 3.2 oz)   06/05/24 68 kg (150 lb)     Physical Exam  General: Patient is alert, not in acute distress.  HEENT: EOMs intact. PERRL.  Neck: L supraclavicular fossa with mass with overlying ecchymosis.   Psych/Depression: nl        ASSESSMENT/PLAN:     1. 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 Iron deficiency anemia due to chronic blood loss    3. Nausea and vomiting, unspecified vomiting type      -- Patient with subacute onset of microcytic anemia, likely iron deficiency anemia.  Labs ordered below to document iron deficiency.  She has been on oral iron therapy since about a month ago.  Discussed with the patient that she will likely need intravenous iron replacement, which I ordered and she will receive a call from infusion center once iron deficiency is documented and will be scheduled.  She completed 200 mg x 5 doses of Venofer on July 1, 2024.  -- Patient underwent upper and lower endoscopy, both of which were negative.    --Patient has a diagnosis of a lymphoproliferative disorder.  She was noted to have a left supraclavicular mass, which was biopsied with core biopsy by IR and showed atypical lymphoid infiltrates.  Diagnosis has a broad differential and is not conclusive.      --Patient underwent an excisional biopsy of left axillary lymphadenopathy in 7/9/2024.  Per verbal report from pathologist Dr. Faustin to Dr. Schaefer this is consistent with a lymphoma.  However, not able to subclassify.  This was submitted to the [facility] for extra departmental pathology evaluation for additional stains in order to determine the classification of this lymphoma.  I discussed with the patient that I called the pathology department this morning, they have been calling [facility] 3 times a day for final report, and this has not yet been resulted.  They will be calling again, patient will be notified if the mask definitive diagnosis is available, in  order to discuss prognosis and further management.  Once this has been classified, the regimen of systemic therapy will be discussed in detail with the patient.  She will undergo tumor assessment [facility] through treatment regimen.    --PET/CT to complete her staging was completed yesterday and it shows stage IVB (due to B symptoms) disease, as not only is her disease above and below the diaphragm, but there is activity throughout the axial and appendicular skeleton.  Discussed with the patient that even with stage IV disease, for the majority of lymphomas, the goal of treatment is still curative intent, as these are very sensitive to chemotherapy, and targeted therapies, etc.  I did discuss based on the SUV of some of the sites, this appears to be a high-grade process.  Anticipate that will have cycle 1 as inpatient due to high risk of TLS.      --Anticipatory n/v: low dose lorazepam prior to meals as needed.  Eat cold foods.  Push po fluids.     -- Patient to request FMLA.      --Keep active.     No follow-ups on file.      No orders of the defined types were placed in this encounter.    MDM high risk.    Results From Past 48 Hours:  Recent Results (from the past 48 hour(s))   POCT Glucose    Collection Time: 07/15/24 10:59 AM   Result Value Ref Range    POC Glucose  112 (H) 70 - 99 mg/dL       Imaging & Referrals:  None   No orders of the defined types were placed in this encounter.        PET STANDARD BODY SCAN (ONCOLOGY) (CPT=78815) [[id number]]Collected: 07/15/24 1741 Order Status: CompletedUpdated: 07/15/24 1742 Narrative:  PROCEDURE: PET/CT STANDARD BODY SCAN (CPT=78815)      COMPARISON: [facility], CT STN/CHEST W CONTRAST (CPT=70491/83002), 5/25/2024, 12:34 PM.      INDICATIONS: D47.9 Lymphoproliferative disorder (HCC)      TECHNIQUE: PET/CT study of the head and torso, done with 12.6 millicuries of Fluorine-18 FDG (Fluorodeoxyglucose) injected into a right antecubital vein.   Low dose non-contrast CT scanning was performed using a dedicated integrated PET/CT scanner for   attenuation correction and anatomic localization only.      FASTING PATIENT BLOOD GLUCOSE: 112 mg/dL.    UPTAKE TIME: 60 minutes.       FINDINGS:   Mediastinal blood pool is 2.5 max SUV, and hepatic blood pool is 3.2 max SUV.      HEAD/NECK: There are an increased number of multiple bilateral enlarged lower cervical chain lymph nodes.   A largest right level 4 lymph node measuring 1.6 x 1.6 cm demonstrates SUV max of 15.3 (8/353).   There is an increased number of supraclavicular lymph nodes which are enlarged and hypermetabolic.   The largest conglomerate and in the left supraclavicular region measures 4.0 x 3.0 cm, with SUV max 5.1.      LUNGS: Normal.  No pathologic FDG activity.  No FDG avid pulmonary nodules.    Mild dependent opacities at the lung bases, thought to represent subsegmental atelectasis.      MEDIASTINUM/GRACE: There are an increased number of numerous hypermetabolic mediastinal lymph nodes.    A right paratracheal lymph node measuring 1.8 x 1.3 cm has SUV max of 13.0 (8/308).      CHEST WALL/AXILLA: There are an increased number of numerous hypermetabolic abnormal left axillary lymph nodes.   Largest left axillary lymph node measuring 2.5 x 2.2 cm demonstrates SUV max of 19.1 (8/322).   There is air within the anterior left axillary region with associated curvilinear adjacent soft tissue stranding, presumably related to recent biopsy.      ABDOMEN/PELVIS: There are numerous enlarged retroperitoneal lymph nodes, as well as a conglomerate mass in the left anterior perirenal region.   A 1.9 x 1.4 cm right aortic hiatus lymph node has an SUV max of 9.1 (8/223).   The previously described partially imaged left periaortic lymph node conglomerate in the left upper quadrant measuring 6.5 x 6.1 cm demonstrates SUV max of 23.4 (8/201).      The spleen is diffusely hypermetabolic relative to the liver,  compatible with splenic involvement.      Calcifications in the left hemipelvis along the anterior aspect of the uterus may represent calcified exophytic left uterine fibroids.  There is a 3.5 cm fluid density lesion of the right ovary, without hypermetabolic activity.  Finding is most   compatible with an ovarian cyst.      MUSCULOSKELETAL: There is diffusely increased radiotracer activity throughout the visualized appendicular and axial skeleton.   There is faint 1.0 cm curvilinear density along the subcutaneous tissues of the right posterolateral gluteal region, with SUV max of 2.39.  Finding is not thought to represent a site of lymphomatous involvement, given the this is much more faint as   related to other sites of activity.  Finding could represent nonspecific inflammatory change, perhaps related to prior injection, local trauma, other infectious/inflammatory processes.                Impression:  CONCLUSION:      Diffusely increased size of multiple hypermetabolic lymph nodes in the neck, chest, and abdomen, as well as diffuse hypermetabolic activity within a lymph node conglomerate in the left upper quadrant and within the spleen.  Findings are compatible with   sites of lymphomatous involvement.  Deauville score is 5.      Diffusely increased radiotracer activity throughout the visualized appendicular and axial skeleton.  Finding could represent diffuse bone marrow activity, with differential of diffuse lymphomatous involvement.  Recommend attention on follow-up.                   Dictated by (CST): Rosana Mckee MD on 7/15/2024 at 5:25 PM       Finalized by (CST): Rosana Mckee MD on 7/15/2024 at 5:41 PM  Component6/17/24 0933Director Review:   Comment   Comment: Abhijeet Billingsley, PhD, [facility]       Director, Molecular Oncology       [facility] [place] [address]       [phone]-Cell PCR Interpretation   Comment   Comment: NEGATIVE: No clonal  T-cell receptor gamma (TCRG) population was             detected   Interpretation of this result should be made in the context of other   clinical, morphologic, and immunophenotypic findings. This PCR assay   detects approximately 89% of T-cell clonal populations but as with all   amplification based assays, may not detect all possible T-cell receptor   gene rearrangements. If significant suspicion of clonality remains,   T-cell receptor beta (TCRB, test code 625247) test is recommended. The   combined T-cell clonality detection rate of TCRG and TCRB is 94-99%.   The T-cell receptor gene rearrangement panel (includung both TCRG and   TCRB, test code 309950) is available for parallel testing.Block Number:   Comment:   Comment: [id number]

## 2024-07-18 ENCOUNTER — TELEPHONE (OUTPATIENT)
Dept: HEMATOLOGY/ONCOLOGY | Facility: HOSPITAL | Age: 59
End: 2024-07-18

## 2024-07-22 ENCOUNTER — OFFICE VISIT (OUTPATIENT)
Dept: SURGERY | Facility: CLINIC | Age: 59
End: 2024-07-22
Payer: COMMERCIAL

## 2024-07-22 VITALS — TEMPERATURE: 101 F | BODY MASS INDEX: 25.69 KG/M2 | WEIGHT: 145 LBS | HEIGHT: 63 IN

## 2024-07-22 DIAGNOSIS — Z48.89 ENCOUNTER FOR POSTOPERATIVE CARE: Primary | ICD-10-CM

## 2024-07-22 NOTE — PROGRESS NOTES
S:  Kimberly Quezada is a 58 year old female sp excision of axillary lymph node.  Doing well, no fevers, no chills, tolerating a general diet, generally normal bowel movements, no calf tenderness nor lower extremity edema, no shortness of breath, no chest pain.       O:  Temp (!) 101.2 °F (38.4 °C) (Oral)   Ht 5' 3\" (1.6 m)   Wt 145 lb (65.8 kg)   LMP 08/09/2018   BMI 25.69 kg/m²   GEN:  No acute distress  L: nonlabored respirations  H: reg rate  Abd:  Soft, NT,ND.  Skin: Incision C D I, no eryth  Extr: No edema, no calf tenderness    Path:  Pending. Sent to [facility]     Assessment   1. Encounter for postoperative care        Wound healing well, pt febrile today, reports feeling some malaise recently.   Continue to keep incision clean and dry.  Maintain a healthy diet.  Maintain good hydration.  F/u prn. Await path results. Continue following with Dr. Reynolds.         Stan Novak PA-C

## 2024-07-23 ENCOUNTER — TELEPHONE (OUTPATIENT)
Dept: HEMATOLOGY/ONCOLOGY | Facility: HOSPITAL | Age: 59
End: 2024-07-23

## 2024-07-23 NOTE — TELEPHONE ENCOUNTER
Patient called had questions on filling out Release of Information. Explained to her and she will be filling it out today and will be faxing it over.

## 2024-07-23 NOTE — TELEPHONE ENCOUNTER
vd Family Medical Leave Act forms in the forms department. Logged for processing. [employer] message sent for authorization.

## 2024-07-23 NOTE — TELEPHONE ENCOUNTER
Type of Leave:  Intermittent FMLA  Reason for Leave:  cancer dx & treatments  Start date of leave:  7/1/24  How much time needed?:  1-4 flare ups a month, each episode lasting 1-2 days and 1-2 appts/treatments a week, each appointment/treatment lasting 1-4 hours  Forms Due Date:  not given  Was Fee and Turnaround info Given?:  yes

## 2024-07-23 NOTE — TELEPHONE ENCOUNTER
Dr. Reynolds,     *The ACKNOWLEDGE button has been moved to the top right ribbon*    Please sign off on form if you agree to:  Intermittent Family Medical Leave Act (diagnosis & upcoming treatments - as outlined in details below), start date: 7/1/24, end date: 7/1/25   (place your signature on the first page only)    -From your Inbasket, Highlight the patient and click Chart   -Double click the 7/23/24 Forms Completion telephone encounter  -Scroll down to the Media section   -Click the blue Hyperlink:  Family Medical Leave Act Dr. Reynolds 7/23/24  -Click Acknowledge located in the top right ribbon/menu   -Drag the mouse into the blank space of the document and a + sign will appear. Left click to   electronically sign the document.     Thank you,    Phuong

## 2024-07-24 NOTE — TELEPHONE ENCOUNTER
Left message to call back regarding completion of [employer].    Family Medical Leave Act forms are completed, uploaded to [employer], no valid auth on file.

## 2024-07-30 NOTE — TELEPHONE ENCOUNTER
Valid Authorization/Release of Information signed on 7/25/24 received and scanned into patient chart. Completed forms faxed to [place] [phone]. Confirmation received. [employer] message sent.

## 2024-08-05 RX ORDER — FOLIC ACID 1 MG/1
1 TABLET ORAL DAILY
Qty: 30 TABLET | Refills: 3 | Status: SHIPPED | OUTPATIENT
Start: 2024-08-05 | End: 2024-09-03

## 2024-08-07 ENCOUNTER — APPOINTMENT (OUTPATIENT)
Dept: HEMATOLOGY/ONCOLOGY | Facility: HOSPITAL | Age: 59
End: 2024-08-07
Attending: INTERNAL MEDICINE
Payer: COMMERCIAL

## 2024-08-08 ENCOUNTER — LAB ENCOUNTER (OUTPATIENT)
Dept: LAB | Facility: HOSPITAL | Age: 59
End: 2024-08-08
Attending: INTERNAL MEDICINE
Payer: COMMERCIAL

## 2024-08-08 ENCOUNTER — OFFICE VISIT (OUTPATIENT)
Dept: HEMATOLOGY/ONCOLOGY | Facility: HOSPITAL | Age: 59
End: 2024-08-08
Attending: INTERNAL MEDICINE
Payer: COMMERCIAL

## 2024-08-08 VITALS
BODY MASS INDEX: 25.69 KG/M2 | OXYGEN SATURATION: 98 % | WEIGHT: 145 LBS | HEART RATE: 120 BPM | RESPIRATION RATE: 18 BRPM | TEMPERATURE: 99 F | SYSTOLIC BLOOD PRESSURE: 122 MMHG | HEIGHT: 63 IN | DIASTOLIC BLOOD PRESSURE: 71 MMHG

## 2024-08-08 DIAGNOSIS — C83.38 DIFFUSE LARGE B-CELL LYMPHOMA OF LYMPH NODES OF MULTIPLE REGIONS (HCC): Primary | ICD-10-CM

## 2024-08-08 DIAGNOSIS — Z01.810 PRE-OPERATIVE CARDIOVASCULAR EXAMINATION: ICD-10-CM

## 2024-08-08 DIAGNOSIS — C83.38 DIFFUSE LARGE B-CELL LYMPHOMA OF LYMPH NODES OF MULTIPLE REGIONS (HCC): ICD-10-CM

## 2024-08-08 PROBLEM — C85.98 LYMPHOMA OF LYMPH NODES OF MULTIPLE REGIONS (HCC): Status: RESOLVED | Noted: 2024-07-16 | Resolved: 2024-08-08

## 2024-08-08 LAB
ALBUMIN SERPL-MCNC: 3.8 G/DL (ref 3.2–4.8)
ALBUMIN/GLOB SERPL: 0.8 {RATIO} (ref 1–2)
ALP LIVER SERPL-CCNC: 302 U/L
ALT SERPL-CCNC: 39 U/L
ANION GAP SERPL CALC-SCNC: 6 MMOL/L (ref 0–18)
AST SERPL-CCNC: 30 U/L (ref ?–34)
BASOPHILS # BLD AUTO: 0.06 X10(3) UL (ref 0–0.2)
BASOPHILS NFR BLD AUTO: 0.4 %
BILIRUB SERPL-MCNC: 0.4 MG/DL (ref 0.3–1.2)
BUN BLD-MCNC: 10 MG/DL (ref 9–23)
BUN/CREAT SERPL: 16.4 (ref 10–20)
CALCIUM BLD-MCNC: 9.9 MG/DL (ref 8.7–10.4)
CHLORIDE SERPL-SCNC: 104 MMOL/L (ref 98–112)
CO2 SERPL-SCNC: 26 MMOL/L (ref 21–32)
CREAT BLD-MCNC: 0.61 MG/DL
DEPRECATED HBV CORE AB SER IA-ACNC: 3018.8 NG/ML
DEPRECATED RDW RBC AUTO: 53.8 FL (ref 35.1–46.3)
EGFRCR SERPLBLD CKD-EPI 2021: 105 ML/MIN/1.73M2 (ref 60–?)
EOSINOPHIL # BLD AUTO: 0.02 X10(3) UL (ref 0–0.7)
EOSINOPHIL NFR BLD AUTO: 0.1 %
ERYTHROCYTE [DISTWIDTH] IN BLOOD BY AUTOMATED COUNT: 18 % (ref 11–15)
FASTING STATUS PATIENT QL REPORTED: NO
GLOBULIN PLAS-MCNC: 4.6 G/DL (ref 2–3.5)
GLUCOSE BLD-MCNC: 111 MG/DL (ref 70–99)
HCT VFR BLD AUTO: 24.8 %
HGB BLD-MCNC: 7.1 G/DL
IMM GRANULOCYTES # BLD AUTO: 0.15 X10(3) UL (ref 0–1)
IMM GRANULOCYTES NFR BLD: 1.1 %
IRON SATN MFR SERPL: 13 %
IRON SERPL-MCNC: 13 UG/DL
LDH SERPL L TO P-CCNC: 164 U/L
LYMPHOCYTES # BLD AUTO: 0.92 X10(3) UL (ref 1–4)
LYMPHOCYTES NFR BLD AUTO: 6.7 %
MCH RBC QN AUTO: 23.7 PG (ref 26–34)
MCHC RBC AUTO-ENTMCNC: 28.6 G/DL (ref 31–37)
MCV RBC AUTO: 82.7 FL
MONOCYTES # BLD AUTO: 1.08 X10(3) UL (ref 0.1–1)
MONOCYTES NFR BLD AUTO: 7.9 %
NEUTROPHILS # BLD AUTO: 11.41 X10 (3) UL (ref 1.5–7.7)
NEUTROPHILS # BLD AUTO: 11.41 X10(3) UL (ref 1.5–7.7)
NEUTROPHILS NFR BLD AUTO: 83.8 %
OSMOLALITY SERPL CALC.SUM OF ELEC: 282 MOSM/KG (ref 275–295)
PLATELET # BLD AUTO: 560 10(3)UL (ref 150–450)
POTASSIUM SERPL-SCNC: 4 MMOL/L (ref 3.5–5.1)
PROT SERPL-MCNC: 8.4 G/DL (ref 5.7–8.2)
RBC # BLD AUTO: 3 X10(6)UL
SODIUM SERPL-SCNC: 136 MMOL/L (ref 136–145)
TIBC SERPL-MCNC: 100 UG/DL (ref 250–425)
TRANSFERRIN SERPL-MCNC: 67 MG/DL (ref 250–380)
URATE SERPL-MCNC: 3.7 MG/DL
WBC # BLD AUTO: 13.6 X10(3) UL (ref 4–11)

## 2024-08-08 PROCEDURE — 84550 ASSAY OF BLOOD/URIC ACID: CPT

## 2024-08-08 PROCEDURE — 85025 COMPLETE CBC W/AUTO DIFF WBC: CPT

## 2024-08-08 PROCEDURE — 83540 ASSAY OF IRON: CPT

## 2024-08-08 PROCEDURE — 99211 OFF/OP EST MAY X REQ PHY/QHP: CPT

## 2024-08-08 PROCEDURE — 84466 ASSAY OF TRANSFERRIN: CPT

## 2024-08-08 PROCEDURE — 80053 COMPREHEN METABOLIC PANEL: CPT

## 2024-08-08 PROCEDURE — 82728 ASSAY OF FERRITIN: CPT

## 2024-08-08 PROCEDURE — 83615 LACTATE (LD) (LDH) ENZYME: CPT

## 2024-08-08 PROCEDURE — 36415 COLL VENOUS BLD VENIPUNCTURE: CPT

## 2024-08-08 NOTE — H&P (VIEW-ONLY)
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    Patient noted with new onset of anemia since March 27, 2023.  Her baseline was between 12 and 13.2 in the past, and at that time, her hemoglobin was 11.4.  Her MCV was normal, but she did have a slight increase of the RDW.  She then had labs on 3/18/2024 and her hemoglobin was down to 7.5 with MCV of 72.3, RDW 17.2 and platelet count of 477.  Patient has not had iron studies performed.  She did have a B12 level which was normal.    At the time of visit with her primary care doctor on 3/6/2024 patient had complaint of decreased appetite, and weight loss of 25 pounds.  He was also having leg cramps when working and at nighttime.  She also complained of a dry cough at times when drinking or eating.  Went the patient was noted to have a hemoglobin of 7.5, she was recommended to take iron replacement therapy over-the-counter, twice daily.  The patient is postmenopausal.    Patient scheduled for EGD and colonoscopy on May 30, 2024.  Has past h/o hemorrhoids that were removed.  States always had problems with her stomach, sensitivities not sure if gluten or lactose problems.     She states she was having chills and was feeling tired.  She states that she has lost 30 lbs since the beginning of the year.  She did not have appetite and did not realize that she was losing weight.  She states she was told at work her uniform was too big.  She was eating once a day.  States that was not eating late at night because would get full and then have bad dreams.      During workup, patient completed GI workup with Dr. He which was negative for source of bleeding.    She had a CT scan performed of the chest abdomen and pelvis, after she noted a neck mass.  This confirmed lymphadenopathy bilateral neck, chest, left axilla and partly visualized retroperitoneal suspicious for lymphoma.   She underwent a fine-needle biopsy of the supraclavicular lymphadenopathy which was consistent with a undetermined lymph flow proliferative disorder.      Given that there was no definitive diagnosis on the fine-needle biopsy, on 7/9/2024 patient underwent left axillary mass excisional biopsy.  Per pathology discussion with Dr. Schaefer, this is very likely a lymphoma.  However, this was sent for an extra departmental pathology consultation at [facility] for additional stains, to determine the exact type of lymphoma.    Patient had PET/CT 7/15/24 with stage III disease.     LN Bx results available from [facility].  Here to discuss results and treatment.      She is having fevers and NS every night.    States that having pain and discomfort on the axillary bx site on the L and L neck LN is larger.      Appetite not good.  Has lost 5 lbs.    ECOG PS 1    Review of Systems:   Review of Systems - Oncology  Pertinent positives per HPI.      Current Outpatient Medications   Medication Sig Dispense Refill    FOLIC ACID 1 MG Oral Tab TAKE 1 TABLET BY MOUTH DAILY 30 tablet 3    Ferrous Sulfate 325 (65 Fe) MG Oral Tab Take 1 tablet (325 mg total) by mouth 2 (two) times daily. (Patient taking differently: Take 1 tablet (325 mg total) by mouth in the morning and 1 tablet (325 mg total) before bedtime.) 60 tablet 5    docusate sodium (COLACE) 100 MG Oral Cap Take 1 capsule (100 mg total) by mouth 2 (two) times daily. 60 capsule 5    LORazepam 0.5 MG Oral Tab Take 1 tablet (0.5 mg total) by mouth every 8 (eight) hours as needed (prior to meals for anticipatory N/V.). (Patient not taking: Reported on 8/8/2024) 60 tablet 0     Allergies:   Allergies   Allergen Reactions    Meloxicam ANGIOEDEMA     Upper and lower lip throbbing, tongue swelling, tongue ulcers    Contrave [Naltrexone-Bupropion Hcl Er] RASH    Wellbutrin [Bupropion] RASH    Simvastatin RASH       Past Medical History:    Diverticulosis large intestine w/o perforation  or abscess w/o bleeding    Gastropathy    High cholesterol    History of blood transfusion    NO ADVERSE REACTIONS    Hx of motion sickness    Hyperlipidemia    Internal hemorrhoids without complication    Rosacea    Seasonal allergies    Visual impairment    GLASSES     Past Surgical History:   Procedure Laterality Date    Colonoscopy N/A 12/13/2018    Procedure: COLONOSCOPY;  Surgeon: Elizabet Gordon MD;  Location: [facility] ENDOSCOPY    Colonoscopy N/A 6/4/2024    Procedure: COLONOSCOPY and Polypectomy;  Surgeon: Deepak eH MD;  Location: [facility] ENDOSCOPY    Other surgical history      Repair of anal fistula 2011     Social History     Socioeconomic History    Marital status: Single   Tobacco Use    Smoking status: Never     Passive exposure: Yes    Smokeless tobacco: Never    Tobacco comments:     Social smoker. Pt does not remember when she stopped   Vaping Use    Vaping status: Never Used   Substance and Sexual Activity    Alcohol use: Yes     Comment: social    Drug use: No   Other Topics Concern    Pt has a pacemaker No    Pt has a defibrillator No    Reaction to local anesthetic No    Right Handed Yes       History reviewed. No pertinent family history.      PHYSICAL EXAM:    /71 (BP Location: Left arm, Patient Position: Sitting, Cuff Size: adult)   Pulse 120   Temp 99 °F (37.2 °C) (Oral)   Resp 18   Ht 1.6 m (5' 3\")   Wt 65.8 kg (145 lb)   LMP 08/09/2018   SpO2 98%   BMI 25.69 kg/m²   Wt Readings from Last 6 Encounters:   08/08/24 65.8 kg (145 lb)   07/22/24 65.8 kg (145 lb)   07/16/24 65.8 kg (145 lb)   07/09/24 66.2 kg (146 lb)   07/01/24 68.4 kg (150 lb 11.2 oz)   07/01/24 68.5 kg (151 lb)     Physical Exam  General: Patient is alert, not in acute distress.  HEENT: EOMs intact. PERRL.  Neck: L supraclavicular fossa with mass with large lymphadenopathy.  L axilla with well healed surgical scar, with lymphadenopathy about that in the axilla.   Psych/Depression: nl         ASSESSMENT/PLAN:     1. Diffuse large B-cell lymphoma of lymph nodes of multiple regions (HCC)     Cancer Staging   Diffuse large B-cell lymphoma of lymph nodes of multiple regions (HCC)  Staging form: Gestational Trophoblastic Neoplasms, AJCC 8th Edition  - Clinical stage from 8/8/2024: Stage IV (Diffuse large B-cell lymphoma) - Signed by Martha Reynolds MD on 8/8/2024    -- Patient with subacute onset of microcytic anemia, likely iron deficiency anemia.  Labs ordered below to document iron deficiency.  She has been on oral iron therapy since about a month ago.  Discussed with the patient that she will likely need intravenous iron replacement, which I ordered and she will receive a call from infusion center once iron deficiency is documented and will be scheduled.  She completed 200 mg x 5 doses of Venofer on July 1, 2024.  -- Patient underwent upper and lower endoscopy, both of which were negative.    --Patient has a diagnosis of a lymphoproliferative disorder.  She was noted to have a left supraclavicular mass, which was biopsied with core biopsy by IR and showed atypical lymphoid infiltrates.  Diagnosis has a broad differential and is not conclusive.      --Patient underwent an excisional biopsy of left axillary lymphadenopathy in 7/9/2024.  Per verbal report from pathologist Dr. Faustin to Dr. Schaefer this is consistent with a lymphoma.  However, not able to subclassify.  This was submitted to the [facility] for extra departmental pathology evaluation for additional stains with diffuse large of the cell lymphoma, CD30 positive, activated B-cell type.  MYC, BCL-2 and FISH are still pending and will be released in an addendum.    Discussed with the patient and her mother that regarding staging, she is stage IVB as on the PET/CT, there is bone marrow involvement and given the iron deficiency anemia, and large intra-abdominal mass, possible bowel involvement.    Labs ordered pretreatment and for  prognostication:  - CBC With Differential With Platelet; Future  - Comp Metabolic Panel (14); Future  - Ferritin; Future  - Iron And Tibc; Future  - LDH; Future  - Uric Acid; Future  - Hepatitis B Profile; Future  - CARD TTE STRAIN W DOPPLER (CPT=93306/77334); Future      Preliminary IPI: at least 2 (LDH pending, low to high intermediate).  Age >60 years  Serum lactate dehydrogenase (LDH) concentration greater than normal  Eastern Cooperative Oncology Group (ECOG) performance status ?2 (table 2)  Clinical stage III or IV   >1 extranodal disease site    Discussed with the patient and her mother that standard of care treatment for this aggressive type of diffuse large B-cell lymphoma is with R-david-CHP based on data from the POLARIX clinical trial.  This trial reported that R-david-CHP achieved superior outcomes compared with R-CHOP for adults with newly-diagnosed, intermediate- or high-risk DLBCL; one-third of the 879 patients had activated B cell (ABC)-like DLBCL and nearly two-thirds had a baseline International Prognosis Index (IPI) score of 3 to 5. OS was 89%for both groups.  Compared with R-CHOP, R-david-CHP achieved superior PFS and EFS. R-david-CHP was associated with 77 percent two-year PFS, compared with 70 percent with R-CHOP (hazard ratio [HR] 0.73 [95% CI 0.57-0.95]); the HR for EFS was 0.75 (95% CI 0.58-0.96). Serious AEs were reported in 30 to 34 percent of patients (mostly neutropenia and anemia) and grade ?2 peripheral neuropathy occurred in 14% to 17%.      (TI Polatuzumab Vedotin in Previously Untreated Diffuse Large B-Cell Lymphoma.  Authors: Anjelica H, Adonis F, Shweta LH, Shantelle JW, Trn?nýM, Hao GHISLAINE, Latasha C, Jurgen JM, Raeann M, Emmett S, Nikko K, Kirstie N, Nina L, Teri A, Joanie W, Lyndon Y, Sapna R, Lexus L, Phillip JM, Jalyn MC, Bowers A, Garrick HJ, Becky G, Mark E, Cassandra P, Gau GHISLAINE, Hirata J, Jc Y, Joseph M, Rene C, Porsha RODRIGUEZ, Saqib G   [place] N Engl J Med.  2022;386(4):351. Epub 2021 Dec 14)    -- Discussed with the patient that she will receive a total of 6 cycles, with 2 additional cycles of only rituximab.    --Discussed with the patient that even with stage IV disease, for the majority of lymphomas, the goal of treatment is still curative intent, as discussed above.    --Anticipate that will have cycle 1 as inpatient due to high risk of TLS.      --I discussed with the patient that I will be transferring her care to my partner Dr. Dayna Hidalgo, who specializes in treatment of hematological malignancies.  I introduced at the patient and her mother to Dr. Hidalgo.    --Anticipatory n/v: low dose lorazepam prior to meals as needed.  Eat cold foods.  Push po fluids.     -- Patient to request FMLA.      --Keep active.     No follow-ups on file.      No orders of the defined types were placed in this encounter.    MDM high risk.    Results From Past 48 Hours:  No results found for this or any previous visit (from the past 48 hour(s)).      Imaging & Referrals:  None   No orders of the defined types were placed in this encounter.  Surgical Pathology                                Case: [id number]                                Authorizing Provider:  Marycruz Schaefer MD      Collected:           07/09/2024 07:58 AM        Ordering Location:     [facility]          Received:            07/09/2024 08:28 AM                               Operating Room                                                             Pathologist:           Caroline Faustin MD                                                      Specimen:    Lymph node, 1. Deep Axillary Lymph Node - Left                                       Final Diagnosis:     Left axillary lymph node; excision:   Diffuse large B-cell lymphoma, activated B-cell type, with CD30 expression, see comment.     Comment:  Sections of the left axillary lymph node demonstrate effaced lymph node architecture.  Abundant large  cells are present, many of which are binucleated, with prominent nucleoli and vesicular chromatin.  The large cells are present in a background of medium sized lymphoid cells and occasional plasma cells.  Immunohistochemical stains performed on block A3 demonstrate that the large cells are strongly positive for CD30, CD20, and PAX5, and are negative for CD15 and ALK1.  CD45 appears to be positive in all lymphoid cells, including at least some of the large cells.  CD3 highlights abundant background T cells.     The case was sent to [facility] for consultation and their full consultation report can be seen below.     The findings of this case were discussed with Dr. Schaefer on 7/11/2024 at 1:11 pm and on 8/6/2024 at 10:29 am.     The immunohistochemical stains interpreted in this case demonstrate appropriate reactivity of positive controls.  The stains were performed on formalin-fixed, paraffin-embedded tissue sections with each antibody analysis being performed on a separate slide.     [facility]     Interpretation     Pathology Consult     FINAL DIAGNOSIS      Lymph node, left deep axillary, excisional biopsy ([id number]; 07/09/2024): Diffuse large B-cell lymphoma, not otherwise, specified, see comment.      COMMENT   This is a challenging case with large atypical B-cells and prominent expression of CD30. It is best classified as diffuse large B-cell lymphoma, not otherwise specified, activated B-cell type. Additional prognostic immunohistochemical stains (MYC, BCL2) and fluorescence in situ hybridization are pending and results will be released in an addendum.     The lymph cells show prominent expression of CD30 and in areas are becoming dense and confluent with loss of nodularity. This suggests and evolution/spectrum to TCHRLBL/DLBCL. Clinical correlation is recommended.      Histologic sections demonstrate diffuse effacement of the candie architecture by a predominantly diffuse infiltrate of small  and large lymphocytes with areas of vague nodularity. The majority of the lymphocytes are small with scant cytoplasm and mature chromatin, but there are prominent intermixed larger lymphocytes with abundant cytoplasm, irregular nuclei, occasional multinucleation and prominent nucleoli. Vessels are prominent in the background. There are scattered neutrophils, but eosinophils and plasma cells are not prominent.      Immunoperoxidase stains were performed on paraffin sections of the left deep axillary lymph node at the referring institution (CD3, CD15, CD20, CD30, CD45, ALK1 and PAX5) and at [facility] in [place], [place] (block A3: CD2, CD4, CD5, CD7, CD8, CD10, CD19, CD21, CD22, CD30, CD45, CD79a, , BCL2, BCL6, BOB1, CXCL13, GATA3, ICOS, [id number], IgD, kappa and lambda immunoglobulin light chains, MEF2B, MUM1, MYC, OCT2, TCR beta-F1, and TCR delta). Current cutoff values for positivity are: CD10, BCL6 and MUM1 greater than/equal to 30%; MYC greater than/equal to 40%; BCL2 greater than 50%. In situ hybridization was performed on paraffin-embedded sections of the left deep axillary lymph node (block A3) specimen at [facility] in [place], [place], using probes that recognize Chacho-Barr virus encoded RNA (NAYELY). The large atypical lymphocytes are positive for CD19 (partial, variable), CD20 (strong), CD22, CD30 (strong), CD79a, BCL6, BOB1, MUM1, OCT2, and PAX5 (variable, predominantly strong), are rarely positive for MEF2B, and negative for CD3, CD10, CD15, GATA3, and ALK1. CD45 is difficult to interpret in the large atypical cells but appears positive in at least a subset. The small lymphocytes are a mixture of CD3-positive T-cells with areas of prominent nodules of small B-cells. The T-cells are predominantly positive for CD2, CD4, CD5, CD7, , CXCL13, ICOS, and TCRbetaF1 and are predominantly negative for CD8, CD10, BCL6, [id number] and TCR delta. There is some loss of CD7. CD21 highlights focal residual  follicular dendritic cell meshworks. IgD is positive in small B-cells that in areas are present as nodules. Kappa and lambda demonstrate polytypic plasma cells. NAYELY [place] is positive in rare, small and slightly larger cells throughout the node consistent with background reactivation. MYC and BCL2 are pending and results will be released in an addendum.      Flow cytometric immunophenotyping, left axillary lymph node, by report: No evidence of a monoclonal B-cell population or detectable immature blast population. T lymphocytes appropriately express all pan T-cell antigens assayed, including CD3, CD2, CD5 and CD7. The CD4/CD8 ratio is 11.9      FISH analysis for rearrangements seen in diffuse large B-cell lymphoma: Pending at [facility] in [place], [place]. Results will be released in an addendum      Independent verification of results: Some of the immunoperoxidase stains were repeated in this case. It was the consultant's opinion that certain stains critical to the diagnosis had to be repeated in the [facility] immunohistochemistry laboratory to independently verify the results observed in the submitted slides.      Thank you for sharing this case with me. If you have any questions, please do not hesitate to reach me by calling [facility] at [phone].      Report electronically signed by  Traci Young M.D., Ph.D.   I verify that I have examined all relevant slides/materials for the specimen(s) and rendered or confirmed the diagnosis.   Seen in consultation with: Maya Chan M.D.          PET STANDARD BODY SCAN (ONCOLOGY) (CPT=78815) [[id number]]Collected: 07/15/24 1741 Order Status: CompletedUpdated: 07/15/24 1742 Narrative:  PROCEDURE: PET/CT STANDARD BODY SCAN (CPT=78815)      COMPARISON: [facility], CT STN/CHEST W CONTRAST (CPT=70491/14147), 5/25/2024, 12:34 PM.      INDICATIONS: D47.9 Lymphoproliferative disorder (HCC)      TECHNIQUE: PET/CT study of  the head and torso, done with 12.6 millicuries of Fluorine-18 FDG (Fluorodeoxyglucose) injected into a right antecubital vein.  Low dose non-contrast CT scanning was performed using a dedicated integrated PET/CT scanner for   attenuation correction and anatomic localization only.      FASTING PATIENT BLOOD GLUCOSE: 112 mg/dL.    UPTAKE TIME: 60 minutes.       FINDINGS:   Mediastinal blood pool is 2.5 max SUV, and hepatic blood pool is 3.2 max SUV.      HEAD/NECK: There are an increased number of multiple bilateral enlarged lower cervical chain lymph nodes.   A largest right level 4 lymph node measuring 1.6 x 1.6 cm demonstrates SUV max of 15.3 (8/353).   There is an increased number of supraclavicular lymph nodes which are enlarged and hypermetabolic.   The largest conglomerate and in the left supraclavicular region measures 4.0 x 3.0 cm, with SUV max 5.1.      LUNGS: Normal.  No pathologic FDG activity.  No FDG avid pulmonary nodules.    Mild dependent opacities at the lung bases, thought to represent subsegmental atelectasis.      MEDIASTINUM/GRACE: There are an increased number of numerous hypermetabolic mediastinal lymph nodes.    A right paratracheal lymph node measuring 1.8 x 1.3 cm has SUV max of 13.0 (8/308).      CHEST WALL/AXILLA: There are an increased number of numerous hypermetabolic abnormal left axillary lymph nodes.   Largest left axillary lymph node measuring 2.5 x 2.2 cm demonstrates SUV max of 19.1 (8/322).   There is air within the anterior left axillary region with associated curvilinear adjacent soft tissue stranding, presumably related to recent biopsy.      ABDOMEN/PELVIS: There are numerous enlarged retroperitoneal lymph nodes, as well as a conglomerate mass in the left anterior perirenal region.   A 1.9 x 1.4 cm right aortic hiatus lymph node has an SUV max of 9.1 (8/223).   The previously described partially imaged left periaortic lymph node conglomerate in the left upper quadrant measuring  6.5 x 6.1 cm demonstrates SUV max of 23.4 (8/201).      The spleen is diffusely hypermetabolic relative to the liver, compatible with splenic involvement.      Calcifications in the left hemipelvis along the anterior aspect of the uterus may represent calcified exophytic left uterine fibroids.  There is a 3.5 cm fluid density lesion of the right ovary, without hypermetabolic activity.  Finding is most   compatible with an ovarian cyst.      MUSCULOSKELETAL: There is diffusely increased radiotracer activity throughout the visualized appendicular and axial skeleton.   There is faint 1.0 cm curvilinear density along the subcutaneous tissues of the right posterolateral gluteal region, with SUV max of 2.39.  Finding is not thought to represent a site of lymphomatous involvement, given the this is much more faint as   related to other sites of activity.  Finding could represent nonspecific inflammatory change, perhaps related to prior injection, local trauma, other infectious/inflammatory processes.                Impression:  CONCLUSION:      Diffusely increased size of multiple hypermetabolic lymph nodes in the neck, chest, and abdomen, as well as diffuse hypermetabolic activity within a lymph node conglomerate in the left upper quadrant and within the spleen.  Findings are compatible with   sites of lymphomatous involvement.  Deauville score is 5.      Diffusely increased radiotracer activity throughout the visualized appendicular and axial skeleton.  Finding could represent diffuse bone marrow activity, with differential of diffuse lymphomatous involvement.  Recommend attention on follow-up.                   Dictated by (CST): Rosana Mckee MD on 7/15/2024 at 5:25 PM       Finalized by (CST): Rosana Mckee MD on 7/15/2024 at 5:41 PM  Component6/17/24 0933Director Review:   Comment   Comment: Abhijeet Billingsley, PhD, [facility]       Director, Molecular Oncology       [facility] [place]        [place] [address]       [phone]-Cell PCR Interpretation   Comment   Comment: NEGATIVE: No clonal T-cell receptor gamma (TCRG) population was             detected   Interpretation of this result should be made in the context of other   clinical, morphologic, and immunophenotypic findings. This PCR assay   detects approximately 89% of T-cell clonal populations but as with all   amplification based assays, may not detect all possible T-cell receptor   gene rearrangements. If significant suspicion of clonality remains,   T-cell receptor beta (TCRB, test code 651076) test is recommended. The   combined T-cell clonality detection rate of TCRG and TCRB is 94-99%.   The T-cell receptor gene rearrangement panel (includung both TCRG and   TCRB, test code 406803) is available for parallel testing.Block Number:   Comment:   Comment: [id number]

## 2024-08-08 NOTE — PROGRESS NOTES
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    Patient noted with new onset of anemia since March 27, 2023.  Her baseline was between 12 and 13.2 in the past, and at that time, her hemoglobin was 11.4.  Her MCV was normal, but she did have a slight increase of the RDW.  She then had labs on 3/18/2024 and her hemoglobin was down to 7.5 with MCV of 72.3, RDW 17.2 and platelet count of 477.  Patient has not had iron studies performed.  She did have a B12 level which was normal.    At the time of visit with her primary care doctor on 3/6/2024 patient had complaint of decreased appetite, and weight loss of 25 pounds.  He was also having leg cramps when working and at nighttime.  She also complained of a dry cough at times when drinking or eating.  Went the patient was noted to have a hemoglobin of 7.5, she was recommended to take iron replacement therapy over-the-counter, twice daily.  The patient is postmenopausal.    Patient scheduled for EGD and colonoscopy on May 30, 2024.  Has past h/o hemorrhoids that were removed.  States always had problems with her stomach, sensitivities not sure if gluten or lactose problems.     She states she was having chills and was feeling tired.  She states that she has lost 30 lbs since the beginning of the year.  She did not have appetite and did not realize that she was losing weight.  She states she was told at work her uniform was too big.  She was eating once a day.  States that was not eating late at night because would get full and then have bad dreams.      During workup, patient completed GI workup with Dr. He which was negative for source of bleeding.    She had a CT scan performed of the chest abdomen and pelvis, after she noted a neck mass.  This confirmed lymphadenopathy bilateral neck, chest, left axilla and partly visualized retroperitoneal suspicious for lymphoma.   She underwent a fine-needle biopsy of the supraclavicular lymphadenopathy which was consistent with a undetermined lymph flow proliferative disorder.      Given that there was no definitive diagnosis on the fine-needle biopsy, on 7/9/2024 patient underwent left axillary mass excisional biopsy.  Per pathology discussion with Dr. Schaefer, this is very likely a lymphoma.  However, this was sent for an extra departmental pathology consultation at [facility] for additional stains, to determine the exact type of lymphoma.    Patient had PET/CT 7/15/24 with stage III disease.     LN Bx results available from [facility].  Here to discuss results and treatment.      She is having fevers and NS every night.    States that having pain and discomfort on the axillary bx site on the L and L neck LN is larger.      Appetite not good.  Has lost 5 lbs.    ECOG PS 1    Review of Systems:   Review of Systems - Oncology  Pertinent positives per HPI.      Current Outpatient Medications   Medication Sig Dispense Refill    FOLIC ACID 1 MG Oral Tab TAKE 1 TABLET BY MOUTH DAILY 30 tablet 3    Ferrous Sulfate 325 (65 Fe) MG Oral Tab Take 1 tablet (325 mg total) by mouth 2 (two) times daily. (Patient taking differently: Take 1 tablet (325 mg total) by mouth in the morning and 1 tablet (325 mg total) before bedtime.) 60 tablet 5    docusate sodium (COLACE) 100 MG Oral Cap Take 1 capsule (100 mg total) by mouth 2 (two) times daily. 60 capsule 5    LORazepam 0.5 MG Oral Tab Take 1 tablet (0.5 mg total) by mouth every 8 (eight) hours as needed (prior to meals for anticipatory N/V.). (Patient not taking: Reported on 8/8/2024) 60 tablet 0     Allergies:   Allergies   Allergen Reactions    Meloxicam ANGIOEDEMA     Upper and lower lip throbbing, tongue swelling, tongue ulcers    Contrave [Naltrexone-Bupropion Hcl Er] RASH    Wellbutrin [Bupropion] RASH    Simvastatin RASH       Past Medical History:    Diverticulosis large intestine w/o perforation  or abscess w/o bleeding    Gastropathy    High cholesterol    History of blood transfusion    NO ADVERSE REACTIONS    Hx of motion sickness    Hyperlipidemia    Internal hemorrhoids without complication    Rosacea    Seasonal allergies    Visual impairment    GLASSES     Past Surgical History:   Procedure Laterality Date    Colonoscopy N/A 12/13/2018    Procedure: COLONOSCOPY;  Surgeon: Elizabet Gordon MD;  Location: [facility] ENDOSCOPY    Colonoscopy N/A 6/4/2024    Procedure: COLONOSCOPY and Polypectomy;  Surgeon: Deepak He MD;  Location: [facility] ENDOSCOPY    Other surgical history      Repair of anal fistula 2011     Social History     Socioeconomic History    Marital status: Single   Tobacco Use    Smoking status: Never     Passive exposure: Yes    Smokeless tobacco: Never    Tobacco comments:     Social smoker. Pt does not remember when she stopped   Vaping Use    Vaping status: Never Used   Substance and Sexual Activity    Alcohol use: Yes     Comment: social    Drug use: No   Other Topics Concern    Pt has a pacemaker No    Pt has a defibrillator No    Reaction to local anesthetic No    Right Handed Yes       History reviewed. No pertinent family history.      PHYSICAL EXAM:    /71 (BP Location: Left arm, Patient Position: Sitting, Cuff Size: adult)   Pulse 120   Temp 99 °F (37.2 °C) (Oral)   Resp 18   Ht 1.6 m (5' 3\")   Wt 65.8 kg (145 lb)   LMP 08/09/2018   SpO2 98%   BMI 25.69 kg/m²   Wt Readings from Last 6 Encounters:   08/08/24 65.8 kg (145 lb)   07/22/24 65.8 kg (145 lb)   07/16/24 65.8 kg (145 lb)   07/09/24 66.2 kg (146 lb)   07/01/24 68.4 kg (150 lb 11.2 oz)   07/01/24 68.5 kg (151 lb)     Physical Exam  General: Patient is alert, not in acute distress.  HEENT: EOMs intact. PERRL.  Neck: L supraclavicular fossa with mass with large lymphadenopathy.  L axilla with well healed surgical scar, with lymphadenopathy about that in the axilla.   Psych/Depression: nl         ASSESSMENT/PLAN:     1. Diffuse large B-cell lymphoma of lymph nodes of multiple regions (HCC)     Cancer Staging   Diffuse large B-cell lymphoma of lymph nodes of multiple regions (HCC)  Staging form: Gestational Trophoblastic Neoplasms, AJCC 8th Edition  - Clinical stage from 8/8/2024: Stage IV (Diffuse large B-cell lymphoma) - Signed by Martha Reynolds MD on 8/8/2024    -- Patient with subacute onset of microcytic anemia, likely iron deficiency anemia.  Labs ordered below to document iron deficiency.  She has been on oral iron therapy since about a month ago.  Discussed with the patient that she will likely need intravenous iron replacement, which I ordered and she will receive a call from infusion center once iron deficiency is documented and will be scheduled.  She completed 200 mg x 5 doses of Venofer on July 1, 2024.  -- Patient underwent upper and lower endoscopy, both of which were negative.    --Patient has a diagnosis of a lymphoproliferative disorder.  She was noted to have a left supraclavicular mass, which was biopsied with core biopsy by IR and showed atypical lymphoid infiltrates.  Diagnosis has a broad differential and is not conclusive.      --Patient underwent an excisional biopsy of left axillary lymphadenopathy in 7/9/2024.  Per verbal report from pathologist Dr. Fautsin to Dr. Schaefer this is consistent with a lymphoma.  However, not able to subclassify.  This was submitted to the [facility] for extra departmental pathology evaluation for additional stains with diffuse large of the cell lymphoma, CD30 positive, activated B-cell type.  MYC, BCL-2 and FISH are still pending and will be released in an addendum.    Discussed with the patient and her mother that regarding staging, she is stage IVB as on the PET/CT, there is bone marrow involvement and given the iron deficiency anemia, and large intra-abdominal mass, possible bowel involvement.    Labs ordered pretreatment and for  prognostication:  - CBC With Differential With Platelet; Future  - Comp Metabolic Panel (14); Future  - Ferritin; Future  - Iron And Tibc; Future  - LDH; Future  - Uric Acid; Future  - Hepatitis B Profile; Future  - CARD TTE STRAIN W DOPPLER (CPT=93306/54111); Future      Preliminary IPI: at least 2 (LDH pending, low to high intermediate).  Age >60 years  Serum lactate dehydrogenase (LDH) concentration greater than normal  Eastern Cooperative Oncology Group (ECOG) performance status ?2 (table 2)  Clinical stage III or IV   >1 extranodal disease site    Discussed with the patient and her mother that standard of care treatment for this aggressive type of diffuse large B-cell lymphoma is with R-david-CHP based on data from the POLARIX clinical trial.  This trial reported that R-david-CHP achieved superior outcomes compared with R-CHOP for adults with newly-diagnosed, intermediate- or high-risk DLBCL; one-third of the 879 patients had activated B cell (ABC)-like DLBCL and nearly two-thirds had a baseline International Prognosis Index (IPI) score of 3 to 5. OS was 89%for both groups.  Compared with R-CHOP, R-david-CHP achieved superior PFS and EFS. R-david-CHP was associated with 77 percent two-year PFS, compared with 70 percent with R-CHOP (hazard ratio [HR] 0.73 [95% CI 0.57-0.95]); the HR for EFS was 0.75 (95% CI 0.58-0.96). Serious AEs were reported in 30 to 34 percent of patients (mostly neutropenia and anemia) and grade ?2 peripheral neuropathy occurred in 14% to 17%.      (TI Polatuzumab Vedotin in Previously Untreated Diffuse Large B-Cell Lymphoma.  Authors: Anjelica H, Adonis F, Shweta LH, Shantelle JW, Trn?nýM, Hao GHISLAINE, Latasha C, Jurgen JM, Raeann M, Emmett S, Nikko K, Kirstie N, Nina L, Teri A, Joanie W, Lyndon Y, Sapna R, Lexus L, Phillip JM, Jalyn MC, Bowers A, Garrick HJ, Becky G, Mark E, Cassandra P, Gau GHISLAINE, Hirata J, Jc Y, Joseph M, Rene C, Porsha RODRIGUEZ, Saqib G   [place] N Engl J Med.  2022;386(4):351. Epub 2021 Dec 14)    -- Discussed with the patient that she will receive a total of 6 cycles, with 2 additional cycles of only rituximab.    --Discussed with the patient that even with stage IV disease, for the majority of lymphomas, the goal of treatment is still curative intent, as discussed above.    --Anticipate that will have cycle 1 as inpatient due to high risk of TLS.      --I discussed with the patient that I will be transferring her care to my partner Dr. Dayna Hidalgo, who specializes in treatment of hematological malignancies.  I introduced at the patient and her mother to Dr. Hidalgo.    --Anticipatory n/v: low dose lorazepam prior to meals as needed.  Eat cold foods.  Push po fluids.     -- Patient to request FMLA.      --Keep active.     No follow-ups on file.      No orders of the defined types were placed in this encounter.    MDM high risk.    Results From Past 48 Hours:  No results found for this or any previous visit (from the past 48 hour(s)).      Imaging & Referrals:  None   No orders of the defined types were placed in this encounter.  Surgical Pathology                                Case: [id number]                                Authorizing Provider:  Marycruz Schaefer MD      Collected:           07/09/2024 07:58 AM        Ordering Location:     [facility]          Received:            07/09/2024 08:28 AM                               Operating Room                                                             Pathologist:           Caroline Faustin MD                                                      Specimen:    Lymph node, 1. Deep Axillary Lymph Node - Left                                       Final Diagnosis:     Left axillary lymph node; excision:   Diffuse large B-cell lymphoma, activated B-cell type, with CD30 expression, see comment.     Comment:  Sections of the left axillary lymph node demonstrate effaced lymph node architecture.  Abundant large  cells are present, many of which are binucleated, with prominent nucleoli and vesicular chromatin.  The large cells are present in a background of medium sized lymphoid cells and occasional plasma cells.  Immunohistochemical stains performed on block A3 demonstrate that the large cells are strongly positive for CD30, CD20, and PAX5, and are negative for CD15 and ALK1.  CD45 appears to be positive in all lymphoid cells, including at least some of the large cells.  CD3 highlights abundant background T cells.     The case was sent to [facility] for consultation and their full consultation report can be seen below.     The findings of this case were discussed with Dr. Schaefer on 7/11/2024 at 1:11 pm and on 8/6/2024 at 10:29 am.     The immunohistochemical stains interpreted in this case demonstrate appropriate reactivity of positive controls.  The stains were performed on formalin-fixed, paraffin-embedded tissue sections with each antibody analysis being performed on a separate slide.     [facility]     Interpretation     Pathology Consult     FINAL DIAGNOSIS      Lymph node, left deep axillary, excisional biopsy ([id number]; 07/09/2024): Diffuse large B-cell lymphoma, not otherwise, specified, see comment.      COMMENT   This is a challenging case with large atypical B-cells and prominent expression of CD30. It is best classified as diffuse large B-cell lymphoma, not otherwise specified, activated B-cell type. Additional prognostic immunohistochemical stains (MYC, BCL2) and fluorescence in situ hybridization are pending and results will be released in an addendum.     The lymph cells show prominent expression of CD30 and in areas are becoming dense and confluent with loss of nodularity. This suggests and evolution/spectrum to TCHRLBL/DLBCL. Clinical correlation is recommended.      Histologic sections demonstrate diffuse effacement of the candie architecture by a predominantly diffuse infiltrate of small  and large lymphocytes with areas of vague nodularity. The majority of the lymphocytes are small with scant cytoplasm and mature chromatin, but there are prominent intermixed larger lymphocytes with abundant cytoplasm, irregular nuclei, occasional multinucleation and prominent nucleoli. Vessels are prominent in the background. There are scattered neutrophils, but eosinophils and plasma cells are not prominent.      Immunoperoxidase stains were performed on paraffin sections of the left deep axillary lymph node at the referring institution (CD3, CD15, CD20, CD30, CD45, ALK1 and PAX5) and at [facility] in [place], [place] (block A3: CD2, CD4, CD5, CD7, CD8, CD10, CD19, CD21, CD22, CD30, CD45, CD79a, , BCL2, BCL6, BOB1, CXCL13, GATA3, ICOS, [id number], IgD, kappa and lambda immunoglobulin light chains, MEF2B, MUM1, MYC, OCT2, TCR beta-F1, and TCR delta). Current cutoff values for positivity are: CD10, BCL6 and MUM1 greater than/equal to 30%; MYC greater than/equal to 40%; BCL2 greater than 50%. In situ hybridization was performed on paraffin-embedded sections of the left deep axillary lymph node (block A3) specimen at [facility] in [place], [place], using probes that recognize Chacho-Barr virus encoded RNA (NAYELY). The large atypical lymphocytes are positive for CD19 (partial, variable), CD20 (strong), CD22, CD30 (strong), CD79a, BCL6, BOB1, MUM1, OCT2, and PAX5 (variable, predominantly strong), are rarely positive for MEF2B, and negative for CD3, CD10, CD15, GATA3, and ALK1. CD45 is difficult to interpret in the large atypical cells but appears positive in at least a subset. The small lymphocytes are a mixture of CD3-positive T-cells with areas of prominent nodules of small B-cells. The T-cells are predominantly positive for CD2, CD4, CD5, CD7, , CXCL13, ICOS, and TCRbetaF1 and are predominantly negative for CD8, CD10, BCL6, [id number] and TCR delta. There is some loss of CD7. CD21 highlights focal residual  follicular dendritic cell meshworks. IgD is positive in small B-cells that in areas are present as nodules. Kappa and lambda demonstrate polytypic plasma cells. NAYELY [place] is positive in rare, small and slightly larger cells throughout the node consistent with background reactivation. MYC and BCL2 are pending and results will be released in an addendum.      Flow cytometric immunophenotyping, left axillary lymph node, by report: No evidence of a monoclonal B-cell population or detectable immature blast population. T lymphocytes appropriately express all pan T-cell antigens assayed, including CD3, CD2, CD5 and CD7. The CD4/CD8 ratio is 11.9      FISH analysis for rearrangements seen in diffuse large B-cell lymphoma: Pending at [facility] in [place], [place]. Results will be released in an addendum      Independent verification of results: Some of the immunoperoxidase stains were repeated in this case. It was the consultant's opinion that certain stains critical to the diagnosis had to be repeated in the [facility] immunohistochemistry laboratory to independently verify the results observed in the submitted slides.      Thank you for sharing this case with me. If you have any questions, please do not hesitate to reach me by calling [facility] streamOnce at [phone].      Report electronically signed by  Traci Young M.D., Ph.D.   I verify that I have examined all relevant slides/materials for the specimen(s) and rendered or confirmed the diagnosis.   Seen in consultation with: Maya Chan M.D.          PET STANDARD BODY SCAN (ONCOLOGY) (CPT=78815) [[id number]]Collected: 07/15/24 1741 Order Status: CompletedUpdated: 07/15/24 1742 Narrative:  PROCEDURE: PET/CT STANDARD BODY SCAN (CPT=78815)      COMPARISON: [facility], CT STN/CHEST W CONTRAST (CPT=70491/36788), 5/25/2024, 12:34 PM.      INDICATIONS: D47.9 Lymphoproliferative disorder (HCC)      TECHNIQUE: PET/CT study of  the head and torso, done with 12.6 millicuries of Fluorine-18 FDG (Fluorodeoxyglucose) injected into a right antecubital vein.  Low dose non-contrast CT scanning was performed using a dedicated integrated PET/CT scanner for   attenuation correction and anatomic localization only.      FASTING PATIENT BLOOD GLUCOSE: 112 mg/dL.    UPTAKE TIME: 60 minutes.       FINDINGS:   Mediastinal blood pool is 2.5 max SUV, and hepatic blood pool is 3.2 max SUV.      HEAD/NECK: There are an increased number of multiple bilateral enlarged lower cervical chain lymph nodes.   A largest right level 4 lymph node measuring 1.6 x 1.6 cm demonstrates SUV max of 15.3 (8/353).   There is an increased number of supraclavicular lymph nodes which are enlarged and hypermetabolic.   The largest conglomerate and in the left supraclavicular region measures 4.0 x 3.0 cm, with SUV max 5.1.      LUNGS: Normal.  No pathologic FDG activity.  No FDG avid pulmonary nodules.    Mild dependent opacities at the lung bases, thought to represent subsegmental atelectasis.      MEDIASTINUM/GRACE: There are an increased number of numerous hypermetabolic mediastinal lymph nodes.    A right paratracheal lymph node measuring 1.8 x 1.3 cm has SUV max of 13.0 (8/308).      CHEST WALL/AXILLA: There are an increased number of numerous hypermetabolic abnormal left axillary lymph nodes.   Largest left axillary lymph node measuring 2.5 x 2.2 cm demonstrates SUV max of 19.1 (8/322).   There is air within the anterior left axillary region with associated curvilinear adjacent soft tissue stranding, presumably related to recent biopsy.      ABDOMEN/PELVIS: There are numerous enlarged retroperitoneal lymph nodes, as well as a conglomerate mass in the left anterior perirenal region.   A 1.9 x 1.4 cm right aortic hiatus lymph node has an SUV max of 9.1 (8/223).   The previously described partially imaged left periaortic lymph node conglomerate in the left upper quadrant measuring  6.5 x 6.1 cm demonstrates SUV max of 23.4 (8/201).      The spleen is diffusely hypermetabolic relative to the liver, compatible with splenic involvement.      Calcifications in the left hemipelvis along the anterior aspect of the uterus may represent calcified exophytic left uterine fibroids.  There is a 3.5 cm fluid density lesion of the right ovary, without hypermetabolic activity.  Finding is most   compatible with an ovarian cyst.      MUSCULOSKELETAL: There is diffusely increased radiotracer activity throughout the visualized appendicular and axial skeleton.   There is faint 1.0 cm curvilinear density along the subcutaneous tissues of the right posterolateral gluteal region, with SUV max of 2.39.  Finding is not thought to represent a site of lymphomatous involvement, given the this is much more faint as   related to other sites of activity.  Finding could represent nonspecific inflammatory change, perhaps related to prior injection, local trauma, other infectious/inflammatory processes.                Impression:  CONCLUSION:      Diffusely increased size of multiple hypermetabolic lymph nodes in the neck, chest, and abdomen, as well as diffuse hypermetabolic activity within a lymph node conglomerate in the left upper quadrant and within the spleen.  Findings are compatible with   sites of lymphomatous involvement.  Deauville score is 5.      Diffusely increased radiotracer activity throughout the visualized appendicular and axial skeleton.  Finding could represent diffuse bone marrow activity, with differential of diffuse lymphomatous involvement.  Recommend attention on follow-up.                   Dictated by (CST): Rosana Mckee MD on 7/15/2024 at 5:25 PM       Finalized by (CST): Rosana Mckee MD on 7/15/2024 at 5:41 PM  Component6/17/24 0933Director Review:   Comment   Comment: Abhijeet Billingsley, PhD, [facility]       Director, Molecular Oncology       [facility]        [place] [address]       [phone]-Cell PCR Interpretation   Comment   Comment: NEGATIVE: No clonal T-cell receptor gamma (TCRG) population was             detected   Interpretation of this result should be made in the context of other   clinical, morphologic, and immunophenotypic findings. This PCR assay   detects approximately 89% of T-cell clonal populations but as with all   amplification based assays, may not detect all possible T-cell receptor   gene rearrangements. If significant suspicion of clonality remains,   T-cell receptor beta (TCRB, test code 362997) test is recommended. The   combined T-cell clonality detection rate of TCRG and TCRB is 94-99%.   The T-cell receptor gene rearrangement panel (includung both TCRG and   TCRB, test code 938271) is available for parallel testing.Block Number:   Comment:   Comment: [id number]

## 2024-08-09 ENCOUNTER — TELEPHONE (OUTPATIENT)
Dept: HEMATOLOGY/ONCOLOGY | Facility: HOSPITAL | Age: 59
End: 2024-08-09

## 2024-08-09 NOTE — TELEPHONE ENCOUNTER
Called and unable to reach patient. Left  requesting call back to schedule a chemo education/consent visit. Told her we are currently working on her insurance approval for her new treatment and will let her know when we can schedule it for next week. Provided office phone number to call back.

## 2024-08-12 ENCOUNTER — LAB ENCOUNTER (OUTPATIENT)
Dept: LAB | Facility: HOSPITAL | Age: 59
End: 2024-08-12
Attending: INTERNAL MEDICINE
Payer: COMMERCIAL

## 2024-08-12 ENCOUNTER — DOCUMENTATION ONLY (OUTPATIENT)
Dept: HEMATOLOGY/ONCOLOGY | Facility: HOSPITAL | Age: 59
End: 2024-08-12

## 2024-08-12 ENCOUNTER — SOCIAL WORK SERVICES (OUTPATIENT)
Dept: HEMATOLOGY/ONCOLOGY | Facility: HOSPITAL | Age: 59
End: 2024-08-12

## 2024-08-12 ENCOUNTER — OFFICE VISIT (OUTPATIENT)
Dept: HEMATOLOGY/ONCOLOGY | Facility: HOSPITAL | Age: 59
End: 2024-08-12
Attending: INTERNAL MEDICINE
Payer: COMMERCIAL

## 2024-08-12 DIAGNOSIS — C83.38 DIFFUSE LARGE B-CELL LYMPHOMA OF LYMPH NODES OF MULTIPLE REGIONS (HCC): ICD-10-CM

## 2024-08-12 DIAGNOSIS — C83.38 DIFFUSE LARGE B-CELL LYMPHOMA OF LYMPH NODES OF MULTIPLE REGIONS (HCC): Primary | ICD-10-CM

## 2024-08-12 PROCEDURE — 87340 HEPATITIS B SURFACE AG IA: CPT

## 2024-08-12 PROCEDURE — 86706 HEP B SURFACE ANTIBODY: CPT

## 2024-08-12 PROCEDURE — 86704 HEP B CORE ANTIBODY TOTAL: CPT

## 2024-08-12 PROCEDURE — 36415 COLL VENOUS BLD VENIPUNCTURE: CPT

## 2024-08-12 PROCEDURE — 99215 OFFICE O/P EST HI 40 MIN: CPT | Performed by: PHYSICIAN ASSISTANT

## 2024-08-12 PROCEDURE — 80503 PATH CLIN CONSLTJ SF 5-20: CPT

## 2024-08-12 RX ORDER — ONDANSETRON HYDROCHLORIDE 8 MG/1
8 TABLET, FILM COATED ORAL EVERY 8 HOURS PRN
Qty: 30 TABLET | Refills: 3 | Status: SHIPPED | OUTPATIENT
Start: 2024-08-12

## 2024-08-12 RX ORDER — ALLOPURINOL 300 MG/1
300 TABLET ORAL DAILY
Qty: 30 TABLET | Refills: 0 | Status: SHIPPED | OUTPATIENT
Start: 2024-08-12

## 2024-08-12 RX ORDER — PROCHLORPERAZINE MALEATE 10 MG
10 TABLET ORAL EVERY 6 HOURS PRN
Qty: 60 TABLET | Refills: 3 | Status: SHIPPED | OUTPATIENT
Start: 2024-08-12

## 2024-08-12 RX ORDER — PREDNISONE 50 MG/1
100 TABLET ORAL DAILY
Qty: 60 TABLET | Refills: 0 | Status: SHIPPED | OUTPATIENT
Start: 2024-08-12

## 2024-08-12 NOTE — PROGRESS NOTES
Met with patient after chemo education visit. She printed out forms for the forms department and stated she did not know how to reply in My Chart, so she printed and filled them out instead. Told her I will each out to the forms department and get these forms over to them. She stated understanding and thanked me for my help.    Called and spoke with Verónica from the Forms Department. Told her the above situation and asked to verify their fax number. Fax number [phone] confirmed. Forms faxed and fax success confirmation received.

## 2024-08-12 NOTE — PROGRESS NOTES
Medication Education Record: IV Therapy    Learner:  Patient and Family Member    Barriers / Limitations:  None    Psychosocial Assessment:  patient psychosocial response appropriate    Diagnosis:   Diffuse large B-cell lymphoma    IV Cancer Treatment Name(s): Polatuzumab, Rituximab, Cyclophosphamide and Doxorubicin  IV Cancer Treatment Frequency Once every 3 weeks    Number of cycles planned 8 cycles total (last 2 cycles are Rituximab alone)  Plan for appointments and lab testing Every 3 weeks  Verified Consent to Chemotherapy/Biotherapy Cancer Treatment form signed by patient and provider:  Yes    Confirm patient informs his/her Cancer Care team of any treatment received in a setting other than at the [facility] (such as inpatient or outpatient at another hospital or clinic, locally or out of state) so that this medical information can accurately be reflected in his/her medical record.  This vital information will provide an accurate picture for the physician prescribing the current cancer treatment.Yes  Cancer Treatment Side Effects (refer to Chemo Care Handouts for further information):  Allergic reactions  Constipation  Diarrhea  Eye disorders  Fatigue  Hair loss  Heart effects  Kidney / Bladder effects  Liver effects  Loss of appetite  Low red blood cell count / Anemia  Low White Blood Cell Count/Risk of infection  Low Platelet Count/Risk of Bleeding  Lung Effects  Mouth or Throat Sores  Muscle / Bone Effects  Nausea / Vomiting  Nerve Effects  Secondary Malignancies  Sexual Effects  Skin Effects  Taste Changes    IV administration risks:  Potential leaking of drug outside of vein during administration   Signs/symptoms include redness, swelling, pain, burning at the site of administration  Notify Infusion Nurse immediately if any of these symptoms occur during or after the infusion  Allergic reaction: there is a chance for allergic reaction with some medications.  If your prescribed therapy  has a higher risk for this, steps will be taken to prevent and minimize this from occurring.    Recommended Anti-nausea medications (as directed by your provider):  Prochloperazine (Compazine) 10 mg every 6 hours and Ondansetron (Zofran) 8 mg every 8 hours  Take as needed    Other drug specific:   Steroid prep Prednisone 50 mg Take TWO tablets once daily, Days 1-5, of each chemotherapy cycle.  10 tablets used per cycle.  Take with food.  Pegfilgrastim injection:  Support immune system (white blood cell count).  May cause bone pain, usually lasts 2 days.  Take Tylenol as needed.  If pain is not controlled, call the clinic.  Allopurinol 300 mg Take ONE tablet once daily for the first 30 days of treatment     Helpful hints during cancer treatment:    Diet:  Avoid greasy or spicy foods on days surrounding chemotherapy  Eat small frequent meals per day (6-7 meals) rather than 3 large meals  Choose high calorie/high protein foods (chicken, hard cooked eggs, peanut butter, cheese)  If nauseated, try dry foods, such as toast, crackers or pretzels; light or bland foods, such as applesauce or oatmeal.    Fluid intake:  Drink 8-10 cups of liquid a day and take a water bottle wherever you go.  Any fluid is acceptable, but caffeinated products do not count towards your intake and should be limited to 1-2 drinks/day.    Physical Activity    If your doctor approves, be as physically active as you can, but start out slowly, and increase your activity over time as you feel stronger.  Listen to your body and rest when you need to.  Do what you can when you feel up to it.      Oral Care  Keep your mouth clean.  Rinse your mouth before and after meals with plain water or with a mild mouth rinse (made with 1 quart water, 1 teaspoon salt, and 1 teaspoon baking soda, shake before using)   Avoid commercial mouthwashes that contain alcohol, alcoholic or acidic drinks or tobacco  An acceptable mouthwash is Biotene®   If soreness or sores  develop, contact the office.    Diarrhea or Constipation    Imodium A-D (Loperamide) use for diarrhea:  Take 2 tabs (4mg) at the first sign of diarrhea; then take 1 tab (2mg) every 2 hours until you have had no diarrhea for at least 12 hours; during the night take 2 tabs (4mg) every 4 hours as needed.  Maximum of 8 tablets in 24 hours.   Constipation:  Over-the-counter recommendations include:  Senokot, Ducolax, Milk of Magnesia and Miralax (generics are ok)  If you have persistent diarrhea or constipation, please contact the triage nurse for further instructions.  Skin Care  Avoid direct sunlight.  Wear a broad-spectrum sunscreen with an SPF of 30 or higher on any skin exposed to the sun.  Re-apply every 2 hours if in the sun and after bathing or sweating.  For dry skin, use an alcohol-free lotion twice per day, especially after baths.  If scalp hair loss has occurred, protect the scalp from the sun by wearing a hat and use sunscreen.  Apply alcohol-free moisturizer as needed.    When to call the doctor:  Fever of 100.5 or greater or shaking chills  Nausea/vomiting not controlled with anti-nausea medications: Unable to drink for 24 hours or have signs of dehydration: tiredness, thirst, dry mouth, dark and decreased amount of urine  Diarrhea - not controlled with Imodium AD or more than 6 episodes in 24 hours  Constipation -no bowel movement x 3 days, no response to stool softeners or laxatives  Mouth sores, sore throat or blisters on the lips affecting oral intake  Difficulty breathing, chest pain, or new cough  Excessive tiredness or weakness, confusion or loss of balance  New rash  Tingling or burning, redness, swelling of the palms of hands or soles of feet  Sudden new unexpected symptom -such as change in vision, swelling in arm or leg  Increase in numbness and tingling of hands or feet  Unusual bleeding (nose bleeds, blood in urine, stool or phlegm)  Pain with urination  Persistent mood changes, depression,  nervousness, difficulty sleeping   Pain, redness, swelling or blistering at the IV site  If you go to Emergency Room for any reason or seek medical attention elsewhere  If you should need to cancel or reschedule any visit, it is important that you contact the office    What Phone Number to Call:  [facility] [phone] / Triage Nurse    Teaching Materials Provided:   Chemo Care chemotherapy information sheets  and Safety and Handling Sheet     Please refer to the following link if you are interested in additional information about chemotherapy for yourself or family members: [email or web address]    Safety and Handling of Chemotherapy  While you or your family member is receiving chemotherapy, whether in the clinic or at home, the following precautions must be taken to lessen any exposure to the medication.     Handling Body Waste:   Caregivers must wear gloves if exposed to the patient’s blood, urine, stool or vomit.  Dispose of the used gloves after each use and wash hands with soap and water.  Any sheets or clothes soiled with the bodily fluids should be machine washed twice in hot water with regular laundry detergent.  Do not wash soiled garments with hands.  If the soiled garments cannot be washed right away, place them in a sealed plastic bag until they can be washed.   Absorbable undergarments, or any other items contaminated with chemotherapy, should be placed in a sealed plastic bag for disposal, separate from other trash.  Toilets should be flushed twice with the lid closed while taking this medication and for 48 hours after the last dose.  Wash your hands after using the toilet.  Wash any skin area that has come in contact with urine or stool.      Safety for my family and friends:  Due to safety concerns and the nature of this treatment environment, children are not permitted in the infusion center.  Please make appropriate arrangements.   Hugging and kissing is safe for  you and your partner or family members.  You can visit, sit with, hug and kiss the children in your life.    You can be around pregnant women, though (if possible) they should not clean up any of your body fluids after you have treatment.   Sexual activity is safe while throughout treatment.  It is possible that traces of the oral chemotherapy may be present in vaginal fluid or semen for 48 hours after taking.  A condom should be used during this time.  Effective birth control should be used throughout treatment to prevent pregnancy while on these medications and for several months or years after therapy.  Chemotherapy can have harmful side effects to the fetus, especially in the first trimester.  In addition, menstrual cycles can become irregular during and after treatment, so you may not know if you are at a time in your cycle when you could become pregnant or if you are actually pregnant.    Cancer treatment education, including treatment plan, supportive medications, and post-treatment care was provided to the patient.  The patient/support person  was attentive during education, verbalized understanding, all questions were answered and patient was instructed to call with further questions.     Reinforcement of education is needed at next visit? Yes  60 minutes appointment with at minimum 50% time spent counseling/coordinating care

## 2024-08-12 NOTE — PROGRESS NOTES
SW met with patient today in consult room 1 to provide psycho social support due to distress screen of 8. Patient is a 59 y/o woman who lives alone. Patient mother was also present during consult as her support person. Patient expressed feelings of anxiousness and worry. SW provided support and validated feelings. Patient was informed of the resources available through the cancer center. Provided the application for the [facility] Financial assistance program and resource for the Leukan[employer]. Patient declined counseling at this time. SW encouraged patient to contact [facility] if she has any questions. Plan to see patient on her cycle one day one touch base.

## 2024-08-13 ENCOUNTER — DOCUMENTATION ONLY (OUTPATIENT)
Dept: HEMATOLOGY/ONCOLOGY | Facility: HOSPITAL | Age: 59
End: 2024-08-13

## 2024-08-13 ENCOUNTER — TELEPHONE (OUTPATIENT)
Dept: HEMATOLOGY/ONCOLOGY | Facility: HOSPITAL | Age: 59
End: 2024-08-13

## 2024-08-13 DIAGNOSIS — C83.38 DIFFUSE LARGE B-CELL LYMPHOMA OF LYMPH NODES OF MULTIPLE REGIONS (HCC): Primary | ICD-10-CM

## 2024-08-13 DIAGNOSIS — D50.0 IRON DEFICIENCY ANEMIA DUE TO CHRONIC BLOOD LOSS: ICD-10-CM

## 2024-08-13 RX ORDER — ACETAMINOPHEN 325 MG/1
650 TABLET ORAL ONCE
Status: CANCELLED | OUTPATIENT
Start: 2024-08-13

## 2024-08-13 RX ORDER — DIPHENHYDRAMINE HCL 25 MG
25 CAPSULE ORAL ONCE
Status: CANCELLED | OUTPATIENT
Start: 2024-08-13

## 2024-08-13 RX ORDER — PREDNISONE 50 MG/1
100 TABLET ORAL ONCE
Status: CANCELLED
Start: 2024-08-13 | End: 2024-08-13

## 2024-08-13 NOTE — TRANSFER CENTER NOTE
DIRECT ADMISSION    Admitting MD: Gwen  Called in by: Valeria  Call back #: [id number]  Date of admission: 8/15/2024  Diagnosis: Diffuse Large B Cell Lymphoma  Specialist MD:  Gwen  Hospitalist assigned: no  Type of admission: INPT  Type of bed: Medical  Time of admission: 0800  Insurance Verification complete: Yes     Received call from Dr. Gwen Shaw's office. Pt to be a direct admit on 8/15 at 0800 to oncology floor for chemo infusion.  Tati Melo RN aware.  Chemo order will be sent to pharmacy, per Valeria.

## 2024-08-13 NOTE — PROGRESS NOTES
Called and spoke with Hillary from admission. Told her this patient needs a direct admission for Thursday, 8/15 for inpatient chemo. Direct admission scheduled for Thursday, 8/15 at 8:00 am. Told her Dr. Hidalgo will be the admitting doctor. She stated have the patient park in the blue parking lot, then go to [facility] to check in. I stated understanding and thanked her for her help.

## 2024-08-13 NOTE — TELEPHONE ENCOUNTER
Called and unable to reach patient. Left VM requesting call back to see if she can come in tomorrow, 8/14 at 8:15 am for a lab appointment before her infusion time at 9:00 am to recheck her hemoglobin and see if she needs a possible blood transfusion. Provided office phone number to call back.

## 2024-08-14 ENCOUNTER — TELEPHONE (OUTPATIENT)
Dept: HEMATOLOGY/ONCOLOGY | Facility: HOSPITAL | Age: 59
End: 2024-08-14

## 2024-08-14 ENCOUNTER — NURSE ONLY (OUTPATIENT)
Dept: HEMATOLOGY/ONCOLOGY | Facility: HOSPITAL | Age: 59
End: 2024-08-14
Attending: INTERNAL MEDICINE
Payer: COMMERCIAL

## 2024-08-14 VITALS
WEIGHT: 143.69 LBS | TEMPERATURE: 98 F | BODY MASS INDEX: 25 KG/M2 | OXYGEN SATURATION: 99 % | DIASTOLIC BLOOD PRESSURE: 66 MMHG | SYSTOLIC BLOOD PRESSURE: 113 MMHG | HEART RATE: 106 BPM | RESPIRATION RATE: 16 BRPM

## 2024-08-14 DIAGNOSIS — D50.0 IRON DEFICIENCY ANEMIA DUE TO CHRONIC BLOOD LOSS: ICD-10-CM

## 2024-08-14 DIAGNOSIS — C83.38 DIFFUSE LARGE B-CELL LYMPHOMA OF LYMPH NODES OF MULTIPLE REGIONS (HCC): ICD-10-CM

## 2024-08-14 DIAGNOSIS — C83.38 DIFFUSE LARGE B-CELL LYMPHOMA OF LYMPH NODES OF MULTIPLE REGIONS (HCC): Primary | ICD-10-CM

## 2024-08-14 LAB
ANTIBODY SCREEN: NEGATIVE
BASOPHILS # BLD AUTO: 0.02 X10(3) UL (ref 0–0.2)
BASOPHILS NFR BLD AUTO: 0.2 %
DEPRECATED RDW RBC AUTO: 52.6 FL (ref 35.1–46.3)
EOSINOPHIL # BLD AUTO: 0.03 X10(3) UL (ref 0–0.7)
EOSINOPHIL NFR BLD AUTO: 0.2 %
ERYTHROCYTE [DISTWIDTH] IN BLOOD BY AUTOMATED COUNT: 17.9 % (ref 11–15)
HBV CORE AB SERPL QL IA: NONREACTIVE
HBV SURFACE AB SER QL: NONREACTIVE
HBV SURFACE AB SERPL IA-ACNC: <3.1 MIU/ML
HBV SURFACE AG SER-ACNC: 0.1 [IU]/L
HBV SURFACE AG SERPL QL IA: NONREACTIVE
HCT VFR BLD AUTO: 24.6 %
HGB BLD-MCNC: 7.4 G/DL
IMM GRANULOCYTES # BLD AUTO: 0.11 X10(3) UL (ref 0–1)
IMM GRANULOCYTES NFR BLD: 0.8 %
LYMPHOCYTES # BLD AUTO: 0.96 X10(3) UL (ref 1–4)
LYMPHOCYTES NFR BLD AUTO: 7.3 %
MCH RBC QN AUTO: 24.3 PG (ref 26–34)
MCHC RBC AUTO-ENTMCNC: 30.1 G/DL (ref 31–37)
MCV RBC AUTO: 80.7 FL
MONOCYTES # BLD AUTO: 0.56 X10(3) UL (ref 0.1–1)
MONOCYTES NFR BLD AUTO: 4.2 %
NEUTROPHILS # BLD AUTO: 11.56 X10 (3) UL (ref 1.5–7.7)
NEUTROPHILS # BLD AUTO: 11.56 X10(3) UL (ref 1.5–7.7)
NEUTROPHILS NFR BLD AUTO: 87.3 %
PLATELET # BLD AUTO: 592 10(3)UL (ref 150–450)
RBC # BLD AUTO: 3.05 X10(6)UL
RH BLOOD TYPE: POSITIVE
WBC # BLD AUTO: 13.2 X10(3) UL (ref 4–11)

## 2024-08-14 PROCEDURE — 85025 COMPLETE CBC W/AUTO DIFF WBC: CPT

## 2024-08-14 PROCEDURE — 96413 CHEMO IV INFUSION 1 HR: CPT

## 2024-08-14 PROCEDURE — 96375 TX/PRO/DX INJ NEW DRUG ADDON: CPT

## 2024-08-14 PROCEDURE — 86900 BLOOD TYPING SEROLOGIC ABO: CPT

## 2024-08-14 PROCEDURE — 86850 RBC ANTIBODY SCREEN: CPT

## 2024-08-14 PROCEDURE — 86901 BLOOD TYPING SEROLOGIC RH(D): CPT

## 2024-08-14 RX ORDER — PREDNISONE 50 MG/1
100 TABLET ORAL ONCE
Status: COMPLETED | OUTPATIENT
Start: 2024-08-14 | End: 2024-08-14

## 2024-08-14 RX ORDER — DIPHENHYDRAMINE HCL 25 MG
25 CAPSULE ORAL ONCE
Status: COMPLETED | OUTPATIENT
Start: 2024-08-14 | End: 2024-08-14

## 2024-08-14 RX ORDER — ACETAMINOPHEN 325 MG/1
650 TABLET ORAL ONCE
Status: COMPLETED | OUTPATIENT
Start: 2024-08-14 | End: 2024-08-14

## 2024-08-14 RX ORDER — ACETAMINOPHEN 325 MG/1
TABLET ORAL
Status: DISPENSED
Start: 2024-08-14 | End: 2024-08-14

## 2024-08-14 RX ORDER — DIPHENHYDRAMINE HCL 25 MG
CAPSULE ORAL
Status: DISPENSED
Start: 2024-08-14 | End: 2024-08-14

## 2024-08-14 RX ADMIN — PREDNISONE 100 MG: 50 TABLET ORAL at 10:05:00

## 2024-08-14 RX ADMIN — DIPHENHYDRAMINE HCL 25 MG: 25 MG CAPSULE ORAL at 09:26:00

## 2024-08-14 RX ADMIN — ACETAMINOPHEN 650 MG: 325 TABLET ORAL at 09:26:00

## 2024-08-14 NOTE — TELEPHONE ENCOUNTER
Called and spoke with Blas from [facility]. Asked if the patient's allopurinol medication is ready for . She stated yes. I stated understanding and thanked her for her clarification.

## 2024-08-14 NOTE — PROGRESS NOTES
Pt here for C1D1 Drug(s)POLIVY.  Arrives Ambulating independently, accompanied by Family member     Patient was evaluated today by Treatment Nurse.    Oral medications included in this regimen:  yes - prednisone    Patient confirms comprehension of cancer treatment schedule:  yes    Pregnancy screening:  Denies possibility of pregnancy    Modifications in dose or schedule:  No    Medications appearance and physical integrity checked by RN: yes.    Chemotherapy IV pump settings verified by 2 RNs:  Yes.  Frequency of blood return and site check throughout administration: Prior to administration and At completion of therapy     Infusion/treatment outcome:  patient tolerated treatment without incident    Education Record    Learner:  Patient  Barriers / Limitations:  None  Method:  Discussion  Education / instructions given:  s/s to monitor for at home and when to seek medical attention/contact provider.   Patient to be admitted in-patient tomorrow to receive remaining chemotherapy    Outcome:  Shows understanding    Discharged Home, Ambulating independently, accompanied by:Family member    Patient/family verbalized understanding of future appointments: by printed AVS

## 2024-08-14 NOTE — PATIENT INSTRUCTIONS
Recommended Anti-nausea medications (as directed by your provider):  Prochloperazine (Compazine) 10 mg every 6 hours and Ondansetron (Zofran) 8 mg every 8 hours  Take as needed    Other drug specific:   Steroid prep Prednisone 50 mg Take TWO tablets once daily, Days 1-5, of each chemotherapy cycle.  10 tablets used per cycle.  Take with food.  Pegfilgrastim injection:  Support immune system (white blood cell count).  May cause bone pain, usually lasts 2 days.  Take Tylenol as needed.  If pain is not controlled, call the clinic.  Allopurinol 300 mg Take ONE tablet once daily for the first 30 days of treatment     Helpful hints during cancer treatment:    Diet:  Avoid greasy or spicy foods on days surrounding chemotherapy  Eat small frequent meals per day (6-7 meals) rather than 3 large meals  Choose high calorie/high protein foods (chicken, hard cooked eggs, peanut butter, cheese)  If nauseated, try dry foods, such as toast, crackers or pretzels; light or bland foods, such as applesauce or oatmeal.    Fluid intake:  Drink 8-10 cups of liquid a day and take a water bottle wherever you go.  Any fluid is acceptable, but caffeinated products do not count towards your intake and should be limited to 1-2 drinks/day.    Physical Activity    If your doctor approves, be as physically active as you can, but start out slowly, and increase your activity over time as you feel stronger.  Listen to your body and rest when you need to.  Do what you can when you feel up to it.      Oral Care  Keep your mouth clean.  Rinse your mouth before and after meals with plain water or with a mild mouth rinse (made with 1 quart water, 1 teaspoon salt, and 1 teaspoon baking soda, shake before using)   Avoid commercial mouthwashes that contain alcohol, alcoholic or acidic drinks or tobacco  An acceptable mouthwash is Biotene®   If soreness or sores develop, contact the office.    Diarrhea or Constipation    Imodium A-D (Loperamide) use for  diarrhea:  Take 2 tabs (4mg) at the first sign of diarrhea; then take 1 tab (2mg) every 2 hours until you have had no diarrhea for at least 12 hours; during the night take 2 tabs (4mg) every 4 hours as needed.  Maximum of 8 tablets in 24 hours.   Constipation:  Over-the-counter recommendations include:  Senokot, Ducolax, Milk of Magnesia and Miralax (generics are ok)  If you have persistent diarrhea or constipation, please contact the triage nurse for further instructions.  Skin Care  Avoid direct sunlight.  Wear a broad-spectrum sunscreen with an SPF of 30 or higher on any skin exposed to the sun.  Re-apply every 2 hours if in the sun and after bathing or sweating.  For dry skin, use an alcohol-free lotion twice per day, especially after baths.  If scalp hair loss has occurred, protect the scalp from the sun by wearing a hat and use sunscreen.  Apply alcohol-free moisturizer as needed.    When to call the doctor:  Fever of 100.5 or greater or shaking chills  Nausea/vomiting not controlled with anti-nausea medications: Unable to drink for 24 hours or have signs of dehydration: tiredness, thirst, dry mouth, dark and decreased amount of urine  Diarrhea - not controlled with Imodium AD or more than 6 episodes in 24 hours  Constipation -no bowel movement x 3 days, no response to stool softeners or laxatives  Mouth sores, sore throat or blisters on the lips affecting oral intake  Difficulty breathing, chest pain, or new cough  Excessive tiredness or weakness, confusion or loss of balance  New rash  Tingling or burning, redness, swelling of the palms of hands or soles of feet  Sudden new unexpected symptom -such as change in vision, swelling in arm or leg  Increase in numbness and tingling of hands or feet  Unusual bleeding (nose bleeds, blood in urine, stool or phlegm)  Pain with urination  Persistent mood changes, depression, nervousness, difficulty sleeping   Pain, redness, swelling or blistering at the IV site  If you  go to Emergency Room for any reason or seek medical attention elsewhere  If you should need to cancel or reschedule any visit, it is important that you contact the office    What Phone Number to Call:  [facility] [phone] / Triage Nurse    Teaching Materials Provided:   Chemo Care chemotherapy information sheets  and Safety and Handling Sheet     Please refer to the following link if you are interested in additional information about chemotherapy for yourself or family members: [email or web address]    Safety and Handling of Chemotherapy  While you or your family member is receiving chemotherapy, whether in the clinic or at home, the following precautions must be taken to lessen any exposure to the medication.     Handling Body Waste:   Caregivers must wear gloves if exposed to the patient’s blood, urine, stool or vomit.  Dispose of the used gloves after each use and wash hands with soap and water.  Any sheets or clothes soiled with the bodily fluids should be machine washed twice in hot water with regular laundry detergent.  Do not wash soiled garments with hands.  If the soiled garments cannot be washed right away, place them in a sealed plastic bag until they can be washed.   Absorbable undergarments, or any other items contaminated with chemotherapy, should be placed in a sealed plastic bag for disposal, separate from other trash.  Toilets should be flushed twice with the lid closed while taking this medication and for 48 hours after the last dose.  Wash your hands after using the toilet.  Wash any skin area that has come in contact with urine or stool.      Safety for my family and friends:  Due to safety concerns and the nature of this treatment environment, children are not permitted in the infusion center.  Please make appropriate arrangements.   Hugging and kissing is safe for you and your partner or family members.  You can visit, sit with, hug and kiss the children in  your life.    You can be around pregnant women, though (if possible) they should not clean up any of your body fluids after you have treatment.   Sexual activity is safe while throughout treatment.  It is possible that traces of the oral chemotherapy may be present in vaginal fluid or semen for 48 hours after taking.  A condom should be used during this time.  Effective birth control should be used throughout treatment to prevent pregnancy while on these medications and for several months or years after therapy.  Chemotherapy can have harmful side effects to the fetus, especially in the first trimester.  In addition, menstrual cycles can become irregular during and after treatment, so you may not know if you are at a time in your cycle when you could become pregnant or if you are actually pregnant.

## 2024-08-15 ENCOUNTER — HOSPITAL ENCOUNTER (INPATIENT)
Facility: HOSPITAL | Age: 59
LOS: 2 days | Discharge: HOME | End: 2024-08-17
Attending: STUDENT IN AN ORGANIZED HEALTH CARE EDUCATION/TRAINING PROGRAM | Admitting: STUDENT IN AN ORGANIZED HEALTH CARE EDUCATION/TRAINING PROGRAM
Payer: COMMERCIAL

## 2024-08-15 ENCOUNTER — APPOINTMENT (OUTPATIENT)
Dept: PICC SERVICES | Facility: HOSPITAL | Age: 59
End: 2024-08-15
Attending: STUDENT IN AN ORGANIZED HEALTH CARE EDUCATION/TRAINING PROGRAM
Payer: COMMERCIAL

## 2024-08-15 ENCOUNTER — APPOINTMENT (OUTPATIENT)
Dept: CV DIAGNOSTICS | Facility: HOSPITAL | Age: 59
End: 2024-08-15
Attending: STUDENT IN AN ORGANIZED HEALTH CARE EDUCATION/TRAINING PROGRAM
Payer: COMMERCIAL

## 2024-08-15 ENCOUNTER — SOCIAL WORK SERVICES (OUTPATIENT)
Dept: HEMATOLOGY/ONCOLOGY | Facility: HOSPITAL | Age: 59
End: 2024-08-15

## 2024-08-15 ENCOUNTER — TELEPHONE (OUTPATIENT)
Dept: HEMATOLOGY/ONCOLOGY | Facility: HOSPITAL | Age: 59
End: 2024-08-15

## 2024-08-15 DIAGNOSIS — C83.30 DIFFUSE LARGE B-CELL LYMPHOMA, UNSPECIFIED BODY REGION (HCC): ICD-10-CM

## 2024-08-15 DIAGNOSIS — C83.38 DIFFUSE LARGE B-CELL LYMPHOMA OF LYMPH NODES OF MULTIPLE REGIONS (HCC): Primary | ICD-10-CM

## 2024-08-15 LAB
ALBUMIN SERPL-MCNC: 3.6 G/DL (ref 3.2–4.8)
ALBUMIN/GLOB SERPL: 0.8 {RATIO} (ref 1–2)
ALP LIVER SERPL-CCNC: 235 U/L
ALT SERPL-CCNC: 44 U/L
ANION GAP SERPL CALC-SCNC: 7 MMOL/L (ref 0–18)
AST SERPL-CCNC: 36 U/L (ref ?–34)
BILIRUB SERPL-MCNC: 0.2 MG/DL (ref 0.3–1.2)
BUN BLD-MCNC: 17 MG/DL (ref 9–23)
BUN/CREAT SERPL: 27.4 (ref 10–20)
CALCIUM BLD-MCNC: 10 MG/DL (ref 8.7–10.4)
CHLORIDE SERPL-SCNC: 109 MMOL/L (ref 98–112)
CO2 SERPL-SCNC: 26 MMOL/L (ref 21–32)
CREAT BLD-MCNC: 0.62 MG/DL
EGFRCR SERPLBLD CKD-EPI 2021: 104 ML/MIN/1.73M2 (ref 60–?)
GLOBULIN PLAS-MCNC: 4.3 G/DL (ref 2–3.5)
GLUCOSE BLD-MCNC: 84 MG/DL (ref 70–99)
LDH SERPL L TO P-CCNC: 110 U/L
LDH SERPL L TO P-CCNC: 110 U/L
MAGNESIUM SERPL-MCNC: 1.9 MG/DL (ref 1.6–2.6)
OSMOLALITY SERPL CALC.SUM OF ELEC: 295 MOSM/KG (ref 275–295)
PHOSPHATE SERPL-MCNC: 2.8 MG/DL (ref 2.4–5.1)
POTASSIUM SERPL-SCNC: 3.7 MMOL/L (ref 3.5–5.1)
PROT SERPL-MCNC: 7.9 G/DL (ref 5.7–8.2)
SODIUM SERPL-SCNC: 142 MMOL/L (ref 136–145)
URATE SERPL-MCNC: 4.2 MG/DL
URATE SERPL-MCNC: 4.2 MG/DL

## 2024-08-15 PROCEDURE — 93307 TTE W/O DOPPLER COMPLETE: CPT | Performed by: STUDENT IN AN ORGANIZED HEALTH CARE EDUCATION/TRAINING PROGRAM

## 2024-08-15 PROCEDURE — 99223 1ST HOSP IP/OBS HIGH 75: CPT | Performed by: HOSPITALIST

## 2024-08-15 PROCEDURE — 3E04305 INTRODUCTION OF OTHER ANTINEOPLASTIC INTO CENTRAL VEIN, PERCUTANEOUS APPROACH: ICD-10-PCS | Performed by: STUDENT IN AN ORGANIZED HEALTH CARE EDUCATION/TRAINING PROGRAM

## 2024-08-15 PROCEDURE — 93356 MYOCRD STRAIN IMG SPCKL TRCK: CPT | Performed by: STUDENT IN AN ORGANIZED HEALTH CARE EDUCATION/TRAINING PROGRAM

## 2024-08-15 PROCEDURE — 3E0430M INTRODUCTION OF MONOCLONAL ANTIBODY INTO CENTRAL VEIN, PERCUTANEOUS APPROACH: ICD-10-PCS | Performed by: STUDENT IN AN ORGANIZED HEALTH CARE EDUCATION/TRAINING PROGRAM

## 2024-08-15 PROCEDURE — 02HV33Z INSERTION OF INFUSION DEVICE INTO SUPERIOR VENA CAVA, PERCUTANEOUS APPROACH: ICD-10-PCS | Performed by: STUDENT IN AN ORGANIZED HEALTH CARE EDUCATION/TRAINING PROGRAM

## 2024-08-15 PROCEDURE — 99222 1ST HOSP IP/OBS MODERATE 55: CPT | Performed by: STUDENT IN AN ORGANIZED HEALTH CARE EDUCATION/TRAINING PROGRAM

## 2024-08-15 PROCEDURE — 93306 TTE W/DOPPLER COMPLETE: CPT | Performed by: STUDENT IN AN ORGANIZED HEALTH CARE EDUCATION/TRAINING PROGRAM

## 2024-08-15 RX ORDER — PROCHLORPERAZINE EDISYLATE 5 MG/ML
5 INJECTION INTRAMUSCULAR; INTRAVENOUS EVERY 8 HOURS PRN
Status: DISCONTINUED | OUTPATIENT
Start: 2024-08-15 | End: 2024-08-17

## 2024-08-15 RX ORDER — DIPHENHYDRAMINE HCL 25 MG
50 CAPSULE ORAL ONCE
Status: COMPLETED | OUTPATIENT
Start: 2024-08-15 | End: 2024-08-15

## 2024-08-15 RX ORDER — TEMAZEPAM 15 MG/1
15 CAPSULE ORAL NIGHTLY PRN
Status: DISCONTINUED | OUTPATIENT
Start: 2024-08-15 | End: 2024-08-17

## 2024-08-15 RX ORDER — PROCHLORPERAZINE EDISYLATE 5 MG/ML
10 INJECTION INTRAMUSCULAR; INTRAVENOUS EVERY 6 HOURS PRN
Status: DISCONTINUED | OUTPATIENT
Start: 2024-08-15 | End: 2024-08-15

## 2024-08-15 RX ORDER — LIDOCAINE HYDROCHLORIDE 10 MG/ML
5 INJECTION, SOLUTION EPIDURAL; INFILTRATION; INTRACAUDAL; PERINEURAL
Status: COMPLETED | OUTPATIENT
Start: 2024-08-15 | End: 2024-08-15

## 2024-08-15 RX ORDER — FOLIC ACID 1 MG/1
1 TABLET ORAL DAILY
Status: DISCONTINUED | OUTPATIENT
Start: 2024-08-15 | End: 2024-08-17

## 2024-08-15 RX ORDER — DOCUSATE SODIUM 100 MG/1
100 CAPSULE, LIQUID FILLED ORAL 2 TIMES DAILY PRN
Status: DISCONTINUED | OUTPATIENT
Start: 2024-08-15 | End: 2024-08-17

## 2024-08-15 RX ORDER — FERROUS SULFATE 325(65) MG
325 TABLET, DELAYED RELEASE (ENTERIC COATED) ORAL 2 TIMES DAILY
Status: DISCONTINUED | OUTPATIENT
Start: 2024-08-15 | End: 2024-08-17

## 2024-08-15 RX ORDER — SODIUM CHLORIDE 9 MG/ML
INJECTION, SOLUTION INTRAVENOUS CONTINUOUS
Status: DISCONTINUED | OUTPATIENT
Start: 2024-08-15 | End: 2024-08-16

## 2024-08-15 RX ORDER — ACETAMINOPHEN 325 MG/1
650 TABLET ORAL ONCE
Status: COMPLETED | OUTPATIENT
Start: 2024-08-15 | End: 2024-08-15

## 2024-08-15 RX ORDER — ACETAMINOPHEN 325 MG/1
650 TABLET ORAL ONCE
Status: DISCONTINUED | OUTPATIENT
Start: 2024-08-15 | End: 2024-08-15

## 2024-08-15 RX ORDER — ALLOPURINOL 300 MG/1
300 TABLET ORAL DAILY
Status: DISCONTINUED | OUTPATIENT
Start: 2024-08-15 | End: 2024-08-17

## 2024-08-15 RX ORDER — PREDNISONE 50 MG/1
100 TABLET ORAL ONCE
Status: COMPLETED | OUTPATIENT
Start: 2024-08-15 | End: 2024-08-15

## 2024-08-15 RX ORDER — ACETAMINOPHEN 500 MG
500 TABLET ORAL EVERY 4 HOURS PRN
Status: DISCONTINUED | OUTPATIENT
Start: 2024-08-15 | End: 2024-08-17

## 2024-08-15 RX ORDER — DIPHENHYDRAMINE HYDROCHLORIDE 50 MG/ML
25 INJECTION INTRAMUSCULAR; INTRAVENOUS AS NEEDED
Status: DISCONTINUED | OUTPATIENT
Start: 2024-08-15 | End: 2024-08-17

## 2024-08-15 RX ORDER — PREDNISONE 50 MG/1
100 TABLET ORAL ONCE
Status: DISCONTINUED | OUTPATIENT
Start: 2024-08-15 | End: 2024-08-15

## 2024-08-15 RX ORDER — DOXORUBICIN HYDROCHLORIDE 2 MG/ML
50 INJECTION, SOLUTION INTRAVENOUS ONCE
Status: COMPLETED | OUTPATIENT
Start: 2024-08-15 | End: 2024-08-15

## 2024-08-15 RX ORDER — ONDANSETRON 2 MG/ML
4 INJECTION INTRAMUSCULAR; INTRAVENOUS EVERY 6 HOURS PRN
Status: DISCONTINUED | OUTPATIENT
Start: 2024-08-15 | End: 2024-08-17

## 2024-08-15 RX ORDER — DOXORUBICIN HYDROCHLORIDE 2 MG/ML
50 INJECTION, SOLUTION INTRAVENOUS ONCE
Status: DISCONTINUED | OUTPATIENT
Start: 2024-08-15 | End: 2024-08-15

## 2024-08-15 RX ORDER — PREDNISONE 50 MG/1
100 TABLET ORAL
Status: DISCONTINUED | OUTPATIENT
Start: 2024-08-16 | End: 2024-08-17

## 2024-08-15 RX ORDER — ENOXAPARIN SODIUM 100 MG/ML
40 INJECTION SUBCUTANEOUS DAILY
Status: DISCONTINUED | OUTPATIENT
Start: 2024-08-15 | End: 2024-08-17

## 2024-08-15 RX ORDER — DIPHENHYDRAMINE HCL 25 MG
50 CAPSULE ORAL ONCE
Status: DISCONTINUED | OUTPATIENT
Start: 2024-08-15 | End: 2024-08-15

## 2024-08-15 NOTE — CONSULTS
Hematology/Oncology Initial Consultation Note    Patient Name: Kimberly Quezada  Medical Record Number: [id number]    YOB: 1965   Date of Consultation: 8/15/2024   Physician requesting consultation:  Dr. Ro    Reason for Consultation:  Kimberly Quezada was seen today for the diagnosis of DLBCL, NOS    Oncologic History:    March 2024: Anemia with MCV of 72.3.  He also endorsed having decreased appetite and weight loss of 25 pounds.  2024: Had EGD and colonoscopy which were normal.    Had a CT of the chest abdomen pelvis after she noted a neck masd which showed bilateral lymphadenopathy in her neck, chest, left axilla and retroperitoneal region.    July 2024: Axillary lymph node excisional biopsy.  Sent to [facility] for additional workup and was noted to have at DLBCL NOS.  =============================================================  History of Present Illness:      Procedure Polatuzumab on 814.  She also initiated oral prednisone from 813.  She continues to have night sweats every day.      Past Medical History:  Past Medical History:    Diverticulosis large intestine w/o perforation or abscess w/o bleeding    Gastropathy    High cholesterol    History of blood transfusion    NO ADVERSE REACTIONS    Hx of motion sickness    Hyperlipidemia    Internal hemorrhoids without complication    Rosacea    Seasonal allergies    Visual impairment    GLASSES       Past Surgical History:   Procedure Laterality Date    Colonoscopy N/A 12/13/2018    Procedure: COLONOSCOPY;  Surgeon: Elizabet Gordon MD;  Location: [facility] ENDOSCOPY    Colonoscopy N/A 6/4/2024    Procedure: COLONOSCOPY and Polypectomy;  Surgeon: Deepak He MD;  Location: [facility] ENDOSCOPY    Other surgical history      Repair of anal fistula 2011       Home Medications:  No current facility-administered medications on file prior to encounter.     Current Outpatient Medications on File Prior to Encounter   Medication Sig  Dispense Refill    prochlorperazine (COMPAZINE) 10 mg tablet Take 1 tablet (10 mg total) by mouth every 6 (six) hours as needed for Nausea. 60 tablet 3    ondansetron (ZOFRAN) 8 MG tablet Take 1 tablet (8 mg total) by mouth every 8 (eight) hours as needed for Nausea. 30 tablet 3    predniSONE 50 MG Oral Tab Take 2 tablets (100 mg total) by mouth daily. , Days 1-5 of each cycle.  Take with food. 60 tablet 0    allopurinol 300 MG Oral Tab Take 1 tablet (300 mg total) by mouth daily. 30 tablet 0    FOLIC ACID 1 MG Oral Tab TAKE 1 TABLET BY MOUTH DAILY 30 tablet 3    Ferrous Sulfate 325 (65 Fe) MG Oral Tab Take 1 tablet (325 mg total) by mouth 2 (two) times daily. (Patient taking differently: Take 1 tablet (325 mg total) by mouth in the morning and 1 tablet (325 mg total) before bedtime.) 60 tablet 5    docusate sodium (COLACE) 100 MG Oral Cap Take 1 capsule (100 mg total) by mouth 2 (two) times daily. 60 capsule 5       Current Inpatient Medications:  Inpatient Meds:   [START ON 8/16/2024] predniSONE  100 mg Oral Daily with breakfast    allopurinol  300 mg Oral Daily    ferrous sulfate  325 mg Oral BID    folic acid  1 mg Oral Daily    enoxaparin  40 mg Subcutaneous Daily    DOXOrubicin  50 mg/m2 (Dosing Weight) Intravenous Once    cyclophosphamide (Cytoxan) 1,280 mg in sodium chloride 0.9% 256.4 mL infusion  1,280 mg Intravenous Once    riTUXimab-abbs (Truxima) 600 mg in sodium chloride 0.9% 560 mL infusion  375 mg/m2 Intravenous Once      sodium chloride 125 mL/hr at 08/15/24 1434       PRN Meds:    docusate sodium    acetaminophen    ondansetron    prochlorperazine    temazepam    diphenhydrAMINE    methylPREDNISolone sodium succinate (Solu-MEDROL) 125 mg in sodium chloride 0.9% 100 mL IVPB    Allergies:   Allergies   Allergen Reactions    Meloxicam ANGIOEDEMA     Upper and lower lip throbbing, tongue swelling, tongue ulcers    Contrave [Naltrexone-Bupropion Hcl Er] RASH    Wellbutrin [Bupropion] RASH    Simvastatin  RASH       Psychosocial History:  Social History     Social History Narrative    Not on file     Social History     Socioeconomic History    Marital status: Single   Tobacco Use    Smoking status: Never     Passive exposure: Yes    Smokeless tobacco: Never    Tobacco comments:     Social smoker. Pt does not remember when she stopped   Vaping Use    Vaping status: Never Used   Substance and Sexual Activity    Alcohol use: Yes     Comment: social    Drug use: No   Other Topics Concern    Pt has a pacemaker No    Pt has a defibrillator No    Reaction to local anesthetic No    Right Handed Yes     Social Determinants of Health     Food Insecurity: No Food Insecurity (8/15/2024)    Food Insecurity     Food Insecurity: Never true   Transportation Needs: No Transportation Needs (8/15/2024)    Transportation Needs     Lack of Transportation: No   Housing Stability: Low Risk  (8/15/2024)    Housing Stability     Housing Instability: No       Family Medical History:  No family history on file.    Review of Systems:  A 10-point ROS was done with pertinent positives and negative per the HPI    Vital Signs:  Height: --  Weight: 66 kg (145 lb 9 oz) (08/15 0903)  BSA (Calculated - sq m): --  Pulse: 102 (08/15 1525)  BP: 117/76 (08/15 1525)  Temp: 98.3 °F (36.8 °C) (08/15 1525)  Do Not Use - Resp Rate: --  SpO2: 98 % (08/15 1525)      Wt Readings from Last 6 Encounters:   08/15/24 66 kg (145 lb 9 oz)   08/14/24 65.2 kg (143 lb 11.2 oz)   08/08/24 65.8 kg (145 lb)   07/22/24 65.8 kg (145 lb)   07/16/24 65.8 kg (145 lb)   07/09/24 66.2 kg (146 lb)       ECOG PS: 0    Physical Examination:  General: Patient is alert and oriented, not in acute distress  Psych: Mood and affect are appropriate  Eyes: EOMI, PERRL  ENT: Oropharynx is clear, no adenopathy  CV: Regular rate and rhythm, normal S1S2, no murmurs, no LE edema  Respiratory: Lungs clear to auscultation bilaterally  GI/Abd: Soft, non-tender with normoactive bowel sounds, no  hepatosplenomegaly  Neurological: Grossly intact   Lymphatics: Bilateral cervical, left supraclavicular,lymphadenopathy  Skin: no rashes or petechiae      Laboratory:  Recent Labs   Lab 08/08/24  1557 08/14/24  0820   WBC 13.6* 13.2*   HGB 7.1* 7.4*   HCT 24.8* 24.6*   .0* 592.0*   MCV 82.7 80.7   RDW 18.0* 17.9*   NEPRELIM 11.41* 11.56*       Recent Labs   Lab 08/08/24  1557 08/15/24  0849    142   K 4.0 3.7    109   CO2 26.0 26.0   BUN 10 17   CREATSERUM 0.61 0.62   * 84   CA 9.9 10.0   PHOS  --  2.8   TP 8.4* 7.9   ALB 3.8 3.6   ALKPHO 302* 235*   AST 30 36*   ALT 39 44   BILT 0.4 0.2*       No results for input(s): \"PT\", \"INR\", \"PTT\", \"FIB\" in the last 168 hours.    Imaging:      CT, July 15 2024  Diffusely increased size of multiple hypermetabolic lymph nodes in the neck, chest and abdomen and also within the spleen.    Impression & Plan:     58-year-old female with diffuse large B cell NOS, likely stage IVB with IPI of 2 advanced clinical stage and more than 1 extranodal site disease 5.    Pathology describes that there was an evaluation or spectrum of TCHRLBL/DLBCL     Was started on Miguel R-CHP based on the Polarix trial showing better PFS to R-CHOP and better activity in non GCB subtype.    Patient started oral prednisone on 8/13 to continue for 5 days,  received a dose of polatuzumab on 8/14.    Currently admitted inpatient for receiving rituximab along with Cytoxan and Adriamycin monitoring of TLS for the next 48 to 72 hours.    Echocardiogram reviewed, EF normal.  Patient received PICC line today    Start with rituximab and give her Cytoxan Adriamycin today.    Continue on allopurinol for TLS.    Please check uric acid, LDH, phosphorus, potassium and calcium every day.    Transfuse for hemoglobin less than 7 and platelets less than 10,000.            Dayna Hidalgo MD  Hematology/Medical Oncology

## 2024-08-15 NOTE — PLAN OF CARE
Patient arrived as direct admit this AM. Vitals stable. Orders in place for patient to receive ECHO and PICC line. Chemo scheduled to start the afternoon at 1400. Patient given rituximab and doxrubicin. Cyclophosphamide infusing at shift change. IVF continuous. Patient in no distress. Assisted with ambulation. Fall precautions in place. Family at bedside throughout shift.

## 2024-08-15 NOTE — PLAN OF CARE
Date: 8/15/2024  Pt receiving C1D2 Medication(s)     Pre Meds  Ondansetron 16mg  Fosaprepitant 150mg  Acetaminophen 650mg  Diphenhydramine 50mg  Prednisone 100mg    Treatment  riTUXimab 600 mg in sodium chloride 0.9% 560ml  Doxorubicin 84mg in 42ml  Cyclophosphamide 1280mg in 0.9% 256.4ml (Handoff)          Right PICC Line in place with blood return noted. See IV assessment flowsheet prn. Frequency of blood return and site check throughout administration: Prior to administration, During administration, After each medication    Pump rate and administration verified with second nurse and at shift change. See eMAR for any titrations prn.     Use of Chemotherapy Spill Kit: NO    Patient Oncology Side Effects and Interventions  Problem list:  None  Interventions:  emotional support given    Chemotherapy Education Record    Learner:  Patient  Barriers / Limitations:  None  Method:  Discussion  Outcome:  Shows understanding  Comments: Patient with no complaints during infusion of chemotherapy Cyclophosphamide going at shift change.

## 2024-08-15 NOTE — H&P
[facility]    PATIENT'S NAME: DAKOTA ARTHUR   ATTENDING PHYSICIAN: Iris Ro MD   PATIENT ACCOUNT#:   [id number]    LOCATION:  [address] [place] [address]  MEDICAL RECORD #:   [id number]       YOB: 1965  ADMISSION DATE:       08/15/2024    HISTORY AND PHYSICAL EXAMINATION    CHIEF COMPLAINT:  Diffuse large B-cell lymphoma for initiation of chemotherapy and prevention of tumor lysis syndrome.    HISTORY OF PRESENT ILLNESS:  The patient is a 58-year-old  female who self-palpated left neck and left axillary lymph nodes, and imaging study showed diffuse lymphadenopathy plus positive PET scan which showed neck, chest, and abdominal lymphadenopathy highly suggestive for lymphoma.  She had biopsy-proven diffuse large B-cell lymphoma and scheduled today for initiation of chemotherapy.    PAST MEDICAL HISTORY:  Diffuse large B-cell lymphoma as outlined above, diverticulosis, hyperlipidemia.    PAST SURGICAL HISTORY:  Left supraclavicular lymph node biopsy, left axillary incisional lymph node biopsy.    MEDICATIONS:  Please see medication reconciliation list.     ALLERGIES:  Meloxicam, bupropion, and simvastatin.    FAMILY HISTORY:  Positive for hypertension.    SOCIAL HISTORY:  No tobacco, alcohol, or drug use.  Lives with her family.  Independent for basic activities of daily living.     REVIEW OF SYSTEMS:  Currently asymptomatic.  No chest pain, no shortness of breath.  Other 12-point review of systems is negative.        PHYSICAL EXAMINATION:    GENERAL:  Alert and oriented to time, place, and person.  No acute distress.    VITAL SIGNS:  Temperature 98.0, pulse 109, respiratory rate 18, blood pressure 113/77, pulse ox 99% on room air.  HEENT:  Atraumatic.  Oropharynx clear.  Lymphadenopathy noted on bilateral supraclavicular and axillary areas.  LUNGS:  Clear to auscultation bilaterally.  Normal respiratory effort.    HEART:  Regular rate and rhythm.  S1 and S2 auscultated.  No  murmur.  ABDOMEN:  Soft, nondistended.  No tenderness.  Positive bowel sounds.  EXTREMITIES:  No peripheral edema, clubbing, or cyanosis.  NEUROLOGIC:  Motor and sensory intact.       ASSESSMENT AND PLAN:    1.   Diffuse large B-cell lymphoma involving lymph nodes in neck, chest, abdomen, and spleen.  To be initiated on chemotherapy R-Miguel-CHP.  She received an infusion of polatuzumab yesterday.  Today to be initiated on cyclophosphamide, doxorubicin, and rituximab for a total of 6 cycles plus an additional 2 cycles of rituximab.    2.   High potential for tumor lysis syndrome.  The patient will be initiated on IV fluids.  Will follow laboratory studies including CBC, BMP, LDH, and uric acid.  Continue allopurinol and prednisone.  Oncology consult.  A 2D echocardiogram with Doppler was ordered.  Further recommendations to follow.      Dictated By Iris Ro MD  d: 08/15/2024 11:10:41  t: 08/15/2024 13:27:26  Job [id number]  FB/

## 2024-08-15 NOTE — PAYOR COMM NOTE
--------------  ADMISSION REVIEW     Payor: [facility] CHOICE PLUS  Subscriber #:  [id number]  Authorization Number: [id number]    Admit date: 8/15/24  Admit time:  8:36 AM       REVIEW DOCUMENTATION:  ED Provider Notes    No notes of this type exist for this encounter.           8/15 H&P      HISTORY AND PHYSICAL EXAMINATION     CHIEF COMPLAINT:  Diffuse large B-cell lymphoma for initiation of chemotherapy and prevention of tumor lysis syndrome.     HISTORY OF PRESENT ILLNESS:  The patient is a 58-year-old  female who self-palpated left neck and left axillary lymph nodes, and imaging study showed diffuse lymphadenopathy plus positive PET scan which showed neck, chest, and abdominal lymphadenopathy highly suggestive for lymphoma.  She had biopsy-proven diffuse large B-cell lymphoma and scheduled today for initiation of chemotherapy.     PAST MEDICAL HISTORY:  Diffuse large B-cell lymphoma as outlined above, diverticulosis, hyperlipidemia.     PAST SURGICAL HISTORY:  Left supraclavicular lymph node biopsy, left axillary incisional lymph node biopsy.     MEDICATIONS:  Please see medication reconciliation list.      ALLERGIES:  Meloxicam, bupropion, and simvastatin.     FAMILY HISTORY:  Positive for hypertension.     SOCIAL HISTORY:  No tobacco, alcohol, or drug use.  Lives with her family.  Independent for basic activities of daily living.      REVIEW OF SYSTEMS:  Currently asymptomatic.  No chest pain, no shortness of breath.  Other 12-point review of systems is negative.         PHYSICAL EXAMINATION:    GENERAL:  Alert and oriented to time, place, and person.  No acute distress.    VITAL SIGNS:  Temperature 98.0, pulse 109, respiratory rate 18, blood pressure 113/77, pulse ox 99% on room air.  HEENT:  Atraumatic.  Oropharynx clear.  Lymphadenopathy noted on bilateral supraclavicular and axillary areas.  LUNGS:  Clear to auscultation bilaterally.  Normal respiratory effort.    HEART:  Regular  rate and rhythm.  S1 and S2 auscultated.  No murmur.  ABDOMEN:  Soft, nondistended.  No tenderness.  Positive bowel sounds.  EXTREMITIES:  No peripheral edema, clubbing, or cyanosis.  NEUROLOGIC:  Motor and sensory intact.        ASSESSMENT AND PLAN:    1.       Diffuse large B-cell lymphoma involving lymph nodes in neck, chest, abdomen, and spleen.  To be initiated on chemotherapy R-Miguel-CHP.  She received an infusion of polatuzumab yesterday.  Today to be initiated on cyclophosphamide, doxorubicin, and rituximab for a total of 6 cycles plus an additional 2 cycles of rituximab.    2.       High potential for tumor lysis syndrome.  The patient will be initiated on IV fluids.  Will follow laboratory studies including CBC, BMP, LDH, and uric acid.  Continue allopurinol and prednisone.  Oncology consult.  A 2D echocardiogram with Doppler was ordered.  Further recommendations to follow.           8/15  Hematology consult      Expand All Collapse All    Hematology/Oncology Initial Consultation Note     Patient Name: Kimberly Quezada  Medical Record Number: [id number]                   YOB: 1965              Date of Consultation: 8/15/2024                Physician requesting consultation:  Dr. Ro     Reason for Consultation:  Kimberly Quezada was seen today for the diagnosis of DLBCL, NOS     Oncologic History:     March 2024: Anemia with MCV of 72.3.  He also endorsed having decreased appetite and weight loss of 25 pounds.  2024: Had EGD and colonoscopy which were normal.     Had a CT of the chest abdomen pelvis after she noted a neck masd which showed bilateral lymphadenopathy in her neck, chest, left axilla and retroperitoneal region.     July 2024: Axillary lymph node excisional biopsy.  Sent to [facility] for additional workup and was noted to have at DLBCL NOS.  =============================================================  History of Present Illness:       Procedure Polatuzumab on 814.  She  also initiated oral prednisone from 813.  She continues to have night sweats every day.        Past Medical History:  Past Medical History       Past Medical History:    Diverticulosis large intestine w/o perforation or abscess w/o bleeding    Gastropathy    High cholesterol    History of blood transfusion     NO ADVERSE REACTIONS    Hx of motion sickness    Hyperlipidemia    Internal hemorrhoids without complication    Rosacea    Seasonal allergies    Visual impairment     GLASSES            Past Surgical History         Past Surgical History:   Procedure Laterality Date    Colonoscopy N/A 12/13/2018     Procedure: COLONOSCOPY;  Surgeon: Elizabet Gordon MD;  Location: [facility] ENDOSCOPY    Colonoscopy N/A 6/4/2024     Procedure: COLONOSCOPY and Polypectomy;  Surgeon: Deepak He MD;  Location: [facility] ENDOSCOPY    Other surgical history         Repair of anal fistula 2011            Home Medications:  Medications Ordered Prior to Encounter   No current facility-administered medications on file prior to encounter.             Current Outpatient Medications on File Prior to Encounter   Medication Sig Dispense Refill    prochlorperazine (COMPAZINE) 10 mg tablet Take 1 tablet (10 mg total) by mouth every 6 (six) hours as needed for Nausea. 60 tablet 3    ondansetron (ZOFRAN) 8 MG tablet Take 1 tablet (8 mg total) by mouth every 8 (eight) hours as needed for Nausea. 30 tablet 3    predniSONE 50 MG Oral Tab Take 2 tablets (100 mg total) by mouth daily. , Days 1-5 of each cycle.  Take with food. 60 tablet 0    allopurinol 300 MG Oral Tab Take 1 tablet (300 mg total) by mouth daily. 30 tablet 0    FOLIC ACID 1 MG Oral Tab TAKE 1 TABLET BY MOUTH DAILY 30 tablet 3    Ferrous Sulfate 325 (65 Fe) MG Oral Tab Take 1 tablet (325 mg total) by mouth 2 (two) times daily. (Patient taking differently: Take 1 tablet (325 mg total) by mouth in the morning and 1 tablet (325 mg total) before bedtime.) 60 tablet 5    docusate  sodium (COLACE) 100 MG Oral Cap Take 1 capsule (100 mg total) by mouth 2 (two) times daily. 60 capsule 5            Current Inpatient Medications:  Inpatient Meds:  Scheduled Medications    [START ON 8/16/2024] predniSONE  100 mg Oral Daily with breakfast    allopurinol  300 mg Oral Daily    ferrous sulfate  325 mg Oral BID    folic acid  1 mg Oral Daily    enoxaparin  40 mg Subcutaneous Daily    DOXOrubicin  50 mg/m2 (Dosing Weight) Intravenous Once    cyclophosphamide (Cytoxan) 1,280 mg in sodium chloride 0.9% 256.4 mL infusion  1,280 mg Intravenous Once    riTUXimab-abbs (Truxima) 600 mg in sodium chloride 0.9% 560 mL infusion  375 mg/m2 Intravenous Once         Medication Infusions    sodium chloride 125 mL/hr at 08/15/24 1434            PRN Meds:    PRN Medications     docusate sodium    acetaminophen    ondansetron    prochlorperazine    temazepam    diphenhydrAMINE    methylPREDNISolone sodium succinate (Solu-MEDROL) 125 mg in sodium chloride 0.9% 100 mL IVPB        Allergies:   Allergies         Allergies   Allergen Reactions    Meloxicam ANGIOEDEMA       Upper and lower lip throbbing, tongue swelling, tongue ulcers    Contrave [Naltrexone-Bupropion Hcl Er] RASH    Wellbutrin [Bupropion] RASH    Simvastatin RASH          Review of Systems:  A 10-point ROS was done with pertinent positives and negative per the HPI     Vital Signs:  Height: --  Weight: 66 kg (145 lb 9 oz) (08/15 0903)  BSA (Calculated - sq m): --  Pulse: 102 (08/15 1525)  BP: 117/76 (08/15 1525)  Temp: 98.3 °F (36.8 °C) (08/15 1525)  Do Not Use - Resp Rate: --  SpO2: 98 % (08/15 1525)            Wt Readings from Last 6 Encounters:   08/15/24 66 kg (145 lb 9 oz)   08/14/24 65.2 kg (143 lb 11.2 oz)   08/08/24 65.8 kg (145 lb)   07/22/24 65.8 kg (145 lb)   07/16/24 65.8 kg (145 lb)   07/09/24 66.2 kg (146 lb)         ECOG PS: 0     Physical Examination:  General: Patient is alert and oriented, not in acute distress  Psych: Mood and affect are  appropriate  Eyes: EOMI, PERRL  ENT: Oropharynx is clear, no adenopathy  CV: Regular rate and rhythm, normal S1S2, no murmurs, no LE edema  Respiratory: Lungs clear to auscultation bilaterally  GI/Abd: Soft, non-tender with normoactive bowel sounds, no hepatosplenomegaly  Neurological: Grossly intact   Lymphatics: Bilateral cervical, left supraclavicular,lymphadenopathy  Skin: no rashes or petechiae        Laboratory:       Recent Labs   Lab 08/08/24  1557 08/14/24  0820   WBC 13.6* 13.2*   HGB 7.1* 7.4*   HCT 24.8* 24.6*   .0* 592.0*   MCV 82.7 80.7   RDW 18.0* 17.9*   NEPRELIM 11.41* 11.56*              Recent Labs   Lab 08/08/24  1557 08/15/24  0849    142   K 4.0 3.7    109   CO2 26.0 26.0   BUN 10 17   CREATSERUM 0.61 0.62   * 84   CA 9.9 10.0   PHOS  --  2.8   TP 8.4* 7.9   ALB 3.8 3.6   ALKPHO 302* 235*   AST 30 36*   ALT 39 44   BILT 0.4 0.2*         No results for input(s): \"PT\", \"INR\", \"PTT\", \"FIB\" in the last 168 hours.     Imaging:       CT, July 15 2024  Diffusely increased size of multiple hypermetabolic lymph nodes in the neck, chest and abdomen and also within the spleen.     Impression & Plan:      58-year-old female with diffuse large B cell NOS, likely stage IVB with IPI of 2 advanced clinical stage and more than 1 extranodal site disease 5.     Pathology describes that there was an evaluation or spectrum of TCHRLBL/DLBCL      Was started on Miguel R-CHP based on the Polarix trial showing better PFS to R-CHOP and better activity in non GCB subtype.     Patient started oral prednisone on 8/13 to continue for 5 days,  received a dose of polatuzumab on 8/14.     Currently admitted inpatient for receiving rituximab along with Cytoxan and Adriamycin monitoring of TLS for the next 48 to 72 hours.     Echocardiogram reviewed, EF normal.  Patient received PICC line today     Start with rituximab and give her Cytoxan Adriamycin today.     Continue on allopurinol for TLS.     Please  check uric acid, LDH, phosphorus, potassium and calcium every day.     Transfuse for hemoglobin less than 7 and platelets less than 10,000.                   MEDICATIONS ADMINISTERED IN LAST 1 DAY:  acetaminophen (Tylenol) tab 650 mg       Date Action Dose Route User    8/15/2024 1430 Given 650 mg Oral Angie Monroe RN          allopurinol (Zyloprim) tab 300 mg       Date Action Dose Route User    8/15/2024 1430 Given 300 mg Oral Angie Monroe RN          diphenhydrAMINE (Benadryl) cap/tab 50 mg       Date Action Dose Route User    8/15/2024 1430 Given 50 mg Oral Angie Monroe RN          enoxaparin (Lovenox) 40 MG/0.4ML SUBQ injection 40 mg       Date Action Dose Route User    8/15/2024 1438 Given 40 mg Subcutaneous (Right Lower Abdomen) Angie Monroe RN          ferrous sulfate DR tab 325 mg       Date Action Dose Route User    8/15/2024 1430 Given 325 mg Oral Angie Monroe RN          folic acid (Folvite) tab 1 mg       Date Action Dose Route User    8/15/2024 1430 Given 1 mg Oral Angie Monroe RN          fosaprepitant (Emend) 150 mg in sodium chloride 0.9% 150 mL IVPB       Date Action Dose Route User    8/15/2024 1450 New Bag 150 mg Intravenous Angie Monroe RN          lidocaine PF (Xylocaine-MPF) 1% injection       Date Action Dose Route User    8/15/2024 1430 Given 5 mL Intradermal Arianne Gamboa RN          ondansetron (Zofran) 16 mg in sodium chloride 0.9% 108 mL IVPB       Date Action Dose Route User    8/15/2024 1436 New Bag 16 mg Intravenous Angie Monroe RN          predniSONE (Deltasone) tab 100 mg       Date Action Dose Route User    8/15/2024 1430 Given 100 mg Oral Angie Monroe RN          riTUXimab-abbs (Truxima) 600 mg in sodium chloride 0.9% 560 mL infusion       Date Action Dose Route User    8/15/2024 1617 Rate/Dose Change (none) Intravenous Angie Monroe RN    8/15/2024 1553 Rate/Dose Change (none) Intravenous Angie Monroe RN    8/15/2024 1520 New Bag 600  mg Intravenous Angie Monroe, RN          sodium chloride 0.9% infusion       Date Action Dose Route User    8/15/2024 1434 New Bag (none) Intravenous Angie Monroe, RN            Vitals (last day)       Date/Time Temp Pulse Resp BP SpO2 Weight O2 Device O2 Flow Rate (L/min) [facility]    08/15/24 1615 -- 94 -- 114/76 98 % -- -- --     08/15/24 1555 -- 98 -- 113/74 97 % -- -- --     08/15/24 1525 98.3 °F (36.8 °C) 102 18 117/76 98 % -- None (Room air) --     08/15/24 1300 -- 101 18 111/78 100 % -- None (Room air) --     08/15/24 0903 -- -- -- -- -- 145 lb 9 oz (66 kg) -- -- CM    08/15/24 0837 -- 109 18 113/77 -- -- -- --           CIWA Scores (since admission)       None

## 2024-08-15 NOTE — PROGRESS NOTES
SW assisted with patient's financial assistance application. DEQUAN forwarded the application to [email or web address].

## 2024-08-16 DIAGNOSIS — C83.38 DIFFUSE LARGE B-CELL LYMPHOMA OF LYMPH NODES OF MULTIPLE REGIONS (HCC): Primary | ICD-10-CM

## 2024-08-16 DIAGNOSIS — C83.30 DIFFUSE LARGE B-CELL LYMPHOMA, UNSPECIFIED BODY REGION (HCC): Primary | ICD-10-CM

## 2024-08-16 LAB
ALBUMIN SERPL-MCNC: 2.9 G/DL (ref 3.2–4.8)
ALBUMIN/GLOB SERPL: 0.8 {RATIO} (ref 1–2)
ALP LIVER SERPL-CCNC: 177 U/L
ALT SERPL-CCNC: 34 U/L
ANION GAP SERPL CALC-SCNC: 5 MMOL/L (ref 0–18)
AST SERPL-CCNC: 25 U/L (ref ?–34)
BASOPHILS # BLD AUTO: 0.02 X10(3) UL (ref 0–0.2)
BASOPHILS NFR BLD AUTO: 0.2 %
BILIRUB SERPL-MCNC: 0.2 MG/DL (ref 0.3–1.2)
BUN BLD-MCNC: 18 MG/DL (ref 9–23)
BUN/CREAT SERPL: 36.7 (ref 10–20)
CALCIUM BLD-MCNC: 9 MG/DL (ref 8.7–10.4)
CHLORIDE SERPL-SCNC: 113 MMOL/L (ref 98–112)
CO2 SERPL-SCNC: 26 MMOL/L (ref 21–32)
CREAT BLD-MCNC: 0.49 MG/DL
DEPRECATED RDW RBC AUTO: 54.1 FL (ref 35.1–46.3)
EGFRCR SERPLBLD CKD-EPI 2021: 111 ML/MIN/1.73M2 (ref 60–?)
EOSINOPHIL # BLD AUTO: 0 X10(3) UL (ref 0–0.7)
EOSINOPHIL NFR BLD AUTO: 0 %
ERYTHROCYTE [DISTWIDTH] IN BLOOD BY AUTOMATED COUNT: 18.4 % (ref 11–15)
GLOBULIN PLAS-MCNC: 3.5 G/DL (ref 2–3.5)
GLUCOSE BLD-MCNC: 114 MG/DL (ref 70–99)
HCT VFR BLD AUTO: 20.9 %
HCT VFR BLD AUTO: 27.8 %
HGB BLD-MCNC: 6.2 G/DL
HGB BLD-MCNC: 8.3 G/DL
IMM GRANULOCYTES # BLD AUTO: 0.11 X10(3) UL (ref 0–1)
IMM GRANULOCYTES NFR BLD: 1 %
LDH SERPL L TO P-CCNC: 91 U/L
LYMPHOCYTES # BLD AUTO: 0.52 X10(3) UL (ref 1–4)
LYMPHOCYTES NFR BLD AUTO: 4.8 %
MCH RBC QN AUTO: 24.4 PG (ref 26–34)
MCHC RBC AUTO-ENTMCNC: 29.7 G/DL (ref 31–37)
MCV RBC AUTO: 82.3 FL
MONOCYTES # BLD AUTO: 0.56 X10(3) UL (ref 0.1–1)
MONOCYTES NFR BLD AUTO: 5.2 %
NEUTROPHILS # BLD AUTO: 9.56 X10 (3) UL (ref 1.5–7.7)
NEUTROPHILS # BLD AUTO: 9.56 X10(3) UL (ref 1.5–7.7)
NEUTROPHILS NFR BLD AUTO: 88.8 %
OSMOLALITY SERPL CALC.SUM OF ELEC: 301 MOSM/KG (ref 275–295)
PHOSPHATE SERPL-MCNC: 3.1 MG/DL (ref 2.4–5.1)
PLATELET # BLD AUTO: 462 10(3)UL (ref 150–450)
POTASSIUM SERPL-SCNC: 3.7 MMOL/L (ref 3.5–5.1)
PROT SERPL-MCNC: 6.4 G/DL (ref 5.7–8.2)
RBC # BLD AUTO: 2.54 X10(6)UL
SODIUM SERPL-SCNC: 144 MMOL/L (ref 136–145)
URATE SERPL-MCNC: 2.6 MG/DL
WBC # BLD AUTO: 10.8 X10(3) UL (ref 4–11)

## 2024-08-16 PROCEDURE — 99233 SBSQ HOSP IP/OBS HIGH 50: CPT | Performed by: HOSPITALIST

## 2024-08-16 PROCEDURE — 99233 SBSQ HOSP IP/OBS HIGH 50: CPT | Performed by: STUDENT IN AN ORGANIZED HEALTH CARE EDUCATION/TRAINING PROGRAM

## 2024-08-16 PROCEDURE — 30243N1 TRANSFUSION OF NONAUTOLOGOUS RED BLOOD CELLS INTO CENTRAL VEIN, PERCUTANEOUS APPROACH: ICD-10-PCS | Performed by: STUDENT IN AN ORGANIZED HEALTH CARE EDUCATION/TRAINING PROGRAM

## 2024-08-16 RX ORDER — ACYCLOVIR 400 MG/1
400 TABLET ORAL 2 TIMES DAILY
Status: DISCONTINUED | OUTPATIENT
Start: 2024-08-16 | End: 2024-08-17

## 2024-08-16 RX ORDER — SODIUM CHLORIDE 9 MG/ML
INJECTION, SOLUTION INTRAVENOUS ONCE
Status: COMPLETED | OUTPATIENT
Start: 2024-08-16 | End: 2024-08-16

## 2024-08-16 RX ORDER — ACYCLOVIR 400 MG/1
400 TABLET ORAL 2 TIMES DAILY
Qty: 60 TABLET | Refills: 1 | Status: SHIPPED | OUTPATIENT
Start: 2024-08-16

## 2024-08-16 NOTE — PAYOR COMM NOTE
--------------  CONTINUED STAY REVIEW    Payor: [place] [employer] CHOICE PLUS  Subscriber #:  [id number]  Authorization Number: [id number]    Admit date: 8/15/24  Admit time:  8:36 AM    REVIEW DOCUMENTATION:      8/16 IM       Chief complaint: chemo  Subjective:   Doing well except for some swelling of Rt hand (PICC placed earlier the same side)  No fevers  No dizziness  Hgb low this morning, s/p transfusions     Mother at bedside     Objective:   Blood pressure 107/75, pulse 82, temperature 98.2 °F (36.8 °C), temperature source Oral, resp. rate 16, weight 145 lb 9 oz (66 kg), last menstrual period 08/09/2018, SpO2 97%.     HEENT: conjunctivae/corneas clear. PERRL, EOM's intact.  Neck:   no JVD, supple  Pulmonary:  clear to auscultation bilaterally  Cardiovascular: S1, S2 normal, no murmur, click, rub or gallop, regular rate and rhythm  Abdominal: soft, non-tender; bowel sounds normal; no masses,  no organomegaly  Extremities: no cyanosis or edema  Pulses: palpable and symmetric  Skin: No rashes or lesions  Neurologic: Alert and oriented X 3, moves all extremities  Psychiatric: calm, cooperative     Assessment and Plan:      Diffuse large B-cell lymphoma involving lymph nodes in neck, chest, abdomen, and spleen.    -Here for initiation of chemotherapy R-Miguel-CHP.    She received an infusion of polatuzumab the day prior admission 8/14.     Prednisone x 5 days (8/13-   -per onc  -monitor labs and hemodynamic status  -ECHO EF nl  -PICC   -acyclovir prophylaxis     High potential for tumor lysis syndrome.    -IV fluids.    -follow laboratory studies including CBC, BMP, phos, LDH, and uric acid daily    -Continue allopurinol and prednisone.    -Oncology consulted.      Anemia, expected due to the above  -transfuse to keep hgb >7 and PLT>10,000 per onc        DVT prophylaxis: lovenox     Dispo: pending, home tomorrow if stable, d/w onc        Chart reviewed, including current vitals, notes, labs and  imaging  Pertinent past medical records reviewed  Labs ordered and medications adjusted as outlined above  Coordinate care with care team/consultants  Discussed with patient and family at bedside results of tests, management plan as outlined above, and the need for ongoing hospitalization  D/w RN     ERNESTO high  severe acute illness/or exacerbation of chronic illness posing a threat to life, IV medications, requiring close monitoring in hospital.         Results:            Lab Results   Component Value Date     WBC 10.8 08/16/2024     HGB 6.2 (LL) 08/16/2024     HCT 20.9 (L) 08/16/2024     .0 (H) 08/16/2024     CREATSERUM 0.49 (L) 08/16/2024     BUN 18 08/16/2024      08/16/2024     K 3.7 08/16/2024      (H) 08/16/2024     CO2 26.0 08/16/2024      (H) 08/16/2024     CA 9.0 08/16/2024     ALB 2.9 (L) 08/16/2024     ALKPHO 177 (H) 08/16/2024     BILT 0.2 (L) 08/16/2024     TP 6.4 08/16/2024     AST 25 08/16/2024     ALT 34 08/16/2024     TSH 2.220 08/17/2020     MG 1.9 08/15/2024     PHOS 3.1 08/16/2024             MEDICATIONS ADMINISTERED IN LAST 1 DAY:  Transfuse RBC       Date Action Dose Route User    8/16/2024 1010 Rate/Dose Change (none) Intravenous Yanet Lema RN    8/16/2024 0954 New Bag (none) Intravenous Yanet Lema RN          acyclovir (Zovirax) tab 400 mg       Date Action Dose Route User    8/16/2024 1239 Given 400 mg Oral Yanet Lema RN          allopurinol (Zyloprim) tab 300 mg       Date Action Dose Route User    8/16/2024 0907 Given 300 mg Oral Yanet Lema RN          cyclophosphamide (Cytoxan) 1,280 mg in sodium chloride 0.9% 256.4 mL infusion       Date Action Dose Route User    8/15/2024 2004 New Bag 1,280 mg Intravenous Angie Monroe RN          docusate sodium (Colace) cap 100 mg       Date Action Dose Route User    8/15/2024 2134 Given 100 mg Oral Carolyn Tovar, RN          DOXOrubicin (Adriamycin) 2 mg/mL IV syringe 84 mg 42 mL       Date  Action Dose Route User    8/15/2024 1946 New Bag 84 mg Intravenous Angie Monroe RN          enoxaparin (Lovenox) 40 MG/0.4ML SUBQ injection 40 mg       Date Action Dose Route User    8/16/2024 1446 Given 40 mg Subcutaneous (Right Lower Abdomen) Angie Monroe RN          ferrous sulfate DR tab 325 mg       Date Action Dose Route User    8/16/2024 0907 Given 325 mg Oral Yanet Lema RN    8/15/2024 2134 Given 325 mg Oral Carolyn Tovar RN          folic acid (Folvite) tab 1 mg       Date Action Dose Route User    8/16/2024 0916 Given 1 mg Oral Yanet Lema RN          predniSONE (Deltasone) tab 100 mg       Date Action Dose Route User    8/16/2024 0906 Given 100 mg Oral Yanet Lema RN          riTUXimab-abbs (Truxima) 600 mg in sodium chloride 0.9% 560 mL infusion       Date Action Dose Route User    8/15/2024 1750 Rate/Dose Change (none) Intravenous Angie Monroe RN    8/15/2024 1704 Rate/Dose Change (none) Intravenous Angie Monroe RN    8/15/2024 1617 Rate/Dose Change (none) Intravenous Angie Monroe RN          sodium chloride 0.9% infusion       Date Action Dose Route User    8/16/2024 0640 New Bag (none) Intravenous Carolyn Tovar RN    8/15/2024 2311 New Bag (none) Intravenous Carolyn Tovar RN          sodium chloride 0.9% infusion       Date Action Dose Route User    8/16/2024 0945 New Bag (none) Intravenous Yanet Lema RN            Vitals (last day)       Date/Time Temp Pulse Resp BP SpO2 Weight O2 Device O2 Flow Rate (L/min) Who    08/16/24 1155 98 °F (36.7 °C) 80 16 109/73 95 % -- None (Room air) -- DM    08/16/24 1055 98.2 °F (36.8 °C) 82 16 107/75 97 % -- -- -- LM    08/16/24 1010 97.9 °F (36.6 °C) 88 18 105/76 97 % -- None (Room air) -- LM    08/16/24 0917 97.9 °F (36.6 °C) 98 18 107/67 97 % -- None (Room air) -- LM    08/16/24 0641 97.9 °F (36.6 °C) 87 20 110/72 96 % -- None (Room air) -- CN    08/15/24 2132 98.3 °F (36.8 °C) 96 20 102/61 97 % -- None (Room  air) --     08/15/24 1922 97.9 °F (36.6 °C) -- -- -- -- -- -- --     08/15/24 1833 -- 100 -- 102/61 97 % -- None (Room air) --     08/15/24 1751 -- 96 -- 106/66 97 % -- None (Room air) --     08/15/24 1615 -- 94 -- 114/76 98 % -- None (Room air) --     08/15/24 1555 -- 98 -- 113/74 97 % -- None (Room air) --     08/15/24 1525 98.3 °F (36.8 °C) 102 18 117/76 98 % -- None (Room air) --     08/15/24 1300 -- 101 18 111/78 100 % -- None (Room air) --     08/15/24 0903 -- -- -- -- -- 145 lb 9 oz (66 kg) -- -- CM    08/15/24 0837 -- 109 18 113/77 -- -- -- --           CIWA Scores (since admission)       None          Blood Transfusion Record       Product Unit Status Volume Start End            Transfuse RBC       24  [id number]  [id number] Completed 08/16/24 1208 295.83 mL 08/16/24 0954 08/16/24 1155

## 2024-08-16 NOTE — PLAN OF CARE
Patient alert and oriented X4, vitals stable. Denies pain. Continued with IVFs. Patient hemoglobin 6.2 this am, transfusing 1 unit of pRBCs. Tolerating diet, denies nausea and vomiting. C/O swelling in right hand, Dr Ash notified, informed to monitor. Arm elevated on pillow. Patient ambulating to bathroom. Calls appropriately for assistance. Endorsed to oncoming RN.    Problem: Patient Centered Care  Goal: Patient preferences are identified and integrated in the patient's plan of care  Description: Interventions:  - What would you like us to know as we care for you? From home with mom  - Provide timely, complete, and accurate information to patient/family  - Incorporate patient and family knowledge, values, beliefs, and cultural backgrounds into the planning and delivery of care  - Encourage patient/family to participate in care and decision-making at the level they choose  - Honor patient and family perspectives and choices  Outcome: Progressing     Problem: PAIN - ADULT  Goal: Verbalizes/displays adequate comfort level or patient's stated pain goal  Description: INTERVENTIONS:  - Encourage pt to monitor pain and request assistance  - Assess pain using appropriate pain scale  - Administer analgesics based on type and severity of pain and evaluate response  - Implement non-pharmacological measures as appropriate and evaluate response  - Consider cultural and social influences on pain and pain management  - Manage/alleviate anxiety  - Utilize distraction and/or relaxation techniques  - Monitor for opioid side effects  - Notify MD/LIP if interventions unsuccessful or patient reports new pain  - Anticipate increased pain with activity and pre-medicate as appropriate  Outcome: Progressing     Problem: RISK FOR INFECTION - ADULT  Goal: Absence of fever/infection during anticipated neutropenic period  Description: INTERVENTIONS  - Monitor WBC  - Administer growth factors as ordered  - Implement neutropenic  guidelines  Outcome: Progressing     Problem: SAFETY ADULT - FALL  Goal: Free from fall injury  Description: INTERVENTIONS:  - Assess pt frequently for physical needs  - Identify cognitive and physical deficits and behaviors that affect risk of falls.  - [place] fall precautions as indicated by assessment.  - Educate pt/family on patient safety including physical limitations  - Instruct pt to call for assistance with activity based on assessment  - Modify environment to reduce risk of injury  - Provide assistive devices as appropriate  - Consider OT/PT consult to assist with strengthening/mobility  - Encourage toileting schedule  Outcome: Progressing     Problem: DISCHARGE PLANNING  Goal: Discharge to home or other facility with appropriate resources  Description: INTERVENTIONS:  - Identify barriers to discharge w/pt and caregiver  - Include patient/family/discharge partner in discharge planning  - Arrange for needed discharge resources and transportation as appropriate  - Identify discharge learning needs (meds, wound care, etc)  - Arrange for interpreters to assist at discharge as needed  - Consider post-discharge preferences of patient/family/discharge partner  - Complete POLST form as appropriate  - Assess patient's ability to be responsible for managing their own health  - Refer to Case Management Department for coordinating discharge planning if the patient needs post-hospital services based on physician/LIP order or complex needs related to functional status, cognitive ability or social support system  Outcome: Progressing     Problem: GASTROINTESTINAL - ADULT  Goal: Minimal or absence of nausea and vomiting  Description: INTERVENTIONS:  - Maintain adequate hydration with IV or PO as ordered and tolerated  - Nasogastric tube to low intermittent suction as ordered  - Evaluate effectiveness of ordered antiemetic medications  - Provide nonpharmacologic comfort measures as appropriate  - Advance diet as  tolerated, if ordered  - Obtain nutritional consult as needed  - Evaluate fluid balance  Outcome: Progressing  Goal: Maintains or returns to baseline bowel function  Description: INTERVENTIONS:  - Assess bowel function  - Maintain adequate hydration with IV or PO as ordered and tolerated  - Evaluate effectiveness of GI medications  - Encourage mobilization and activity  - Obtain nutritional consult as needed  - Establish a toileting routine/schedule  - Consider collaborating with pharmacy to review patient's medication profile  Outcome: Progressing     Problem: METABOLIC/FLUID AND ELECTROLYTES - ADULT  Goal: Electrolytes maintained within normal limits  Description: INTERVENTIONS:  - Monitor labs and rhythm and assess patient for signs and symptoms of electrolyte imbalances  - Administer electrolyte replacement as ordered  - Monitor response to electrolyte replacements, including rhythm and repeat lab results as appropriate  - Fluid restriction as ordered  - Instruct patient on fluid and nutrition restrictions as appropriate  Outcome: Progressing     Problem: HEMATOLOGIC - ADULT  Goal: Maintains hematologic stability  Description: INTERVENTIONS  - Assess for signs and symptoms of bleeding or hemorrhage  - Monitor labs and vital signs for trends  - Administer supportive blood products/factors, fluids and medications as ordered and appropriate  - Administer supportive blood products/factors as ordered and appropriate  Outcome: Progressing

## 2024-08-16 NOTE — PLAN OF CARE
Kimberly is A&Ox4. Hgb 8.3 after 1 Unit of PRBC. Vitals stable. Denies pain. Saline locked. Ambulating independently. Family at bedside throughout shift. Plan of care continued as ordered.  Problem: Patient Centered Care  Goal: Patient preferences are identified and integrated in the patient's plan of care  Description: Interventions:  - What would you like us to know as we care for you? From home with mom  - Provide timely, complete, and accurate information to patient/family  - Incorporate patient and family knowledge, values, beliefs, and cultural backgrounds into the planning and delivery of care  - Encourage patient/family to participate in care and decision-making at the level they choose  - Honor patient and family perspectives and choices  Outcome: Progressing        Problem: PAIN - ADULT  Goal: Verbalizes/displays adequate comfort level or patient's stated pain goal  Description: INTERVENTIONS:  - Encourage pt to monitor pain and request assistance  - Assess pain using appropriate pain scale  - Administer analgesics based on type and severity of pain and evaluate response  - Implement non-pharmacological measures as appropriate and evaluate response  - Consider cultural and social influences on pain and pain management  - Manage/alleviate anxiety  - Utilize distraction and/or relaxation techniques  - Monitor for opioid side effects  - Notify MD/LIP if interventions unsuccessful or patient reports new pain  - Anticipate increased pain with activity and pre-medicate as appropriate  Outcome: Progressing     Problem: RISK FOR INFECTION - ADULT  Goal: Absence of fever/infection during anticipated neutropenic period  Description: INTERVENTIONS  - Monitor WBC  - Administer growth factors as ordered  - Implement neutropenic guidelines  Outcome: Progressing     Problem: SAFETY ADULT - FALL  Goal: Free from fall injury  Description: INTERVENTIONS:  - Assess pt frequently for physical needs  - Identify cognitive and  physical deficits and behaviors that affect risk of falls.  - [place] fall precautions as indicated by assessment.  - Educate pt/family on patient safety including physical limitations  - Instruct pt to call for assistance with activity based on assessment  - Modify environment to reduce risk of injury  - Provide assistive devices as appropriate  - Consider OT/PT consult to assist with strengthening/mobility  - Encourage toileting schedule  Outcome: Progressing     Problem: DISCHARGE PLANNING  Goal: Discharge to home or other facility with appropriate resources  Description: INTERVENTIONS:  - Identify barriers to discharge w/pt and caregiver  - Include patient/family/discharge partner in discharge planning  - Arrange for needed discharge resources and transportation as appropriate  - Identify discharge learning needs (meds, wound care, etc)  - Arrange for interpreters to assist at discharge as needed  - Consider post-discharge preferences of patient/family/discharge partner  - Complete POLST form as appropriate  - Assess patient's ability to be responsible for managing their own health  - Refer to Case Management Department for coordinating discharge planning if the patient needs post-hospital services based on physician/LIP order or complex needs related to functional status, cognitive ability or social support system  Outcome: Progressing     Problem: GASTROINTESTINAL - ADULT  Goal: Minimal or absence of nausea and vomiting  Description: INTERVENTIONS:  - Maintain adequate hydration with IV or PO as ordered and tolerated  - Nasogastric tube to low intermittent suction as ordered  - Evaluate effectiveness of ordered antiemetic medications  - Provide nonpharmacologic comfort measures as appropriate  - Advance diet as tolerated, if ordered  - Obtain nutritional consult as needed  - Evaluate fluid balance  Outcome: Progressing  Goal: Maintains or returns to baseline bowel function  Description: INTERVENTIONS:  -  Assess bowel function  - Maintain adequate hydration with IV or PO as ordered and tolerated  - Evaluate effectiveness of GI medications  - Encourage mobilization and activity  - Obtain nutritional consult as needed  - Establish a toileting routine/schedule  - Consider collaborating with pharmacy to review patient's medication profile  Outcome: Progressing     Problem: METABOLIC/FLUID AND ELECTROLYTES - ADULT  Goal: Electrolytes maintained within normal limits  Description: INTERVENTIONS:  - Monitor labs and rhythm and assess patient for signs and symptoms of electrolyte imbalances  - Administer electrolyte replacement as ordered  - Monitor response to electrolyte replacements, including rhythm and repeat lab results as appropriate  - Fluid restriction as ordered  - Instruct patient on fluid and nutrition restrictions as appropriate  Outcome: Progressing     Problem: HEMATOLOGIC - ADULT  Goal: Maintains hematologic stability  Description: INTERVENTIONS  - Assess for signs and symptoms of bleeding or hemorrhage  - Monitor labs and vital signs for trends  - Administer supportive blood products/factors, fluids and medications as ordered and appropriate  - Administer supportive blood products/factors as ordered and appropriate  Outcome: Progressing

## 2024-08-16 NOTE — PLAN OF CARE
Kimberly is alert and oriented x4, on RA. No complaints of NV. Voiding up to bathroom, 1x-assist/stand-by/self after set-up. No complaints of pain. Right arm precaution, right PICC in place, getting IVF infusing with 0.9 at 125 ml/hr. Unit lab draw in the morning. Completed Chemo during the shift. Mom at bedside for support and assistance. Plan pending course, safety measures in place, call light within reach.     Problem: Patient Centered Care  Goal: Patient preferences are identified and integrated in the patient's plan of care  Description: Interventions:  - What would you like us to know as we care for you? From home with mom  - Provide timely, complete, and accurate information to patient/family  - Incorporate patient and family knowledge, values, beliefs, and cultural backgrounds into the planning and delivery of care  - Encourage patient/family to participate in care and decision-making at the level they choose  - Honor patient and family perspectives and choices  Outcome: Progressing     Problem: Patient/Family Goals  Goal: Patient/Family Long Term Goal  Description: Patient's Long Term Goal:     Interventions:  -   - See additional Care Plan goals for specific interventions  Outcome: Progressing  Goal: Patient/Family Short Term Goal  Description: Patient's Short Term Goal:     Interventions:   -   - See additional Care Plan goals for specific interventions  Outcome: Progressing     Problem: PAIN - ADULT  Goal: Verbalizes/displays adequate comfort level or patient's stated pain goal  Description: INTERVENTIONS:  - Encourage pt to monitor pain and request assistance  - Assess pain using appropriate pain scale  - Administer analgesics based on type and severity of pain and evaluate response  - Implement non-pharmacological measures as appropriate and evaluate response  - Consider cultural and social influences on pain and pain management  - Manage/alleviate anxiety  - Utilize distraction and/or relaxation  techniques  - Monitor for opioid side effects  - Notify MD/LIP if interventions unsuccessful or patient reports new pain  - Anticipate increased pain with activity and pre-medicate as appropriate  Outcome: Progressing     Problem: RISK FOR INFECTION - ADULT  Goal: Absence of fever/infection during anticipated neutropenic period  Description: INTERVENTIONS  - Monitor WBC  - Administer growth factors as ordered  - Implement neutropenic guidelines  Outcome: Progressing     Problem: SAFETY ADULT - FALL  Goal: Free from fall injury  Description: INTERVENTIONS:  - Assess pt frequently for physical needs  - Identify cognitive and physical deficits and behaviors that affect risk of falls.  - [place] fall precautions as indicated by assessment.  - Educate pt/family on patient safety including physical limitations  - Instruct pt to call for assistance with activity based on assessment  - Modify environment to reduce risk of injury  - Provide assistive devices as appropriate  - Consider OT/PT consult to assist with strengthening/mobility  - Encourage toileting schedule  Outcome: Progressing     Problem: DISCHARGE PLANNING  Goal: Discharge to home or other facility with appropriate resources  Description: INTERVENTIONS:  - Identify barriers to discharge w/pt and caregiver  - Include patient/family/discharge partner in discharge planning  - Arrange for needed discharge resources and transportation as appropriate  - Identify discharge learning needs (meds, wound care, etc)  - Arrange for interpreters to assist at discharge as needed  - Consider post-discharge preferences of patient/family/discharge partner  - Complete POLST form as appropriate  - Assess patient's ability to be responsible for managing their own health  - Refer to Case Management Department for coordinating discharge planning if the patient needs post-hospital services based on physician/LIP order or complex needs related to functional status, cognitive ability  or social support system  Outcome: Progressing     Problem: GASTROINTESTINAL - ADULT  Goal: Minimal or absence of nausea and vomiting  Description: INTERVENTIONS:  - Maintain adequate hydration with IV or PO as ordered and tolerated  - Nasogastric tube to low intermittent suction as ordered  - Evaluate effectiveness of ordered antiemetic medications  - Provide nonpharmacologic comfort measures as appropriate  - Advance diet as tolerated, if ordered  - Obtain nutritional consult as needed  - Evaluate fluid balance  Outcome: Progressing  Goal: Maintains or returns to baseline bowel function  Description: INTERVENTIONS:  - Assess bowel function  - Maintain adequate hydration with IV or PO as ordered and tolerated  - Evaluate effectiveness of GI medications  - Encourage mobilization and activity  - Obtain nutritional consult as needed  - Establish a toileting routine/schedule  - Consider collaborating with pharmacy to review patient's medication profile  Outcome: Progressing     Problem: METABOLIC/FLUID AND ELECTROLYTES - ADULT  Goal: Electrolytes maintained within normal limits  Description: INTERVENTIONS:  - Monitor labs and rhythm and assess patient for signs and symptoms of electrolyte imbalances  - Administer electrolyte replacement as ordered  - Monitor response to electrolyte replacements, including rhythm and repeat lab results as appropriate  - Fluid restriction as ordered  - Instruct patient on fluid and nutrition restrictions as appropriate  Outcome: Progressing     Problem: HEMATOLOGIC - ADULT  Goal: Maintains hematologic stability  Description: INTERVENTIONS  - Assess for signs and symptoms of bleeding or hemorrhage  - Monitor labs and vital signs for trends  - Administer supportive blood products/factors, fluids and medications as ordered and appropriate  - Administer supportive blood products/factors as ordered and appropriate  Outcome: Progressing

## 2024-08-16 NOTE — PROGRESS NOTES
[facility]  part of [facility]    Progress Note    Kimberly Quezada Patient Status:  Inpatient    10/31/1965 MRN [id number]   Location [facility] 4W/SW/SE Attending Katelin Ash MD   Hosp Day # 1 PCP Hillary Prado MD     Chief complaint: chemo  Subjective:   Doing well except for some swelling of Rt hand (PICC placed earlier the same side)  No fevers  No dizziness  Hgb low this morning, s/p transfusions    Mother at bedside    Objective:   Blood pressure 107/75, pulse 82, temperature 98.2 °F (36.8 °C), temperature source Oral, resp. rate 16, weight 145 lb 9 oz (66 kg), last menstrual period 2018, SpO2 97%.    HEENT: conjunctivae/corneas clear. PERRL, EOM's intact.  Neck:   no JVD, supple  Pulmonary:  clear to auscultation bilaterally  Cardiovascular: S1, S2 normal, no murmur, click, rub or gallop, regular rate and rhythm  Abdominal: soft, non-tender; bowel sounds normal; no masses,  no organomegaly  Extremities: no cyanosis or edema  Pulses: palpable and symmetric  Skin: No rashes or lesions  Neurologic: Alert and oriented X 3, moves all extremities  Psychiatric: calm, cooperative    Assessment and Plan:     Diffuse large B-cell lymphoma involving lymph nodes in neck, chest, abdomen, and spleen.    -Here for initiation of chemotherapy [id number].    She received an infusion of polatuzumab the day prior admission .     Prednisone x 5 days (-   -per onc  -monitor labs and hemodynamic status  -ECHO EF nl  -PICC   -acyclovir prophylaxis    High potential for tumor lysis syndrome.    -IV fluids.    -follow laboratory studies including CBC, BMP, phos, LDH, and uric acid daily    -Continue allopurinol and prednisone.    -Oncology consulted.     Anemia, expected due to the above  -transfuse to keep hgb >7 and PLT>10,000 per onc       DVT prophylaxis: lovenox     Dispo: pending, home tomorrow if stable, d/w onc       Chart reviewed, including current vitals, notes,  labs and imaging  Pertinent past medical records reviewed  Labs ordered and medications adjusted as outlined above  Coordinate care with care team/consultants  Discussed with patient and family at bedside results of tests, management plan as outlined above, and the need for ongoing hospitalization  D/w RN     MDM high  severe acute illness/or exacerbation of chronic illness posing a threat to life, IV medications, requiring close monitoring in hospital.       Results:     Lab Results   Component Value Date    WBC 10.8 08/16/2024    HGB 6.2 (LL) 08/16/2024    HCT 20.9 (L) 08/16/2024    .0 (H) 08/16/2024    CREATSERUM 0.49 (L) 08/16/2024    BUN 18 08/16/2024     08/16/2024    K 3.7 08/16/2024     (H) 08/16/2024    CO2 26.0 08/16/2024     (H) 08/16/2024    CA 9.0 08/16/2024    ALB 2.9 (L) 08/16/2024    ALKPHO 177 (H) 08/16/2024    BILT 0.2 (L) 08/16/2024    TP 6.4 08/16/2024    AST 25 08/16/2024    ALT 34 08/16/2024    TSH 2.220 08/17/2020    MG 1.9 08/15/2024    PHOS 3.1 08/16/2024       No results found.          JULIAN REDDY MD  8/16/2024

## 2024-08-16 NOTE — PROGRESS NOTES
Hematology/Oncology Progress Note    Patient Name: Kimberly Quezada  Medical Record Number: [id number]    YOB: 1965     Reason for Consultation:  Kimberly Quezada was seen today for the diagnosis of cycle 1, day 2 Miguel -R-CHP    Interval events:      Patient received her PICC line yesterday.  Patient started rituximab, Cytoxan and Adriamycin.  Tolerated well without any complications.  Denies having any fevers.  However endorses having sweats.  No infusion reactions.      Inpatient Meds:   acyclovir  400 mg Oral BID    predniSONE  100 mg Oral Daily with breakfast    allopurinol  300 mg Oral Daily    ferrous sulfate  325 mg Oral BID    folic acid  1 mg Oral Daily    enoxaparin  40 mg Subcutaneous Daily         PRN Meds:    docusate sodium    acetaminophen    ondansetron    prochlorperazine    temazepam    diphenhydrAMINE    methylPREDNISolone sodium succinate (Solu-MEDROL) 125 mg in sodium chloride 0.9% 100 mL IVPB    Allergies:   Allergies   Allergen Reactions    Meloxicam ANGIOEDEMA     Upper and lower lip throbbing, tongue swelling, tongue ulcers    Contrave [Naltrexone-Bupropion Hcl Er] RASH    Wellbutrin [Bupropion] RASH    Simvastatin RASH       Vital Signs:  Height: --  Weight: --  BSA (Calculated - sq m): --  Pulse: 80 (08/16 1155)  BP: 109/73 (08/16 1155)  Temp: 98 °F (36.7 °C) (08/16 1155)  Do Not Use - Resp Rate: --  SpO2: 95 % (08/16 1155)    Wt Readings from Last 6 Encounters:   08/15/24 66 kg (145 lb 9 oz)   08/14/24 65.2 kg (143 lb 11.2 oz)   08/08/24 65.8 kg (145 lb)   07/22/24 65.8 kg (145 lb)   07/16/24 65.8 kg (145 lb)   07/09/24 66.2 kg (146 lb)       Physical Examination:  General: Patient is alert and oriented, not in acute distress  Psych: Mood and affect are appropriate  Eyes: EOMI, PERRL  ENT: Oropharynx is clear, no adenopathy  CV: Regular rate and rhythm, normal S1S2, no murmurs, no LE edema  Respiratory: Lungs clear to auscultation bilaterally  GI/Abd: Soft, non-tender  with normoactive bowel sounds, no hepatosplenomegaly  Neurological: Grossly intact   Lymphatics: No palpable cervical, supraclavicular, axillary, or inguinal lymphadenopathy  Skin: no rashes or petechiae  Lines: Right arm PICC line.  Some swelling noted around the PICC site.    Laboratory:  Recent Labs   Lab 08/14/24  0820 08/16/24  0648 08/16/24  1247   WBC 13.2* 10.8  --    HGB 7.4* 6.2* 8.3*   HCT 24.6* 20.9* 27.8*   .0* 462.0*  --    MCV 80.7 82.3  --    RDW 17.9* 18.4*  --    NEPRELIM 11.56* 9.56*  --        Recent Labs   Lab 08/15/24  0849 08/16/24  0648    144   K 3.7 3.7    113*   CO2 26.0 26.0   BUN 17 18   CREATSERUM 0.62 0.49*   GLU 84 114*   CA 10.0 9.0   PHOS 2.8 3.1   TP 7.9 6.4   ALB 3.6 2.9*   ALKPHO 235* 177*   AST 36* 25   ALT 44 34   BILT 0.2* 0.2*       No results for input(s): \"PT\", \"INR\", \"PTT\" in the last 168 hours.    Imaging:    PET STANDARD BODY SCAN (ONCOLOGY) (CPT=78815)    Result Date: 7/15/2024  CONCLUSION:   Diffusely increased size of multiple hypermetabolic lymph nodes in the neck, chest, and abdomen, as well as diffuse hypermetabolic activity within a lymph node conglomerate in the left upper quadrant and within the spleen.  Findings are compatible with sites of lymphomatous involvement.  Deauville score is 5.  Diffusely increased radiotracer activity throughout the visualized appendicular and axial skeleton.  Finding could represent diffuse bone marrow activity, with differential of diffuse lymphomatous involvement.  Recommend attention on follow-up.       Dictated by (CST): Rosana Mckee MD on 7/15/2024 at 5:25 PM     Finalized by (CST): Rosana Mckee MD on 7/15/2024 at 5:41 PM          IR BIOPSY    Result Date: 6/17/2024  CONCLUSION:  Successful ultrasound-guided percutaneous core biopsy of left supraclavicular lymph node.    Dictated by (CST): Axel Mendoza MD on 6/17/2024 at 11:27 AM     Finalized by (CST): Axel Mendoza MD on 6/17/2024 at 11:29  AM          CT STN/CHEST W CONTRAST (CPT=70491/32558)    Result Date: 5/28/2024  CONCLUSION:  1. Lymphadenopathy in bilateral neck, chest, left axilla, and partly visualized retroperitoneum most suspicious for lymphoma.  Recommend complete evaluation of the abdomen and pelvis and or PET scan for complete staging.    Dictated by (CST): Kennedy Flores MD on 5/28/2024 at 5:37 PM     Finalized by (CST): Kennedy Flores MD on 5/28/2024 at 6:12 PM             Impression & Plan:     58-year-old female with diffuse large B cell NOS, likely stage IVB with IPI of 2 advanced clinical stage and more than 1 extranodal site disease 5.     Pathology describes that there was an evaluation or spectrum of TCHRLBL/DLBCL.  Patient is currently on cycle 1, day 2 of  Miguel R-CHP.  Patient to start G-CSF on Monday.      Patient started oral prednisone on 8/13 to continue for 5 days,  received a dose of polatuzumab on 8/14, rituximab, doxorubicin and cyclophosphamide on 8/15.      TLS labs are normal.     Continue on allopurinol for TLS.     Please check uric acid, LDH, phosphorus, potassium and calcium tomorrow.     Continue Acyclovir 400 mg BID     Transfuse for hemoglobin less than 7 and platelets less than 10,000.           Infusion appointment on Monday for Peg- Filgrastim.     Dayna Hidalgo MD  Hematology/Medical Oncology

## 2024-08-17 VITALS
RESPIRATION RATE: 20 BRPM | DIASTOLIC BLOOD PRESSURE: 83 MMHG | OXYGEN SATURATION: 97 % | TEMPERATURE: 98 F | HEART RATE: 92 BPM | BODY MASS INDEX: 26 KG/M2 | WEIGHT: 145.56 LBS | SYSTOLIC BLOOD PRESSURE: 121 MMHG

## 2024-08-17 LAB
ALBUMIN SERPL-MCNC: 2.9 G/DL (ref 3.2–4.8)
ALBUMIN/GLOB SERPL: 0.9 {RATIO} (ref 1–2)
ALP LIVER SERPL-CCNC: 176 U/L
ALT SERPL-CCNC: 26 U/L
ANION GAP SERPL CALC-SCNC: 6 MMOL/L (ref 0–18)
AST SERPL-CCNC: 15 U/L (ref ?–34)
BASOPHILS # BLD AUTO: 0.01 X10(3) UL (ref 0–0.2)
BASOPHILS NFR BLD AUTO: 0.1 %
BILIRUB SERPL-MCNC: 0.2 MG/DL (ref 0.3–1.2)
BLOOD TYPE BARCODE: 5100
BUN BLD-MCNC: 18 MG/DL (ref 9–23)
BUN/CREAT SERPL: 37.5 (ref 10–20)
CALCIUM BLD-MCNC: 9.3 MG/DL (ref 8.7–10.4)
CHLORIDE SERPL-SCNC: 110 MMOL/L (ref 98–112)
CO2 SERPL-SCNC: 26 MMOL/L (ref 21–32)
CREAT BLD-MCNC: 0.48 MG/DL
DEPRECATED RDW RBC AUTO: 51.5 FL (ref 35.1–46.3)
EGFRCR SERPLBLD CKD-EPI 2021: 111 ML/MIN/1.73M2 (ref 60–?)
EOSINOPHIL # BLD AUTO: 0.01 X10(3) UL (ref 0–0.7)
EOSINOPHIL NFR BLD AUTO: 0.1 %
ERYTHROCYTE [DISTWIDTH] IN BLOOD BY AUTOMATED COUNT: 17.7 % (ref 11–15)
GLOBULIN PLAS-MCNC: 3.2 G/DL (ref 2–3.5)
GLUCOSE BLD-MCNC: 130 MG/DL (ref 70–99)
HCT VFR BLD AUTO: 24.4 %
HGB BLD-MCNC: 7.5 G/DL
IMM GRANULOCYTES # BLD AUTO: 0.07 X10(3) UL (ref 0–1)
IMM GRANULOCYTES NFR BLD: 0.8 %
LDH SERPL L TO P-CCNC: 100 U/L
LYMPHOCYTES # BLD AUTO: 0.41 X10(3) UL (ref 1–4)
LYMPHOCYTES NFR BLD AUTO: 4.8 %
MCH RBC QN AUTO: 25.2 PG (ref 26–34)
MCHC RBC AUTO-ENTMCNC: 30.7 G/DL (ref 31–37)
MCV RBC AUTO: 81.9 FL
MONOCYTES # BLD AUTO: 0.64 X10(3) UL (ref 0.1–1)
MONOCYTES NFR BLD AUTO: 7.5 %
NEUTROPHILS # BLD AUTO: 7.39 X10 (3) UL (ref 1.5–7.7)
NEUTROPHILS # BLD AUTO: 7.39 X10(3) UL (ref 1.5–7.7)
NEUTROPHILS NFR BLD AUTO: 86.7 %
OSMOLALITY SERPL CALC.SUM OF ELEC: 298 MOSM/KG (ref 275–295)
PHOSPHATE SERPL-MCNC: 2.7 MG/DL (ref 2.4–5.1)
PLATELET # BLD AUTO: 459 10(3)UL (ref 150–450)
POTASSIUM SERPL-SCNC: 3.3 MMOL/L (ref 3.5–5.1)
PROT SERPL-MCNC: 6.1 G/DL (ref 5.7–8.2)
RBC # BLD AUTO: 2.98 X10(6)UL
SODIUM SERPL-SCNC: 142 MMOL/L (ref 136–145)
UNIT VOLUME: 350 ML
URATE SERPL-MCNC: 2.5 MG/DL
WBC # BLD AUTO: 8.5 X10(3) UL (ref 4–11)

## 2024-08-17 PROCEDURE — 99239 HOSP IP/OBS DSCHRG MGMT >30: CPT | Performed by: HOSPITALIST

## 2024-08-17 RX ORDER — POTASSIUM CHLORIDE 1500 MG/1
40 TABLET, EXTENDED RELEASE ORAL ONCE
Status: COMPLETED | OUTPATIENT
Start: 2024-08-17 | End: 2024-08-17

## 2024-08-17 NOTE — PLAN OF CARE
Problem: Patient Centered Care  Goal: Patient preferences are identified and integrated in the patient's plan of care  Description: Interventions:  - What would you like us to know as we care for you? From home with mom  - Provide timely, complete, and accurate information to patient/family  - Incorporate patient and family knowledge, values, beliefs, and cultural backgrounds into the planning and delivery of care  - Encourage patient/family to participate in care and decision-making at the level they choose  - Honor patient and family perspectives and choices  Outcome: Progressing     Problem: Patient/Family Goals  Goal: Patient/Family Long Term Goal  Description: Patient's Long Term Goal:     Interventions:  -   - See additional Care Plan goals for specific interventions  Outcome: Progressing  Goal: Patient/Family Short Term Goal  Description: Patient's Short Term Goal:     Interventions:   - See additional Care Plan goals for specific interventions  Outcome: Progressing     Problem: PAIN - ADULT  Goal: Verbalizes/displays adequate comfort level or patient's stated pain goal  Description: INTERVENTIONS:  - Encourage pt to monitor pain and request assistance  - Assess pain using appropriate pain scale  - Administer analgesics based on type and severity of pain and evaluate response  - Implement non-pharmacological measures as appropriate and evaluate response  - Consider cultural and social influences on pain and pain management  - Manage/alleviate anxiety  - Utilize distraction and/or relaxation techniques  - Monitor for opioid side effects  - Notify MD/LIP if interventions unsuccessful or patient reports new pain  - Anticipate increased pain with activity and pre-medicate as appropriate  Outcome: Progressing     Problem: RISK FOR INFECTION - ADULT  Goal: Absence of fever/infection during anticipated neutropenic period  Description: INTERVENTIONS  - Monitor WBC  - Administer growth factors as ordered  -  Implement neutropenic guidelines  Outcome: Progressing     Problem: SAFETY ADULT - FALL  Goal: Free from fall injury  Description: INTERVENTIONS:  - Assess pt frequently for physical needs  - Identify cognitive and physical deficits and behaviors that affect risk of falls.  - [place] fall precautions as indicated by assessment.  - Educate pt/family on patient safety including physical limitations  - Instruct pt to call for assistance with activity based on assessment  - Modify environment to reduce risk of injury  - Provide assistive devices as appropriate  - Consider OT/PT consult to assist with strengthening/mobility  - Encourage toileting schedule  Outcome: Progressing     Problem: DISCHARGE PLANNING  Goal: Discharge to home or other facility with appropriate resources  Description: INTERVENTIONS:  - Identify barriers to discharge w/pt and caregiver  - Include patient/family/discharge partner in discharge planning  - Arrange for needed discharge resources and transportation as appropriate  - Identify discharge learning needs (meds, wound care, etc)  - Arrange for interpreters to assist at discharge as needed  - Consider post-discharge preferences of patient/family/discharge partner  - Complete POLST form as appropriate  - Assess patient's ability to be responsible for managing their own health  - Refer to Case Management Department for coordinating discharge planning if the patient needs post-hospital services based on physician/LIP order or complex needs related to functional status, cognitive ability or social support system  Outcome: Progressing     Problem: GASTROINTESTINAL - ADULT  Goal: Minimal or absence of nausea and vomiting  Description: INTERVENTIONS:  - Maintain adequate hydration with IV or PO as ordered and tolerated  - Nasogastric tube to low intermittent suction as ordered  - Evaluate effectiveness of ordered antiemetic medications  - Provide nonpharmacologic comfort measures as appropriate  -  Advance diet as tolerated, if ordered  - Obtain nutritional consult as needed  - Evaluate fluid balance  Outcome: Progressing  Goal: Maintains or returns to baseline bowel function  Description: INTERVENTIONS:  - Assess bowel function  - Maintain adequate hydration with IV or PO as ordered and tolerated  - Evaluate effectiveness of GI medications  - Encourage mobilization and activity  - Obtain nutritional consult as needed  - Establish a toileting routine/schedule  - Consider collaborating with pharmacy to review patient's medication profile  Outcome: Progressing     Problem: METABOLIC/FLUID AND ELECTROLYTES - ADULT  Goal: Electrolytes maintained within normal limits  Description: INTERVENTIONS:  - Monitor labs and rhythm and assess patient for signs and symptoms of electrolyte imbalances  - Administer electrolyte replacement as ordered  - Monitor response to electrolyte replacements, including rhythm and repeat lab results as appropriate  - Fluid restriction as ordered  - Instruct patient on fluid and nutrition restrictions as appropriate  Outcome: Progressing     Problem: HEMATOLOGIC - ADULT  Goal: Maintains hematologic stability  Description: INTERVENTIONS  - Assess for signs and symptoms of bleeding or hemorrhage  - Monitor labs and vital signs for trends  - Administer supportive blood products/factors, fluids and medications as ordered and appropriate  - Administer supportive blood products/factors as ordered and appropriate  Outcome: Progressing     Kimberly is alert/oriented x4. Vital signs stable on room air. Tolerating diet, nausea x1 managed with PRN zofran. Up ad kathleen. Denies pain.  Lovenox for DVT prophylaxis. Voiding freely. Fall precautions in place: nonskid socks on, bed low. Call light within reach, pt calls appropriately.

## 2024-08-17 NOTE — DISCHARGE SUMMARY
[facility]     Kimberly Quezada Patient Status:  Inpatient    10/31/1965 MRN [id number]   Location [facility] 4W/SW/SE Attending No att. providers found   Hosp Day # 2 PCP Hillary Prado MD     Date of Admission: 8/15/2024  Date of Discharge: 2024  Discharge Disposition: Home or Self Care    Admitting Chief Complaint:   Diffuse large B cell lymphoma (HCC) [C83.30]    PCP: Hillary Prado MD    Discharge Diagnosis:   Diffuse large B-cell lymphoma   initiation of chemotherapy        Anemia of malignancy        History of Present Illness:   Per Dr. Ro    The patient is a 58-year-old  female who self-palpated left neck and left axillary lymph nodes, and imaging study showed diffuse lymphadenopathy plus positive PET scan which showed neck, chest, and abdominal lymphadenopathy highly suggestive for lymphoma.  She had biopsy-proven diffuse large B-cell lymphoma and scheduled today for initiation of chemotherapy.         Brief Synopsis:   Diffuse large B-cell lymphoma involving lymph nodes in neck, chest, abdomen, and spleen.    -Here for initiation of chemotherapy R-Miguel-CHP.    She received an infusion of polatuzumab the day prior admission .     Prednisone x 5 days (- )  -per onc, Dr. Hidalgo  -monitor labs and hemodynamic status-->stable  -ECHO EF nl  -PICC removed on discharge (plans for outpatient port)  -acyclovir prophylaxis     High potential for tumor lysis syndrome.    -IV fluids.    -follow laboratory studies including CBC, BMP, phos, LDH, and uric acid daily    -Continue allopurinol and prednisone.    -stable  -Oncology consulted.      Anemia, expected due to the above  -transfuse to keep hgb >7 and PLT>10,000 per onc   -received PRBC x1 on admission for hgb 6.2 -->7.5  -plans for neulasta injection on Monday (in 2 days)     DVT prophylaxis: lovenox     Dispo: home       D/w pt, her mother at bedside  D/w hematology      Discharge Medication  List:     Discharge Medications        START taking these medications        Instructions Prescription details   acyclovir 400 MG Tabs  Commonly known as: Zovirax      Take 1 tablet (400 mg total) by mouth 2 (two) times daily.   Quantity: 60 tablet  Refills: 1            CHANGE how you take these medications        Instructions Prescription details   Ferrous Sulfate 325 (65 Fe) MG Tabs  What changed: when to take this      Take 1 tablet (325 mg total) by mouth 2 (two) times daily.   Quantity: 60 tablet  Refills: 5            CONTINUE taking these medications        Instructions Prescription details   allopurinol 300 MG Tabs  Commonly known as: Zyloprim      Take 1 tablet (300 mg total) by mouth daily.   Quantity: 30 tablet  Refills: 0     docusate sodium 100 MG Caps  Commonly known as: Colace      Take 1 capsule (100 mg total) by mouth 2 (two) times daily.   Quantity: 60 capsule  Refills: 5     folic acid 1 MG Tabs  Commonly known as: Folvite      TAKE 1 TABLET BY MOUTH DAILY   Quantity: 30 tablet  Refills: 3     ondansetron 8 MG tablet  Commonly known as: Zofran      Take 1 tablet (8 mg total) by mouth every 8 (eight) hours as needed for Nausea.   Quantity: 30 tablet  Refills: 3     predniSONE 50 MG Tabs  Commonly known as: Deltasone      Take 2 tablets (100 mg total) by mouth daily. , Days 1-5 of each cycle.  Take with food.   Quantity: 60 tablet  Refills: 0     prochlorperazine 10 mg tablet  Commonly known as: Compazine      Take 1 tablet (10 mg total) by mouth every 6 (six) hours as needed for Nausea.   Quantity: 60 tablet  Refills: 3               Where to Get Your Medications        These medications were sent to [facility] [id number] [address] [phone], [phone]  [address] [place] [id number]      Phone: [phone]   acyclovir 400 MG Tabs         Follow-up appointment:   Dayna Hidalgo MD  [address] [facility] [address] [phone]    Call  As  needed and attend upcoming scheduled appointments.      Vital signs:  Temp:  [97.7 °F (36.5 °C)-97.8 °F (36.6 °C)] 97.7 °F (36.5 °C)  Pulse:  [89-93] 92  Resp:  [16-20] 20  BP: (110-121)/(70-83) 121/83  SpO2:  [96 %-97 %] 97 %    Physical Exam:    General: No acute distress. Doing well  Respiratory: Clear to auscultation bilaterally. No wheezes. No rhonchi.  Cardiovascular: S1, S2. Regular rate and rhythm. No murmurs, rubs or gallops.   Abdomen: Soft, nontender, nondistended.  Positive bowel sounds. No rebound or guarding.  Neurologic: No focal neurological deficits.   Musculoskeletal: Moves all extremities.  Extremities: No edema.  -----------------------------------------------------------------------------------------------  PATIENT DISCHARGE INSTRUCTIONS: See electronic chart    I d/w pt and family the available results, management plan, medications changes, and discharge instructions including follow ups as outlined above.   Scripts sent to pharmacy.      Hospital Discharge Diagnoses:  lymphoma    Lace+ Score: 57  59-90 High Risk  29-58 Medium Risk  0-28   Low Risk.    TCM Follow-Up Recommendation:  LACE 29-58: Moderate Risk of readmission after discharge from the hospital.        JULIAN REDDY MD 8/17/2024    Time spent:  > 30 minutes

## 2024-08-17 NOTE — PLAN OF CARE
Patient alert and oriented, vital signs stable on room air, ambulating independently, voiding freely. Having bowel movements. Denies pain. Mild nausea. Tolerating diet. Patient cleared for discharge. PICC line removed per order. Call light within reach, fall precautions in place, patient calling appropriately.     Problem: Patient Centered Care  Goal: Patient preferences are identified and integrated in the patient's plan of care  Description: Interventions:  - What would you like us to know as we care for you? From home with mom  - Provide timely, complete, and accurate information to patient/family  - Incorporate patient and family knowledge, values, beliefs, and cultural backgrounds into the planning and delivery of care  - Encourage patient/family to participate in care and decision-making at the level they choose  - Honor patient and family perspectives and choices  Outcome: Adequate for Discharge     Problem: Patient/Family Goals  Goal: Patient/Family Long Term Goal  Description: Patient's Long Term Goal: Discharge without complications    Interventions:  -- Work with treatment team to develop plan of care  - Utilize medications as appropriate, take prescribed medications as instructed  - Continue following up post discharge as instructed with appropriate physicians  - See additional Care Plan goals for specific interventions  Outcome: Adequate for Discharge  Goal: Patient/Family Short Term Goal  Description: Patient's Short Term Goal: Pain/Nausea Control    Interventions:   - Work with treatment team to develop pain control plan  - Utilize PRN medications appropriately  - Use non-pharmacological pain control techniques (deep breathing, guided-imagery, heat/ice)  - See additional Care Plan goals for specific interventions  Outcome: Adequate for Discharge     Problem: PAIN - ADULT  Goal: Verbalizes/displays adequate comfort level or patient's stated pain goal  Description: INTERVENTIONS:  - Encourage pt to  monitor pain and request assistance  - Assess pain using appropriate pain scale  - Administer analgesics based on type and severity of pain and evaluate response  - Implement non-pharmacological measures as appropriate and evaluate response  - Consider cultural and social influences on pain and pain management  - Manage/alleviate anxiety  - Utilize distraction and/or relaxation techniques  - Monitor for opioid side effects  - Notify MD/LIP if interventions unsuccessful or patient reports new pain  - Anticipate increased pain with activity and pre-medicate as appropriate  Outcome: Adequate for Discharge     Problem: RISK FOR INFECTION - ADULT  Goal: Absence of fever/infection during anticipated neutropenic period  Description: INTERVENTIONS  - Monitor WBC  - Administer growth factors as ordered  - Implement neutropenic guidelines  Outcome: Adequate for Discharge     Problem: SAFETY ADULT - FALL  Goal: Free from fall injury  Description: INTERVENTIONS:  - Assess pt frequently for physical needs  - Identify cognitive and physical deficits and behaviors that affect risk of falls.  - [place] fall precautions as indicated by assessment.  - Educate pt/family on patient safety including physical limitations  - Instruct pt to call for assistance with activity based on assessment  - Modify environment to reduce risk of injury  - Provide assistive devices as appropriate  - Consider OT/PT consult to assist with strengthening/mobility  - Encourage toileting schedule  Outcome: Adequate for Discharge     Problem: DISCHARGE PLANNING  Goal: Discharge to home or other facility with appropriate resources  Description: INTERVENTIONS:  - Identify barriers to discharge w/pt and caregiver  - Include patient/family/discharge partner in discharge planning  - Arrange for needed discharge resources and transportation as appropriate  - Identify discharge learning needs (meds, wound care, etc)  - Arrange for interpreters to assist at  discharge as needed  - Consider post-discharge preferences of patient/family/discharge partner  - Complete POLST form as appropriate  - Assess patient's ability to be responsible for managing their own health  - Refer to Case Management Department for coordinating discharge planning if the patient needs post-hospital services based on physician/LIP order or complex needs related to functional status, cognitive ability or social support system  Outcome: Adequate for Discharge     Problem: GASTROINTESTINAL - ADULT  Goal: Minimal or absence of nausea and vomiting  Description: INTERVENTIONS:  - Maintain adequate hydration with IV or PO as ordered and tolerated  - Nasogastric tube to low intermittent suction as ordered  - Evaluate effectiveness of ordered antiemetic medications  - Provide nonpharmacologic comfort measures as appropriate  - Advance diet as tolerated, if ordered  - Obtain nutritional consult as needed  - Evaluate fluid balance  Outcome: Adequate for Discharge  Goal: Maintains or returns to baseline bowel function  Description: INTERVENTIONS:  - Assess bowel function  - Maintain adequate hydration with IV or PO as ordered and tolerated  - Evaluate effectiveness of GI medications  - Encourage mobilization and activity  - Obtain nutritional consult as needed  - Establish a toileting routine/schedule  - Consider collaborating with pharmacy to review patient's medication profile  Outcome: Adequate for Discharge     Problem: METABOLIC/FLUID AND ELECTROLYTES - ADULT  Goal: Electrolytes maintained within normal limits  Description: INTERVENTIONS:  - Monitor labs and rhythm and assess patient for signs and symptoms of electrolyte imbalances  - Administer electrolyte replacement as ordered  - Monitor response to electrolyte replacements, including rhythm and repeat lab results as appropriate  - Fluid restriction as ordered  - Instruct patient on fluid and nutrition restrictions as appropriate  Outcome: Adequate for  Discharge     Problem: HEMATOLOGIC - ADULT  Goal: Maintains hematologic stability  Description: INTERVENTIONS  - Assess for signs and symptoms of bleeding or hemorrhage  - Monitor labs and vital signs for trends  - Administer supportive blood products/factors, fluids and medications as ordered and appropriate  - Administer supportive blood products/factors as ordered and appropriate  Outcome: Adequate for Discharge

## 2024-08-19 ENCOUNTER — TELEPHONE (OUTPATIENT)
Dept: HEMATOLOGY/ONCOLOGY | Facility: HOSPITAL | Age: 59
End: 2024-08-19

## 2024-08-19 ENCOUNTER — NURSE ONLY (OUTPATIENT)
Dept: HEMATOLOGY/ONCOLOGY | Facility: HOSPITAL | Age: 59
End: 2024-08-19
Attending: INTERNAL MEDICINE
Payer: COMMERCIAL

## 2024-08-19 DIAGNOSIS — C83.38 DIFFUSE LARGE B-CELL LYMPHOMA OF LYMPH NODES OF MULTIPLE REGIONS (HCC): Primary | ICD-10-CM

## 2024-08-19 LAB
BASOPHILS # BLD AUTO: 0 X10(3) UL (ref 0–0.2)
BASOPHILS NFR BLD AUTO: 0 %
DEPRECATED RDW RBC AUTO: 54.4 FL (ref 35.1–46.3)
EOSINOPHIL # BLD AUTO: 0 X10(3) UL (ref 0–0.7)
EOSINOPHIL NFR BLD AUTO: 0 %
ERYTHROCYTE [DISTWIDTH] IN BLOOD BY AUTOMATED COUNT: 18.3 % (ref 11–15)
HCT VFR BLD AUTO: 31.9 %
HGB BLD-MCNC: 9.5 G/DL
IMM GRANULOCYTES # BLD AUTO: 0.02 X10(3) UL (ref 0–1)
IMM GRANULOCYTES NFR BLD: 0.5 %
LYMPHOCYTES # BLD AUTO: 0.22 X10(3) UL (ref 1–4)
LYMPHOCYTES NFR BLD AUTO: 5 %
MCH RBC QN AUTO: 24.5 PG (ref 26–34)
MCHC RBC AUTO-ENTMCNC: 29.8 G/DL (ref 31–37)
MCV RBC AUTO: 82.4 FL
MONOCYTES # BLD AUTO: 0 X10(3) UL (ref 0.1–1)
MONOCYTES NFR BLD AUTO: 0 %
NEUTROPHILS # BLD AUTO: 4.17 X10 (3) UL (ref 1.5–7.7)
NEUTROPHILS # BLD AUTO: 4.17 X10(3) UL (ref 1.5–7.7)
NEUTROPHILS NFR BLD AUTO: 94.5 %
PLATELET # BLD AUTO: 595 10(3)UL (ref 150–450)
RBC # BLD AUTO: 3.87 X10(6)UL
WBC # BLD AUTO: 4.4 X10(3) UL (ref 4–11)

## 2024-08-19 PROCEDURE — 85025 COMPLETE CBC W/AUTO DIFF WBC: CPT

## 2024-08-19 PROCEDURE — 96372 THER/PROPH/DIAG INJ SC/IM: CPT

## 2024-08-19 PROCEDURE — 36415 COLL VENOUS BLD VENIPUNCTURE: CPT

## 2024-08-19 NOTE — TELEPHONE ENCOUNTER
Patient had CBC drawn today for possible transfusion tomorrow. Dr. Hidalgo reviewed labs and no transfusion needed at this time. Left voicemail for patient that appointment tomorrow will be canceled and patient's next appointment is on 9/3/2024. Number to clinic given for any questions/concerns.

## 2024-08-19 NOTE — PAYOR COMM NOTE
--------------  DISCHARGE REVIEW    Payor: [facility] [employer]  Subscriber #:  [id number]  Authorization Number: [id number]    Admit date: 8/15/24  Admit time:   8:36 AM  Discharge Date: 2024  5:51 PM     Admitting Physician: Dayna Hidalgo MD  Attending Physician:  No att. providers found  Primary Care Physician: Hillary Prado MD          Discharge Summary Notes        Discharge Summary signed by Katelin Ash MD at 2024  6:10 PM       Author: Katelin Ash MD Specialty: HOSPITALIST Author Type: Physician    Filed: 2024  6:10 PM Date of Service: 2024  6:02 PM Status: Signed    : Katelin Ash MD (Physician)           [facility]     Kimberly Quezada Patient Status:  Inpatient    10/31/1965 MRN [id number]   Location [facility] 4W//SE Attending No att. providers found   Hosp Day # 2 PCP Hillary Prado MD     Date of Admission: 8/15/2024  Date of Discharge: 2024  Discharge Disposition: Home or Self Care    Admitting Chief Complaint:   Diffuse large B cell lymphoma (HCC) [C83.30]    PCP: Hillary Prado MD    Discharge Diagnosis:   Diffuse large B-cell lymphoma   initiation of chemotherapy        Anemia of malignancy        History of Present Illness:   Per Dr. Ro    The patient is a 58-year-old  female who self-palpated left neck and left axillary lymph nodes, and imaging study showed diffuse lymphadenopathy plus positive PET scan which showed neck, chest, and abdominal lymphadenopathy highly suggestive for lymphoma.  She had biopsy-proven diffuse large B-cell lymphoma and scheduled today for initiation of chemotherapy.         Brief Synopsis:   Diffuse large B-cell lymphoma involving lymph nodes in neck, chest, abdomen, and spleen.    -Here for initiation of chemotherapy [id number].    She received an infusion of polatuzumab the day prior admission .     Prednisone x 5 days  (8/13- 8/17)  -per onc, Dr. Hidalgo  -monitor labs and hemodynamic status-->stable  -ECHO EF nl  -PICC removed on discharge (plans for outpatient port)  -acyclovir prophylaxis     High potential for tumor lysis syndrome.    -IV fluids.    -follow laboratory studies including CBC, BMP, phos, LDH, and uric acid daily    -Continue allopurinol and prednisone.    -stable  -Oncology consulted.      Anemia, expected due to the above  -transfuse to keep hgb >7 and PLT>10,000 per onc   -received PRBC x1 on admission for hgb 6.2 -->7.5  -plans for neulasta injection on Monday (in 2 days)     DVT prophylaxis: lovenox     Dispo: home       D/w pt, her mother at bedside  D/w hematology      Discharge Medication List:     Discharge Medications        START taking these medications        Instructions Prescription details   acyclovir 400 MG Tabs  Commonly known as: Zovirax      Take 1 tablet (400 mg total) by mouth 2 (two) times daily.   Quantity: 60 tablet  Refills: 1            CHANGE how you take these medications        Instructions Prescription details   Ferrous Sulfate 325 (65 Fe) MG Tabs  What changed: when to take this      Take 1 tablet (325 mg total) by mouth 2 (two) times daily.   Quantity: 60 tablet  Refills: 5            CONTINUE taking these medications        Instructions Prescription details   allopurinol 300 MG Tabs  Commonly known as: Zyloprim      Take 1 tablet (300 mg total) by mouth daily.   Quantity: 30 tablet  Refills: 0     docusate sodium 100 MG Caps  Commonly known as: Colace      Take 1 capsule (100 mg total) by mouth 2 (two) times daily.   Quantity: 60 capsule  Refills: 5     folic acid 1 MG Tabs  Commonly known as: Folvite      TAKE 1 TABLET BY MOUTH DAILY   Quantity: 30 tablet  Refills: 3     ondansetron 8 MG tablet  Commonly known as: Zofran      Take 1 tablet (8 mg total) by mouth every 8 (eight) hours as needed for Nausea.   Quantity: 30 tablet  Refills: 3     predniSONE 50 MG Tabs  Commonly known  as: Deltasone      Take 2 tablets (100 mg total) by mouth daily. , Days 1-5 of each cycle.  Take with food.   Quantity: 60 tablet  Refills: 0     prochlorperazine 10 mg tablet  Commonly known as: Compazine      Take 1 tablet (10 mg total) by mouth every 6 (six) hours as needed for Nausea.   Quantity: 60 tablet  Refills: 3               Where to Get Your Medications        These medications were sent to [facility] [id number] [address] [phone], [phone]  [address] [place] [id number]      Phone: [phone]   acyclovir 400 MG Tabs         Follow-up appointment:   Dayna Hidalgo MD  [address] [id number] [phone]    Call  As needed and attend upcoming scheduled appointments.      Vital signs:  Temp:  [97.7 °F (36.5 °C)-97.8 °F (36.6 °C)] 97.7 °F (36.5 °C)  Pulse:  [89-93] 92  Resp:  [16-20] 20  BP: (110-121)/(70-83) 121/83  SpO2:  [96 %-97 %] 97 %    Physical Exam:    General: No acute distress. Doing well  Respiratory: Clear to auscultation bilaterally. No wheezes. No rhonchi.  Cardiovascular: S1, S2. Regular rate and rhythm. No murmurs, rubs or gallops.   Abdomen: Soft, nontender, nondistended.  Positive bowel sounds. No rebound or guarding.  Neurologic: No focal neurological deficits.   Musculoskeletal: Moves all extremities.  Extremities: No edema.  -----------------------------------------------------------------------------------------------  PATIENT DISCHARGE INSTRUCTIONS: See electronic chart    I d/w pt and family the available results, management plan, medications changes, and discharge instructions including follow ups as outlined above.   Scripts sent to pharmacy.      Hospital Discharge Diagnoses:  lymphoma    Lace+ Score: 57  59-90 High Risk  29-58 Medium Risk  0-28   Low Risk.    TCM Follow-Up Recommendation:  LACE 29-58: Moderate Risk of readmission after discharge from the hospital.        JULIAN REDDY MD  8/17/2024    Time spent:  > 30 minutes      Electronically signed by Katelin Ash MD on 8/17/2024  6:10 PM         REVIEWER COMMENTS

## 2024-08-19 NOTE — TELEPHONE ENCOUNTER
Called patient to let her know Hgb 9.5 per Dr. Hidalgo no transfusion tomorrow.  We discussed port a cath - what it is how we use it process for placing port with IR - patient stated understanding that IR will call to schedule port placement and that transfusion tomorrow canceled.

## 2024-08-20 ENCOUNTER — APPOINTMENT (OUTPATIENT)
Dept: HEMATOLOGY/ONCOLOGY | Facility: HOSPITAL | Age: 59
End: 2024-08-20
Attending: INTERNAL MEDICINE
Payer: COMMERCIAL

## 2024-08-20 ENCOUNTER — TELEPHONE (OUTPATIENT)
Dept: HEMATOLOGY/ONCOLOGY | Facility: HOSPITAL | Age: 59
End: 2024-08-20

## 2024-08-20 NOTE — TELEPHONE ENCOUNTER
Left message for Kimberly to call me back to discuss port a cath and scheduling of the port. Left number for her to call back.

## 2024-08-21 ENCOUNTER — TELEPHONE (OUTPATIENT)
Dept: HEMATOLOGY/ONCOLOGY | Facility: HOSPITAL | Age: 59
End: 2024-08-21

## 2024-08-21 NOTE — TELEPHONE ENCOUNTER
Spoke with patient regarding her hesitation regarding port placement. Discussed the benefits of the port and how it makes chemotherapy administration safer. She can not get the chemo safely thru a PIV. She states understanding and states she is just nervous and anxious about doing it but will proceed with scheduling. Transferred the call to scheduling in IR.

## 2024-08-25 ENCOUNTER — TELEPHONE (OUTPATIENT)
Dept: ADMINISTRATIVE | Age: 59
End: 2024-08-25

## 2024-08-25 NOTE — TELEPHONE ENCOUNTER
Jerry Leos Disab /RTW forms for Dr Reynolds in Forms Dept. PRISCILLA missing. Micreoshart message sent for PRISCILLA.

## 2024-08-25 NOTE — TELEPHONE ENCOUNTER
Jerry Leos Disab /RTW forms for Dr Reynolds in Forms Dept. PRISCILLA missing. Didatuan message sent for PRISCILLA. Forms logged for processing.

## 2024-08-27 ENCOUNTER — DOCUMENTATION ONLY (OUTPATIENT)
Dept: HEMATOLOGY/ONCOLOGY | Facility: HOSPITAL | Age: 59
End: 2024-08-27

## 2024-08-27 ENCOUNTER — TELEPHONE (OUTPATIENT)
Dept: HEMATOLOGY/ONCOLOGY | Facility: HOSPITAL | Age: 59
End: 2024-08-27

## 2024-08-27 ENCOUNTER — PATIENT MESSAGE (OUTPATIENT)
Dept: HEMATOLOGY/ONCOLOGY | Facility: HOSPITAL | Age: 59
End: 2024-08-27

## 2024-08-27 NOTE — TELEPHONE ENCOUNTER
[facility] is calling to confirm fax was received for \"Expert Written Review\" and would like to know if Dr. Hidalgo would be interested in clinical collaboration.  [phone]   Called 8/27/24. MP

## 2024-08-27 NOTE — TELEPHONE ENCOUNTER
From: Kimberly Quezada  To: Martha Reynolds  Sent: 8/27/2024 2:02 PM CDT  Subject: STD paperwork    Hi,  I received the paperwork for the approval for the short term disability case number [id number] to be completed by physician.   Do I drop off the paperwork to you or can I email you the paperwork?   I’m not sure how that works.     Thank you.    Kimberly

## 2024-08-29 ENCOUNTER — HOSPITAL ENCOUNTER (OUTPATIENT)
Dept: INTERVENTIONAL RADIOLOGY/VASCULAR | Facility: HOSPITAL | Age: 59
Discharge: HOME | End: 2024-08-29
Attending: STUDENT IN AN ORGANIZED HEALTH CARE EDUCATION/TRAINING PROGRAM | Admitting: RADIOLOGY
Payer: COMMERCIAL

## 2024-08-29 VITALS
TEMPERATURE: 97 F | SYSTOLIC BLOOD PRESSURE: 128 MMHG | HEIGHT: 63 IN | HEART RATE: 79 BPM | DIASTOLIC BLOOD PRESSURE: 80 MMHG | OXYGEN SATURATION: 97 % | RESPIRATION RATE: 23 BRPM | BODY MASS INDEX: 25.34 KG/M2 | WEIGHT: 143 LBS

## 2024-08-29 DIAGNOSIS — C83.30 DIFFUSE LARGE B-CELL LYMPHOMA, UNSPECIFIED BODY REGION (HCC): ICD-10-CM

## 2024-08-29 PROCEDURE — 99152 MOD SED SAME PHYS/QHP 5/>YRS: CPT | Performed by: RADIOLOGY

## 2024-08-29 PROCEDURE — 0JH60WZ INSERTION OF TOTALLY IMPLANTABLE VASCULAR ACCESS DEVICE INTO CHEST SUBCUTANEOUS TISSUE AND FASCIA, OPEN APPROACH: ICD-10-PCS | Performed by: RADIOLOGY

## 2024-08-29 PROCEDURE — 36561 INSERT TUNNELED CV CATH: CPT | Performed by: RADIOLOGY

## 2024-08-29 PROCEDURE — 77001 FLUOROGUIDE FOR VEIN DEVICE: CPT | Performed by: RADIOLOGY

## 2024-08-29 PROCEDURE — 02HV33Z INSERTION OF INFUSION DEVICE INTO SUPERIOR VENA CAVA, PERCUTANEOUS APPROACH: ICD-10-PCS | Performed by: RADIOLOGY

## 2024-08-29 RX ORDER — ACETAMINOPHEN 325 MG/1
650 TABLET ORAL EVERY 6 HOURS PRN
Status: DISCONTINUED | OUTPATIENT
Start: 2024-08-29 | End: 2024-08-29

## 2024-08-29 RX ORDER — MIDAZOLAM HYDROCHLORIDE 1 MG/ML
INJECTION INTRAMUSCULAR; INTRAVENOUS
Status: COMPLETED
Start: 2024-08-29 | End: 2024-08-29

## 2024-08-29 RX ORDER — ACETAMINOPHEN 325 MG/1
TABLET ORAL
Status: COMPLETED
Start: 2024-08-29 | End: 2024-08-29

## 2024-08-29 RX ORDER — SODIUM CHLORIDE 9 MG/ML
INJECTION, SOLUTION INTRAVENOUS CONTINUOUS
Status: DISCONTINUED | OUTPATIENT
Start: 2024-08-29 | End: 2024-08-29

## 2024-08-29 RX ORDER — LIDOCAINE HYDROCHLORIDE 20 MG/ML
INJECTION, SOLUTION INFILTRATION; PERINEURAL
Status: COMPLETED
Start: 2024-08-29 | End: 2024-08-29

## 2024-08-29 RX ADMIN — ACETAMINOPHEN 650 MG: 325 TABLET ORAL at 10:11:00

## 2024-08-29 RX ADMIN — SODIUM CHLORIDE: 9 INJECTION, SOLUTION INTRAVENOUS at 08:15:00

## 2024-08-29 NOTE — IVS NOTE
DISCHARGE NOTE     Pt is able to sit up and ambulate without difficulty.   Pt voided and tolerated fluids and food.   Procedural site remains dry and intact with good circulation, motion, and sensation.   No signs and symptoms of bleeding/hematoma noted.   IV access removed  Instruction provided, patient/family verbalizes understanding.       Pt discharge via wheelchair to [facility]       Follow up Appointment: none    New Prescription: none

## 2024-08-29 NOTE — INTERVAL H&P NOTE
The above referenced H&P was reviewed by Alfonso Salazar MD on 8/29/2024, the patient was examined and no significant changes have occurred in the patient's condition since the H&P was performed.  Risks, benefits, alternative treatments and consequences of no treatment were discussed.  We will proceed with procedure as planned.      Alfonso Salazar MD  8/29/2024  8:11 AM

## 2024-08-29 NOTE — DISCHARGE INSTRUCTIONS
INTERVENTIONAL RADIOLOGY  [facility]  [phone]       Procedure:  Chest Port Insertion     Site Care: Dermabond (skin glue) has been applied to your incision.  Do not scrub the area.  Allow the Dermabond to flake off on its own.   NO showering for 48 hours. After 48 hours, wash with mild soap and water then dab dry. NO ointment, powder, or cream to the site. NO bathtubs or swimming pools.                                     Activity/Diet  No heavy lifting or strenuous activity for 48 hours.  Drink plenty of fluids, unless you have otherwise been told to restrict your fluid intake.  Do not drink alcohol for 24 hours.  Do not drive,  operate heavy machinery, make important decisions or sign legal documents today.    Medications:  Make no changes to your existing medications.    Contact Interventional Radiology at [phone] if you have severe/unrelieved pain, fever, chills, dizziness/lightheadedness, or drainage/bleeding from your incision site.

## 2024-08-30 PROBLEM — Z45.2 ENCOUNTER FOR CARE RELATED TO PORT-A-CATH: Status: ACTIVE | Noted: 2024-08-30

## 2024-09-03 ENCOUNTER — OFFICE VISIT (OUTPATIENT)
Dept: HEMATOLOGY/ONCOLOGY | Facility: HOSPITAL | Age: 59
End: 2024-09-03
Attending: INTERNAL MEDICINE
Payer: COMMERCIAL

## 2024-09-03 VITALS
RESPIRATION RATE: 16 BRPM | SYSTOLIC BLOOD PRESSURE: 131 MMHG | BODY MASS INDEX: 27.09 KG/M2 | WEIGHT: 152.88 LBS | TEMPERATURE: 98 F | OXYGEN SATURATION: 99 % | HEART RATE: 87 BPM | DIASTOLIC BLOOD PRESSURE: 80 MMHG | HEIGHT: 63 IN

## 2024-09-03 DIAGNOSIS — C83.38 DIFFUSE LARGE B-CELL LYMPHOMA OF LYMPH NODES OF MULTIPLE REGIONS (HCC): Primary | ICD-10-CM

## 2024-09-03 DIAGNOSIS — C83.30 DIFFUSE LARGE B-CELL LYMPHOMA, UNSPECIFIED BODY REGION (HCC): Primary | ICD-10-CM

## 2024-09-03 LAB
ALBUMIN SERPL-MCNC: 4 G/DL (ref 3.2–4.8)
ALBUMIN/GLOB SERPL: 1.4 {RATIO} (ref 1–2)
ALP LIVER SERPL-CCNC: 114 U/L
ALT SERPL-CCNC: 27 U/L
ANION GAP SERPL CALC-SCNC: 7 MMOL/L (ref 0–18)
AST SERPL-CCNC: 25 U/L (ref ?–34)
BASOPHILS # BLD AUTO: 0.06 X10(3) UL (ref 0–0.2)
BASOPHILS NFR BLD AUTO: 0.9 %
BILIRUB SERPL-MCNC: 0.3 MG/DL (ref 0.3–1.2)
BUN BLD-MCNC: 9 MG/DL (ref 9–23)
BUN/CREAT SERPL: 13.4 (ref 10–20)
CALCIUM BLD-MCNC: 9.7 MG/DL (ref 8.7–10.4)
CHLORIDE SERPL-SCNC: 112 MMOL/L (ref 98–112)
CO2 SERPL-SCNC: 26 MMOL/L (ref 21–32)
CREAT BLD-MCNC: 0.67 MG/DL
DEPRECATED RDW RBC AUTO: 67.6 FL (ref 35.1–46.3)
EGFRCR SERPLBLD CKD-EPI 2021: 103 ML/MIN/1.73M2 (ref 60–?)
EOSINOPHIL # BLD AUTO: 0.25 X10(3) UL (ref 0–0.7)
EOSINOPHIL NFR BLD AUTO: 3.9 %
ERYTHROCYTE [DISTWIDTH] IN BLOOD BY AUTOMATED COUNT: 21.9 % (ref 11–15)
GLOBULIN PLAS-MCNC: 2.8 G/DL (ref 2–3.5)
GLUCOSE BLD-MCNC: 110 MG/DL (ref 70–99)
HCT VFR BLD AUTO: 33.9 %
HGB BLD-MCNC: 10.3 G/DL
IMM GRANULOCYTES # BLD AUTO: 0.25 X10(3) UL (ref 0–1)
IMM GRANULOCYTES NFR BLD: 3.9 %
LYMPHOCYTES # BLD AUTO: 1.22 X10(3) UL (ref 1–4)
LYMPHOCYTES NFR BLD AUTO: 18.9 %
MCH RBC QN AUTO: 26.5 PG (ref 26–34)
MCHC RBC AUTO-ENTMCNC: 30.4 G/DL (ref 31–37)
MCV RBC AUTO: 87.1 FL
MONOCYTES # BLD AUTO: 0.42 X10(3) UL (ref 0.1–1)
MONOCYTES NFR BLD AUTO: 6.5 %
NEUTROPHILS # BLD AUTO: 4.25 X10 (3) UL (ref 1.5–7.7)
NEUTROPHILS # BLD AUTO: 4.25 X10(3) UL (ref 1.5–7.7)
NEUTROPHILS NFR BLD AUTO: 65.9 %
OSMOLALITY SERPL CALC.SUM OF ELEC: 299 MOSM/KG (ref 275–295)
PLATELET # BLD AUTO: 236 10(3)UL (ref 150–450)
PLATELET MORPHOLOGY: NORMAL
POTASSIUM SERPL-SCNC: 3.6 MMOL/L (ref 3.5–5.1)
PROT SERPL-MCNC: 6.8 G/DL (ref 5.7–8.2)
RBC # BLD AUTO: 3.89 X10(6)UL
SODIUM SERPL-SCNC: 145 MMOL/L (ref 136–145)
WBC # BLD AUTO: 6.5 X10(3) UL (ref 4–11)

## 2024-09-03 PROCEDURE — 85025 COMPLETE CBC W/AUTO DIFF WBC: CPT

## 2024-09-03 PROCEDURE — 99215 OFFICE O/P EST HI 40 MIN: CPT | Performed by: STUDENT IN AN ORGANIZED HEALTH CARE EDUCATION/TRAINING PROGRAM

## 2024-09-03 PROCEDURE — 80053 COMPREHEN METABOLIC PANEL: CPT

## 2024-09-03 PROCEDURE — 36415 COLL VENOUS BLD VENIPUNCTURE: CPT

## 2024-09-03 RX ORDER — ACETAMINOPHEN 325 MG/1
650 TABLET ORAL ONCE
Status: CANCELLED | OUTPATIENT
Start: 2024-09-04

## 2024-09-03 RX ORDER — DIPHENHYDRAMINE HCL 25 MG
25 CAPSULE ORAL ONCE
Status: CANCELLED | OUTPATIENT
Start: 2024-09-04

## 2024-09-03 RX ORDER — DOXORUBICIN HYDROCHLORIDE 2 MG/ML
50 INJECTION, SOLUTION INTRAVENOUS ONCE
Status: CANCELLED | OUTPATIENT
Start: 2024-09-04

## 2024-09-03 NOTE — CONSULTS
Social History     Socioeconomic History    Marital status: Single     Spouse name: Not on file    Number of children: Not on file    Years of education: Not on file    Highest education level: Not on file   Occupational History    Not on file   Tobacco Use    Smoking status: Never     Passive exposure: Yes    Smokeless tobacco: Never    Tobacco comments:     Social smoker. Pt does not remember when she stopped   Vaping Use    Vaping status: Never Used   Substance and Sexual Activity    Alcohol use: Yes     Comment: social    Drug use: No    Sexual activity: Not on file   Other Topics Concern    Grew up on a farm Not Asked    History of tanning Not Asked    Outdoor occupation Not Asked    Pt has a pacemaker No    Pt has a defibrillator No    Breast feeding Not Asked    Reaction to local anesthetic No    Caffeine Concern Not Asked    Exercise Not Asked    Seat Belt Not Asked    Special Diet Not Asked    Stress Concern Not Asked    Weight Concern Not Asked    Left Handed Not Asked    Right Handed Yes    Currently spends a great deal of time in the sun Not Asked    Past Sunlamp Treatments for Acne Not Asked    Hx of Spending Great Deal of Time in Sun Not Asked    Bad sunburns in the past Not Asked    Tanning Salons in the Past Not Asked    Hx of Radiation Treatments Not Asked    Regular use of sun block Not Asked   Social History Narrative    Not on file     Social Determinants of Health     Financial Resource Strain: Not on file   Food Insecurity: No Food Insecurity (8/15/2024)    Food Insecurity     Food Insecurity: Never true   Transportation Needs: No Transportation Needs (8/15/2024)    Transportation Needs     Lack of Transportation: No     Car Seat: Not on file   Physical Activity: Not on file   Stress: Not on file   Social Connections: Not on file   Housing Stability: Low Risk  (8/15/2024)    Housing Stability     Housing Instability: No     Housing Instability Emergency: Not on file     David or Maddy:  Not on file     No family history on file.      PHYSICAL EXAM:    /80 (BP Location: Left arm, Patient Position: Sitting, Cuff Size: adult)   Pulse 87   Temp 98.1 °F (36.7 °C) (Oral)   Resp 16   Ht 1.6 m (5' 3\")   Wt 69.4 kg (152 lb 14.4 oz)   LMP 08/09/2018   SpO2 99%   BMI 27.09 kg/m²     Physical Exam        ASSESSMENT/PLAN:   No diagnosis found.    No orders of the defined types were placed in this encounter.      Results From Past 48 Hours:  Recent Results (from the past 48 hour(s))   CBC W Differential W Platelet    Collection Time: 09/03/24 10:25 AM   Result Value Ref Range    WBC 6.5 4.0 - 11.0 x10(3) uL    RBC 3.89 3.80 - 5.30 x10(6)uL    HGB 10.3 (L) 12.0 - 16.0 g/dL    HCT 33.9 (L) 35.0 - 48.0 %    MCV 87.1 80.0 - 100.0 fL    MCH 26.5 26.0 - 34.0 pg    MCHC 30.4 (L) 31.0 - 37.0 g/dL    RDW-SD 67.6 (H) 35.1 - 46.3 fL    RDW 21.9 (H) 11.0 - 15.0 %    .0 150.0 - 450.0 10(3)uL    Neutrophil Absolute Prelim 4.25 1.50 - 7.70 x10 (3) uL    Neutrophil Absolute 4.25 1.50 - 7.70 x10(3) uL    Lymphocyte Absolute 1.22 1.00 - 4.00 x10(3) uL    Monocyte Absolute 0.42 0.10 - 1.00 x10(3) uL    Eosinophil Absolute 0.25 0.00 - 0.70 x10(3) uL    Basophil Absolute 0.06 0.00 - 0.20 x10(3) uL    Immature Granulocyte Absolute 0.25 0.00 - 1.00 x10(3) uL    Neutrophil % 65.9 %    Lymphocyte % 18.9 %    Monocyte % 6.5 %    Eosinophil % 3.9 %    Basophil % 0.9 %    Immature Granulocyte % 3.9 %   Comp Metabolic Panel (14)    Collection Time: 09/03/24 10:25 AM   Result Value Ref Range    Glucose 110 (H) 70 - 99 mg/dL    Sodium 145 136 - 145 mmol/L    Potassium 3.6 3.5 - 5.1 mmol/L    Chloride 112 98 - 112 mmol/L    CO2 26.0 21.0 - 32.0 mmol/L    Anion Gap 7 0 - 18 mmol/L    BUN 9 9 - 23 mg/dL    Creatinine 0.67 0.55 - 1.02 mg/dL    BUN/CREA Ratio 13.4 10.0 - 20.0    Calcium, Total 9.7 8.7 - 10.4 mg/dL    Calculated Osmolality 299 (H) 275 - 295 mOsm/kg    eGFR-Cr 103 >=60 mL/min/1.73m2    ALT 27 10 - 49 U/L    AST  25 <34 U/L    Alkaline Phosphatase 114 46 - 118 U/L    Bilirubin, Total 0.3 0.3 - 1.2 mg/dL    Total Protein 6.8 5.7 - 8.2 g/dL    Albumin 4.0 3.2 - 4.8 g/dL    Globulin  2.8 2.0 - 3.5 g/dL    A/G Ratio 1.4 1.0 - 2.0    Patient Fasting for CMP? Patient not present        Meds This Visit:        Imaging & Referrals:  None   No orders of the defined types were placed in this encounter.

## 2024-09-03 NOTE — PROGRESS NOTES
Hematology/Oncology Initial Consultation Note    Patient Name: Kimberly Quezada  Medical Record Number: [id number]    YOB: 1965   Date of Consultation: 9/3/2024   PCP: Hillary Prado MD   Other providers:      Reason for Consultation:  Kimberly Quezada was seen today for the diagnosis of DLBCL     Oncologic History:       March 2024: Anemia with MCV of 72.3.  He also endorsed having decreased appetite and weight loss of 25 pounds.  2024: Had EGD and colonoscopy which were normal.     Had a CT of the chest abdomen pelvis after she noted a neck masd which showed bilateral lymphadenopathy in her neck, chest, left axilla and retroperitoneal region.     July 2024: Axillary lymph node excisional biopsy.  Sent to [facility] for additional workup and was noted to have at DLBCL NOS.    August 14th : Cycle 1 Miguel-R-CHP  ===================================================  History of Present Illness:      Patient presents to the clinic with her mother.  Denies having any fevers.  Night sweats have improved.  Energy levels have improved and she is able to do her activities independently and  able to go to grocery stores.\  Has noticed decrease in the swelling in her left neck.  Having any loose stools or neuropathy or chest discomfort or shortness of breath or leg swelling.  It is improved and patient has been eating very well.    Patient has noticed a rash in her neck and lower jaw.  Started around 28 August.  Has history of rosacea and has used doxycycline in the past.  And patient mentions that this feels similar.    Past Medical History:  Past Medical History:    Diverticulosis large intestine w/o perforation or abscess w/o bleeding    Gastropathy    High cholesterol    History of blood transfusion    NO ADVERSE REACTIONS    Hx of motion sickness    Hyperlipidemia    Internal hemorrhoids without complication    Rosacea    Seasonal allergies    Visual impairment    GLASSES       Past Surgical History:    Procedure Laterality Date    Colonoscopy N/A 12/13/2018    Procedure: COLONOSCOPY;  Surgeon: Elizabet Gordon MD;  Location: [facility] ENDOSCOPY    Colonoscopy N/A 6/4/2024    Procedure: COLONOSCOPY and Polypectomy;  Surgeon: Deepak He MD;  Location: [facility] ENDOSCOPY    Other surgical history      Repair of anal fistula 2011       Home Medications:   acyclovir 400 MG Oral Tab Take 1 tablet (400 mg total) by mouth 2 (two) times daily. 60 tablet 1    prochlorperazine (COMPAZINE) 10 mg tablet Take 1 tablet (10 mg total) by mouth every 6 (six) hours as needed for Nausea. 60 tablet 3    ondansetron (ZOFRAN) 8 MG tablet Take 1 tablet (8 mg total) by mouth every 8 (eight) hours as needed for Nausea. 30 tablet 3    allopurinol 300 MG Oral Tab Take 1 tablet (300 mg total) by mouth daily. 30 tablet 0    FOLIC ACID 1 MG Oral Tab TAKE 1 TABLET BY MOUTH DAILY 30 tablet 3    Ferrous Sulfate 325 (65 Fe) MG Oral Tab Take 1 tablet (325 mg total) by mouth 2 (two) times daily. (Patient taking differently: Take 1 tablet (325 mg total) by mouth in the morning and 1 tablet (325 mg total) before bedtime.) 60 tablet 5    docusate sodium (COLACE) 100 MG Oral Cap Take 1 capsule (100 mg total) by mouth 2 (two) times daily. 60 capsule 5     -------  Current Outpatient Medications on File Prior to Visit   Medication Sig Dispense Refill    acyclovir 400 MG Oral Tab Take 1 tablet (400 mg total) by mouth 2 (two) times daily. 60 tablet 1    prochlorperazine (COMPAZINE) 10 mg tablet Take 1 tablet (10 mg total) by mouth every 6 (six) hours as needed for Nausea. 60 tablet 3    ondansetron (ZOFRAN) 8 MG tablet Take 1 tablet (8 mg total) by mouth every 8 (eight) hours as needed for Nausea. 30 tablet 3    allopurinol 300 MG Oral Tab Take 1 tablet (300 mg total) by mouth daily. 30 tablet 0    FOLIC ACID 1 MG Oral Tab TAKE 1 TABLET BY MOUTH DAILY 30 tablet 3    Ferrous Sulfate 325 (65 Fe) MG Oral Tab Take 1 tablet (325 mg total) by mouth 2  (two) times daily. (Patient taking differently: Take 1 tablet (325 mg total) by mouth in the morning and 1 tablet (325 mg total) before bedtime.) 60 tablet 5    docusate sodium (COLACE) 100 MG Oral Cap Take 1 capsule (100 mg total) by mouth 2 (two) times daily. 60 capsule 5     Current Facility-Administered Medications on File Prior to Visit   Medication Dose Route Frequency Provider Last Rate Last Admin    [COMPLETED] midazolam (Versed) 2 MG/2ML injection             [COMPLETED] fentaNYL (Sublimaze) 50 mcg/mL injection             [COMPLETED] heparin (Porcine) 100 Units/mL lock flush             [COMPLETED] heparin in sodium chloride 0.9% (Porcine) 2 Units/mL flush bag premix             [COMPLETED] lidocaine (Xylocaine) 2 % injection             [COMPLETED] ceFAZolin (Ancef) 2 g/10mL IV syringe premix             [COMPLETED] pegfilgrastim-jmdb (Fulphila) 6 MG/0.6ML SUBQ injection 6 mg  6 mg Subcutaneous Once Dayna Hidalgo MD   6 mg at 08/19/24 1445    [COMPLETED] potassium chloride (Klor-Con M20) tab 40 mEq  40 mEq Oral Once Katelin Ash MD   40 mEq at 08/17/24 0943    [COMPLETED] sodium chloride 0.9% infusion   Intravenous Once Dayna Hidalgo MD 10 mL/hr at 08/16/24 0945 New Bag at 08/16/24 0945    [COMPLETED] acetaminophen (Tylenol) tab 650 mg  650 mg Oral Once Dayna Hidalgo MD   650 mg at 08/15/24 1430    [COMPLETED] diphenhydrAMINE (Benadryl) cap/tab 50 mg  50 mg Oral Once Dayna Hidalgo MD   50 mg at 08/15/24 1430    [COMPLETED] predniSONE (Deltasone) tab 100 mg  100 mg Oral Once Dayna Hidalgo MD   100 mg at 08/15/24 1430    [COMPLETED] DOXOrubicin (Adriamycin) 2 mg/mL IV syringe 84 mg 42 mL  50 mg/m2 (Dosing Weight) Intravenous Once Dayna Hidalgo MD   84 mg at 08/15/24 1946    [COMPLETED] cyclophosphamide (Cytoxan) 1,280 mg in sodium chloride 0.9% 256.4 mL infusion  1,280 mg Intravenous Once Dayna Hidalgo MD   Stopped at 08/15/24 2055    [COMPLETED] fosaprepitant  (Emend) 150 mg in sodium chloride 0.9% 150 mL IVPB  150 mg Intravenous Once Dayna Hidalgo  mL/hr at 08/15/24 1450 150 mg at 08/15/24 1450    [COMPLETED] riTUXimab-abbs (Truxima) 600 mg in sodium chloride 0.9% 560 mL infusion  375 mg/m2 Intravenous Once Dayna Hidalgo MD   Stopped at 08/15/24 1930    [COMPLETED] ondansetron (Zofran) 16 mg in sodium chloride 0.9% 108 mL IVPB  16 mg Intravenous Once Dayna Hidalgo  mL/hr at 08/15/24 1436 16 mg at 08/15/24 1436    [COMPLETED] lidocaine PF (Xylocaine-MPF) 1% injection  5 mL Intradermal Once Dayna Hidalgo MD   5 mL at 08/15/24 1430    [COMPLETED] predniSONE (Deltasone) tab 100 mg  100 mg Oral Once Dayna Hidalgo MD   100 mg at 24 1005    [COMPLETED] ondansetron (Zofran) 16 mg in sodium chloride 0.9% 108 mL IVPB  16 mg Intravenous Once Dayna Hidalgo MD   Stopped at 24 0946    [COMPLETED] acetaminophen (Tylenol) tab 650 mg  650 mg Oral Once Dayna Hidalgo MD   650 mg at 24 0926    [COMPLETED] diphenhydrAMINE (Benadryl) cap/tab 25 mg  25 mg Oral Once Dayna Hidalgo MD   25 mg at 24 0926    [COMPLETED] polatuzumab vedotin (Polivy) 118 mg in sodium chloride 0.9% 105.9 mL infusion  1.8 mg/kg (Treatment Plan Recorded) Intravenous Once Dayna Hidalgo MD   Stopped at 24 1156    [] acetaminophen (Tylenol) 325 MG tab             [] diphenhydrAMINE (Benadryl) 25 MG cap/tab                Allergies:   Allergies   Allergen Reactions    Meloxicam ANGIOEDEMA     Upper and lower lip throbbing, tongue swelling, tongue ulcers    Contrave [Naltrexone-Bupropion Hcl Er] RASH    Wellbutrin [Bupropion] RASH    Simvastatin RASH       Psychosocial History:  Social History     Social History Narrative    Not on file     Social History     Socioeconomic History    Marital status: Single   Tobacco Use    Smoking status: Never     Passive exposure: Yes    Smokeless tobacco: Never    Tobacco comments:      Social smoker. Pt does not remember when she stopped   Vaping Use    Vaping status: Never Used   Substance and Sexual Activity    Alcohol use: Yes     Comment: social    Drug use: No   Other Topics Concern    Pt has a pacemaker No    Pt has a defibrillator No    Reaction to local anesthetic No    Right Handed Yes     Social Determinants of Health     Food Insecurity: No Food Insecurity (8/15/2024)    Food Insecurity     Food Insecurity: Never true   Transportation Needs: No Transportation Needs (8/15/2024)    Transportation Needs     Lack of Transportation: No   Housing Stability: Low Risk  (8/15/2024)    Housing Stability     Housing Instability: No       Family Medical History:  No family history on file.    Review of Systems:  A 10-point ROS was done with pertinent positives and negative per the HPI    Vital Signs:  Height: 160 cm (5' 3\") (09/03 1119)  Weight: 69.4 kg (152 lb 14.4 oz) (09/03 1119)  BSA (Calculated - sq m): 1.73 sq meters (09/03 1119)  Pulse: 87 (09/03 1119)  BP: 131/80 (09/03 1119)  Temp: 98.1 °F (36.7 °C) (09/03 1119)  Do Not Use - Resp Rate: --  SpO2: 99 % (09/03 1119)    Wt Readings from Last 6 Encounters:   09/03/24 69.4 kg (152 lb 14.4 oz)   08/21/24 64.9 kg (143 lb)   08/15/24 66 kg (145 lb 9 oz)   08/14/24 65.2 kg (143 lb 11.2 oz)   08/08/24 65.8 kg (145 lb)   07/22/24 65.8 kg (145 lb)       ECOG PS: 0    Physical Examination:  General: Patient is alert and oriented, not in acute distress  Psych: Mood and affect are appropriate  Eyes: EOMI, PERRL  ENT: Oropharynx is clear, no adenopathy  CV: Regular rate and rhythm, normal S1S2, no murmurs, no LE edema  Respiratory: Lungs clear to auscultation bilaterally  GI/Abd: Soft, non-tender with normoactive bowel sounds, no hepatosplenomegaly  Neurological: Grossly intact   Lymphatics: Left supraclavicular lymphadenopathy palpable.  No palpable axillary lymph nodes.  Skin: no rashes or petechiae  Lines: Mediport in tight chest.  No redness or  swelling.    Laboratory:  Lab Results   Component Value Date    WBC 6.5 09/03/2024    WBC 4.4 08/19/2024    WBC 8.5 08/17/2024    HGB 10.3 (L) 09/03/2024    HGB 9.5 (L) 08/19/2024    HGB 7.5 (L) 08/17/2024    HCT 33.9 (L) 09/03/2024    MCV 87.1 09/03/2024    MCH 26.5 09/03/2024    MCHC 30.4 (L) 09/03/2024    RDW 21.9 (H) 09/03/2024    .0 09/03/2024    .0 (H) 08/19/2024    .0 (H) 08/17/2024     Lab Results   Component Value Date     (H) 09/03/2024    BUN 9 09/03/2024    BUNCREA 13.4 09/03/2024    CREATSERUM 0.67 09/03/2024    CREATSERUM 0.48 (L) 08/17/2024    CREATSERUM 0.49 (L) 08/16/2024    ANIONGAP 7 09/03/2024    GFRNAA 103 02/07/2022    GFRAA 120 02/07/2022    CA 9.7 09/03/2024    OSMOCALC 299 (H) 09/03/2024    ALKPHO 114 09/03/2024    AST 25 09/03/2024    ALT 27 09/03/2024    ALKPHOS 41 09/21/2015    BILT 0.3 09/03/2024    TP 6.8 09/03/2024    ALB 4.0 09/03/2024    GLOBULIN 2.8 09/03/2024    AGRATIO 0.8 (L) 03/18/2024     09/03/2024    K 3.6 09/03/2024     09/03/2024    CO2 26.0 09/03/2024     No results found for: \"PTT\", \"PT\", \"INR\"    Imaging:      Head CT from July 2024 showing extensive adenopathy in her mediastinum, abdominal region.    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    Cycle 1 Miguel R-CHP on 8/14.  No toxicities.    Patient presents prior to cycle 2.  Labs okay to proceed with cycle 2: R-CHP with G-CSF    PET CT after cycle 2.        Anemia  -Secondary to iron deficiency and possible marrow involvement.  -Patient has low iron saturation.  Advise doing continue her oral iron    Discussed with patient that the risk of  perforation is higher during the cycle to of chemotherapy and to present to the ER or call us if she has worsening abdominal pain nausea vomiting or has blood in her stool.    Return in 3 weeks prior to cycle 3        Dayna Hidalgo MD  Hematology/Medical Oncology

## 2024-09-04 ENCOUNTER — OFFICE VISIT (OUTPATIENT)
Dept: HEMATOLOGY/ONCOLOGY | Facility: HOSPITAL | Age: 59
End: 2024-09-04
Attending: INTERNAL MEDICINE
Payer: COMMERCIAL

## 2024-09-04 VITALS
WEIGHT: 153 LBS | DIASTOLIC BLOOD PRESSURE: 73 MMHG | SYSTOLIC BLOOD PRESSURE: 126 MMHG | BODY MASS INDEX: 27.11 KG/M2 | HEIGHT: 63 IN | TEMPERATURE: 98 F | OXYGEN SATURATION: 99 % | RESPIRATION RATE: 16 BRPM | HEART RATE: 87 BPM

## 2024-09-04 DIAGNOSIS — C83.38 DIFFUSE LARGE B-CELL LYMPHOMA OF LYMPH NODES OF MULTIPLE REGIONS (HCC): Primary | ICD-10-CM

## 2024-09-04 PROCEDURE — 96415 CHEMO IV INFUSION ADDL HR: CPT

## 2024-09-04 PROCEDURE — 96367 TX/PROPH/DG ADDL SEQ IV INF: CPT

## 2024-09-04 PROCEDURE — 96549 UNLISTED CHEMOTHERAPY PX: CPT

## 2024-09-04 PROCEDURE — 96413 CHEMO IV INFUSION 1 HR: CPT

## 2024-09-04 PROCEDURE — 96375 TX/PRO/DX INJ NEW DRUG ADDON: CPT

## 2024-09-04 PROCEDURE — 96417 CHEMO IV INFUS EACH ADDL SEQ: CPT

## 2024-09-04 PROCEDURE — 96411 CHEMO IV PUSH ADDL DRUG: CPT

## 2024-09-04 RX ORDER — DIPHENHYDRAMINE HCL 25 MG
25 CAPSULE ORAL ONCE
Status: COMPLETED | OUTPATIENT
Start: 2024-09-04 | End: 2024-09-04

## 2024-09-04 RX ORDER — ACETAMINOPHEN 325 MG/1
650 TABLET ORAL ONCE
Status: COMPLETED | OUTPATIENT
Start: 2024-09-04 | End: 2024-09-04

## 2024-09-04 RX ORDER — DOXORUBICIN HYDROCHLORIDE 2 MG/ML
50 INJECTION, SOLUTION INTRAVENOUS ONCE
Status: COMPLETED | OUTPATIENT
Start: 2024-09-04 | End: 2024-09-04

## 2024-09-04 RX ORDER — DIPHENHYDRAMINE HCL 25 MG
CAPSULE ORAL
Status: COMPLETED
Start: 2024-09-04 | End: 2024-09-04

## 2024-09-04 RX ORDER — PREDNISONE 50 MG/1
100 TABLET ORAL ONCE
Status: COMPLETED | OUTPATIENT
Start: 2024-09-04 | End: 2024-09-04

## 2024-09-04 RX ORDER — ACETAMINOPHEN 325 MG/1
TABLET ORAL
Status: COMPLETED
Start: 2024-09-04 | End: 2024-09-04

## 2024-09-04 RX ADMIN — DIPHENHYDRAMINE HCL 25 MG: 25 MG CAPSULE ORAL at 08:19:00

## 2024-09-04 RX ADMIN — DOXORUBICIN HYDROCHLORIDE 84 MG: 2 INJECTION, SOLUTION INTRAVENOUS at 12:19:00

## 2024-09-04 RX ADMIN — PREDNISONE 100 MG: 50 TABLET ORAL at 08:22:00

## 2024-09-04 RX ADMIN — ACETAMINOPHEN 650 MG: 325 TABLET ORAL at 08:19:00

## 2024-09-04 NOTE — PROGRESS NOTES
Pt here for C2D1  Miguel R-CHP.  Arrives Ambulating independently, accompanied by Family member     Patient was evaluated today by Treatment Nurse.    Oral medications included in this regimen:  yes - prednisone  Pt did not take Prednisone prior to arrival, order received and dose given here.  Pt reinstructed on at home Predinisone schedule.    Patient confirms comprehension of cancer treatment schedule:  yes    Pregnancy screening:  Not applicable    Modifications in dose or schedule:  No    Medications appearance and physical integrity checked by RN: yes.    Chemotherapy IV pump settings verified by 2 RNs:  Yes.  Frequency of blood return and site check throughout administration: Prior to administration, Prior to each drug, and At completion of therapy     Infusion/treatment outcome:  patient tolerated treatment without incident    Education Record    Learner:  Patient  Barriers / Limitations:  None  Method:  Discussion  Education / instructions given:  plan of care  Outcome:  Shows understanding    Discharged Home, Ambulating independently, accompanied by:Family member    Patient/family verbalized understanding of future appointments: by [place]

## 2024-09-05 ENCOUNTER — NURSE ONLY (OUTPATIENT)
Dept: HEMATOLOGY/ONCOLOGY | Facility: HOSPITAL | Age: 59
End: 2024-09-05
Attending: INTERNAL MEDICINE
Payer: COMMERCIAL

## 2024-09-05 DIAGNOSIS — C83.38 DIFFUSE LARGE B-CELL LYMPHOMA OF LYMPH NODES OF MULTIPLE REGIONS (HCC): Primary | ICD-10-CM

## 2024-09-05 PROCEDURE — 96372 THER/PROPH/DIAG INJ SC/IM: CPT

## 2024-09-05 NOTE — TELEPHONE ENCOUNTER
Called patient to obtain details on forms [employer] - Please obtain details when patient called back- Forms are disability/FMLA/ RTW forms

## 2024-09-05 NOTE — TELEPHONE ENCOUNTER
Patient returned call:    Type of Leave: Intermittent and Continuous  Reason for Leave: Diffuse large B Cell lymphoma of lymph nodes, multiple regions  Start date of leave: 8/10/24  End date of leave: 02/02/25, Return to work 2/03/25, approx.  How many flare ups per month/length?: 1-5 flare ups/mo; each lasting 1-24 hr.  How many appts per month/length?:   Was Fee and Turnaround info Given?:

## 2024-09-06 NOTE — TELEPHONE ENCOUNTER
Patient called to get forms status advise patient that forms are currently being processed. Patient will be faxing over Release of Information and calling back to confirmed if it was received.

## 2024-09-06 NOTE — TELEPHONE ENCOUNTER
Dr. Reynolds      Please sign off on form if you agree to: Disability   Start date of leave: 8/10/24  End date of leave: 02/02/25,   Return to work 2/03/25, approx.   (place your signature on the first page only)    -From your Inbasket, Highlight the patient and click Chart   -Double click the 08/25/24 Forms Completion telephone encounter  -Scroll down to the Media section   -Click the blue Hyperlink: Disab Dr Reynolds 09/6/24  -Click Acknowledge located in the top right ribbon/menu   -Drag the mouse into the blank space of the document and a + sign will appear. Left click to   electronically sign the document.     Thank you,     Jihan

## 2024-09-09 NOTE — TELEPHONE ENCOUNTER
Patient called to check status on form being faxed. E-faxed form sent to emily [phone] Fax confirmation received.

## 2024-09-12 NOTE — TELEPHONE ENCOUNTER
Short term disability forms received that look like duplicates. Placed in oncology folder to confirm forms are dupes before archiving.

## 2024-09-18 RX ORDER — TRIAMCINOLONE ACETONIDE 1 MG/G
CREAM TOPICAL 2 TIMES DAILY
Qty: 60 G | Refills: 0 | Status: SHIPPED | OUTPATIENT
Start: 2024-09-18

## 2024-09-24 ENCOUNTER — HOSPITAL ENCOUNTER (OUTPATIENT)
Dept: NUCLEAR MEDICINE | Facility: HOSPITAL | Age: 59
Discharge: HOME | End: 2024-09-24
Attending: STUDENT IN AN ORGANIZED HEALTH CARE EDUCATION/TRAINING PROGRAM
Payer: COMMERCIAL

## 2024-09-24 ENCOUNTER — TELEPHONE (OUTPATIENT)
Dept: HEMATOLOGY/ONCOLOGY | Facility: HOSPITAL | Age: 59
End: 2024-09-24

## 2024-09-24 ENCOUNTER — NURSE ONLY (OUTPATIENT)
Dept: HEMATOLOGY/ONCOLOGY | Facility: HOSPITAL | Age: 59
End: 2024-09-24
Attending: STUDENT IN AN ORGANIZED HEALTH CARE EDUCATION/TRAINING PROGRAM
Payer: COMMERCIAL

## 2024-09-24 VITALS
RESPIRATION RATE: 18 BRPM | WEIGHT: 157 LBS | HEIGHT: 63 IN | HEART RATE: 78 BPM | DIASTOLIC BLOOD PRESSURE: 75 MMHG | TEMPERATURE: 98 F | OXYGEN SATURATION: 100 % | BODY MASS INDEX: 27.82 KG/M2 | SYSTOLIC BLOOD PRESSURE: 127 MMHG

## 2024-09-24 DIAGNOSIS — Z51.11 CHEMOTHERAPY MANAGEMENT, ENCOUNTER FOR: ICD-10-CM

## 2024-09-24 DIAGNOSIS — C83.38 DIFFUSE LARGE B-CELL LYMPHOMA OF LYMPH NODES OF MULTIPLE REGIONS (HCC): Primary | ICD-10-CM

## 2024-09-24 DIAGNOSIS — D64.9 ANEMIA, UNSPECIFIED TYPE: ICD-10-CM

## 2024-09-24 DIAGNOSIS — C83.30 DIFFUSE LARGE B-CELL LYMPHOMA, UNSPECIFIED BODY REGION (HCC): ICD-10-CM

## 2024-09-24 LAB
ALBUMIN SERPL-MCNC: 4.1 G/DL (ref 3.2–4.8)
ALBUMIN/GLOB SERPL: 1.6 {RATIO} (ref 1–2)
ALP LIVER SERPL-CCNC: 83 U/L
ALT SERPL-CCNC: 19 U/L
ANION GAP SERPL CALC-SCNC: 3 MMOL/L (ref 0–18)
AST SERPL-CCNC: 21 U/L (ref ?–34)
BASOPHILS # BLD AUTO: 0.03 X10(3) UL (ref 0–0.2)
BASOPHILS NFR BLD AUTO: 0.8 %
BILIRUB SERPL-MCNC: 0.5 MG/DL (ref 0.3–1.2)
BUN BLD-MCNC: 11 MG/DL (ref 9–23)
BUN/CREAT SERPL: 16.4 (ref 10–20)
CALCIUM BLD-MCNC: 9.9 MG/DL (ref 8.7–10.4)
CHLORIDE SERPL-SCNC: 112 MMOL/L (ref 98–112)
CO2 SERPL-SCNC: 27 MMOL/L (ref 21–32)
CREAT BLD-MCNC: 0.67 MG/DL
DEPRECATED HBV CORE AB SER IA-ACNC: 1384 NG/ML
DEPRECATED RDW RBC AUTO: 72.1 FL (ref 35.1–46.3)
EGFRCR SERPLBLD CKD-EPI 2021: 103 ML/MIN/1.73M2 (ref 60–?)
EOSINOPHIL # BLD AUTO: 0.15 X10(3) UL (ref 0–0.7)
EOSINOPHIL NFR BLD AUTO: 3.8 %
ERYTHROCYTE [DISTWIDTH] IN BLOOD BY AUTOMATED COUNT: 23.2 % (ref 11–15)
GLOBULIN PLAS-MCNC: 2.5 G/DL (ref 2–3.5)
GLUCOSE BLD-MCNC: 96 MG/DL (ref 70–99)
GLUCOSE BLDC GLUCOMTR-MCNC: 94 MG/DL (ref 70–99)
HCT VFR BLD AUTO: 34.3 %
HGB BLD-MCNC: 11.1 G/DL
IMM GRANULOCYTES # BLD AUTO: 0.04 X10(3) UL (ref 0–1)
IMM GRANULOCYTES NFR BLD: 1 %
LDH SERPL L TO P-CCNC: 189 U/L
LYMPHOCYTES # BLD AUTO: 0.91 X10(3) UL (ref 1–4)
LYMPHOCYTES NFR BLD AUTO: 23 %
MCH RBC QN AUTO: 28 PG (ref 26–34)
MCHC RBC AUTO-ENTMCNC: 32.4 G/DL (ref 31–37)
MCV RBC AUTO: 86.6 FL
MONOCYTES # BLD AUTO: 0.47 X10(3) UL (ref 0.1–1)
MONOCYTES NFR BLD AUTO: 11.9 %
NEUTROPHILS # BLD AUTO: 2.35 X10 (3) UL (ref 1.5–7.7)
NEUTROPHILS # BLD AUTO: 2.35 X10(3) UL (ref 1.5–7.7)
NEUTROPHILS NFR BLD AUTO: 59.5 %
OSMOLALITY SERPL CALC.SUM OF ELEC: 293 MOSM/KG (ref 275–295)
PLATELET # BLD AUTO: 219 10(3)UL (ref 150–450)
PLATELET MORPHOLOGY: NORMAL
POTASSIUM SERPL-SCNC: 3.7 MMOL/L (ref 3.5–5.1)
PROT SERPL-MCNC: 6.6 G/DL (ref 5.7–8.2)
RBC # BLD AUTO: 3.96 X10(6)UL
SODIUM SERPL-SCNC: 142 MMOL/L (ref 136–145)
WBC # BLD AUTO: 4 X10(3) UL (ref 4–11)

## 2024-09-24 PROCEDURE — 36415 COLL VENOUS BLD VENIPUNCTURE: CPT

## 2024-09-24 PROCEDURE — 78815 PET IMAGE W/CT SKULL-THIGH: CPT | Performed by: STUDENT IN AN ORGANIZED HEALTH CARE EDUCATION/TRAINING PROGRAM

## 2024-09-24 PROCEDURE — 99215 OFFICE O/P EST HI 40 MIN: CPT | Performed by: STUDENT IN AN ORGANIZED HEALTH CARE EDUCATION/TRAINING PROGRAM

## 2024-09-24 PROCEDURE — 82962 GLUCOSE BLOOD TEST: CPT

## 2024-09-24 PROCEDURE — 83615 LACTATE (LD) (LDH) ENZYME: CPT

## 2024-09-24 PROCEDURE — 82728 ASSAY OF FERRITIN: CPT

## 2024-09-24 PROCEDURE — 80053 COMPREHEN METABOLIC PANEL: CPT

## 2024-09-24 PROCEDURE — 85025 COMPLETE CBC W/AUTO DIFF WBC: CPT

## 2024-09-24 RX ORDER — ACETAMINOPHEN 325 MG/1
650 TABLET ORAL ONCE
Status: CANCELLED | OUTPATIENT
Start: 2024-09-24

## 2024-09-24 RX ORDER — DOXORUBICIN HYDROCHLORIDE 2 MG/ML
50 INJECTION, SOLUTION INTRAVENOUS ONCE
Status: CANCELLED | OUTPATIENT
Start: 2024-09-24

## 2024-09-24 RX ORDER — DIPHENHYDRAMINE HCL 25 MG
25 CAPSULE ORAL ONCE
Status: CANCELLED | OUTPATIENT
Start: 2024-09-24

## 2024-09-24 NOTE — TELEPHONE ENCOUNTER
LM left for patient,  Dr. Hidalgo would like you to try to get into Dematology quicker. She has a recommendation for Dr. Richard- [facility]. Patients can usually get in within a week. Phone number is . Left number to call back if she has any questions.

## 2024-09-25 ENCOUNTER — OFFICE VISIT (OUTPATIENT)
Dept: HEMATOLOGY/ONCOLOGY | Facility: HOSPITAL | Age: 59
End: 2024-09-25
Attending: INTERNAL MEDICINE
Payer: COMMERCIAL

## 2024-09-25 VITALS
SYSTOLIC BLOOD PRESSURE: 115 MMHG | DIASTOLIC BLOOD PRESSURE: 75 MMHG | RESPIRATION RATE: 18 BRPM | HEART RATE: 83 BPM | OXYGEN SATURATION: 100 % | TEMPERATURE: 99 F

## 2024-09-25 DIAGNOSIS — C83.38 DIFFUSE LARGE B-CELL LYMPHOMA OF LYMPH NODES OF MULTIPLE REGIONS (HCC): Primary | ICD-10-CM

## 2024-09-25 PROCEDURE — 96375 TX/PRO/DX INJ NEW DRUG ADDON: CPT

## 2024-09-25 PROCEDURE — 96367 TX/PROPH/DG ADDL SEQ IV INF: CPT

## 2024-09-25 PROCEDURE — 96411 CHEMO IV PUSH ADDL DRUG: CPT

## 2024-09-25 PROCEDURE — 96413 CHEMO IV INFUSION 1 HR: CPT

## 2024-09-25 PROCEDURE — 96415 CHEMO IV INFUSION ADDL HR: CPT

## 2024-09-25 PROCEDURE — 96417 CHEMO IV INFUS EACH ADDL SEQ: CPT

## 2024-09-25 RX ORDER — DOXORUBICIN HYDROCHLORIDE 2 MG/ML
50 INJECTION, SOLUTION INTRAVENOUS ONCE
Status: COMPLETED | OUTPATIENT
Start: 2024-09-25 | End: 2024-09-25

## 2024-09-25 RX ORDER — ACETAMINOPHEN 325 MG/1
TABLET ORAL
Status: COMPLETED
Start: 2024-09-25 | End: 2024-09-25

## 2024-09-25 RX ORDER — DIPHENHYDRAMINE HCL 25 MG
CAPSULE ORAL
Status: COMPLETED
Start: 2024-09-25 | End: 2024-09-25

## 2024-09-25 RX ORDER — ACETAMINOPHEN 325 MG/1
650 TABLET ORAL ONCE
Status: COMPLETED | OUTPATIENT
Start: 2024-09-25 | End: 2024-09-25

## 2024-09-25 RX ORDER — DIPHENHYDRAMINE HCL 25 MG
25 CAPSULE ORAL ONCE
Status: COMPLETED | OUTPATIENT
Start: 2024-09-25 | End: 2024-09-25

## 2024-09-25 RX ADMIN — ACETAMINOPHEN 650 MG: 325 TABLET ORAL at 08:33:00

## 2024-09-25 RX ADMIN — DOXORUBICIN HYDROCHLORIDE 84 MG: 2 INJECTION, SOLUTION INTRAVENOUS at 12:29:00

## 2024-09-25 RX ADMIN — DIPHENHYDRAMINE HCL 25 MG: 25 MG CAPSULE ORAL at 08:33:00

## 2024-09-25 NOTE — PROGRESS NOTES
Pt here for C3D1  Miguel R-CHP.  Arrives Ambulating independently, accompanied by Family member     Patient was evaluated today by Treatment Nurse.    Oral medications included in this regimen:  yes - prednisone  Pt did not take Prednisone prior to arrival, order received and dose given here.  Pt reinstructed on at home Predinisone schedule.    Patient confirms comprehension of cancer treatment schedule:  yes    Pregnancy screening:  Not applicable    Modifications in dose or schedule:  No    Medications appearance and physical integrity checked by RN: yes.    Chemotherapy IV pump settings verified by 2 RNs:  Yes.  Frequency of blood return and site check throughout administration: Prior to administration, Prior to each drug, and At completion of therapy     Infusion/treatment outcome:  patient tolerated treatment without incident    Education Record    Learner:  Patient  Barriers / Limitations:  None  Method:  Discussion  Education / instructions given:  plan of care  Outcome:  Shows understanding    Discharged Home, Ambulating independently, accompanied by:Family member    Patient/family verbalized understanding of future appointments: by [place]

## 2024-09-25 NOTE — PROGRESS NOTES
Hematology/Oncology Initial Consultation Note    Patient Name: Kimberly Quezada  Medical Record Number: [id number]    YOB: 1965   Date of Consultation: 9/3/2024   PCP: Hillary Prado MD   Other providers:      Reason for Consultation:  Kimberly Quezada was seen today for the diagnosis of DLBCL     Oncologic History:       March 2024: Anemia with MCV of 72.3.  He also endorsed having decreased appetite and weight loss of 25 pounds.  2024: Had EGD and colonoscopy which were normal.     Had a CT of the chest abdomen pelvis after she noted a neck masd which showed bilateral lymphadenopathy in her neck, chest, left axilla and retroperitoneal region.     July 2024: Axillary lymph node excisional biopsy.  Sent to [facility] for additional workup and was noted to have at DLBCL NOS.    August 14th : Cycle 1 Miguel-R-CHP  Sept 4th 2024: Cycle 2 Miguel RCHP - complicated with facial rash    Sept 24th. Interim PET after 2 cycles showed Deauville 3. With significant decrease in activity noted.    ===================================================  History of Present Illness:      Patient presents to the clinic with her mother.  Patient mentions that she has been feeling energetic.  Appetite is improved.  Denies having any fevers.  Night sweats have improved.  Swelling around her neck has improved.      Having any loose stools or neuropathy or chest discomfort or shortness of breath or leg swelling.    Rash on her neck and the lower jaw around the end of the cycle 1.  Patient mentions that she has noticed worsening of her rashes.  The cycle of chemotherapy was diffusely involving her cheeks and neck.  Patient was given steroid cream which does improve the rashes.  Denies having any mucosal lesions.      Past Medical History:  Past Medical History:    Diverticulosis large intestine w/o perforation or abscess w/o bleeding    Gastropathy    High cholesterol    History of blood transfusion    NO ADVERSE REACTIONS     Hx of motion sickness    Hyperlipidemia    Internal hemorrhoids without complication    Rosacea    Seasonal allergies    Visual impairment    GLASSES       Past Surgical History:   Procedure Laterality Date    Colonoscopy N/A 12/13/2018    Procedure: COLONOSCOPY;  Surgeon: Elizabet Gordon MD;  Location: [facility] ENDOSCOPY    Colonoscopy N/A 6/4/2024    Procedure: COLONOSCOPY and Polypectomy;  Surgeon: Deepak He MD;  Location: [facility] ENDOSCOPY    Other surgical history      Repair of anal fistula 2011       Home Medications:   triamcinolone 0.1 % External Cream Apply topically 2 (two) times daily. 60 g 0    acyclovir 400 MG Oral Tab Take 1 tablet (400 mg total) by mouth 2 (two) times daily. 60 tablet 1    prochlorperazine (COMPAZINE) 10 mg tablet Take 1 tablet (10 mg total) by mouth every 6 (six) hours as needed for Nausea. 60 tablet 3    ondansetron (ZOFRAN) 8 MG tablet Take 1 tablet (8 mg total) by mouth every 8 (eight) hours as needed for Nausea. 30 tablet 3    Ferrous Sulfate 325 (65 Fe) MG Oral Tab Take 1 tablet (325 mg total) by mouth 2 (two) times daily. (Patient taking differently: Take 1 tablet (325 mg total) by mouth in the morning and 1 tablet (325 mg total) before bedtime.) 60 tablet 5    docusate sodium (COLACE) 100 MG Oral Cap Take 1 capsule (100 mg total) by mouth 2 (two) times daily. 60 capsule 5     -------  Current Outpatient Medications on File Prior to Visit   Medication Sig Dispense Refill    triamcinolone 0.1 % External Cream Apply topically 2 (two) times daily. 60 g 0    acyclovir 400 MG Oral Tab Take 1 tablet (400 mg total) by mouth 2 (two) times daily. 60 tablet 1    prochlorperazine (COMPAZINE) 10 mg tablet Take 1 tablet (10 mg total) by mouth every 6 (six) hours as needed for Nausea. 60 tablet 3    ondansetron (ZOFRAN) 8 MG tablet Take 1 tablet (8 mg total) by mouth every 8 (eight) hours as needed for Nausea. 30 tablet 3    Ferrous Sulfate 325 (65 Fe) MG Oral Tab Take 1  tablet (325 mg total) by mouth 2 (two) times daily. (Patient taking differently: Take 1 tablet (325 mg total) by mouth in the morning and 1 tablet (325 mg total) before bedtime.) 60 tablet 5    docusate sodium (COLACE) 100 MG Oral Cap Take 1 capsule (100 mg total) by mouth 2 (two) times daily. 60 capsule 5     Current Facility-Administered Medications on File Prior to Visit   Medication Dose Route Frequency Provider Last Rate Last Admin    [COMPLETED] pegfilgrastim-jmdb (Fulphila) 6 MG/0.6ML SUBQ injection 6 mg  6 mg Subcutaneous Once Dayna Hidalgo MD   6 mg at 09/05/24 1423    [COMPLETED] ondansetron (Zofran) 16 mg in sodium chloride 0.9% 108 mL IVPB  16 mg Intravenous Once Dayna Hidalgo MD   Stopped at 09/04/24 0818    [COMPLETED] acetaminophen (Tylenol) tab 650 mg  650 mg Oral Once Dayna Hidalgo MD   650 mg at 09/04/24 0819    [COMPLETED] diphenhydrAMINE (Benadryl) cap/tab 25 mg  25 mg Oral Once Dayna Hidalgo MD   25 mg at 09/04/24 0819    [COMPLETED] fosaprepitant (Emend) 150 mg in sodium chloride 0.9% 150 mL IVPB  150 mg Intravenous Once Dayna Hidalgo MD   Stopped at 09/04/24 0846    [COMPLETED] riTUXimab-abbs (Truxima) 600 mg in sodium chloride 0.9% 560 mL infusion  375 mg/m2 (Treatment Plan Recorded) Intravenous Once Dayna Hidalgo MD   Stopped at 09/04/24 1205    [COMPLETED] polatuzumab vedotin (Polivy) 118 mg in sodium chloride 0.9% 105.9 mL infusion  1.8 mg/kg (Treatment Plan Recorded) Intravenous Once Dayna Hidalgo MD   Stopped at 09/04/24 1152    [COMPLETED] DOXOrubicin (Adriamycin) 2 mg/mL IV syringe 84 mg 42 mL  50 mg/m2 (Treatment Plan Recorded) Intravenous Once Dayna Hidalgo MD   Stopped at 09/04/24 1229    [COMPLETED] cyclophosphamide (Cytoxan) 1,280 mg in sodium chloride 0.9% 256.4 mL infusion  750 mg/m2 (Treatment Plan Recorded) Intravenous Once Dayna Hidalgo MD   Stopped at 09/04/24 1301    [COMPLETED] predniSONE (Deltasone) tab 100 mg  100 mg Oral Once  Niki Motta APRN   100 mg at 09/04/24 0822    [COMPLETED] midazolam (Versed) 2 MG/2ML injection             [COMPLETED] fentaNYL (Sublimaze) 50 mcg/mL injection             [COMPLETED] heparin (Porcine) 100 Units/mL lock flush             [COMPLETED] heparin in sodium chloride 0.9% (Porcine) 2 Units/mL flush bag premix             [COMPLETED] lidocaine (Xylocaine) 2 % injection             [COMPLETED] ceFAZolin (Ancef) 2 g/10mL IV syringe premix                Allergies:   Allergies   Allergen Reactions    Meloxicam ANGIOEDEMA     Upper and lower lip throbbing, tongue swelling, tongue ulcers    Contrave [Naltrexone-Bupropion Hcl Er] RASH    Wellbutrin [Bupropion] RASH    Simvastatin RASH       Psychosocial History:  Social History     Social History Narrative    Not on file     Social History     Socioeconomic History    Marital status: Single   Tobacco Use    Smoking status: Never     Passive exposure: Yes    Smokeless tobacco: Never    Tobacco comments:     Social smoker. Pt does not remember when she stopped   Vaping Use    Vaping status: Never Used   Substance and Sexual Activity    Alcohol use: Yes     Comment: social    Drug use: No   Other Topics Concern    Pt has a pacemaker No    Pt has a defibrillator No    Reaction to local anesthetic No    Right Handed Yes     Social Determinants of Health     Food Insecurity: No Food Insecurity (8/15/2024)    Food Insecurity     Food Insecurity: Never true   Transportation Needs: No Transportation Needs (8/15/2024)    Transportation Needs     Lack of Transportation: No   Housing Stability: Low Risk  (8/15/2024)    Housing Stability     Housing Instability: No       Family Medical History:  History reviewed. No pertinent family history.    Review of Systems:  A 10-point ROS was done with pertinent positives and negative per the HPI    Vital Signs:  Height: --  Weight: --  BSA (Calculated - sq m): --  Pulse: 83 (09/25 0819)  BP: 115/75 (09/25 0819)  Temp: 98.5 °F  (36.9 °C) (09/25 0819)  Do Not Use - Resp Rate: --  SpO2: 100 % (09/25 0819)    Wt Readings from Last 6 Encounters:   09/24/24 71.2 kg (157 lb)   09/04/24 69.4 kg (153 lb)   09/03/24 69.4 kg (152 lb 14.4 oz)   08/21/24 64.9 kg (143 lb)   08/15/24 66 kg (145 lb 9 oz)   08/14/24 65.2 kg (143 lb 11.2 oz)       ECOG PS: 0    Physical Examination:  General: Patient is alert and oriented, not in acute distress  Psych: Mood and affect are appropriate  Eyes: EOMI, PERRL  ENT: Oropharynx is clear, no adenopathy  CV: Regular rate and rhythm, normal S1S2, no murmurs, no LE edema  Respiratory: Lungs clear to auscultation bilaterally  GI/Abd: Soft, non-tender with normoactive bowel sounds, no hepatosplenomegaly  Neurological: Grossly intact   Lymphatics: Left supraclavicular lymphadenopathy palpable.  No palpable axillary lymph nodes.  Skin: no rashes or petechiae  Lines: Mediport in tight chest.  No redness or swelling.    Laboratory:  Lab Results   Component Value Date    WBC 4.0 09/24/2024    WBC 6.5 09/03/2024    WBC 4.4 08/19/2024    HGB 11.1 (L) 09/24/2024    HGB 10.3 (L) 09/03/2024    HGB 9.5 (L) 08/19/2024    HCT 34.3 (L) 09/24/2024    MCV 86.6 09/24/2024    MCH 28.0 09/24/2024    MCHC 32.4 09/24/2024    RDW 23.2 (H) 09/24/2024    .0 09/24/2024    .0 09/03/2024    .0 (H) 08/19/2024     Lab Results   Component Value Date    GLU 96 09/24/2024    BUN 11 09/24/2024    BUNCREA 16.4 09/24/2024    CREATSERUM 0.67 09/24/2024    CREATSERUM 0.67 09/03/2024    CREATSERUM 0.48 (L) 08/17/2024    ANIONGAP 3 09/24/2024    GFRNAA 103 02/07/2022    GFRAA 120 02/07/2022    CA 9.9 09/24/2024    OSMOCALC 293 09/24/2024    ALKPHO 83 09/24/2024    AST 21 09/24/2024    ALT 19 09/24/2024    ALKPHOS 41 09/21/2015    BILT 0.5 09/24/2024    TP 6.6 09/24/2024    ALB 4.1 09/24/2024    GLOBULIN 2.5 09/24/2024    AGRATIO 0.8 (L) 03/18/2024     09/24/2024    K 3.7 09/24/2024     09/24/2024    CO2 27.0 09/24/2024     No  results found for: \"PTT\", \"PT\", \"INR\"    Imaging:      PET CT from September 24 was reviewed with the patient.  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    Cycle 1 Miguel R-CHP on 8/14.  No toxicities.  Cycle 2 Miguel RCHP on 9/4. Grade 1 maculopapular rash. ? From polatuzumab. Rash improving with steroid cream. Dermatology referral .     - Interim PET CT showed good response to treatment.   Proceed to cycle 3 with careful monitoring of the skin lesions.         Anemia  -Secondary to iron deficiency and possible marrow involvement.  -Patient has low iron saturation.  Advise doing continue her oral iron    Return in 3 weeks prior to cycle 3        Dayna Hidalgo MD  Hematology/Medical Oncology

## 2024-09-26 ENCOUNTER — NURSE ONLY (OUTPATIENT)
Dept: HEMATOLOGY/ONCOLOGY | Facility: HOSPITAL | Age: 59
End: 2024-09-26
Attending: INTERNAL MEDICINE
Payer: COMMERCIAL

## 2024-09-26 DIAGNOSIS — C83.38 DIFFUSE LARGE B-CELL LYMPHOMA OF LYMPH NODES OF MULTIPLE REGIONS (HCC): Primary | ICD-10-CM

## 2024-09-26 PROCEDURE — 96372 THER/PROPH/DIAG INJ SC/IM: CPT

## 2024-10-04 ENCOUNTER — APPOINTMENT (OUTPATIENT)
Dept: URBAN - METROPOLITAN AREA CLINIC 248 | Age: 59
Setting detail: DERMATOLOGY
End: 2024-10-04

## 2024-10-04 DIAGNOSIS — L71.8 OTHER ROSACEA: ICD-10-CM

## 2024-10-04 PROCEDURE — OTHER PRESCRIPTION: OTHER

## 2024-10-04 PROCEDURE — 99204 OFFICE O/P NEW MOD 45 MIN: CPT

## 2024-10-04 PROCEDURE — OTHER COUNSELING: OTHER

## 2024-10-04 PROCEDURE — OTHER PRESCRIPTION MEDICATION MANAGEMENT: OTHER

## 2024-10-04 PROCEDURE — OTHER ADDITIONAL NOTES: OTHER

## 2024-10-04 PROCEDURE — OTHER DIAGNOSIS COMMENT: OTHER

## 2024-10-04 RX ORDER — IVERMECTIN/METRONIDAZOLE/NIACI 1 %-1 %-4%
GEL (GRAM) TOPICAL
Qty: 30 | Refills: 0 | Status: ERX | COMMUNITY
Start: 2024-10-04

## 2024-10-04 ASSESSMENT — LOCATION DETAILED DESCRIPTION DERM
LOCATION DETAILED: LEFT INFERIOR CENTRAL MALAR CHEEK
LOCATION DETAILED: RIGHT CENTRAL MALAR CHEEK

## 2024-10-04 ASSESSMENT — LOCATION SIMPLE DESCRIPTION DERM
LOCATION SIMPLE: LEFT CHEEK
LOCATION SIMPLE: RIGHT CHEEK

## 2024-10-04 ASSESSMENT — LOCATION ZONE DERM: LOCATION ZONE: FACE

## 2024-10-04 NOTE — PROCEDURE: ADDITIONAL NOTES
Render Risk Assessment In Note?: no
Additional Notes: Patient advised to use vanicream products and samples provided\\nLong history of rosacea treated in doxycycline in the past\\nPatient mobile photos reviewed, flare pattern consistent with chemo every 21 days. Patient unsure of which 4 chemo she is using but will bring list next visit
Detail Level: Simple

## 2024-10-04 NOTE — HPI: RASH
What Type Of Note Output Would You Prefer (Optional)?: Bullet Format
How Severe Is Your Rash?: moderate
Is This A New Presentation, Or A Follow-Up?: Rash
Additional History: Patient has history of lymphoma \\nWas prescribe triamcinalone \\nDried spots \\n

## 2024-10-04 NOTE — PROCEDURE: PRESCRIPTION MEDICATION MANAGEMENT
Render In Strict Bullet Format?: No
Plan: Next chemo treatment Oct. 15th she has 5 more sessions left
Detail Level: Zone
Initiate Treatment: Aveidaoxia 1 %-1 %-4 % topical gel \\nQuantity: 30.0 g  Days Supply: 30\\nSig: Apply to AA QAM
Continue Regimen: Triamcinalone cream on face and neck prescribed by oncologist

## 2024-10-04 NOTE — PROCEDURE: DIAGNOSIS COMMENT
Detail Level: Simple
Comment: Chemotherapy induced pustular eruption with prior history of rosacea
Render Risk Assessment In Note?: no

## 2024-10-15 ENCOUNTER — OFFICE VISIT (OUTPATIENT)
Dept: HEMATOLOGY/ONCOLOGY | Facility: HOSPITAL | Age: 59
End: 2024-10-15
Attending: STUDENT IN AN ORGANIZED HEALTH CARE EDUCATION/TRAINING PROGRAM
Payer: COMMERCIAL

## 2024-10-15 ENCOUNTER — NURSE ONLY (OUTPATIENT)
Dept: HEMATOLOGY/ONCOLOGY | Facility: HOSPITAL | Age: 59
End: 2024-10-15
Attending: INTERNAL MEDICINE
Payer: COMMERCIAL

## 2024-10-15 VITALS
TEMPERATURE: 98 F | SYSTOLIC BLOOD PRESSURE: 126 MMHG | OXYGEN SATURATION: 100 % | WEIGHT: 160.19 LBS | RESPIRATION RATE: 16 BRPM | HEART RATE: 90 BPM | DIASTOLIC BLOOD PRESSURE: 66 MMHG | HEIGHT: 63 IN | BODY MASS INDEX: 28.38 KG/M2

## 2024-10-15 DIAGNOSIS — C83.38 DIFFUSE LARGE B-CELL LYMPHOMA OF LYMPH NODES OF MULTIPLE REGIONS (HCC): Primary | ICD-10-CM

## 2024-10-15 DIAGNOSIS — L71.9 ROSACEA, ACNE: ICD-10-CM

## 2024-10-15 LAB
ALBUMIN SERPL-MCNC: 4.3 G/DL (ref 3.2–4.8)
ALBUMIN/GLOB SERPL: 1.9 {RATIO} (ref 1–2)
ALP LIVER SERPL-CCNC: 93 U/L
ALT SERPL-CCNC: 18 U/L
ANION GAP SERPL CALC-SCNC: 7 MMOL/L (ref 0–18)
AST SERPL-CCNC: 23 U/L (ref ?–34)
BASOPHILS # BLD AUTO: 0.03 X10(3) UL (ref 0–0.2)
BASOPHILS NFR BLD AUTO: 0.7 %
BILIRUB SERPL-MCNC: 0.3 MG/DL (ref 0.3–1.2)
BUN BLD-MCNC: 10 MG/DL (ref 9–23)
BUN/CREAT SERPL: 14.1 (ref 10–20)
CALCIUM BLD-MCNC: 9.7 MG/DL (ref 8.7–10.4)
CHLORIDE SERPL-SCNC: 111 MMOL/L (ref 98–112)
CO2 SERPL-SCNC: 28 MMOL/L (ref 21–32)
CREAT BLD-MCNC: 0.71 MG/DL
DEPRECATED RDW RBC AUTO: 71.3 FL (ref 35.1–46.3)
EGFRCR SERPLBLD CKD-EPI 2021: 100 ML/MIN/1.73M2 (ref 60–?)
EOSINOPHIL # BLD AUTO: 0.1 X10(3) UL (ref 0–0.7)
EOSINOPHIL NFR BLD AUTO: 2.4 %
ERYTHROCYTE [DISTWIDTH] IN BLOOD BY AUTOMATED COUNT: 22 % (ref 11–15)
GLOBULIN PLAS-MCNC: 2.3 G/DL (ref 2–3.5)
GLUCOSE BLD-MCNC: 91 MG/DL (ref 70–99)
HCT VFR BLD AUTO: 34.7 %
HGB BLD-MCNC: 11.5 G/DL
IMM GRANULOCYTES # BLD AUTO: 0.01 X10(3) UL (ref 0–1)
IMM GRANULOCYTES NFR BLD: 0.2 %
LYMPHOCYTES # BLD AUTO: 0.66 X10(3) UL (ref 1–4)
LYMPHOCYTES NFR BLD AUTO: 15.9 %
MCH RBC QN AUTO: 29.7 PG (ref 26–34)
MCHC RBC AUTO-ENTMCNC: 33.1 G/DL (ref 31–37)
MCV RBC AUTO: 89.7 FL
MONOCYTES # BLD AUTO: 0.5 X10(3) UL (ref 0.1–1)
MONOCYTES NFR BLD AUTO: 12 %
NEUTROPHILS # BLD AUTO: 2.85 X10 (3) UL (ref 1.5–7.7)
NEUTROPHILS # BLD AUTO: 2.85 X10(3) UL (ref 1.5–7.7)
NEUTROPHILS NFR BLD AUTO: 68.8 %
OSMOLALITY SERPL CALC.SUM OF ELEC: 301 MOSM/KG (ref 275–295)
PLATELET # BLD AUTO: 262 10(3)UL (ref 150–450)
PLATELET MORPHOLOGY: NORMAL
POTASSIUM SERPL-SCNC: 3.4 MMOL/L (ref 3.5–5.1)
PROT SERPL-MCNC: 6.6 G/DL (ref 5.7–8.2)
RBC # BLD AUTO: 3.87 X10(6)UL
SODIUM SERPL-SCNC: 146 MMOL/L (ref 136–145)
WBC # BLD AUTO: 4.2 X10(3) UL (ref 4–11)

## 2024-10-15 PROCEDURE — 99215 OFFICE O/P EST HI 40 MIN: CPT | Performed by: STUDENT IN AN ORGANIZED HEALTH CARE EDUCATION/TRAINING PROGRAM

## 2024-10-15 PROCEDURE — 85025 COMPLETE CBC W/AUTO DIFF WBC: CPT

## 2024-10-15 PROCEDURE — 80053 COMPREHEN METABOLIC PANEL: CPT

## 2024-10-15 PROCEDURE — 36415 COLL VENOUS BLD VENIPUNCTURE: CPT

## 2024-10-15 RX ORDER — DOXORUBICIN HYDROCHLORIDE 2 MG/ML
50 INJECTION, SOLUTION INTRAVENOUS ONCE
Status: CANCELLED | OUTPATIENT
Start: 2024-10-16

## 2024-10-15 RX ORDER — ACETAMINOPHEN 325 MG/1
650 TABLET ORAL ONCE
Status: CANCELLED | OUTPATIENT
Start: 2024-10-16

## 2024-10-15 RX ORDER — DIPHENHYDRAMINE HCL 25 MG
25 CAPSULE ORAL ONCE
Status: CANCELLED | OUTPATIENT
Start: 2024-10-16

## 2024-10-15 NOTE — PROGRESS NOTES
Hematology/Oncology Initial Consultation Note    Patient Name: Kimberly Quezada  Medical Record Number: [id number]    YOB: 1965   Date of Consultation: 9/3/2024   PCP: Hillary Prado MD   Other providers:      Reason for Consultation:  Kimberly Quezada was seen today for the diagnosis of DLBCL     Oncologic History:       March 2024: Anemia with MCV of 72.3.  He also endorsed having decreased appetite and weight loss of 25 pounds.  2024: Had EGD and colonoscopy which were normal.     Had a CT of the chest abdomen pelvis after she noted a neck masd which showed bilateral lymphadenopathy in her neck, chest, left axilla and retroperitoneal region.     July 2024: Axillary lymph node excisional biopsy.  Sent to [facility] for additional workup and was noted to have at DLBCL NOS.    August 14th : Cycle 1 Miguel-R-CHP  Sept 4th 2024: Cycle 2 Miguel RCHP - complicated with facial rash    Sept 24th. Interim PET after 2 cycles showed Deauville 3. With significant decrease in activity noted.    Oct 15th, 2024: Extensive rosacea noted    ===================================================  History of Present Illness:      Patient presents to the clinic  Patient mentions that she has been feeling energetic.  Appetite is improved.  Denies having any fevers.  Night sweats have improved.  Swelling around her neck has improved.      Having any loose stools or neuropathy or chest discomfort or shortness of breath or leg swelling.    Rash on her neck and the lower jaw around the end of the cycle 1.  The rash has progressively worsened and now she also developed left brown macular patches.  Patient was seen by dermatology and was diagnosed with process year.  She is currently getting topical ivermectin metronidazole combination.  She mentions that her lesions have improved.       Denies having any mucosal lesions.      Past Medical History:  Past Medical History:    Diverticulosis large intestine w/o perforation or  abscess w/o bleeding    Gastropathy    High cholesterol    History of blood transfusion    NO ADVERSE REACTIONS    Hx of motion sickness    Hyperlipidemia    Internal hemorrhoids without complication    Rosacea    Seasonal allergies    Visual impairment    GLASSES       Past Surgical History:   Procedure Laterality Date    Colonoscopy N/A 12/13/2018    Procedure: COLONOSCOPY;  Surgeon: Elizabet Gordon MD;  Location: [facility] ENDOSCOPY    Colonoscopy N/A 6/4/2024    Procedure: COLONOSCOPY and Polypectomy;  Surgeon: Deepak He MD;  Location: [facility] ENDOSCOPY    Other surgical history      Repair of anal fistula 2011       Home Medications:   acyclovir 400 MG Oral Tab Take 1 tablet (400 mg total) by mouth 2 (two) times daily. 60 tablet 1    prochlorperazine (COMPAZINE) 10 mg tablet Take 1 tablet (10 mg total) by mouth every 6 (six) hours as needed for Nausea. 60 tablet 3    ondansetron (ZOFRAN) 8 MG tablet Take 1 tablet (8 mg total) by mouth every 8 (eight) hours as needed for Nausea. 30 tablet 3    Ferrous Sulfate 325 (65 Fe) MG Oral Tab Take 1 tablet (325 mg total) by mouth 2 (two) times daily. (Patient taking differently: Take 1 tablet (325 mg total) by mouth in the morning and 1 tablet (325 mg total) before bedtime.) 60 tablet 5    docusate sodium (COLACE) 100 MG Oral Cap Take 1 capsule (100 mg total) by mouth 2 (two) times daily. 60 capsule 5     -------  Current Outpatient Medications on File Prior to Visit   Medication Sig Dispense Refill    acyclovir 400 MG Oral Tab Take 1 tablet (400 mg total) by mouth 2 (two) times daily. 60 tablet 1    prochlorperazine (COMPAZINE) 10 mg tablet Take 1 tablet (10 mg total) by mouth every 6 (six) hours as needed for Nausea. 60 tablet 3    ondansetron (ZOFRAN) 8 MG tablet Take 1 tablet (8 mg total) by mouth every 8 (eight) hours as needed for Nausea. 30 tablet 3    Ferrous Sulfate 325 (65 Fe) MG Oral Tab Take 1 tablet (325 mg total) by mouth 2 (two) times daily.  (Patient taking differently: Take 1 tablet (325 mg total) by mouth in the morning and 1 tablet (325 mg total) before bedtime.) 60 tablet 5    docusate sodium (COLACE) 100 MG Oral Cap Take 1 capsule (100 mg total) by mouth 2 (two) times daily. 60 capsule 5    triamcinolone 0.1 % External Cream Apply topically 2 (two) times daily. (Patient not taking: Reported on 10/15/2024) 60 g 0     Current Facility-Administered Medications on File Prior to Visit   Medication Dose Route Frequency Provider Last Rate Last Admin    [COMPLETED] pegfilgrastim-jmdb (Fulphila) 6 MG/0.6ML SUBQ injection 6 mg  6 mg Subcutaneous Once Dayna Hidalgo MD   6 mg at 09/26/24 1436    [COMPLETED] ondansetron (Zofran) 16 mg in sodium chloride 0.9% 108 mL IVPB  16 mg Intravenous Once Dayna Hidalgo MD   Stopped at 09/25/24 0921    [COMPLETED] acetaminophen (Tylenol) tab 650 mg  650 mg Oral Once Dayna Hidalgo MD   650 mg at 09/25/24 0833    [COMPLETED] diphenhydrAMINE (Benadryl) cap/tab 25 mg  25 mg Oral Once Dayna Hidalgo MD   25 mg at 09/25/24 0833    [COMPLETED] fosaprepitant (Emend) 150 mg in sodium chloride 0.9% 150 mL IVPB  150 mg Intravenous Once Dayna Hidalgo MD   Stopped at 09/25/24 0950    [COMPLETED] riTUXimab-abbs (Truxima) 600 mg in sodium chloride 0.9% 560 mL infusion  375 mg/m2 (Treatment Plan Recorded) Intravenous Once Dayna Hidalgo MD   Stopped at 09/25/24 1136    [COMPLETED] polatuzumab vedotin (Polivy) 118 mg in sodium chloride 0.9% 105.9 mL infusion  1.8 mg/kg (Treatment Plan Recorded) Intravenous Once Dayna Hidalgo MD   Stopped at 09/25/24 1210    [COMPLETED] DOXOrubicin (Adriamycin) 2 mg/mL IV syringe 84 mg 42 mL  50 mg/m2 (Treatment Plan Recorded) Intravenous Once Dayna Hidalgo MD   Stopped at 09/25/24 1239    [COMPLETED] cyclophosphamide (Cytoxan) 1,280 mg in sodium chloride 0.9% 256.4 mL infusion  750 mg/m2 (Treatment Plan Recorded) Intravenous Once Dayna Hidalgo MD   Stopped at  09/25/24 1312       Allergies:   Allergies   Allergen Reactions    Meloxicam ANGIOEDEMA     Upper and lower lip throbbing, tongue swelling, tongue ulcers    Contrave [Naltrexone-Bupropion Hcl Er] RASH    Wellbutrin [Bupropion] RASH    Simvastatin RASH       Psychosocial History:  Social History     Social History Narrative    Not on file     Social History     Socioeconomic History    Marital status: Single   Tobacco Use    Smoking status: Never     Passive exposure: Yes    Smokeless tobacco: Never    Tobacco comments:     Social smoker. Pt does not remember when she stopped   Vaping Use    Vaping status: Never Used   Substance and Sexual Activity    Alcohol use: Yes     Comment: social    Drug use: No   Other Topics Concern    Pt has a pacemaker No    Pt has a defibrillator No    Reaction to local anesthetic No    Right Handed Yes     Social Drivers of Health     Food Insecurity: No Food Insecurity (8/15/2024)    Food Insecurity     Food Insecurity: Never true   Transportation Needs: No Transportation Needs (8/15/2024)    Transportation Needs     Lack of Transportation: No   Housing Stability: Low Risk  (8/15/2024)    Housing Stability     Housing Instability: No       Family Medical History:  No family history on file.    Review of Systems:  A 10-point ROS was done with pertinent positives and negative per the HPI    Vital Signs:  Height: 160 cm (5' 3\") (10/15 1123)  Weight: 72.7 kg (160 lb 3.2 oz) (10/15 1123)  BSA (Calculated - sq m): 1.76 sq meters (10/15 1123)  Pulse: 90 (10/15 1123)  BP: 126/66 (10/15 1123)  Temp: 98 °F (36.7 °C) (10/15 1123)  Do Not Use - Resp Rate: --  SpO2: 100 % (10/15 1123)    Wt Readings from Last 6 Encounters:   10/15/24 72.7 kg (160 lb 3.2 oz)   09/24/24 71.2 kg (157 lb)   09/04/24 69.4 kg (153 lb)   09/03/24 69.4 kg (152 lb 14.4 oz)   08/21/24 64.9 kg (143 lb)   08/15/24 66 kg (145 lb 9 oz)       ECOG PS: 0    Physical Examination:  General: Patient is alert and oriented, not in  acute distress  Psych: Mood and affect are appropriate  Eyes: EOMI, PERRL  ENT: Oropharynx is clear, no adenopathy  CV: Regular rate and rhythm, normal S1S2, no murmurs, no LE edema  Respiratory: Lungs clear to auscultation bilaterally  GI/Abd: Soft, non-tender with normoactive bowel sounds, no hepatosplenomegaly  Neurological: Grossly intact   Lymphatics: Left supraclavicular lymphadenopathy palpable.  No palpable axillary lymph nodes.  Skin: no rashes or petechiae  Lines: Mediport in tight chest.  No redness or swelling.    Laboratory:  Lab Results   Component Value Date    WBC 4.2 10/15/2024    WBC 4.0 09/24/2024    WBC 6.5 09/03/2024    HGB 11.5 (L) 10/15/2024    HGB 11.1 (L) 09/24/2024    HGB 10.3 (L) 09/03/2024    HCT 34.7 (L) 10/15/2024    MCV 89.7 10/15/2024    MCH 29.7 10/15/2024    MCHC 33.1 10/15/2024    RDW 22.0 (H) 10/15/2024    .0 10/15/2024    .0 09/24/2024    .0 09/03/2024     Lab Results   Component Value Date    GLU 91 10/15/2024    BUN 10 10/15/2024    BUNCREA 14.1 10/15/2024    CREATSERUM 0.71 10/15/2024    CREATSERUM 0.67 09/24/2024    CREATSERUM 0.67 09/03/2024    ANIONGAP 7 10/15/2024    GFRNAA 103 02/07/2022    GFRAA 120 02/07/2022    CA 9.7 10/15/2024    OSMOCALC 301 (H) 10/15/2024    ALKPHO 93 10/15/2024    AST 23 10/15/2024    ALT 18 10/15/2024    ALKPHOS 41 09/21/2015    BILT 0.3 10/15/2024    TP 6.6 10/15/2024    ALB 4.3 10/15/2024    GLOBULIN 2.3 10/15/2024    AGRATIO 0.8 (L) 03/18/2024     (H) 10/15/2024    K 3.4 (L) 10/15/2024     10/15/2024    CO2 28.0 10/15/2024     No results found for: \"PTT\", \"PT\", \"INR\"    Imaging:      PET CT from September 24 was reviewed with the patient.  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    Cycle 1 Miguel R-CHP on 8/14.  No toxicities.  Cycle 2 Miguel RCHP on 9/4.   Interim PET revealed Deauville 3, significant decrease in tumor burden     Treatment complicated by worsening skin rashes involving her facial skin. Brown macular patches noted on cheeks    Proceed to cycle 4 with careful monitoring of the skin lesions.     Skin lesion   - worsening of rosacea with chemotherapy. On topical metronidazole, Ivermectin         Anemia  - improving   -Secondary to iron deficiency and possible marrow involvement.  -Patient has low iron saturation.  Advise doing continue her oral iron    Return in 3 weeks prior to cycle 5        Dayna Hidalgo MD  Hematology/Medical Oncology

## 2024-10-16 ENCOUNTER — OFFICE VISIT (OUTPATIENT)
Dept: HEMATOLOGY/ONCOLOGY | Facility: HOSPITAL | Age: 59
End: 2024-10-16
Attending: INTERNAL MEDICINE
Payer: COMMERCIAL

## 2024-10-16 VITALS
RESPIRATION RATE: 16 BRPM | OXYGEN SATURATION: 98 % | SYSTOLIC BLOOD PRESSURE: 110 MMHG | TEMPERATURE: 98 F | DIASTOLIC BLOOD PRESSURE: 59 MMHG | HEART RATE: 88 BPM

## 2024-10-16 DIAGNOSIS — C83.38 DIFFUSE LARGE B-CELL LYMPHOMA OF LYMPH NODES OF MULTIPLE REGIONS (HCC): Primary | ICD-10-CM

## 2024-10-16 PROCEDURE — 96411 CHEMO IV PUSH ADDL DRUG: CPT

## 2024-10-16 PROCEDURE — 96413 CHEMO IV INFUSION 1 HR: CPT

## 2024-10-16 PROCEDURE — 96417 CHEMO IV INFUS EACH ADDL SEQ: CPT

## 2024-10-16 PROCEDURE — 96415 CHEMO IV INFUSION ADDL HR: CPT

## 2024-10-16 PROCEDURE — 96375 TX/PRO/DX INJ NEW DRUG ADDON: CPT

## 2024-10-16 PROCEDURE — 96367 TX/PROPH/DG ADDL SEQ IV INF: CPT

## 2024-10-16 RX ORDER — ACETAMINOPHEN 325 MG/1
TABLET ORAL
Status: COMPLETED
Start: 2024-10-16 | End: 2024-10-16

## 2024-10-16 RX ORDER — DIPHENHYDRAMINE HCL 25 MG
CAPSULE ORAL
Status: COMPLETED
Start: 2024-10-16 | End: 2024-10-16

## 2024-10-16 RX ORDER — ACETAMINOPHEN 325 MG/1
650 TABLET ORAL ONCE
Status: COMPLETED | OUTPATIENT
Start: 2024-10-16 | End: 2024-10-16

## 2024-10-16 RX ORDER — DIPHENHYDRAMINE HCL 25 MG
25 CAPSULE ORAL ONCE
Status: COMPLETED | OUTPATIENT
Start: 2024-10-16 | End: 2024-10-16

## 2024-10-16 RX ORDER — DOXORUBICIN HYDROCHLORIDE 2 MG/ML
50 INJECTION, SOLUTION INTRAVENOUS ONCE
Status: COMPLETED | OUTPATIENT
Start: 2024-10-16 | End: 2024-10-16

## 2024-10-16 RX ADMIN — DOXORUBICIN HYDROCHLORIDE 88 MG: 2 INJECTION, SOLUTION INTRAVENOUS at 15:14:00

## 2024-10-16 RX ADMIN — ACETAMINOPHEN 650 MG: 325 TABLET ORAL at 10:52:00

## 2024-10-16 RX ADMIN — DIPHENHYDRAMINE HCL 25 MG: 25 MG CAPSULE ORAL at 10:53:00

## 2024-10-16 NOTE — PROGRESS NOTES
Patient here for C4D1 R-Miguel-CHP.  Arrives Ambulating independently, accompanied by Self. Patient denies new issues or complaints, labs from yesterday reviewed.      Patient was evaluated today by Treatment Nurse.    Oral medications included in this regimen:  yes - prednisone. Patient confirms taking as prescribed.    Patient confirms comprehension of cancer treatment schedule:  yes    Pregnancy screening:  Denies possibility of pregnancy    Modifications in dose or schedule:  No    Medications appearance and physical integrity checked by RN: yes.    Chemotherapy IV pump settings verified by 2 RNs:  Yes.  Frequency of blood return and site check throughout administration: Prior to administration, Prior to each drug, and At completion of therapy     Infusion/treatment outcome:  patient tolerated treatment without incident. Rituximab infused via rapid rate.     Education Record    Learner:  Patient  Barriers / Limitations:  None  Method:  Reinforcement  Education / instructions given: Reinforced plan of care and treatment regimen.  Outcome:  Shows understanding    Discharged Home, Ambulating independently, accompanied by:Family member    Patient/family verbalized understanding of future appointments: by [employer] messaging

## 2024-10-17 ENCOUNTER — NURSE ONLY (OUTPATIENT)
Dept: HEMATOLOGY/ONCOLOGY | Facility: HOSPITAL | Age: 59
End: 2024-10-17
Attending: STUDENT IN AN ORGANIZED HEALTH CARE EDUCATION/TRAINING PROGRAM
Payer: COMMERCIAL

## 2024-10-17 ENCOUNTER — TELEPHONE (OUTPATIENT)
Dept: HEMATOLOGY/ONCOLOGY | Facility: CLINIC | Age: 59
End: 2024-10-17

## 2024-10-17 DIAGNOSIS — C83.38 DIFFUSE LARGE B-CELL LYMPHOMA OF LYMPH NODES OF MULTIPLE REGIONS (HCC): Primary | ICD-10-CM

## 2024-10-17 PROCEDURE — 96372 THER/PROPH/DIAG INJ SC/IM: CPT

## 2024-10-17 NOTE — TELEPHONE ENCOUNTER
Disability Revision requested w/ new Protected health information request, processed, loaded in this encounter as previous one w/ disability forms is closed, faxing to [place], pending confirmation

## 2024-10-22 ENCOUNTER — TELEPHONE (OUTPATIENT)
Dept: HEMATOLOGY/ONCOLOGY | Facility: HOSPITAL | Age: 59
End: 2024-10-22

## 2024-10-22 NOTE — TELEPHONE ENCOUNTER
Toxicities: C4 D1 Polatuzumab/Rituximab-abbs/Cyclophosphamide/Prednisone on 10/16/2024 C4 D2 Pegfilgrastim-jmdb on 10/17/2024    New Lump    Kimberly reports yesterday she was wiping herself after using the bathroom. She felt a \"dime\" sized lump in her left groin. It hurt when she touched it. She does not have pain if she is not touching it.  She denies redness or warmth. The skin is intact. No chills, shakes or fever. She asked what she should do to treat it? She also reports having a similar lump before she started treatment. She did not tell Dr Hidalgo about it.    I told her I would update Dr Hidalgo and DEL Sparks now.

## 2024-10-22 NOTE — TELEPHONE ENCOUNTER
Patient calling she noticed a cyst in her groin area size of a dime. It is painful when she walks rubs together. Thank you daniela

## 2024-10-22 NOTE — TELEPHONE ENCOUNTER
I called Kimberly and updated her that Dr Hidalgo said she can see her tomorrow morning or she may come for an ACV with DEL Sparks today at 12:30 pm. Kimberly said she can't come today. She agreed to an appointment at 11 am tomorrow with Dr Hidalgo.

## 2024-10-23 ENCOUNTER — OFFICE VISIT (OUTPATIENT)
Dept: HEMATOLOGY/ONCOLOGY | Facility: HOSPITAL | Age: 59
End: 2024-10-23
Attending: INTERNAL MEDICINE
Payer: COMMERCIAL

## 2024-10-23 VITALS
DIASTOLIC BLOOD PRESSURE: 78 MMHG | HEIGHT: 63 IN | TEMPERATURE: 98 F | BODY MASS INDEX: 27.68 KG/M2 | OXYGEN SATURATION: 99 % | SYSTOLIC BLOOD PRESSURE: 117 MMHG | HEART RATE: 90 BPM | WEIGHT: 156.19 LBS | RESPIRATION RATE: 18 BRPM

## 2024-10-23 DIAGNOSIS — N90.89 PERINEAL CYST IN FEMALE: Primary | ICD-10-CM

## 2024-10-23 DIAGNOSIS — C83.30 DIFFUSE LARGE B-CELL LYMPHOMA, UNSPECIFIED BODY REGION (HCC): ICD-10-CM

## 2024-10-23 PROCEDURE — 99213 OFFICE O/P EST LOW 20 MIN: CPT | Performed by: STUDENT IN AN ORGANIZED HEALTH CARE EDUCATION/TRAINING PROGRAM

## 2024-10-23 RX ORDER — MUPIROCIN 20 MG/G
1 OINTMENT TOPICAL 2 TIMES DAILY
Qty: 30 G | Refills: 0 | Status: SHIPPED | OUTPATIENT
Start: 2024-10-23

## 2024-10-23 NOTE — PROGRESS NOTES
Hematology/Oncology Initial Consultation Note    Patient Name: Kimberly Quezada  Medical Record Number: [id number]    YOB: 1965   Date of Consultation: 9/3/2024   PCP: Hillary Prado MD   Other providers:      Reason for Consultation:  Kimberly Quezada was seen today for the diagnosis of DLBCL     Oncologic History:       March 2024: Anemia with MCV of 72.3.  He also endorsed having decreased appetite and weight loss of 25 pounds.  2024: Had EGD and colonoscopy which were normal.     Had a CT of the chest abdomen pelvis after she noted a neck masd which showed bilateral lymphadenopathy in her neck, chest, left axilla and retroperitoneal region.     July 2024: Axillary lymph node excisional biopsy.  Sent to [facility] for additional workup and was noted to have at DLBCL NOS.    August 14th : Cycle 1 Miguel-R-CHP  Sept 4th 2024: Cycle 2 Miguel RCHP - complicated with facial rash    Sept 24th. Interim PET after 2 cycles showed Deauville 3. With significant decrease in activity noted.    Oct 15th, 2024: Extensive rosacea noted    ===================================================  History of Present Illness:      Patient presents to the clinic for cyst in her perineum.  Patient started leaking fluid since yesterday.  Patient has prior lesions similar to this.  Denies having any fevers or chills.      Past Medical History:  Past Medical History:    Diverticulosis large intestine w/o perforation or abscess w/o bleeding    Gastropathy    High cholesterol    History of blood transfusion    NO ADVERSE REACTIONS    Hx of motion sickness    Hyperlipidemia    Internal hemorrhoids without complication    Rosacea    Seasonal allergies    Visual impairment    GLASSES       Past Surgical History:   Procedure Laterality Date    Colonoscopy N/A 12/13/2018    Procedure: COLONOSCOPY;  Surgeon: Elizabet Gordon MD;  Location: [facility] ENDOSCOPY    Colonoscopy N/A 6/4/2024    Procedure: COLONOSCOPY and  Polypectomy;  Surgeon: Deepak He MD;  Location: [facility] ENDOSCOPY    Other surgical history      Repair of anal fistula 2011       Home Medications:   mupirocin 2 % External Ointment Apply 1 Application topically 2 (two) times daily. 30 g 0    triamcinolone 0.1 % External Cream Apply topically 2 (two) times daily. 60 g 0    acyclovir 400 MG Oral Tab Take 1 tablet (400 mg total) by mouth 2 (two) times daily. 60 tablet 1    prochlorperazine (COMPAZINE) 10 mg tablet Take 1 tablet (10 mg total) by mouth every 6 (six) hours as needed for Nausea. 60 tablet 3    ondansetron (ZOFRAN) 8 MG tablet Take 1 tablet (8 mg total) by mouth every 8 (eight) hours as needed for Nausea. 30 tablet 3    Ferrous Sulfate 325 (65 Fe) MG Oral Tab Take 1 tablet (325 mg total) by mouth 2 (two) times daily. (Patient taking differently: Take 1 tablet (325 mg total) by mouth in the morning and 1 tablet (325 mg total) before bedtime.) 60 tablet 5    docusate sodium (COLACE) 100 MG Oral Cap Take 1 capsule (100 mg total) by mouth 2 (two) times daily. 60 capsule 5     -------  Current Outpatient Medications on File Prior to Visit   Medication Sig Dispense Refill    triamcinolone 0.1 % External Cream Apply topically 2 (two) times daily. 60 g 0    acyclovir 400 MG Oral Tab Take 1 tablet (400 mg total) by mouth 2 (two) times daily. 60 tablet 1    prochlorperazine (COMPAZINE) 10 mg tablet Take 1 tablet (10 mg total) by mouth every 6 (six) hours as needed for Nausea. 60 tablet 3    ondansetron (ZOFRAN) 8 MG tablet Take 1 tablet (8 mg total) by mouth every 8 (eight) hours as needed for Nausea. 30 tablet 3    Ferrous Sulfate 325 (65 Fe) MG Oral Tab Take 1 tablet (325 mg total) by mouth 2 (two) times daily. (Patient taking differently: Take 1 tablet (325 mg total) by mouth in the morning and 1 tablet (325 mg total) before bedtime.) 60 tablet 5    docusate sodium (COLACE) 100 MG Oral Cap Take 1 capsule (100 mg total) by mouth 2 (two) times daily. 60  capsule 5     Current Facility-Administered Medications on File Prior to Visit   Medication Dose Route Frequency Provider Last Rate Last Admin    [COMPLETED] pegfilgrastim-jmdb (Fulphila) 6 MG/0.6ML SUBQ injection 6 mg  6 mg Subcutaneous Once Dayna Hidalgo MD   6 mg at 10/17/24 1424    [COMPLETED] ondansetron (Zofran) 16 mg in sodium chloride 0.9% 108 mL IVPB  16 mg Intravenous Once Dayna Hidalgo MD   Stopped at 10/16/24 1110    [COMPLETED] acetaminophen (Tylenol) tab 650 mg  650 mg Oral Once Dayna Hidalgo MD   650 mg at 10/16/24 1052    [COMPLETED] diphenhydrAMINE (Benadryl) cap/tab 25 mg  25 mg Oral Once Dayna Hidalgo MD   25 mg at 10/16/24 1053    [COMPLETED] fosaprepitant (Emend) 150 mg in sodium chloride 0.9% 150 mL IVPB  150 mg Intravenous Once Dayna Hidalgo MD   Stopped at 10/16/24 1135    [COMPLETED] riTUXimab-abbs (Truxima) 600 mg in sodium chloride 0.9% 560 mL infusion  375 mg/m2 (Treatment Plan Recorded) Intravenous Once Dayna Hidalgo MD   Stopped at 10/16/24 1401    [COMPLETED] polatuzumab vedotin (Polivy) 130 mg in sodium chloride 0.9% 106.5 mL infusion  1.8 mg/kg (Treatment Plan Recorded) Intravenous Once Dayna Hidalgo MD   Stopped at 10/16/24 1437    [COMPLETED] DOXOrubicin (Adriamycin) 2 mg/mL IV syringe 88 mg 44 mL  50 mg/m2 (Treatment Plan Recorded) Intravenous Once Dayna Hidalgo MD   Stopped at 10/16/24 1524    [COMPLETED] cyclophosphamide (Cytoxan) 1,320 mg in sodium chloride 0.9% 256.6 mL infusion  750 mg/m2 (Treatment Plan Recorded) Intravenous Once Dayna Hidalgo MD   Stopped at 10/16/24 1558    [COMPLETED] pegfilgrastim-jmdb (Fulphila) 6 MG/0.6ML SUBQ injection 6 mg  6 mg Subcutaneous Once Dayna Hidalgo MD   6 mg at 09/26/24 1436    [COMPLETED] ondansetron (Zofran) 16 mg in sodium chloride 0.9% 108 mL IVPB  16 mg Intravenous Once Dayna Hidalgo MD   Stopped at 09/25/24 0921    [COMPLETED] acetaminophen (Tylenol) tab 650 mg  650 mg Oral Once  Dayna Hidalgo MD   650 mg at 09/25/24 0833    [COMPLETED] diphenhydrAMINE (Benadryl) cap/tab 25 mg  25 mg Oral Once Dayna Hidalgo MD   25 mg at 09/25/24 0833    [COMPLETED] fosaprepitant (Emend) 150 mg in sodium chloride 0.9% 150 mL IVPB  150 mg Intravenous Once Dayna Hidalgo MD   Stopped at 09/25/24 0950    [COMPLETED] riTUXimab-abbs (Truxima) 600 mg in sodium chloride 0.9% 560 mL infusion  375 mg/m2 (Treatment Plan Recorded) Intravenous Once Dayna Hidalgo MD   Stopped at 09/25/24 1136    [COMPLETED] polatuzumab vedotin (Polivy) 118 mg in sodium chloride 0.9% 105.9 mL infusion  1.8 mg/kg (Treatment Plan Recorded) Intravenous Once Dayna Hidalgo MD   Stopped at 09/25/24 1210    [COMPLETED] DOXOrubicin (Adriamycin) 2 mg/mL IV syringe 84 mg 42 mL  50 mg/m2 (Treatment Plan Recorded) Intravenous Once Dayna Hidalgo MD   Stopped at 09/25/24 1239    [COMPLETED] cyclophosphamide (Cytoxan) 1,280 mg in sodium chloride 0.9% 256.4 mL infusion  750 mg/m2 (Treatment Plan Recorded) Intravenous Once Dayna Hidalgo MD   Stopped at 09/25/24 1312       Allergies:   Allergies   Allergen Reactions    Meloxicam ANGIOEDEMA     Upper and lower lip throbbing, tongue swelling, tongue ulcers    Contrave [Naltrexone-Bupropion Hcl Er] RASH    Wellbutrin [Bupropion] RASH    Simvastatin RASH       Psychosocial History:  Social History     Social History Narrative    Not on file     Social History     Socioeconomic History    Marital status: Single   Tobacco Use    Smoking status: Never     Passive exposure: Yes    Smokeless tobacco: Never    Tobacco comments:     Social smoker. Pt does not remember when she stopped   Vaping Use    Vaping status: Never Used   Substance and Sexual Activity    Alcohol use: Yes     Comment: social    Drug use: No   Other Topics Concern    Pt has a pacemaker No    Pt has a defibrillator No    Reaction to local anesthetic No    Right Handed Yes     Social Drivers of Health     Food  Insecurity: No Food Insecurity (8/15/2024)    Food Insecurity     Food Insecurity: Never true   Transportation Needs: No Transportation Needs (8/15/2024)    Transportation Needs     Lack of Transportation: No   Housing Stability: Low Risk  (8/15/2024)    Housing Stability     Housing Instability: No       Family Medical History:  No family history on file.    Review of Systems:  A 10-point ROS was done with pertinent positives and negative per the HPI    Vital Signs:  Height: 160 cm (5' 3\") (10/23 1057)  Weight: 70.9 kg (156 lb 3.2 oz) (10/23 1057)  BSA (Calculated - sq m): 1.74 sq meters (10/23 1057)  Pulse: 90 (10/23 1057)  BP: 117/78 (10/23 1057)  Temp: 97.9 °F (36.6 °C) (10/23 1057)  Do Not Use - Resp Rate: --  SpO2: 99 % (10/23 1057)    Wt Readings from Last 6 Encounters:   10/23/24 70.9 kg (156 lb 3.2 oz)   10/15/24 72.7 kg (160 lb 3.2 oz)   09/24/24 71.2 kg (157 lb)   09/04/24 69.4 kg (153 lb)   09/03/24 69.4 kg (152 lb 14.4 oz)   08/21/24 64.9 kg (143 lb)       ECOG PS: 0    Physical Examination:  General: Patient is alert and oriented, not in acute distress    CV: Regular rate and rhythm, normal S1S2, no murmurs, no LE edema  Respiratory: Lungs clear to auscultation bilaterally  GI/Abd: Soft, non-tender with normoactive bowel sounds, no hepatosplenomegaly  Neurological: Grossly intact   Skin: Diffuse macular brown patches noted  Lines: Mediport in tight chest.  No redness or swelling.    Perineal region: no active cyst. No erythema noted     Laboratory:  Lab Results   Component Value Date    WBC 4.2 10/15/2024    WBC 4.0 09/24/2024    WBC 6.5 09/03/2024    HGB 11.5 (L) 10/15/2024    HGB 11.1 (L) 09/24/2024    HGB 10.3 (L) 09/03/2024    HCT 34.7 (L) 10/15/2024    MCV 89.7 10/15/2024    MCH 29.7 10/15/2024    MCHC 33.1 10/15/2024    RDW 22.0 (H) 10/15/2024    .0 10/15/2024    .0 09/24/2024    .0 09/03/2024     Lab Results   Component Value Date    GLU 91 10/15/2024    BUN 10 10/15/2024     BUNCREA 14.1 10/15/2024    CREATSERUM 0.71 10/15/2024    CREATSERUM 0.67 09/24/2024    CREATSERUM 0.67 09/03/2024    ANIONGAP 7 10/15/2024    GFRNAA 103 02/07/2022    GFRAA 120 02/07/2022    CA 9.7 10/15/2024    OSMOCALC 301 (H) 10/15/2024    ALKPHO 93 10/15/2024    AST 23 10/15/2024    ALT 18 10/15/2024    ALKPHOS 41 09/21/2015    BILT 0.3 10/15/2024    TP 6.6 10/15/2024    ALB 4.3 10/15/2024    GLOBULIN 2.3 10/15/2024    AGRATIO 0.8 (L) 03/18/2024     (H) 10/15/2024    K 3.4 (L) 10/15/2024     10/15/2024    CO2 28.0 10/15/2024     No results found for: \"PTT\", \"PT\", \"INR\"    Imaging:      PET CT from September 24 was reviewed with the patient.  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     .  Noted to have a cyst in the perineum which opened and leaked fluid. Currently no further lesions, no erythema. Topical Mupirocin given.       Skin lesion   - worsening of rosacea with chemotherapy. On topical metronidazole, Ivermectin        Follow up prior to cycle 5.         Dayna Hidalgo MD  Hematology/Medical Oncology

## 2024-10-25 RX ORDER — ACYCLOVIR 400 MG/1
400 TABLET ORAL 2 TIMES DAILY
Qty: 180 TABLET | Refills: 1 | Status: SHIPPED | OUTPATIENT
Start: 2024-10-25

## 2024-10-25 NOTE — TELEPHONE ENCOUNTER
Please review.  Written by hospitalist at recent admit 8/16/24.  Is patient to continue?        Requested Prescriptions   Pending Prescriptions Disp Refills    acyclovir 400 MG Oral Tab 180 tablet 1     Sig: Take 1 tablet (400 mg total) by mouth 2 (two) times daily.       Herpes Agent Protocol Passed - 10/25/2024 10:40 AM        Passed - In person appointment or virtual visit in the past 12 mos or appointment in next 3 mos     Recent Outpatient Visits              2 days ago Perineal cyst in female    [facility] Hematology Oncology Dayna Hidalgo MD    Office Visit    1 week ago Diffuse large B-cell lymphoma of lymph nodes of multiple regions (HCC)    [facility] - Infusion    Nurse Only    1 week ago Diffuse large B-cell lymphoma of lymph nodes of multiple regions (HCC)    [facility] [place] - Infusion    Office Visit    1 week ago Diffuse large B-cell lymphoma of lymph nodes of multiple regions (HCC)    [facility] Hematology Oncology Dayna Hidalgo MD    Office Visit    1 week ago Diffuse large B-cell lymphoma of lymph nodes of multiple regions (HCC)    [facility]    Nurse Only          Future Appointments         Provider Department Appt Notes    In 1 week EM CC LAB2 [facility] [place] - Infusion [id number]    In 1 week Dayna Hidalgo MD [facility] Hematology Oncology PRE CHEMO VISIT    In 1 week EM CC INFRN 5 [facility] [place] - Infusion C5 D1 POLIVY-RCHOP-PORT-MYJ    In 1 week EM CC LAB1 [facility] [place] - Infusion PEG F    In 1 month EM CC LAB1 [facility] [id number]    In 1 month Dayna Hidalgo MD [facility] Hematology Oncology PRE CHEMO VISIT    In 1 month EM CC INFRN 1 [facility] Infusion C6 D1 POLIVY-RCHOP-PORT-MYJ    In 1 month EM CC INFRN 1 [facility] - Infusion PEG F                        Future Appointments         Provider Department Appt Notes    In 1 week EM CC LAB2 [facility] [id number]    In 1 week Dayna Hidalgo MD [facility] Hematology Oncology PRE CHEMO VISIT    In 1 week EM CC INFRN 5 [facility]fusion C5 D1 POLIVY-RCHOP-PORT-MYJ    In 1 week EM CC LAB1 [facility] PEG F    In 1 month EM CC LAB1 [facility] [id number]    In 1 month Dayna Hidalgo MD [facility] Hematology Oncology PRE CHEMO VISIT    In 1 month EM CC INFRN 1 [facility] C6 D1 POLIVY-RCHOP-PORT-MYJ    In 1 month EM CC INFRN 1 [facility] [place] - Infusion PEG F          Recent Outpatient Visits              2 days ago Perineal cyst in female    [facility] Hematology Oncology Dayna Hidalgo MD    Office Visit    1 week ago Diffuse large B-cell lymphoma of lymph nodes of multiple regions (HCC)    [facility] - Infusion    Nurse Only    1 week ago Diffuse large B-cell lymphoma of lymph nodes of multiple regions (HCC)    [facility]    Office Visit    1 week ago Diffuse large B-cell lymphoma of lymph nodes of multiple regions (HCC)    [facility] Hematology Oncology Dayna Hidalgo MD    Office Visit    1 week ago Diffuse large B-cell lymphoma of lymph nodes of multiple regions (HCC)    [facility] [place] - Infusion    Nurse Only

## 2024-11-05 ENCOUNTER — NURSE ONLY (OUTPATIENT)
Dept: HEMATOLOGY/ONCOLOGY | Facility: HOSPITAL | Age: 59
End: 2024-11-05
Attending: STUDENT IN AN ORGANIZED HEALTH CARE EDUCATION/TRAINING PROGRAM
Payer: COMMERCIAL

## 2024-11-05 VITALS
HEIGHT: 63 IN | SYSTOLIC BLOOD PRESSURE: 126 MMHG | BODY MASS INDEX: 28.53 KG/M2 | WEIGHT: 161 LBS | HEART RATE: 84 BPM | RESPIRATION RATE: 18 BRPM | TEMPERATURE: 98 F | DIASTOLIC BLOOD PRESSURE: 77 MMHG | OXYGEN SATURATION: 100 %

## 2024-11-05 DIAGNOSIS — T45.1X5A ALOPECIA DUE TO CYTOTOXIC DRUG: Primary | ICD-10-CM

## 2024-11-05 DIAGNOSIS — C83.30 DIFFUSE LARGE B-CELL LYMPHOMA, UNSPECIFIED BODY REGION (HCC): ICD-10-CM

## 2024-11-05 DIAGNOSIS — C83.38 DIFFUSE LARGE B-CELL LYMPHOMA OF LYMPH NODES OF MULTIPLE REGIONS (HCC): Primary | ICD-10-CM

## 2024-11-05 DIAGNOSIS — L65.8 ALOPECIA DUE TO CYTOTOXIC DRUG: Primary | ICD-10-CM

## 2024-11-05 LAB
ALBUMIN SERPL-MCNC: 4.4 G/DL (ref 3.2–4.8)
ALBUMIN/GLOB SERPL: 2 {RATIO} (ref 1–2)
ALP LIVER SERPL-CCNC: 95 U/L
ALT SERPL-CCNC: 17 U/L
ANION GAP SERPL CALC-SCNC: 6 MMOL/L (ref 0–18)
AST SERPL-CCNC: 20 U/L (ref ?–34)
BASOPHILS # BLD AUTO: 0.03 X10(3) UL (ref 0–0.2)
BASOPHILS NFR BLD AUTO: 0.7 %
BILIRUB SERPL-MCNC: 0.3 MG/DL (ref 0.3–1.2)
BUN BLD-MCNC: 12 MG/DL (ref 9–23)
BUN/CREAT SERPL: 18.2 (ref 10–20)
CALCIUM BLD-MCNC: 10 MG/DL (ref 8.7–10.4)
CHLORIDE SERPL-SCNC: 111 MMOL/L (ref 98–112)
CO2 SERPL-SCNC: 26 MMOL/L (ref 21–32)
CREAT BLD-MCNC: 0.66 MG/DL
DEPRECATED RDW RBC AUTO: 63.7 FL (ref 35.1–46.3)
EGFRCR SERPLBLD CKD-EPI 2021: 102 ML/MIN/1.73M2 (ref 60–?)
EOSINOPHIL # BLD AUTO: 0.08 X10(3) UL (ref 0–0.7)
EOSINOPHIL NFR BLD AUTO: 2 %
ERYTHROCYTE [DISTWIDTH] IN BLOOD BY AUTOMATED COUNT: 19.1 % (ref 11–15)
GLOBULIN PLAS-MCNC: 2.2 G/DL (ref 2–3.5)
GLUCOSE BLD-MCNC: 95 MG/DL (ref 70–99)
HCT VFR BLD AUTO: 33.8 %
HGB BLD-MCNC: 11.2 G/DL
IMM GRANULOCYTES # BLD AUTO: 0.03 X10(3) UL (ref 0–1)
IMM GRANULOCYTES NFR BLD: 0.7 %
LYMPHOCYTES # BLD AUTO: 0.66 X10(3) UL (ref 1–4)
LYMPHOCYTES NFR BLD AUTO: 16.3 %
MCH RBC QN AUTO: 30.2 PG (ref 26–34)
MCHC RBC AUTO-ENTMCNC: 33.1 G/DL (ref 31–37)
MCV RBC AUTO: 91.1 FL
MONOCYTES # BLD AUTO: 0.54 X10(3) UL (ref 0.1–1)
MONOCYTES NFR BLD AUTO: 13.3 %
NEUTROPHILS # BLD AUTO: 2.72 X10 (3) UL (ref 1.5–7.7)
NEUTROPHILS # BLD AUTO: 2.72 X10(3) UL (ref 1.5–7.7)
NEUTROPHILS NFR BLD AUTO: 67 %
OSMOLALITY SERPL CALC.SUM OF ELEC: 296 MOSM/KG (ref 275–295)
PLATELET # BLD AUTO: 297 10(3)UL (ref 150–450)
POTASSIUM SERPL-SCNC: 3.7 MMOL/L (ref 3.5–5.1)
PROT SERPL-MCNC: 6.6 G/DL (ref 5.7–8.2)
RBC # BLD AUTO: 3.71 X10(6)UL
SODIUM SERPL-SCNC: 143 MMOL/L (ref 136–145)
WBC # BLD AUTO: 4.1 X10(3) UL (ref 4–11)

## 2024-11-05 PROCEDURE — 85025 COMPLETE CBC W/AUTO DIFF WBC: CPT

## 2024-11-05 PROCEDURE — 36415 COLL VENOUS BLD VENIPUNCTURE: CPT

## 2024-11-05 PROCEDURE — 99215 OFFICE O/P EST HI 40 MIN: CPT | Performed by: STUDENT IN AN ORGANIZED HEALTH CARE EDUCATION/TRAINING PROGRAM

## 2024-11-05 PROCEDURE — 80053 COMPREHEN METABOLIC PANEL: CPT

## 2024-11-05 RX ORDER — DOXORUBICIN HYDROCHLORIDE 2 MG/ML
50 INJECTION, SOLUTION INTRAVENOUS ONCE
Status: CANCELLED | OUTPATIENT
Start: 2024-11-06

## 2024-11-05 RX ORDER — ACETAMINOPHEN 325 MG/1
650 TABLET ORAL ONCE
Status: CANCELLED | OUTPATIENT
Start: 2024-11-06

## 2024-11-05 RX ORDER — DIPHENHYDRAMINE HCL 25 MG
25 CAPSULE ORAL ONCE
Status: CANCELLED | OUTPATIENT
Start: 2024-11-06

## 2024-11-06 ENCOUNTER — OFFICE VISIT (OUTPATIENT)
Dept: HEMATOLOGY/ONCOLOGY | Facility: HOSPITAL | Age: 59
End: 2024-11-06
Attending: STUDENT IN AN ORGANIZED HEALTH CARE EDUCATION/TRAINING PROGRAM
Payer: COMMERCIAL

## 2024-11-06 VITALS
BODY MASS INDEX: 29 KG/M2 | DIASTOLIC BLOOD PRESSURE: 77 MMHG | OXYGEN SATURATION: 100 % | WEIGHT: 161.31 LBS | SYSTOLIC BLOOD PRESSURE: 138 MMHG | RESPIRATION RATE: 18 BRPM | TEMPERATURE: 98 F | HEART RATE: 90 BPM

## 2024-11-06 DIAGNOSIS — C83.38 DIFFUSE LARGE B-CELL LYMPHOMA OF LYMPH NODES OF MULTIPLE REGIONS (HCC): Primary | ICD-10-CM

## 2024-11-06 PROCEDURE — 96411 CHEMO IV PUSH ADDL DRUG: CPT

## 2024-11-06 PROCEDURE — 96367 TX/PROPH/DG ADDL SEQ IV INF: CPT

## 2024-11-06 PROCEDURE — 96375 TX/PRO/DX INJ NEW DRUG ADDON: CPT

## 2024-11-06 PROCEDURE — 96417 CHEMO IV INFUS EACH ADDL SEQ: CPT

## 2024-11-06 PROCEDURE — 96413 CHEMO IV INFUSION 1 HR: CPT

## 2024-11-06 PROCEDURE — 96415 CHEMO IV INFUSION ADDL HR: CPT

## 2024-11-06 RX ORDER — DIPHENHYDRAMINE HCL 25 MG
CAPSULE ORAL
Status: COMPLETED
Start: 2024-11-06 | End: 2024-11-06

## 2024-11-06 RX ORDER — DOXORUBICIN HYDROCHLORIDE 2 MG/ML
50 INJECTION, SOLUTION INTRAVENOUS ONCE
Status: COMPLETED | OUTPATIENT
Start: 2024-11-06 | End: 2024-11-06

## 2024-11-06 RX ORDER — ACETAMINOPHEN 325 MG/1
TABLET ORAL
Status: COMPLETED
Start: 2024-11-06 | End: 2024-11-06

## 2024-11-06 RX ORDER — DIPHENHYDRAMINE HCL 25 MG
25 CAPSULE ORAL ONCE
Status: COMPLETED | OUTPATIENT
Start: 2024-11-06 | End: 2024-11-06

## 2024-11-06 RX ORDER — ACETAMINOPHEN 325 MG/1
650 TABLET ORAL ONCE
Status: COMPLETED | OUTPATIENT
Start: 2024-11-06 | End: 2024-11-06

## 2024-11-06 RX ADMIN — ACETAMINOPHEN 650 MG: 325 TABLET ORAL at 09:57:00

## 2024-11-06 RX ADMIN — DOXORUBICIN HYDROCHLORIDE 88 MG: 2 INJECTION, SOLUTION INTRAVENOUS at 13:33:00

## 2024-11-06 RX ADMIN — DIPHENHYDRAMINE HCL 25 MG: 25 MG CAPSULE ORAL at 09:57:00

## 2024-11-06 NOTE — PROGRESS NOTES
Patient here for C5d1 R-Miguel-CHP.  Arrives Ambulating independently, accompanied by Self.      Patient was evaluated today by Treatment Nurse.    Oral medications included in this regimen:  yes - prednisone. Patient confirms taking as prescribed.    Patient confirms comprehension of cancer treatment schedule:  yes    Pregnancy screening:  Denies possibility of pregnancy    Modifications in dose or schedule:  No    Medications appearance and physical integrity checked by RN: yes.    Chemotherapy IV pump settings verified by 2 RNs:  Yes.  Frequency of blood return and site check throughout administration: Prior to administration, Prior to each drug, and At completion of therapy     Infusion/treatment outcome:  patient tolerated treatment without incident. Rituximab infused via rapid rate.     Education Record    Learner:  Patient  Barriers / Limitations:  None  Method:  Reinforcement  Education / instructions given: Reinforced plan of care and treatment regimen.  Outcome:  Shows understanding    Discharged Home, Ambulating independently, accompanied by:Family member    Patient/family verbalized understanding of future appointments: by [employer] messaging

## 2024-11-07 ENCOUNTER — NURSE ONLY (OUTPATIENT)
Dept: HEMATOLOGY/ONCOLOGY | Facility: HOSPITAL | Age: 59
End: 2024-11-07
Attending: STUDENT IN AN ORGANIZED HEALTH CARE EDUCATION/TRAINING PROGRAM
Payer: COMMERCIAL

## 2024-11-07 ENCOUNTER — PATIENT MESSAGE (OUTPATIENT)
Dept: FAMILY MEDICINE CLINIC | Facility: CLINIC | Age: 59
End: 2024-11-07

## 2024-11-07 DIAGNOSIS — T45.2X1A: Primary | ICD-10-CM

## 2024-11-07 DIAGNOSIS — C83.38 DIFFUSE LARGE B-CELL LYMPHOMA OF LYMPH NODES OF MULTIPLE REGIONS (HCC): Primary | ICD-10-CM

## 2024-11-07 DIAGNOSIS — E78.49 OTHER HYPERLIPIDEMIA: ICD-10-CM

## 2024-11-07 PROCEDURE — 96372 THER/PROPH/DIAG INJ SC/IM: CPT

## 2024-11-08 ENCOUNTER — APPOINTMENT (OUTPATIENT)
Dept: URBAN - METROPOLITAN AREA CLINIC 248 | Age: 59
Setting detail: DERMATOLOGY
End: 2024-11-08

## 2024-11-08 DIAGNOSIS — L82.1 OTHER SEBORRHEIC KERATOSIS: ICD-10-CM

## 2024-11-08 DIAGNOSIS — L71.8 OTHER ROSACEA: ICD-10-CM

## 2024-11-08 PROCEDURE — OTHER COUNSELING: OTHER

## 2024-11-08 PROCEDURE — 99213 OFFICE O/P EST LOW 20 MIN: CPT

## 2024-11-08 PROCEDURE — OTHER DIAGNOSIS COMMENT: OTHER

## 2024-11-08 PROCEDURE — OTHER ADDITIONAL NOTES: OTHER

## 2024-11-08 PROCEDURE — OTHER PRESCRIPTION MEDICATION MANAGEMENT: OTHER

## 2024-11-08 ASSESSMENT — LOCATION ZONE DERM
LOCATION ZONE: LEG
LOCATION ZONE: ARM
LOCATION ZONE: FACE

## 2024-11-08 ASSESSMENT — LOCATION SIMPLE DESCRIPTION DERM
LOCATION SIMPLE: LEFT UPPER ARM
LOCATION SIMPLE: LEFT CHEEK
LOCATION SIMPLE: RIGHT CHEEK
LOCATION SIMPLE: LEFT PRETIBIAL REGION

## 2024-11-08 ASSESSMENT — LOCATION DETAILED DESCRIPTION DERM
LOCATION DETAILED: LEFT PROXIMAL PRETIBIAL REGION
LOCATION DETAILED: LEFT INFERIOR CENTRAL MALAR CHEEK
LOCATION DETAILED: RIGHT CENTRAL MALAR CHEEK
LOCATION DETAILED: LEFT ANTERIOR PROXIMAL UPPER ARM

## 2024-11-08 NOTE — TELEPHONE ENCOUNTER
Last LIPID panel 318/24. Vitamin D was 103 at this time and vitamin D was discontinued.     Per visit 3/6/24:  Has cramps in legs when working and nighttime -  Has not been taking atorvastatin.  Dry cough at times when drinking or eating.  Wt Readings from Last 3 Encounters:         Component  Ref Range & Units 3/18/24  9:52 AM   CHOLESTEROL, TOTAL  <200 mg/dL 166   HDL CHOLESTEROL  > OR = 50 mg/dL 51   TRIGLYCERIDES  <150 mg/dL 86   LDL-CHOLESTEROL  mg/dL (calc) 97   Comment: Reference range: <100     Desirable range <100 mg/dL for primary prevention;    <70 mg/dL for patients with CHD or diabetic patients  with > or = 2 CHD risk factors.     LDL-C is now calculated using the Georgi-Agudelo  calculation, which is a validated novel method providing  better accuracy than the Friedewald equation in the  estimation of LDL-C.  Georgi PALACIOS et al. LIVAN. 2013;310(19): 2831-1328  (http://education.[employer].com/faq/VOG816)   CHOL/HDLC RATIO  <5.0 (calc) 3.3   NON-HDL CHOLESTEROL  <130 mg/dL (calc) 115   Comment: For patients with diabetes pl

## 2024-11-08 NOTE — PROCEDURE: ADDITIONAL NOTES
Render Risk Assessment In Note?: no
Additional Notes: Patient advised to use vanicream products and samples provided\\nLong history of rosacea treated in doxycycline in the past\\nPatient mobile photos reviewed, flare pattern consistent with chemo every 21 days.\\nPatient stopped using Triamcinalone cream on face and neck prescribed by oncologist because that cream burn her face.
Detail Level: Simple

## 2024-11-08 NOTE — PROCEDURE: PRESCRIPTION MEDICATION MANAGEMENT
Render In Strict Bullet Format?: No
Plan: 3 more chemo sessions remain
Detail Level: Zone
Continue Regimen: Aveidaoxia 1 %-1 %-4 % topical gel \\nSig: Apply to AA QAM\\nNo refill send today. Will send refill if pt need refills

## 2024-11-11 NOTE — PROGRESS NOTES
Hematology/Oncology Initial Consultation Note    Patient Name: Kimberly Quezada  Medical Record Number: [id number]    YOB: 1965   Date of Consultation: 9/3/2024   PCP: Hillary Prado MD   Other providers:      Reason for Consultation:  Kimberly Quezada was seen today for the diagnosis of DLBCL     Oncologic History:       March 2024: Anemia with MCV of 72.3.  He also endorsed having decreased appetite and weight loss of 25 pounds.  2024: Had EGD and colonoscopy which were normal.     Had a CT of the chest abdomen pelvis after she noted a neck masd which showed bilateral lymphadenopathy in her neck, chest, left axilla and retroperitoneal region.     July 2024: Axillary lymph node excisional biopsy.  Sent to [facility] for additional workup and was noted to have at DLBCL NOS.    August 14th : Cycle 1 Miguel-R-CHP  Sept 4th 2024: Cycle 2 Miguel RCHP - complicated with facial rash    Sept 24th. Interim PET after 2 cycles showed Deauville 3. With significant decrease in activity noted.    Oct 15th, 2024: Extensive rosacea noted    ===================================================  History of Present Illness:      Patient presents to the clinic  Appetite is improved.  Denies having any fevers.  Night sweats have improved.  Swelling around her neck has improved.      Having any loose stools or neuropathy or chest discomfort or shortness of breath or leg swelling.        Past Medical History:  Past Medical History:    Diverticulosis large intestine w/o perforation or abscess w/o bleeding    Gastropathy    High cholesterol    History of blood transfusion    NO ADVERSE REACTIONS    Hx of motion sickness    Hyperlipidemia    Internal hemorrhoids without complication    Rosacea    Seasonal allergies    Visual impairment    GLASSES       Past Surgical History:   Procedure Laterality Date    Colonoscopy N/A 12/13/2018    Procedure: COLONOSCOPY;  Surgeon: Elizabet Gordon MD;  Location: [facility] ENDOSCOPY     Colonoscopy N/A 6/4/2024    Procedure: COLONOSCOPY and Polypectomy;  Surgeon: Deepak He MD;  Location: [facility] ENDOSCOPY    Other surgical history      Repair of anal fistula 2011       Home Medications:   acyclovir 400 MG Oral Tab Take 1 tablet (400 mg total) by mouth 2 (two) times daily. 180 tablet 1    mupirocin 2 % External Ointment Apply 1 Application topically 2 (two) times daily. 30 g 0    prochlorperazine (COMPAZINE) 10 mg tablet Take 1 tablet (10 mg total) by mouth every 6 (six) hours as needed for Nausea. 60 tablet 3    ondansetron (ZOFRAN) 8 MG tablet Take 1 tablet (8 mg total) by mouth every 8 (eight) hours as needed for Nausea. 30 tablet 3    Ferrous Sulfate 325 (65 Fe) MG Oral Tab Take 1 tablet (325 mg total) by mouth 2 (two) times daily. (Patient taking differently: Take 1 tablet (325 mg total) by mouth in the morning and 1 tablet (325 mg total) before bedtime.) 60 tablet 5    docusate sodium (COLACE) 100 MG Oral Cap Take 1 capsule (100 mg total) by mouth 2 (two) times daily. 60 capsule 5     -------  Current Outpatient Medications on File Prior to Visit   Medication Sig Dispense Refill    acyclovir 400 MG Oral Tab Take 1 tablet (400 mg total) by mouth 2 (two) times daily. 180 tablet 1    mupirocin 2 % External Ointment Apply 1 Application topically 2 (two) times daily. 30 g 0    prochlorperazine (COMPAZINE) 10 mg tablet Take 1 tablet (10 mg total) by mouth every 6 (six) hours as needed for Nausea. 60 tablet 3    ondansetron (ZOFRAN) 8 MG tablet Take 1 tablet (8 mg total) by mouth every 8 (eight) hours as needed for Nausea. 30 tablet 3    Ferrous Sulfate 325 (65 Fe) MG Oral Tab Take 1 tablet (325 mg total) by mouth 2 (two) times daily. (Patient taking differently: Take 1 tablet (325 mg total) by mouth in the morning and 1 tablet (325 mg total) before bedtime.) 60 tablet 5    docusate sodium (COLACE) 100 MG Oral Cap Take 1 capsule (100 mg total) by mouth 2 (two) times daily. 60 capsule 5     triamcinolone 0.1 % External Cream Apply topically 2 (two) times daily. (Patient not taking: Reported on 11/5/2024) 60 g 0     Current Facility-Administered Medications on File Prior to Visit   Medication Dose Route Frequency Provider Last Rate Last Admin    [COMPLETED] pegfilgrastim-jmdb (Fulphila) 6 MG/0.6ML SUBQ injection 6 mg  6 mg Subcutaneous Once Dayna Hidalgo MD   6 mg at 10/17/24 1424    [COMPLETED] ondansetron (Zofran) 16 mg in sodium chloride 0.9% 108 mL IVPB  16 mg Intravenous Once Dayna Hidalgo MD   Stopped at 10/16/24 1110    [COMPLETED] acetaminophen (Tylenol) tab 650 mg  650 mg Oral Once Dayna Hidalgo MD   650 mg at 10/16/24 1052    [COMPLETED] diphenhydrAMINE (Benadryl) cap/tab 25 mg  25 mg Oral Once Dayna Hidalgo MD   25 mg at 10/16/24 1053    [COMPLETED] fosaprepitant (Emend) 150 mg in sodium chloride 0.9% 150 mL IVPB  150 mg Intravenous Once Dayna Hidalgo MD   Stopped at 10/16/24 1135    [COMPLETED] riTUXimab-abbs (Truxima) 600 mg in sodium chloride 0.9% 560 mL infusion  375 mg/m2 (Treatment Plan Recorded) Intravenous Once Dayna Hidalgo MD   Stopped at 10/16/24 1401    [COMPLETED] polatuzumab vedotin (Polivy) 130 mg in sodium chloride 0.9% 106.5 mL infusion  1.8 mg/kg (Treatment Plan Recorded) Intravenous Once Dayna Hidalgo MD   Stopped at 10/16/24 1437    [COMPLETED] DOXOrubicin (Adriamycin) 2 mg/mL IV syringe 88 mg 44 mL  50 mg/m2 (Treatment Plan Recorded) Intravenous Once Dayna Hidalgo MD   Stopped at 10/16/24 1524    [COMPLETED] cyclophosphamide (Cytoxan) 1,320 mg in sodium chloride 0.9% 256.6 mL infusion  750 mg/m2 (Treatment Plan Recorded) Intravenous Once Dayna Hidalgo MD   Stopped at 10/16/24 1558       Allergies:   Allergies   Allergen Reactions    Meloxicam ANGIOEDEMA     Upper and lower lip throbbing, tongue swelling, tongue ulcers    Contrave [Naltrexone-Bupropion Hcl Er] RASH    Wellbutrin [Bupropion] RASH    Simvastatin RASH        Psychosocial History:  Social History     Social History Narrative    Not on file     Social History     Socioeconomic History    Marital status: Single   Tobacco Use    Smoking status: Never     Passive exposure: Yes    Smokeless tobacco: Never    Tobacco comments:     Social smoker. Pt does not remember when she stopped   Vaping Use    Vaping status: Never Used   Substance and Sexual Activity    Alcohol use: Yes     Comment: social    Drug use: No   Other Topics Concern    Pt has a pacemaker No    Pt has a defibrillator No    Reaction to local anesthetic No    Right Handed Yes     Social Drivers of Health     Food Insecurity: No Food Insecurity (8/15/2024)    Food Insecurity     Food Insecurity: Never true   Transportation Needs: No Transportation Needs (8/15/2024)    Transportation Needs     Lack of Transportation: No   Housing Stability: Low Risk  (8/15/2024)    Housing Stability     Housing Instability: No       Family Medical History:  No family history on file.    Review of Systems:  A 10-point ROS was done with pertinent positives and negative per the HPI    Vital Signs:  Height: --  Weight: --  BSA (Calculated - sq m): --  Pulse: --  BP: --  Temp: --  Do Not Use - Resp Rate: --  SpO2: --    Wt Readings from Last 6 Encounters:   11/06/24 73.2 kg (161 lb 4.8 oz)   11/05/24 73 kg (161 lb)   10/23/24 70.9 kg (156 lb 3.2 oz)   10/15/24 72.7 kg (160 lb 3.2 oz)   09/24/24 71.2 kg (157 lb)   09/04/24 69.4 kg (153 lb)       ECOG PS: 0    Physical Examination:  General: Patient is alert and oriented, not in acute distress  Psych: Mood and affect are appropriate  Eyes: EOMI, PERRL  ENT: Oropharynx is clear, no adenopathy  CV: Regular rate and rhythm, normal S1S2, no murmurs, no LE edema  Respiratory: Lungs clear to auscultation bilaterally  GI/Abd: Soft, non-tender with normoactive bowel sounds, no hepatosplenomegaly  Neurological: Grossly intact   Lymphatics: Left supraclavicular lymphadenopathy palpable.  No  palpable axillary lymph nodes.  Skin: no rashes or petechiae  Lines: Mediport in tight chest.  No redness or swelling.    Laboratory:  Lab Results   Component Value Date    WBC 4.1 11/05/2024    WBC 4.2 10/15/2024    WBC 4.0 09/24/2024    HGB 11.2 (L) 11/05/2024    HGB 11.5 (L) 10/15/2024    HGB 11.1 (L) 09/24/2024    HCT 33.8 (L) 11/05/2024    MCV 91.1 11/05/2024    MCH 30.2 11/05/2024    MCHC 33.1 11/05/2024    RDW 19.1 (H) 11/05/2024    .0 11/05/2024    .0 10/15/2024    .0 09/24/2024     Lab Results   Component Value Date    GLU 95 11/05/2024    BUN 12 11/05/2024    BUNCREA 18.2 11/05/2024    CREATSERUM 0.66 11/05/2024    CREATSERUM 0.71 10/15/2024    CREATSERUM 0.67 09/24/2024    ANIONGAP 6 11/05/2024    GFRNAA 103 02/07/2022    GFRAA 120 02/07/2022    CA 10.0 11/05/2024    OSMOCALC 296 (H) 11/05/2024    ALKPHO 95 11/05/2024    AST 20 11/05/2024    ALT 17 11/05/2024    ALKPHOS 41 09/21/2015    BILT 0.3 11/05/2024    TP 6.6 11/05/2024    ALB 4.4 11/05/2024    GLOBULIN 2.2 11/05/2024    AGRATIO 0.8 (L) 03/18/2024     11/05/2024    K 3.7 11/05/2024     11/05/2024    CO2 26.0 11/05/2024     No results found for: \"PTT\", \"PT\", \"INR\"    Imaging:      PET CT from September 24 was reviewed with the patient.  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    Cycle 1 Miguel R-CHP on 8/14.  No toxicities.  Cycle 2 Miguel RCHP on 9/4.   Interim PET revealed Deauville 3, significant decrease in tumor burden     Treatment complicated by likely Rosacea.    Proceed to cycle 5 with careful monitoring  of the skin lesions.     Skin lesion     Rash on her neck and the lower jaw around the end of the cycle 1.  The rash has progressively worsened and now she also developed left brown macular patches. Diagnosed with Rosacea   She is currently getting topical ivermectin metronidazole combination.  She mentions that her lesions have improved.   - worsening of rosacea with chemotherapy. On topical metronidazole, Ivermectin         Anemia  - improving   -Secondary to iron deficiency and possible marrow involvement.  -Patient has low iron saturation.  Advise doing continue her oral iron    Return in 3 weeks prior to cycle 6        Dayna Hidalgo MD  Hematology/Medical Oncology

## 2024-11-12 ENCOUNTER — LAB ENCOUNTER (OUTPATIENT)
Dept: LAB | Age: 59
End: 2024-11-12
Attending: FAMILY MEDICINE
Payer: COMMERCIAL

## 2024-11-12 DIAGNOSIS — E78.49 OTHER HYPERLIPIDEMIA: ICD-10-CM

## 2024-11-12 DIAGNOSIS — T45.2X1A: ICD-10-CM

## 2024-11-12 LAB
CHOLEST SERPL-MCNC: 253 MG/DL (ref ?–200)
FASTING PATIENT LIPID ANSWER: YES
HDLC SERPL-MCNC: 104 MG/DL (ref 40–59)
LDLC SERPL CALC-MCNC: 127 MG/DL (ref ?–100)
NONHDLC SERPL-MCNC: 149 MG/DL (ref ?–130)
TRIGL SERPL-MCNC: 132 MG/DL (ref 30–149)
VIT D+METAB SERPL-MCNC: 28.6 NG/ML (ref 30–100)
VLDLC SERPL CALC-MCNC: 24 MG/DL (ref 0–30)

## 2024-11-12 PROCEDURE — 36415 COLL VENOUS BLD VENIPUNCTURE: CPT

## 2024-11-12 PROCEDURE — 82306 VITAMIN D 25 HYDROXY: CPT

## 2024-11-12 PROCEDURE — 80061 LIPID PANEL: CPT

## 2024-11-21 ENCOUNTER — TELEPHONE (OUTPATIENT)
Dept: FAMILY MEDICINE CLINIC | Facility: CLINIC | Age: 59
End: 2024-11-21

## 2024-11-21 NOTE — TELEPHONE ENCOUNTER
Patient wanted to review and clarify plan of below for both.  She is going to buy OTC 2000units. She checked with her oncology provider and they said ok as long as no vit K.   She thought this dose was script, did not know OTC.    Also reviewed the statin directions and plan. Patient agreeable.     No other questions at this time.     Cholesterol is very high without the statin. You can try restarting half dose every other day to see how you tolerate it. Please ask your oncology team if ok to restart vitamin D. I Recommend 2000 units daily. - Dr. Prado   Written by Hillary Prado MD on 11/20/2024  5:18 PM CST  Seen by patient Kimberly FATIMAH Quezada on 11/21/2024  9:28 AM

## 2024-11-26 ENCOUNTER — OFFICE VISIT (OUTPATIENT)
Dept: HEMATOLOGY/ONCOLOGY | Facility: HOSPITAL | Age: 59
End: 2024-11-26
Attending: STUDENT IN AN ORGANIZED HEALTH CARE EDUCATION/TRAINING PROGRAM
Payer: COMMERCIAL

## 2024-11-26 VITALS
HEART RATE: 81 BPM | WEIGHT: 160.69 LBS | SYSTOLIC BLOOD PRESSURE: 116 MMHG | DIASTOLIC BLOOD PRESSURE: 73 MMHG | BODY MASS INDEX: 28.47 KG/M2 | HEIGHT: 63 IN | RESPIRATION RATE: 16 BRPM | OXYGEN SATURATION: 97 % | TEMPERATURE: 98 F

## 2024-11-26 DIAGNOSIS — C83.38 DIFFUSE LARGE B-CELL LYMPHOMA OF LYMPH NODES OF MULTIPLE REGIONS (HCC): Primary | ICD-10-CM

## 2024-11-26 DIAGNOSIS — Z51.11 CHEMOTHERAPY MANAGEMENT, ENCOUNTER FOR: ICD-10-CM

## 2024-11-26 DIAGNOSIS — D64.9 ANEMIA, UNSPECIFIED TYPE: ICD-10-CM

## 2024-11-26 LAB
ALBUMIN SERPL-MCNC: 4.5 G/DL (ref 3.2–4.8)
ALBUMIN/GLOB SERPL: 2.5 {RATIO} (ref 1–2)
ALP LIVER SERPL-CCNC: 90 U/L
ALT SERPL-CCNC: 14 U/L
ANION GAP SERPL CALC-SCNC: 6 MMOL/L (ref 0–18)
AST SERPL-CCNC: 22 U/L (ref ?–34)
BASOPHILS # BLD AUTO: 0.03 X10(3) UL (ref 0–0.2)
BASOPHILS NFR BLD AUTO: 1.2 %
BILIRUB SERPL-MCNC: 0.3 MG/DL (ref 0.3–1.2)
BUN BLD-MCNC: 16 MG/DL (ref 9–23)
BUN/CREAT SERPL: 21.9 (ref 10–20)
CALCIUM BLD-MCNC: 9.9 MG/DL (ref 8.7–10.4)
CHLORIDE SERPL-SCNC: 109 MMOL/L (ref 98–112)
CO2 SERPL-SCNC: 28 MMOL/L (ref 21–32)
CREAT BLD-MCNC: 0.73 MG/DL
DEPRECATED RDW RBC AUTO: 55.6 FL (ref 35.1–46.3)
EGFRCR SERPLBLD CKD-EPI 2021: 96 ML/MIN/1.73M2 (ref 60–?)
EOSINOPHIL # BLD AUTO: 0.06 X10(3) UL (ref 0–0.7)
EOSINOPHIL NFR BLD AUTO: 2.4 %
ERYTHROCYTE [DISTWIDTH] IN BLOOD BY AUTOMATED COUNT: 16.2 % (ref 11–15)
GLOBULIN PLAS-MCNC: 1.8 G/DL (ref 2–3.5)
GLUCOSE BLD-MCNC: 98 MG/DL (ref 70–99)
HCT VFR BLD AUTO: 35.3 %
HGB BLD-MCNC: 11.6 G/DL
IMM GRANULOCYTES # BLD AUTO: 0.01 X10(3) UL (ref 0–1)
IMM GRANULOCYTES NFR BLD: 0.4 %
LYMPHOCYTES # BLD AUTO: 0.53 X10(3) UL (ref 1–4)
LYMPHOCYTES NFR BLD AUTO: 21.3 %
MCH RBC QN AUTO: 31 PG (ref 26–34)
MCHC RBC AUTO-ENTMCNC: 32.9 G/DL (ref 31–37)
MCV RBC AUTO: 94.4 FL
MONOCYTES # BLD AUTO: 0.5 X10(3) UL (ref 0.1–1)
MONOCYTES NFR BLD AUTO: 20.1 %
NEUTROPHILS # BLD AUTO: 1.36 X10 (3) UL (ref 1.5–7.7)
NEUTROPHILS # BLD AUTO: 1.36 X10(3) UL (ref 1.5–7.7)
NEUTROPHILS NFR BLD AUTO: 54.6 %
OSMOLALITY SERPL CALC.SUM OF ELEC: 297 MOSM/KG (ref 275–295)
PLATELET # BLD AUTO: 290 10(3)UL (ref 150–450)
POTASSIUM SERPL-SCNC: 3.7 MMOL/L (ref 3.5–5.1)
PROT SERPL-MCNC: 6.3 G/DL (ref 5.7–8.2)
RBC # BLD AUTO: 3.74 X10(6)UL
SODIUM SERPL-SCNC: 143 MMOL/L (ref 136–145)
WBC # BLD AUTO: 2.5 X10(3) UL (ref 4–11)

## 2024-11-26 PROCEDURE — 80053 COMPREHEN METABOLIC PANEL: CPT

## 2024-11-26 PROCEDURE — 99215 OFFICE O/P EST HI 40 MIN: CPT | Performed by: STUDENT IN AN ORGANIZED HEALTH CARE EDUCATION/TRAINING PROGRAM

## 2024-11-26 PROCEDURE — 36415 COLL VENOUS BLD VENIPUNCTURE: CPT

## 2024-11-26 PROCEDURE — 85025 COMPLETE CBC W/AUTO DIFF WBC: CPT

## 2024-11-26 RX ORDER — DOXORUBICIN HYDROCHLORIDE 2 MG/ML
50 INJECTION, SOLUTION INTRAVENOUS ONCE
Status: CANCELLED | OUTPATIENT
Start: 2024-11-27

## 2024-11-26 RX ORDER — DIPHENHYDRAMINE HCL 25 MG
25 CAPSULE ORAL ONCE
Status: CANCELLED | OUTPATIENT
Start: 2024-11-27

## 2024-11-26 RX ORDER — ACETAMINOPHEN 325 MG/1
650 TABLET ORAL ONCE
Status: CANCELLED | OUTPATIENT
Start: 2024-11-27

## 2024-11-26 NOTE — PROGRESS NOTES
Hematology/Oncology Initial Consultation Note    Patient Name: Kimberly Quezada  Medical Record Number: [id number]    YOB: 1965   Date of Consultation: 9/3/2024   PCP: Hilalry Prado MD   Other providers:      Reason for Consultation:  Kimberly Quezada was seen today for the diagnosis of DLBCL     Oncologic History:       March 2024: Anemia with MCV of 72.3.  He also endorsed having decreased appetite and weight loss of 25 pounds.  2024: Had EGD and colonoscopy which were normal.     Had a CT of the chest abdomen pelvis after she noted a neck masd which showed bilateral lymphadenopathy in her neck, chest, left axilla and retroperitoneal region.     July 2024: Axillary lymph node excisional biopsy.  Sent to [facility] for additional workup and was noted to have at DLBCL NOS.    August 14th : Cycle 1 Miguel-R-CHP  Sept 4th 2024: Cycle 2 Miguel RCHP - complicated with facial rash    Sept 24th. Interim PET after 2 cycles showed Deauville 3. With significant decrease in activity noted.    Oct 15th, 2024: Extensive rosacea noted    ===================================================  History of Present Illness:      Patient presents to the clinic.  Any fevers or chills.  No night sweats.  The sweating around her neck and armpit have improved.  No loose stools.  No skin rashes.  No shortness of breath or leg swelling.    Has noticed pain in the lateral 4 fingers of the left upper extremity which is numb after she wakes up from her sleep.  Denies having any burning sensation numbness or tingling during the daytime.  No motor weakness.  Patient mentions that she has had prior episodes of pain in the left upper extremity and has a history of back pain for which he underwent physical therapy last year.  No numbness or tingling in the right upper extremity on her bilateral lower extremity.        Past Medical History:  Past Medical History:    Diverticulosis large intestine w/o perforation or abscess w/o  bleeding    Gastropathy    High cholesterol    History of blood transfusion    NO ADVERSE REACTIONS    Hx of motion sickness    Hyperlipidemia    Internal hemorrhoids without complication    Rosacea    Seasonal allergies    Visual impairment    GLASSES       Past Surgical History:   Procedure Laterality Date    Colonoscopy N/A 12/13/2018    Procedure: COLONOSCOPY;  Surgeon: Elizabet Gordon MD;  Location: [facility] ENDOSCOPY    Colonoscopy N/A 6/4/2024    Procedure: COLONOSCOPY and Polypectomy;  Surgeon: Deepak He MD;  Location: [facility] ENDOSCOPY    Other surgical history      Repair of anal fistula 2011       Home Medications:   acyclovir 400 MG Oral Tab Take 1 tablet (400 mg total) by mouth 2 (two) times daily. 180 tablet 1    prochlorperazine (COMPAZINE) 10 mg tablet Take 1 tablet (10 mg total) by mouth every 6 (six) hours as needed for Nausea. 60 tablet 3    ondansetron (ZOFRAN) 8 MG tablet Take 1 tablet (8 mg total) by mouth every 8 (eight) hours as needed for Nausea. 30 tablet 3    Ferrous Sulfate 325 (65 Fe) MG Oral Tab Take 1 tablet (325 mg total) by mouth 2 (two) times daily. (Patient taking differently: Take 1 tablet (325 mg total) by mouth in the morning and 1 tablet (325 mg total) before bedtime.) 60 tablet 5    docusate sodium (COLACE) 100 MG Oral Cap Take 1 capsule (100 mg total) by mouth 2 (two) times daily. 60 capsule 5     -------  Current Outpatient Medications on File Prior to Visit   Medication Sig Dispense Refill    acyclovir 400 MG Oral Tab Take 1 tablet (400 mg total) by mouth 2 (two) times daily. 180 tablet 1    prochlorperazine (COMPAZINE) 10 mg tablet Take 1 tablet (10 mg total) by mouth every 6 (six) hours as needed for Nausea. 60 tablet 3    ondansetron (ZOFRAN) 8 MG tablet Take 1 tablet (8 mg total) by mouth every 8 (eight) hours as needed for Nausea. 30 tablet 3    Ferrous Sulfate 325 (65 Fe) MG Oral Tab Take 1 tablet (325 mg total) by mouth 2 (two) times daily. (Patient  taking differently: Take 1 tablet (325 mg total) by mouth in the morning and 1 tablet (325 mg total) before bedtime.) 60 tablet 5    docusate sodium (COLACE) 100 MG Oral Cap Take 1 capsule (100 mg total) by mouth 2 (two) times daily. 60 capsule 5    mupirocin 2 % External Ointment Apply 1 Application topically 2 (two) times daily. 30 g 0    triamcinolone 0.1 % External Cream Apply topically 2 (two) times daily. (Patient not taking: Reported on 11/5/2024) 60 g 0     Current Facility-Administered Medications on File Prior to Visit   Medication Dose Route Frequency Provider Last Rate Last Admin    [COMPLETED] pegfilgrastim-jmdb (Fulphila) 6 MG/0.6ML SUBQ injection 6 mg  6 mg Subcutaneous Once Dayna Hidalgo MD   6 mg at 11/07/24 1114    [COMPLETED] ondansetron (Zofran) 16 mg in sodium chloride 0.9% 108 mL IVPB  16 mg Intravenous Once Dayna Hidalgo MD   Stopped at 11/06/24 1008    [COMPLETED] acetaminophen (Tylenol) tab 650 mg  650 mg Oral Once Dayna Hidalgo MD   650 mg at 11/06/24 0957    [COMPLETED] diphenhydrAMINE (Benadryl) cap/tab 25 mg  25 mg Oral Once Dayna Hidalgo MD   25 mg at 11/06/24 0957    [COMPLETED] fosaprepitant (Emend) 150 mg in sodium chloride 0.9% 150 mL IVPB  150 mg Intravenous Once Dayna Hidalgo MD   Stopped at 11/06/24 1030    [COMPLETED] riTUXimab-abbs (Truxima) 700 mg in sodium chloride 0.9% 570 mL infusion  375 mg/m2 (Treatment Plan Recorded) Intravenous Once Dayna Hidalgo MD   Stopped at 11/06/24 1219    [COMPLETED] polatuzumab vedotin (Polivy) 130 mg in sodium chloride 0.9% 106.5 mL infusion  1.8 mg/kg (Treatment Plan Recorded) Intravenous Once Dayna Hidalgo MD   Stopped at 11/06/24 1253    [COMPLETED] DOXOrubicin (Adriamycin) 2 mg/mL IV syringe 88 mg 44 mL  50 mg/m2 (Treatment Plan Recorded) Intravenous Once Dayna Hidalgo MD   Stopped at 11/06/24 1343    [COMPLETED] cyclophosphamide (Cytoxan) 1,320 mg in sodium chloride 0.9% 256.6 mL infusion  750 mg/m2  (Treatment Plan Recorded) Intravenous Once Dayna Hidalgo MD   Stopped at 11/06/24 8435       Allergies:   Allergies   Allergen Reactions    Meloxicam ANGIOEDEMA     Upper and lower lip throbbing, tongue swelling, tongue ulcers    Contrave [Naltrexone-Bupropion Hcl Er] RASH    Wellbutrin [Bupropion] RASH    Simvastatin RASH       Psychosocial History:  Social History     Social History Narrative    Not on file     Social History     Socioeconomic History    Marital status: Single   Tobacco Use    Smoking status: Never     Passive exposure: Yes    Smokeless tobacco: Never    Tobacco comments:     Social smoker. Pt does not remember when she stopped   Vaping Use    Vaping status: Never Used   Substance and Sexual Activity    Alcohol use: Yes     Comment: social    Drug use: No   Other Topics Concern    Pt has a pacemaker No    Pt has a defibrillator No    Reaction to local anesthetic No    Right Handed Yes     Social Drivers of Health     Food Insecurity: No Food Insecurity (8/15/2024)    Food Insecurity     Food Insecurity: Never true   Transportation Needs: No Transportation Needs (8/15/2024)    Transportation Needs     Lack of Transportation: No   Housing Stability: Low Risk  (8/15/2024)    Housing Stability     Housing Instability: No       Family Medical History:  No family history on file.    Review of Systems:  A 10-point ROS was done with pertinent positives and negative per the HPI    Vital Signs:  Height: 160 cm (5' 3\") (11/26 0957)  Weight: 72.9 kg (160 lb 11.2 oz) (11/26 0957)  BSA (Calculated - sq m): 1.76 sq meters (11/26 0957)  Pulse: 81 (11/26 0957)  BP: 116/73 (11/26 0957)  Temp: 98.3 °F (36.8 °C) (11/26 0957)  Do Not Use - Resp Rate: --  SpO2: 97 % (11/26 0957)    Wt Readings from Last 6 Encounters:   11/26/24 72.9 kg (160 lb 11.2 oz)   11/06/24 73.2 kg (161 lb 4.8 oz)   11/05/24 73 kg (161 lb)   10/23/24 70.9 kg (156 lb 3.2 oz)   10/15/24 72.7 kg (160 lb 3.2 oz)   09/24/24 71.2 kg (157 lb)        ECOG PS: 0    Physical Examination:  General: Patient is alert and oriented, not in acute distress  Psych: Mood and affect are appropriate  Eyes: EOMI, PERRL  ENT: Oropharynx is clear, no adenopathy  CV: Regular rate and rhythm, normal S1S2, no murmurs, no LE edema  Respiratory: Lungs clear to auscultation bilaterally  GI/Abd: Soft, non-tender with normoactive bowel sounds, no hepatosplenomegaly  Neurological: Grossly intact   Lymphatics: Left supraclavicular lymphadenopathy palpable.  No palpable axillary lymph nodes.  Skin: no rashes or petechiae  Lines: Mediport in tight chest.  No redness or swelling.    Laboratory:  Lab Results   Component Value Date    WBC 2.5 (L) 11/26/2024    WBC 4.1 11/05/2024    WBC 4.2 10/15/2024    HGB 11.6 (L) 11/26/2024    HGB 11.2 (L) 11/05/2024    HGB 11.5 (L) 10/15/2024    HCT 35.3 11/26/2024    MCV 94.4 11/26/2024    MCH 31.0 11/26/2024    MCHC 32.9 11/26/2024    RDW 16.2 (H) 11/26/2024    .0 11/26/2024    .0 11/05/2024    .0 10/15/2024     Lab Results   Component Value Date    GLU 98 11/26/2024    BUN 16 11/26/2024    BUNCREA 21.9 (H) 11/26/2024    CREATSERUM 0.73 11/26/2024    CREATSERUM 0.66 11/05/2024    CREATSERUM 0.71 10/15/2024    ANIONGAP 6 11/26/2024    GFRNAA 103 02/07/2022    GFRAA 120 02/07/2022    CA 9.9 11/26/2024    OSMOCALC 297 (H) 11/26/2024    ALKPHO 90 11/26/2024    AST 22 11/26/2024    ALT 14 11/26/2024    ALKPHOS 41 09/21/2015    BILT 0.3 11/26/2024    TP 6.3 11/26/2024    ALB 4.5 11/26/2024    GLOBULIN 1.8 (L) 11/26/2024    AGRATIO 0.8 (L) 03/18/2024     11/26/2024    K 3.7 11/26/2024     11/26/2024    CO2 28.0 11/26/2024     No results found for: \"PTT\", \"PT\", \"INR\"    Imaging:      PET CT from September 24 was reviewed with the patient.  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    Cycle 1 of Miguel- R-CHP on August 14.    Interim PET revealed Deauville 3, significant decrease in tumor burden     Treatment complicated by likely Rosacea.      Grade 1 Leukopenia/ Neutropenia noted. Proceed to cycle 6.  Will schedule an end of treatment PET/CT.    2 more cycles of rituximab as per the POLARIX trial.      Skin lesion     Rash on her neck and the lower jaw around the end of the cycle 1.  Rash improved in most of her cheeks, new  break out noted   - worsening of rosacea with chemotherapy. On topical metronidazole, Ivermectin         Anemia  - improving   -Secondary to iron deficiency and possible marrow involvement.  -Patient has low iron saturation.  Advise doing continue her oral iron    Return in 3 weeks with EOT PET CT    Dayna Hidalgo MD  Hematology/Medical Oncology

## 2024-11-27 ENCOUNTER — OFFICE VISIT (OUTPATIENT)
Dept: HEMATOLOGY/ONCOLOGY | Facility: HOSPITAL | Age: 59
End: 2024-11-27
Attending: STUDENT IN AN ORGANIZED HEALTH CARE EDUCATION/TRAINING PROGRAM
Payer: COMMERCIAL

## 2024-11-27 VITALS
DIASTOLIC BLOOD PRESSURE: 83 MMHG | RESPIRATION RATE: 16 BRPM | BODY MASS INDEX: 29 KG/M2 | WEIGHT: 161 LBS | HEART RATE: 83 BPM | TEMPERATURE: 99 F | OXYGEN SATURATION: 98 % | SYSTOLIC BLOOD PRESSURE: 143 MMHG

## 2024-11-27 DIAGNOSIS — C83.38 DIFFUSE LARGE B-CELL LYMPHOMA OF LYMPH NODES OF MULTIPLE REGIONS (HCC): Primary | ICD-10-CM

## 2024-11-27 PROCEDURE — 96411 CHEMO IV PUSH ADDL DRUG: CPT

## 2024-11-27 PROCEDURE — 96413 CHEMO IV INFUSION 1 HR: CPT

## 2024-11-27 PROCEDURE — 96367 TX/PROPH/DG ADDL SEQ IV INF: CPT

## 2024-11-27 PROCEDURE — 96375 TX/PRO/DX INJ NEW DRUG ADDON: CPT

## 2024-11-27 PROCEDURE — 96417 CHEMO IV INFUS EACH ADDL SEQ: CPT

## 2024-11-27 PROCEDURE — 96415 CHEMO IV INFUSION ADDL HR: CPT

## 2024-11-27 RX ORDER — DIPHENHYDRAMINE HCL 25 MG
CAPSULE ORAL
Status: COMPLETED
Start: 2024-11-27 | End: 2024-11-27

## 2024-11-27 RX ORDER — ACETAMINOPHEN 325 MG/1
650 TABLET ORAL ONCE
Status: COMPLETED | OUTPATIENT
Start: 2024-11-27 | End: 2024-11-27

## 2024-11-27 RX ORDER — ACETAMINOPHEN 325 MG/1
TABLET ORAL
Status: COMPLETED
Start: 2024-11-27 | End: 2024-11-27

## 2024-11-27 RX ORDER — DIPHENHYDRAMINE HCL 25 MG
25 CAPSULE ORAL ONCE
Status: COMPLETED | OUTPATIENT
Start: 2024-11-27 | End: 2024-11-27

## 2024-11-27 RX ORDER — DOXORUBICIN HYDROCHLORIDE 2 MG/ML
50 INJECTION, SOLUTION INTRAVENOUS ONCE
Status: COMPLETED | OUTPATIENT
Start: 2024-11-27 | End: 2024-11-27

## 2024-11-27 RX ADMIN — DOXORUBICIN HYDROCHLORIDE 88 MG: 2 INJECTION, SOLUTION INTRAVENOUS at 14:05:00

## 2024-11-27 RX ADMIN — DIPHENHYDRAMINE HCL 25 MG: 25 MG CAPSULE ORAL at 08:58:00

## 2024-11-27 RX ADMIN — ACETAMINOPHEN 650 MG: 325 TABLET ORAL at 08:58:00

## 2024-11-27 NOTE — PROGRESS NOTES
Patient here for C6d1 R-Miguel-CHP      .  Arrives Ambulating independently, accompanied by Self.      Patient was evaluated today by Treatment Nurse.    Oral medications included in this regimen:  yes - prednisone. Patient confirms taking as prescribed.    Patient confirms comprehension of cancer treatment schedule:  yes    Pregnancy screening:  Denies possibility of pregnancy    Modifications in dose or schedule:  No    Medications appearance and physical integrity checked by RN: yes.    Chemotherapy IV pump settings verified by 2 RNs:  Yes.  Frequency of blood return and site check throughout administration: Prior to administration, Prior to each drug, and At completion of therapy     Infusion/treatment outcome:  patient tolerated treatment without incident. Rituximab infused via rapid rate.     Education Record    Learner:  Patient  Barriers / Limitations:  None  Method:  Reinforcement  Education / instructions given: Reinforced plan of care and treatment regimen.  Outcome:  Shows understanding    Discharged Home, Ambulating independently, accompanied by:Family member    Patient/family verbalized understanding of future appointments: by [employer] messaging

## 2024-11-28 ENCOUNTER — NURSE ONLY (OUTPATIENT)
Dept: HEMATOLOGY/ONCOLOGY | Facility: HOSPITAL | Age: 59
End: 2024-11-28
Attending: STUDENT IN AN ORGANIZED HEALTH CARE EDUCATION/TRAINING PROGRAM
Payer: COMMERCIAL

## 2024-11-28 DIAGNOSIS — C83.38 DIFFUSE LARGE B-CELL LYMPHOMA OF LYMPH NODES OF MULTIPLE REGIONS (HCC): Primary | ICD-10-CM

## 2024-11-28 PROCEDURE — 96372 THER/PROPH/DIAG INJ SC/IM: CPT

## 2024-11-28 NOTE — PROGRESS NOTES
Patient arrives to infusion, ambulating independently for PEG-F injection. Reports no issues or concerns.   PEG-F injected to abdomen. Tolerated well.  Discharged from infusion ambulatory.

## 2024-12-12 ENCOUNTER — PATIENT MESSAGE (OUTPATIENT)
Dept: FAMILY MEDICINE CLINIC | Facility: CLINIC | Age: 59
End: 2024-12-12

## 2024-12-12 RX ORDER — FERROUS SULFATE 325(65) MG
1 TABLET ORAL 2 TIMES DAILY
Qty: 60 TABLET | Refills: 5 | Status: SHIPPED | OUTPATIENT
Start: 2024-12-12

## 2024-12-12 NOTE — TELEPHONE ENCOUNTER
Please review. Protocol Failed; No Protocol    Requested Prescriptions   Pending Prescriptions Disp Refills    FEROSUL 325 (65 Fe) MG Oral Tab [Pharmacy Med Name: FERROUS SULFATE 325MG (5GR) TABS] 60 tablet 5     Sig: TAKE 1 TABLET BY MOUTH TWICE DAILY       There is no refill protocol information for this order            Future Appointments         Provider Department Appt Notes    In 5 days [facility] PET DOSE RM [facility]     In 5 days [facility] PET RM1 [facility] PET Scan [facility]     In 6 days [facility] LAB2 [facility] - Infusion [id number]    In 6 days Dayna Hidalgo MD [facility] Hematology Oncology PRE CHEMO VISIT    In 6 days [facility] INFRN 5 [facility] - Infusion SD C7 [id number]          Recent Outpatient Visits              2 weeks ago Diffuse large B-cell lymphoma of lymph nodes of multiple regions (HCC)    [facility] - Infusion    Nurse Only    2 weeks ago Diffuse large B-cell lymphoma of lymph nodes of multiple regions (HCC)    [facility] - [facility]    Office Visit    2 weeks ago Diffuse large B-cell lymphoma of lymph nodes of multiple regions (HCC)    [facility] Hematology Oncology Dayna Hidalgo MD    Office Visit    2 weeks ago Diffuse large B-cell lymphoma of lymph nodes of multiple regions (HCC)    [facility] - Infusion    Nurse Only    1 month ago Diffuse large B-cell lymphoma of lymph nodes of multiple regions (HCC)    [facility] - [facility]fusion    Nurse Only

## 2024-12-17 ENCOUNTER — HOSPITAL ENCOUNTER (OUTPATIENT)
Dept: NUCLEAR MEDICINE | Facility: HOSPITAL | Age: 59
Discharge: HOME | End: 2024-12-17
Attending: STUDENT IN AN ORGANIZED HEALTH CARE EDUCATION/TRAINING PROGRAM
Payer: COMMERCIAL

## 2024-12-17 DIAGNOSIS — C83.30 DIFFUSE LARGE B-CELL LYMPHOMA, UNSPECIFIED BODY REGION (HCC): ICD-10-CM

## 2024-12-17 LAB — GLUCOSE BLDC GLUCOMTR-MCNC: 93 MG/DL (ref 70–99)

## 2024-12-17 PROCEDURE — 78815 PET IMAGE W/CT SKULL-THIGH: CPT | Performed by: STUDENT IN AN ORGANIZED HEALTH CARE EDUCATION/TRAINING PROGRAM

## 2024-12-17 PROCEDURE — 82962 GLUCOSE BLOOD TEST: CPT

## 2024-12-18 ENCOUNTER — OFFICE VISIT (OUTPATIENT)
Age: 59
End: 2024-12-18
Attending: STUDENT IN AN ORGANIZED HEALTH CARE EDUCATION/TRAINING PROGRAM
Payer: COMMERCIAL

## 2024-12-18 VITALS
HEIGHT: 63 IN | WEIGHT: 162 LBS | DIASTOLIC BLOOD PRESSURE: 80 MMHG | TEMPERATURE: 98 F | BODY MASS INDEX: 28.7 KG/M2 | SYSTOLIC BLOOD PRESSURE: 118 MMHG | RESPIRATION RATE: 16 BRPM | HEART RATE: 84 BPM | OXYGEN SATURATION: 99 %

## 2024-12-18 DIAGNOSIS — E61.1 IRON DEFICIENCY: ICD-10-CM

## 2024-12-18 DIAGNOSIS — C83.38 DIFFUSE LARGE B-CELL LYMPHOMA OF LYMPH NODES OF MULTIPLE REGIONS (HCC): Primary | ICD-10-CM

## 2024-12-18 DIAGNOSIS — M80.08XA FRACTURE OF VERTEBRA DUE TO OSTEOPOROSIS, INITIAL ENCOUNTER (HCC): Primary | ICD-10-CM

## 2024-12-18 DIAGNOSIS — M80.08XA FRACTURE OF VERTEBRA DUE TO OSTEOPOROSIS, INITIAL ENCOUNTER (HCC): ICD-10-CM

## 2024-12-18 DIAGNOSIS — C83.38 DIFFUSE LARGE B-CELL LYMPHOMA OF LYMPH NODES OF MULTIPLE REGIONS (HCC): ICD-10-CM

## 2024-12-18 LAB
BASOPHILS # BLD AUTO: 0.02 X10(3) UL (ref 0–0.2)
BASOPHILS NFR BLD AUTO: 0.9 %
DEPRECATED HBV CORE AB SER IA-ACNC: 859 NG/ML
DEPRECATED RDW RBC AUTO: 47.9 FL (ref 35.1–46.3)
EOSINOPHIL # BLD AUTO: 0.06 X10(3) UL (ref 0–0.7)
EOSINOPHIL NFR BLD AUTO: 2.8 %
ERYTHROCYTE [DISTWIDTH] IN BLOOD BY AUTOMATED COUNT: 14 % (ref 11–15)
HCT VFR BLD AUTO: 33.5 %
HGB BLD-MCNC: 11.2 G/DL
IGA SERPL-MCNC: 107.3 MG/DL (ref 40–350)
IGM SERPL-MCNC: <21 MG/DL (ref 50–300)
IMM GRANULOCYTES # BLD AUTO: 0 X10(3) UL (ref 0–1)
IMM GRANULOCYTES NFR BLD: 0 %
IMMUNOGLOBULIN PNL SER-MCNC: 654 MG/DL (ref 650–1600)
IRON SATN MFR SERPL: 21 %
IRON SERPL-MCNC: 61 UG/DL
LDH SERPL L TO P-CCNC: 182 U/L
LYMPHOCYTES # BLD AUTO: 0.47 X10(3) UL (ref 1–4)
LYMPHOCYTES NFR BLD AUTO: 21.9 %
MCH RBC QN AUTO: 31.5 PG (ref 26–34)
MCHC RBC AUTO-ENTMCNC: 33.4 G/DL (ref 31–37)
MCV RBC AUTO: 94.1 FL
MONOCYTES # BLD AUTO: 0.52 X10(3) UL (ref 0.1–1)
MONOCYTES NFR BLD AUTO: 24.2 %
NEUTROPHILS # BLD AUTO: 1.08 X10 (3) UL (ref 1.5–7.7)
NEUTROPHILS # BLD AUTO: 1.08 X10(3) UL (ref 1.5–7.7)
NEUTROPHILS NFR BLD AUTO: 50.2 %
PLATELET # BLD AUTO: 316 10(3)UL (ref 150–450)
RBC # BLD AUTO: 3.56 X10(6)UL
TIBC SERPL-MCNC: 297 UG/DL (ref 250–425)
TRANSFERRIN SERPL-MCNC: 199 MG/DL (ref 250–380)
WBC # BLD AUTO: 2.2 X10(3) UL (ref 4–11)

## 2024-12-18 PROCEDURE — 85060 BLOOD SMEAR INTERPRETATION: CPT

## 2024-12-18 RX ORDER — ACETAMINOPHEN 325 MG/1
650 TABLET ORAL ONCE
Status: CANCELLED | OUTPATIENT
Start: 2024-12-18

## 2024-12-18 RX ORDER — ACETAMINOPHEN 325 MG/1
650 TABLET ORAL ONCE
Status: COMPLETED | OUTPATIENT
Start: 2024-12-18 | End: 2024-12-18

## 2024-12-18 RX ORDER — DIPHENHYDRAMINE HCL 25 MG
25 CAPSULE ORAL ONCE
Status: CANCELLED | OUTPATIENT
Start: 2024-12-18

## 2024-12-18 RX ORDER — ACETAMINOPHEN 325 MG/1
TABLET ORAL
Status: DISPENSED
Start: 2024-12-18 | End: 2024-12-18

## 2024-12-18 RX ORDER — DIPHENHYDRAMINE HCL 25 MG
25 CAPSULE ORAL ONCE
Status: COMPLETED | OUTPATIENT
Start: 2024-12-18 | End: 2024-12-18

## 2024-12-18 RX ORDER — DIPHENHYDRAMINE HCL 25 MG
CAPSULE ORAL
Status: DISPENSED
Start: 2024-12-18 | End: 2024-12-18

## 2024-12-18 RX ADMIN — DIPHENHYDRAMINE HCL 25 MG: 25 MG CAPSULE ORAL at 12:00:00

## 2024-12-18 RX ADMIN — ACETAMINOPHEN 650 MG: 325 TABLET ORAL at 11:59:00

## 2024-12-18 NOTE — PROGRESS NOTES
Pt here for C7 D1 Rituximab.  Arrives Ambulating independently, accompanied by Family member. Premedications administered per orders.     Patient was evaluated today by MD.    Patient confirms comprehension of cancer treatment schedule:  yes    Pregnancy screening:  Denies possibility of pregnancy    Modifications in dose or schedule:  No    Medications appearance and physical integrity checked by RN: yes.    Chemotherapy IV pump settings verified by 2 RNs:  No due to targeted therapy IV administration.  Frequency of blood return and site check throughout administration: Prior to administration, Prior to each drug, and At completion of therapy     Infusion/treatment outcome:  patient tolerated treatment without incident    Education Record    Learner:  Patient  Barriers / Limitations:  None  Method:  Discussion  Education / instructions given:  Plan of carre reviewed  Outcome:  Shows understanding  Discharged Home, Ambulating independently, accompanied by:Self and Family member    Patient/family verbalized understanding of future appointments: by MyChart messaging

## 2024-12-19 ENCOUNTER — DOCUMENTATION ONLY (OUTPATIENT)
Age: 59
End: 2024-12-19

## 2024-12-19 NOTE — PROGRESS NOTES
Hematology/Oncology Initial Consultation Note    Patient Name: Kimberly Quezada  Medical Record Number: [id number]    YOB: 1965   Date of Consultation: 9/3/2024   PCP: Hillary Prado MD   Other providers:      Reason for Consultation:  Kimberly Quezada was seen today for the diagnosis of DLBCL     Oncologic History:       March 2024: Anemia with MCV of 72.3.  He also endorsed having decreased appetite and weight loss of 25 pounds.  2024: Had EGD and colonoscopy which were normal.     Had a CT of the chest abdomen pelvis after she noted a neck masd which showed bilateral lymphadenopathy in her neck, chest, left axilla and retroperitoneal region.     July 2024: Axillary lymph node excisional biopsy.  Sent to [facility] for additional workup and was noted to have at DLBCL NOS.    August 14th : Cycle 1 Miguel-R-CHP  Sept 4th 2024: Cycle 2 Miguel RCHP - complicated with facial rash    Sept 24th. Interim PET after 2 cycles showed Deauville 3. With significant decrease in activity noted.    Oct 15th, 2024: Extensive rosacea noted    ===================================================  History of Present Illness:      Patient presents to the clinic.  Any fevers or chills.  No night sweats.  The sweating around her neck and armpit have improved.  No loose stools.  No skin rashes.  No shortness of breath or leg swelling.    Has noticed pain in the lateral 4 fingers of the left upper extremity which is numb after she wakes up from her sleep.  Denies having any burning sensation numbness or tingling during the daytime.  No motor weakness.  Patient mentions that she has had prior episodes of pain in the left upper extremity and has a history of back pain for which he underwent physical therapy last year.  No numbness or tingling in the right upper extremity on her bilateral lower extremity.        Past Medical History:  Past Medical History:    Diverticulosis large intestine w/o perforation or abscess w/o  bleeding    Gastropathy    High cholesterol    History of blood transfusion    NO ADVERSE REACTIONS    Hx of motion sickness    Hyperlipidemia    Internal hemorrhoids without complication    Rosacea    Seasonal allergies    Visual impairment    GLASSES       Past Surgical History:   Procedure Laterality Date    Colonoscopy N/A 2018    Procedure: COLONOSCOPY;  Surgeon: Elizabet Gordon MD;  Location: [facility] ENDOSCOPY    Colonoscopy N/A 2024    Procedure: COLONOSCOPY and Polypectomy;  Surgeon: Deepak He MD;  Location: [facility] ENDOSCOPY    Other surgical history      Repair of anal fistula        Home Medications:   Ferrous Sulfate (FEROSUL) 325 (65 Fe) MG Oral Tab Take 1 tablet (325 mg total) by mouth 2 (two) times daily. 60 tablet 5    acyclovir 400 MG Oral Tab Take 1 tablet (400 mg total) by mouth 2 (two) times daily. 180 tablet 1     -------  Current Outpatient Medications on File Prior to Visit   Medication Sig Dispense Refill    Ferrous Sulfate (FEROSUL) 325 (65 Fe) MG Oral Tab Take 1 tablet (325 mg total) by mouth 2 (two) times daily. 60 tablet 5    acyclovir 400 MG Oral Tab Take 1 tablet (400 mg total) by mouth 2 (two) times daily. 180 tablet 1     Current Facility-Administered Medications on File Prior to Visit   Medication Dose Route Frequency Provider Last Rate Last Admin    [] acetaminophen (Tylenol) 325 MG tab             [] diphenhydrAMINE (Benadryl) 25 MG cap/tab             [COMPLETED] pegfilgrastim-jmdb (Fulphila) 6 MG/0.6ML SUBQ injection 6 mg  6 mg Subcutaneous Once Dayna Hidalgo MD   6 mg at 24 0743    [COMPLETED] ondansetron (Zofran) 16 mg in sodium chloride 0.9% 108 mL IVPB  16 mg Intravenous Once Dayna Hidalgo MD   Stopped at 24 0937    [COMPLETED] acetaminophen (Tylenol) tab 650 mg  650 mg Oral Once Dayna Hidalgo MD   650 mg at 24 0858    [COMPLETED] diphenhydrAMINE (Benadryl) cap/tab 25 mg  25 mg Oral Once Gwen  MD Dayna   25 mg at 11/27/24 0858    [COMPLETED] fosaprepitant (Emend) 150 mg in sodium chloride 0.9% 150 mL IVPB  150 mg Intravenous Once Dayna Hidalgo MD   Stopped at 11/27/24 0919    [COMPLETED] riTUXimab-abbs (Truxima) 700 mg in sodium chloride 0.9% 570 mL infusion  375 mg/m2 (Treatment Plan Recorded) Intravenous Once Dayna Hidalgo MD   Stopped at 11/27/24 1251    [COMPLETED] polatuzumab vedotin (Polivy) 130 mg in sodium chloride 0.9% 106.5 mL infusion  1.8 mg/kg (Treatment Plan Recorded) Intravenous Once Dayna Hidalgo MD   Stopped at 11/27/24 1327    [COMPLETED] DOXOrubicin (Adriamycin) 2 mg/mL IV syringe 88 mg 44 mL  50 mg/m2 (Treatment Plan Recorded) Intravenous Once Dayna Hidalgo MD   Stopped at 11/27/24 1415    [COMPLETED] cyclophosphamide (Cytoxan) 1,320 mg in sodium chloride 0.9% 256.6 mL infusion  750 mg/m2 (Treatment Plan Recorded) Intravenous Once Dayna Hidalgo MD   Stopped at 11/27/24 1448       Allergies:   Allergies   Allergen Reactions    Meloxicam ANGIOEDEMA     Upper and lower lip throbbing, tongue swelling, tongue ulcers    Contrave [Naltrexone-Bupropion Hcl Er] RASH    Wellbutrin [Bupropion] RASH    Simvastatin RASH       Psychosocial History:  Social History     Social History Narrative    Not on file     Social History     Socioeconomic History    Marital status: Single   Tobacco Use    Smoking status: Never     Passive exposure: Yes    Smokeless tobacco: Never    Tobacco comments:     Social smoker. Pt does not remember when she stopped   Vaping Use    Vaping status: Never Used   Substance and Sexual Activity    Alcohol use: Yes     Comment: social    Drug use: No   Other Topics Concern    Pt has a pacemaker No    Pt has a defibrillator No    Reaction to local anesthetic No    Right Handed Yes     Social Drivers of Health     Food Insecurity: No Food Insecurity (8/15/2024)    Food Insecurity     Food Insecurity: Never true   Transportation Needs: No  Transportation Needs (8/15/2024)    Transportation Needs     Lack of Transportation: No   Housing Stability: Low Risk  (8/15/2024)    Housing Stability     Housing Instability: No       Family Medical History:  No family history on file.    Review of Systems:  A 10-point ROS was done with pertinent positives and negative per the HPI    Vital Signs:  Height: 160 cm (5' 3\") (12/18 1026)  Weight: 73.5 kg (162 lb) (12/18 1026)  BSA (Calculated - sq m): 1.77 sq meters (12/18 1026)  Pulse: 84 (12/18 1026)  BP: 118/80 (12/18 1026)  Temp: 98.3 °F (36.8 °C) (12/18 1026)  Do Not Use - Resp Rate: --  SpO2: 99 % (12/18 1026)    Wt Readings from Last 6 Encounters:   12/18/24 73.5 kg (162 lb)   11/27/24 73 kg (161 lb)   11/26/24 72.9 kg (160 lb 11.2 oz)   11/06/24 73.2 kg (161 lb 4.8 oz)   11/05/24 73 kg (161 lb)   10/23/24 70.9 kg (156 lb 3.2 oz)       ECOG PS: 0    Physical Examination:  General: Patient is alert and oriented, not in acute distress  Psych: Mood and affect are appropriate  Eyes: EOMI, PERRL  ENT: Oropharynx is clear, no adenopathy  CV: Regular rate and rhythm, normal S1S2, no murmurs, no LE edema  Respiratory: Lungs clear to auscultation bilaterally  GI/Abd: Soft, non-tender with normoactive bowel sounds, no hepatosplenomegaly  Neurological: Grossly intact   Lymphatics: Left supraclavicular lymphadenopathy palpable.  No palpable axillary lymph nodes.  Skin: no rashes or petechiae  Lines: Mediport in tight chest.  No redness or swelling.    Laboratory:  Lab Results   Component Value Date    WBC 2.2 (L) 12/18/2024    WBC 2.5 (L) 11/26/2024    WBC 4.1 11/05/2024    HGB 11.2 (L) 12/18/2024    HGB 11.6 (L) 11/26/2024    HGB 11.2 (L) 11/05/2024    HCT 33.5 (L) 12/18/2024    MCV 94.1 12/18/2024    MCH 31.5 12/18/2024    MCHC 33.4 12/18/2024    RDW 14.0 12/18/2024    .0 12/18/2024    .0 11/26/2024    .0 11/05/2024     Lab Results   Component Value Date    GLU 98 11/26/2024    BUN 16 11/26/2024     BUNCREA 21.9 (H) 11/26/2024    CREATSERUM 0.73 11/26/2024    CREATSERUM 0.66 11/05/2024    CREATSERUM 0.71 10/15/2024    ANIONGAP 6 11/26/2024    GFRNAA 103 02/07/2022    GFRAA 120 02/07/2022    CA 9.9 11/26/2024    OSMOCALC 297 (H) 11/26/2024    ALKPHO 90 11/26/2024    AST 22 11/26/2024    ALT 14 11/26/2024    ALKPHOS 41 09/21/2015    BILT 0.3 11/26/2024    TP 6.3 11/26/2024    ALB 4.5 11/26/2024    GLOBULIN 1.8 (L) 11/26/2024    AGRATIO 0.8 (L) 03/18/2024     11/26/2024    K 3.7 11/26/2024     11/26/2024    CO2 28.0 11/26/2024     No results found for: \"PTT\", \"PT\", \"INR\"    Imaging:      PET CT from September 24 was reviewed with the patient.  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    Cycle 1 of Miguel- R-CHP on August 14.    Interim PET revealed Deauville 3, significant decrease in tumor burden     EOT PET after 6 cycles, revealed Deauville 3.     We will follow up on the para-aortic LN with repeat CT in 3 months.     2 more cycles of rituximab as per the POLARIX trial.      Skin lesion     Rash on her neck and the lower jaw around the end of the cycle 1.  Rash improved in most of her cheeks, new  break out noted   - worsening of rosacea with chemotherapy. On topical metronidazole, Ivermectin         Anemia  - improving     Vertebral fracture  - noted on the PET CT, patient is currently experiencing pain with no neurological impairment.   - Will obtain MRI  - referral to Neurosurgery if patient has displaced fracture. Will refer to IR for kyphoplasty.     - needs DEXA scan and work for vertebral  fracture.     Return after 6 weeks.     Dayna Hidalgo MD  Hematology/Medical Oncology

## 2024-12-20 LAB
ALBUMIN SERPL ELPH-MCNC: 4.04 G/DL (ref 3.75–5.21)
ALBUMIN/GLOB SERPL: 1.97 {RATIO} (ref 1–2)
ALPHA1 GLOB SERPL ELPH-MCNC: 0.26 G/DL (ref 0.19–0.46)
ALPHA2 GLOB SERPL ELPH-MCNC: 0.63 G/DL (ref 0.48–1.05)
B-GLOBULIN SERPL ELPH-MCNC: 0.63 G/DL (ref 0.68–1.23)
GAMMA GLOB SERPL ELPH-MCNC: 0.54 G/DL (ref 0.62–1.7)
KAPPA LC FREE SER-MCNC: 0.79 MG/DL (ref 0.33–1.94)
KAPPA LC FREE/LAMBDA FREE SER NEPH: 0.92 {RATIO} (ref 0.26–1.65)
LAMBDA LC FREE SERPL-MCNC: 0.86 MG/DL (ref 0.57–2.63)
PROT SERPL-MCNC: 6.1 G/DL (ref 5.7–8.2)

## 2024-12-24 RX ORDER — IVERMECTIN/METRONIDAZOLE/NIACI 1 %-1 %-4%
GEL (GRAM) TOPICAL
Qty: 30 | Refills: 0 | Status: ERX

## 2024-12-26 ENCOUNTER — TELEPHONE (OUTPATIENT)
Dept: HEMATOLOGY/ONCOLOGY | Facility: HOSPITAL | Age: 59
End: 2024-12-26

## 2024-12-26 ENCOUNTER — DOCUMENTATION ONLY (OUTPATIENT)
Age: 59
End: 2024-12-26

## 2024-12-26 ENCOUNTER — TELEPHONE (OUTPATIENT)
Age: 59
End: 2024-12-26

## 2024-12-26 NOTE — TELEPHONE ENCOUNTER
Call transferred from Call Center. Deonna from [facility] wanted to inform me that the order for the thoracic spine currently has with and without contrast. Deonna states that the test does not require contrast, wanted to verify if okay to proceed without contrast. Informed Deonna that would be fine. Deonna also requesting patient's previous lumbar MRI report from 2023 be faxed over for comparison. Verified fax number of [phone]. Informed Deonna I can fax the report over once I receive consent from the patient. Deonna stated understanding.

## 2024-12-26 NOTE — TELEPHONE ENCOUNTER
[facility] Skylar  [phone]  needs latest  office note for the thoracic spine. Faxed to [phone]

## 2024-12-26 NOTE — TELEPHONE ENCOUNTER
Called and spoke natalie Harris. Informed her that [facility] wants her previous report of her MRI from 2023 faxed over for comparison. Kimberly gave verbal consent that it is okay to fax previous MRI report to [facility].

## 2024-12-26 NOTE — PROGRESS NOTES
Copy of Lumbar MRI report from 5/25/23 faxed to [facility] with patient's consent. Fax confirmation received.

## 2025-01-02 ENCOUNTER — DOCUMENTATION ONLY (OUTPATIENT)
Age: 60
End: 2025-01-02

## 2025-01-03 ENCOUNTER — VIRTUAL PHONE E/M (OUTPATIENT)
Age: 60
End: 2025-01-03
Attending: STUDENT IN AN ORGANIZED HEALTH CARE EDUCATION/TRAINING PROGRAM
Payer: COMMERCIAL

## 2025-01-03 ENCOUNTER — PATIENT MESSAGE (OUTPATIENT)
Age: 60
End: 2025-01-03

## 2025-01-03 DIAGNOSIS — S32.010A CLOSED COMPRESSION FRACTURE OF L1 LUMBAR VERTEBRA, INITIAL ENCOUNTER (HCC): ICD-10-CM

## 2025-01-03 DIAGNOSIS — S22.080A T12 COMPRESSION FRACTURE, INITIAL ENCOUNTER (HCC): ICD-10-CM

## 2025-01-03 DIAGNOSIS — C83.38 DIFFUSE LARGE B-CELL LYMPHOMA OF LYMPH NODES OF MULTIPLE REGIONS (HCC): Primary | ICD-10-CM

## 2025-01-03 DIAGNOSIS — Z92.241 HISTORY OF RECENT STEROID USE: ICD-10-CM

## 2025-01-03 DIAGNOSIS — M54.50 BILATERAL LOW BACK PAIN, UNSPECIFIED CHRONICITY, UNSPECIFIED WHETHER SCIATICA PRESENT: ICD-10-CM

## 2025-01-06 ENCOUNTER — PATIENT MESSAGE (OUTPATIENT)
Dept: FAMILY MEDICINE CLINIC | Facility: CLINIC | Age: 60
End: 2025-01-06

## 2025-01-06 NOTE — PROGRESS NOTES
Virtual Telephone Check-In    Kimberly Quezada verbally consents to a Virtual/Telephone Check-In visit on 01/06/25.  Patient has been referred to the [facility] website at [email or web address] to review the yearly Consent to Treat document.    Patient understands and accepts financial responsibility for any deductible, co-insurance and/or co-pays associated with this service.    Duration of the service: 30 minutes      Summary of topics discussed:       Diagnoses and all orders for this visit:    Diffuse large B-cell lymphoma of lymph nodes of multiple regions (HCC)    Bilateral low back pain, unspecified chronicity, unspecified whether sciatica present    Continue low back pain 4/10 using acetaminophen with minimal relief, reports intermittent. Denies numbness and tingling, reports angle le generalized weakness. Reports stable gait.    Discussed MRI lumbar 12/30/24 showing T12 and L1 acute anterior wedge compression fractures with edema, Lumbar stenosis, L1-2 disc bulge, L2-3 disc protrusion, L3-4 disc bulge, L4-5 disc herniation, L5-S1disc bulge, spondylosis and disc diseccation. 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    Recommend IR consult for kyphoplasty for acute T12 and L1 wedge compression. To discuss with Dr. Wright for plan for physiatry/PM&R and Ortho consult for lumbar disc bulge, stenosis and tarlov cyst. Will need Dexa to assess bone density.    Recommended OTC medications- Acetaminophen, Ibuprofen, and heat or ice therapy.  Patient to call if symptoms worsen or no improvement in  1 week(s).    To consult with IR and then has follow up Dr. Hidalgo 1/29/25 and next treatment 1/8/25.  No follow-ups on file.      [facility]

## 2025-01-07 NOTE — TELEPHONE ENCOUNTER
Name and  verified. Called the patient to clarify message. No atorvastatin seen in medication record.     Dr. Prado, patient asking for refill of Atorvastatin 10 MG once a day. Cannot find in her medication record on her chart - please advise.

## 2025-01-08 ENCOUNTER — OFFICE VISIT (OUTPATIENT)
Age: 60
End: 2025-01-08
Attending: STUDENT IN AN ORGANIZED HEALTH CARE EDUCATION/TRAINING PROGRAM
Payer: COMMERCIAL

## 2025-01-08 VITALS
RESPIRATION RATE: 16 BRPM | TEMPERATURE: 97 F | OXYGEN SATURATION: 98 % | HEART RATE: 81 BPM | SYSTOLIC BLOOD PRESSURE: 123 MMHG | DIASTOLIC BLOOD PRESSURE: 70 MMHG

## 2025-01-08 DIAGNOSIS — C83.38 DIFFUSE LARGE B-CELL LYMPHOMA OF LYMPH NODES OF MULTIPLE REGIONS (HCC): Primary | ICD-10-CM

## 2025-01-08 LAB
BASOPHILS # BLD AUTO: 0.02 X10(3) UL (ref 0–0.2)
BASOPHILS NFR BLD AUTO: 0.4 %
DEPRECATED RDW RBC AUTO: 43 FL (ref 35.1–46.3)
EOSINOPHIL # BLD AUTO: 0.25 X10(3) UL (ref 0–0.7)
EOSINOPHIL NFR BLD AUTO: 5.3 %
ERYTHROCYTE [DISTWIDTH] IN BLOOD BY AUTOMATED COUNT: 12.5 % (ref 11–15)
HCT VFR BLD AUTO: 35.7 %
HGB BLD-MCNC: 12.2 G/DL
IMM GRANULOCYTES # BLD AUTO: 0.01 X10(3) UL (ref 0–1)
IMM GRANULOCYTES NFR BLD: 0.2 %
LYMPHOCYTES # BLD AUTO: 0.94 X10(3) UL (ref 1–4)
LYMPHOCYTES NFR BLD AUTO: 19.8 %
MCH RBC QN AUTO: 31.9 PG (ref 26–34)
MCHC RBC AUTO-ENTMCNC: 34.2 G/DL (ref 31–37)
MCV RBC AUTO: 93.2 FL
MONOCYTES # BLD AUTO: 0.51 X10(3) UL (ref 0.1–1)
MONOCYTES NFR BLD AUTO: 10.8 %
NEUTROPHILS # BLD AUTO: 3.01 X10 (3) UL (ref 1.5–7.7)
NEUTROPHILS # BLD AUTO: 3.01 X10(3) UL (ref 1.5–7.7)
NEUTROPHILS NFR BLD AUTO: 63.5 %
PLATELET # BLD AUTO: 168 10(3)UL (ref 150–450)
RBC # BLD AUTO: 3.83 X10(6)UL
WBC # BLD AUTO: 4.7 X10(3) UL (ref 4–11)

## 2025-01-08 RX ORDER — ATORVASTATIN CALCIUM 10 MG/1
10 TABLET, FILM COATED ORAL NIGHTLY
Qty: 90 TABLET | Refills: 4 | Status: SHIPPED | OUTPATIENT
Start: 2025-01-08

## 2025-01-08 RX ORDER — ACETAMINOPHEN 325 MG/1
TABLET ORAL
Status: COMPLETED
Start: 2025-01-08 | End: 2025-01-08

## 2025-01-08 RX ORDER — DIPHENHYDRAMINE HCL 25 MG
25 CAPSULE ORAL ONCE
Status: COMPLETED | OUTPATIENT
Start: 2025-01-08 | End: 2025-01-08

## 2025-01-08 RX ORDER — DIPHENHYDRAMINE HCL 25 MG
CAPSULE ORAL
Status: COMPLETED
Start: 2025-01-08 | End: 2025-01-08

## 2025-01-08 RX ORDER — ACETAMINOPHEN 325 MG/1
650 TABLET ORAL ONCE
Status: COMPLETED | OUTPATIENT
Start: 2025-01-08 | End: 2025-01-08

## 2025-01-08 RX ADMIN — DIPHENHYDRAMINE HCL 25 MG: 25 MG CAPSULE ORAL at 12:17:00

## 2025-01-08 RX ADMIN — ACETAMINOPHEN 650 MG: 325 TABLET ORAL at 12:17:00

## 2025-01-08 NOTE — PROGRESS NOTES
Pt here for C8D1 Drug(s)Rituxan.  Arrives Ambulating independently, accompanied by Family member     Patient was evaluated today by Treatment Nurse.    R chest port accessed in lab, good blood return noted.    Oral medications included in this regimen:  no    Patient confirms comprehension of cancer treatment schedule:  yes    Pregnancy screening:  Denies possibility of pregnancy    Modifications in dose or schedule:  No    Medications appearance and physical integrity checked by RN: yes.    Chemotherapy IV pump settings verified by 2 RNs:  No due to targeted therapy IV administration.  Frequency of blood return and site check throughout administration: Prior to administration, Prior to each drug, and At completion of therapy     Infusion/treatment outcome:  patient tolerated treatment without incident    Port flushed and de-accessed.    Education Record    Learner:  Patient and Family Member  Barriers / Limitations:  None  Method:  Discussion  Education / instructions given:  Medication, plan of care, schedule  Outcome:  Shows understanding    Discharged Home, Ambulating independently, accompanied by:Family member    Patient/family verbalized understanding of future appointments: by [employer] messaging

## 2025-01-09 RX ORDER — FERROUS SULFATE 325(65) MG
1 TABLET ORAL 2 TIMES DAILY
Qty: 60 TABLET | Refills: 5 | OUTPATIENT
Start: 2025-01-09

## 2025-01-09 RX ORDER — FERROUS SULFATE 325(65) MG
1 TABLET ORAL 2 TIMES DAILY
Qty: 60 TABLET | Refills: 5 | Status: SHIPPED | OUTPATIENT
Start: 2025-01-09

## 2025-01-09 NOTE — TELEPHONE ENCOUNTER
Please Review. Protocol Failed; No Protocol     Requested Prescriptions   Pending Prescriptions Disp Refills    FEROSUL 325 (65 Fe) MG Oral Tab [Pharmacy Med Name: FERROUS SULFATE 325MG (5GR) TABS] 60 tablet 5     Sig: TAKE 1 TABLET BY MOUTH TWICE DAILY       There is no refill protocol information for this order            Future Appointments         Provider Department Appt Notes    In 2 weeks [facility] LAB2 [facility] [id number]    In 2 weeks Dayna Hidalgo MD Nancy W. [facility] Hematology Oncology [place] FOLLOW UP    In 1 month ADO DEXA RM1; ADO EVARISTO RM1 [facility] Mammography - [place]     In 1 month ADO EVARISTO RM1; ADO DEXA RM1 [facility] DEXA - [place] Is it an open machine.   Also, I have a port on my right side of my chest.          Recent Outpatient Visits              Yesterday Diffuse large B-cell lymphoma of lymph nodes of multiple regions (HCC)    [facility]    Office Visit    Yesterday Diffuse large B-cell lymphoma of lymph nodes of multiple regions (HCC)    [facility]    Nurse Only    6 days ago Diffuse large B-cell lymphoma of lymph nodes of multiple regions (HCC)    [facility] Hematology Oncology [place] Valeria Cantrell APRN    Virtual Phone E/M    3 weeks ago Diffuse large B-cell lymphoma of lymph nodes of multiple regions (HCC)    [facility]    Office Visit    3 weeks ago Fracture of vertebra due to osteoporosis, initial encounter (HCC)    [facility] Hematology Oncology [place] Dayna Hidalgo MD    Office Visit

## 2025-01-09 NOTE — TELEPHONE ENCOUNTER
Disp Refills Start End    Ferrous Sulfate (FEROSUL) 325 (65 Fe) MG Oral Tab 60 tablet 5 12/12/2024 --    Sig - Route: Take 1 tablet (325 mg total) by mouth 2 (two) times daily. - Oral    Sent to pharmacy as: Ferrous Sulfate 325 (65 Fe) MG Oral Tablet (FeroSul)    E-Prescribing Status: Receipt confirmed by pharmacy (12/12/2024 12:31 PM CST)      Pharmacy    [id number] [place], [place] - [address], [phone], [phone]

## 2025-01-15 DIAGNOSIS — S22.000A COMPRESSION FRACTURE OF THORACIC VERTEBRA (HCC): Primary | ICD-10-CM

## 2025-01-15 DIAGNOSIS — S22.080A COMPRESSION FRACTURE OF T12 VERTEBRA, INITIAL ENCOUNTER (HCC): Primary | ICD-10-CM

## 2025-01-15 DIAGNOSIS — M48.54XA NONTRAUMATIC COMPRESSION FRACTURE OF T12 VERTEBRA, INITIAL ENCOUNTER (HCC): ICD-10-CM

## 2025-01-15 RX ORDER — ACETAMINOPHEN AND CODEINE PHOSPHATE 300; 30 MG/1; MG/1
1 TABLET ORAL EVERY 6 HOURS PRN
Qty: 10 TABLET | Refills: 0 | Status: SHIPPED | OUTPATIENT
Start: 2025-01-15

## 2025-01-16 DIAGNOSIS — S22.080A T12 COMPRESSION FRACTURE, INITIAL ENCOUNTER (HCC): ICD-10-CM

## 2025-01-16 DIAGNOSIS — M54.50 BILATERAL LOW BACK PAIN, UNSPECIFIED CHRONICITY, UNSPECIFIED WHETHER SCIATICA PRESENT: Primary | ICD-10-CM

## 2025-01-16 NOTE — ADDENDUM NOTE
Addended by: JHON ARAIZA on: 1/16/2025 04:05 PM     Modules accepted: Orders     Patient presented to urgent care 09/14 2/2 with left knee pain/swelling x 2 weeks that started after he accidentally kneel down on a small rock and then with exacerbated pain after a sudden step and twisted motion a week later  He was initiated on Bactrim and saw Orthopedic Sx 2 days later who recommended for patient to go to the ER due to persistent sx and concern for L septic prepatellar bursitis  Patient has mild erythema of the left knee and anterior shin to ankle, boggy prominence on anterior patella, and swelling of LLE    · 09/14/22 left knee xray without acute findings    · Patient fortunately does not meet sepsis criteria    · Blood cx obtained   · IV Ceftriaxone   · Check uric acid   · Venous duplex LLE to r/o DVT  · Ortho consult for possible tap with fluid studies

## 2025-01-17 ENCOUNTER — TELEPHONE (OUTPATIENT)
Dept: HEMATOLOGY/ONCOLOGY | Facility: CLINIC | Age: 60
End: 2025-01-17

## 2025-01-17 NOTE — TELEPHONE ENCOUNTER
Patient called to request an extension on her disability forms until 3/31/25, Return to work 4/1/25 due to diffuse large B-cell lymphoma. Advised patient I would inform  and most like have to task provider for approval. Patient expressed understanding.

## 2025-01-21 ENCOUNTER — OFFICE VISIT (OUTPATIENT)
Dept: PHYSICAL MEDICINE AND REHAB | Facility: CLINIC | Age: 60
End: 2025-01-21
Payer: COMMERCIAL

## 2025-01-21 VITALS — WEIGHT: 162 LBS | HEIGHT: 63 IN | BODY MASS INDEX: 28.7 KG/M2

## 2025-01-21 DIAGNOSIS — S22.080D COMPRESSION FRACTURE OF T12 VERTEBRA WITH ROUTINE HEALING, SUBSEQUENT ENCOUNTER: Primary | ICD-10-CM

## 2025-01-21 DIAGNOSIS — S22.000A COMPRESSION FRACTURE OF THORACIC VERTEBRA (HCC): ICD-10-CM

## 2025-01-21 PROCEDURE — 99214 OFFICE O/P EST MOD 30 MIN: CPT | Performed by: PHYSICAL MEDICINE & REHABILITATION

## 2025-01-21 RX ORDER — CALCITONIN SALMON 200 [IU]/.09ML
1 SPRAY, METERED NASAL DAILY
Qty: 3.7 ML | Refills: 0 | Status: SHIPPED | OUTPATIENT
Start: 2025-01-21

## 2025-01-21 RX ORDER — ACETAMINOPHEN AND CODEINE PHOSPHATE 300; 30 MG/1; MG/1
1 TABLET ORAL 2 TIMES DAILY PRN
Qty: 14 TABLET | Refills: 0 | Status: SHIPPED | OUTPATIENT
Start: 2025-01-21

## 2025-01-21 RX ORDER — LIDOCAINE 50 MG/G
1 PATCH TOPICAL EVERY 24 HOURS
Qty: 20 PATCH | Refills: 0 | Status: SHIPPED | OUTPATIENT
Start: 2025-01-21

## 2025-01-21 NOTE — PROGRESS NOTES
Progress note    C/C:   Chief Complaint   Patient presents with    New Patient Chief Complaint     LOV: 09/12/2023 - Pt c/o low back pain, reports difficulty with ADL's. Pain worse with standing, sitting.   CPL: 9/10. Tylenol #3 PRN w/ relief.  Pt had PET scan where they found frax.   Pt reports weakness to BLE. Numbness to B/L hands at night - causing sleep disturbances. Pt started PT and reports compliance with HEP.      HPI: 59-year-old female presents for follow-up.  Since last time I saw her she was unfortunately diagnosed with large B-cell lymphoma of lymph nodes of multiple regions, developed T12 and L1 compression fractures.  Was seen by interventional radiology, deferred kyphoplasty.  She recently started physical therapy, had her initial evaluation a few days ago, and scheduled for further episodes.  She continues to have thoracolumbar back pain without radiation to the bilateral lower extremities.  There is numbness and tingling in both hands, possibly in the feet as well, and the legs feel subjectively weak since she completed chemotherapy a few weeks ago.  She was also prescribed Tylenol 3 which she rarely takes, but is asking for refill to have on hand in case of severe lower back pain.    Pertinent allergies: Allergies[1]     Physical exam:  Ht 63\"   Wt 162 lb (73.5 kg)   LMP 08/09/2018   BMI 28.70 kg/m²      Lumbar spine exam:    No skin rash lumbar/upper sacral region  mild pain with lumbar flexion. mild pain with lumbar extension.  Minimal/no tenderness to palpation bilateral thoracolumbar paraspinals. No midline tenderness to palpation, no significant pain upon light percussion over the thoracolumbar spinous processes  4+/5 bilateral hip flexion; 5/5 distal LE. 4+/5 shoulder abductors.  SILT b/l LE  2/4 quad, 1/4 gastrocs reflexes b/l    Imaging: MRI lumbar spine dated 12/30/2024 independently reviewed, as was report.  Increased STIR signal particularly at the superior aspect of the T12  vertebral body, less so L1 vertebral body. mild facet arthrosis throughout. Small left L4-L5 foraminal protrusion without significant nerve root impingement.  L5-S1 disc space narrowing, diffusely bulging towards the bilateral L5-S1 foramina with mild bilateral foraminal stenosis.    Assessment and plan  Symptomatic T12 and L1 compression fx  B cell lymphoma, recently completed chemotherapy  Numbness and tingling hands and feet, proximal weakness. ? Polyneuropathy    She will continue with physical therapy, and prescribed calcitonin nasal spray which she is apply 1 nasal spray daily for 14 days only.  I also prescribed Tylenol 3 to be taken no more than twice daily on an as-needed basis for severe lower back pain. My overall impression of her history and physical is that the back pain seems to be improving reasonably well; we should continue to track her strength given the proximal upper and lower extremity weakness, numbness and tingling possibly from polyneuropathy.     I will see her back in about 2 months.  I would expect from a strength standpoint she might have some weakness, but I would expect to see some improvement by then if she is not improving as expected we should consider an EMG of the right upper and lower extremity. There is currently no need to pursue this as she recently completed chemotherapy and may see further strength improvement and resolution of n/t in the next few months.     Tomy Christiansen DO  Physical Medicine and Rehabilitation  [facility]       [1]   Allergies  Allergen Reactions    Meloxicam ANGIOEDEMA     Upper and lower lip throbbing, tongue swelling, tongue ulcers    Contrave [Naltrexone-Bupropion Hcl Er] RASH    Wellbutrin [Bupropion] RASH    Simvastatin RASH

## 2025-01-21 NOTE — PATIENT INSTRUCTIONS
Please come back and see me in 6 weeks. We can track your strength; hopefully by then the back pain will be much better. If strength remains a problem could consider EMG of the right arm and leg.

## 2025-01-22 ENCOUNTER — TELEPHONE (OUTPATIENT)
Dept: PHYSICAL MEDICINE AND REHAB | Facility: CLINIC | Age: 60
End: 2025-01-22

## 2025-01-22 NOTE — TELEPHONE ENCOUNTER
Medication was filled yesterday electronically?    Outpatient Medication Detail     Disp Refills Start End    calcitonin, salmon, 200 UNIT/ACT Nasal Solution 3.7 mL 0 1/21/2025 --    Sig - Route: 1 spray by Nasal route daily. 1 spray daily x14 days. Alternate nostril daily. - Nasal    Sent to pharmacy as: Calcitonin ([place]) 200 UNIT/ACT Nasal Solution (Miacalcin)    E-Prescribing Status: Receipt confirmed by pharmacy (1/21/2025  4:11 PM CST)      Associated Diagnoses    Compression fracture of T12 vertebra with routine healing, subsequent encounter  - Primary        Pharmacy    [id number] - [place], [place] - [address] AT [facility], [phone], [phone]

## 2025-01-22 NOTE — TELEPHONE ENCOUNTER
Already completed electronically - see electronic medication order for further information regarding this PA.

## 2025-01-23 DIAGNOSIS — S22.080D COMPRESSION FRACTURE OF T12 VERTEBRA WITH ROUTINE HEALING, SUBSEQUENT ENCOUNTER: ICD-10-CM

## 2025-01-24 NOTE — TELEPHONE ENCOUNTER
Revision processed, added to this encounter, Valid Release of Information on file for earl diaz pending confirmation.

## 2025-01-24 NOTE — TELEPHONE ENCOUNTER
Patient called to check status of forms, advised patient provider responded to the request for said extension. Advised . Patient expressed understanding.

## 2025-01-27 DIAGNOSIS — C83.38 DIFFUSE LARGE B-CELL LYMPHOMA OF LYMPH NODES OF MULTIPLE REGIONS (HCC): Primary | ICD-10-CM

## 2025-01-27 RX ORDER — CALCITONIN SALMON 200 [IU]/.09ML
SPRAY, METERED NASAL
Qty: 3.7 ML | Refills: 0 | OUTPATIENT
Start: 2025-01-27

## 2025-01-27 NOTE — TELEPHONE ENCOUNTER
S/w patient who stated the [facility] in [place] did not have calcitonin, salmon, 200 UNIT/ACT Nasal Solution last week. Patient tried to transfer prescription to [facility] in [place] but was denied since she asked for a refill but she never took it to begin with. . Patient requested rx to be resent to [facility] in [place].    S/w pharmacist at [place] and called in rx for patient.     Patient informed pharmacy will notify hr once rx has been filled.

## 2025-01-29 ENCOUNTER — OFFICE VISIT (OUTPATIENT)
Age: 60
End: 2025-01-29
Attending: STUDENT IN AN ORGANIZED HEALTH CARE EDUCATION/TRAINING PROGRAM
Payer: COMMERCIAL

## 2025-01-29 VITALS
DIASTOLIC BLOOD PRESSURE: 72 MMHG | HEART RATE: 79 BPM | OXYGEN SATURATION: 97 % | HEIGHT: 63 IN | TEMPERATURE: 99 F | RESPIRATION RATE: 16 BRPM | BODY MASS INDEX: 29.95 KG/M2 | SYSTOLIC BLOOD PRESSURE: 122 MMHG | WEIGHT: 169 LBS

## 2025-01-29 DIAGNOSIS — C83.30 DIFFUSE LARGE B-CELL LYMPHOMA, UNSPECIFIED BODY REGION (HCC): Primary | ICD-10-CM

## 2025-01-29 DIAGNOSIS — M80.08XS FRACTURE OF VERTEBRA DUE TO OSTEOPOROSIS, SEQUELA: ICD-10-CM

## 2025-01-29 DIAGNOSIS — T45.1X5A ALOPECIA DUE TO CYTOTOXIC DRUG: ICD-10-CM

## 2025-01-29 DIAGNOSIS — L65.8 ALOPECIA DUE TO CYTOTOXIC DRUG: ICD-10-CM

## 2025-01-29 DIAGNOSIS — C83.38 DIFFUSE LARGE B-CELL LYMPHOMA OF LYMPH NODES OF MULTIPLE REGIONS (HCC): ICD-10-CM

## 2025-01-29 LAB
ALBUMIN SERPL-MCNC: 4.5 G/DL (ref 3.2–4.8)
ALBUMIN/GLOB SERPL: 2.1 {RATIO} (ref 1–2)
ALP LIVER SERPL-CCNC: 93 U/L
ALT SERPL-CCNC: 20 U/L
ANION GAP SERPL CALC-SCNC: 8 MMOL/L (ref 0–18)
AST SERPL-CCNC: 22 U/L (ref ?–34)
BASOPHILS # BLD AUTO: 0.01 X10(3) UL (ref 0–0.2)
BASOPHILS NFR BLD AUTO: 0.4 %
BILIRUB SERPL-MCNC: 0.5 MG/DL (ref 0.3–1.2)
BUN BLD-MCNC: 12 MG/DL (ref 9–23)
BUN/CREAT SERPL: 16.4 (ref 10–20)
CALCIUM BLD-MCNC: 10.1 MG/DL (ref 8.7–10.4)
CHLORIDE SERPL-SCNC: 106 MMOL/L (ref 98–112)
CO2 SERPL-SCNC: 29 MMOL/L (ref 21–32)
CREAT BLD-MCNC: 0.73 MG/DL
DEPRECATED RDW RBC AUTO: 44.3 FL (ref 35.1–46.3)
EGFRCR SERPLBLD CKD-EPI 2021: 96 ML/MIN/1.73M2 (ref 60–?)
EOSINOPHIL # BLD AUTO: 0.13 X10(3) UL (ref 0–0.7)
EOSINOPHIL NFR BLD AUTO: 5.2 %
ERYTHROCYTE [DISTWIDTH] IN BLOOD BY AUTOMATED COUNT: 13 % (ref 11–15)
GLOBULIN PLAS-MCNC: 2.1 G/DL (ref 2–3.5)
GLUCOSE BLD-MCNC: 109 MG/DL (ref 70–99)
HCT VFR BLD AUTO: 37.7 %
HGB BLD-MCNC: 12.9 G/DL
IMM GRANULOCYTES # BLD AUTO: 0 X10(3) UL (ref 0–1)
IMM GRANULOCYTES NFR BLD: 0 %
LYMPHOCYTES # BLD AUTO: 0.66 X10(3) UL (ref 1–4)
LYMPHOCYTES NFR BLD AUTO: 26.5 %
MCH RBC QN AUTO: 31.8 PG (ref 26–34)
MCHC RBC AUTO-ENTMCNC: 34.2 G/DL (ref 31–37)
MCV RBC AUTO: 92.9 FL
MONOCYTES # BLD AUTO: 0.2 X10(3) UL (ref 0.1–1)
MONOCYTES NFR BLD AUTO: 8 %
NEUTROPHILS # BLD AUTO: 1.49 X10 (3) UL (ref 1.5–7.7)
NEUTROPHILS # BLD AUTO: 1.49 X10(3) UL (ref 1.5–7.7)
NEUTROPHILS NFR BLD AUTO: 59.9 %
OSMOLALITY SERPL CALC.SUM OF ELEC: 296 MOSM/KG (ref 275–295)
PLATELET # BLD AUTO: 197 10(3)UL (ref 150–450)
POTASSIUM SERPL-SCNC: 3.8 MMOL/L (ref 3.5–5.1)
PROT SERPL-MCNC: 6.6 G/DL (ref 5.7–8.2)
RBC # BLD AUTO: 4.06 X10(6)UL
SODIUM SERPL-SCNC: 143 MMOL/L (ref 136–145)
WBC # BLD AUTO: 2.5 X10(3) UL (ref 4–11)

## 2025-01-31 ENCOUNTER — MED REC SCAN ONLY (OUTPATIENT)
Dept: PHYSICAL MEDICINE AND REHAB | Facility: CLINIC | Age: 60
End: 2025-01-31

## 2025-02-10 ENCOUNTER — HOSPITAL ENCOUNTER (OUTPATIENT)
Dept: MAMMOGRAPHY | Age: 60
Discharge: HOME | End: 2025-02-10
Attending: FAMILY MEDICINE
Payer: COMMERCIAL

## 2025-02-10 DIAGNOSIS — Z12.31 VISIT FOR SCREENING MAMMOGRAM: ICD-10-CM

## 2025-02-10 PROCEDURE — 77067 SCR MAMMO BI INCL CAD: CPT | Performed by: FAMILY MEDICINE

## 2025-02-10 PROCEDURE — 77063 BREAST TOMOSYNTHESIS BI: CPT | Performed by: FAMILY MEDICINE

## 2025-02-12 ENCOUNTER — HOSPITAL ENCOUNTER (OUTPATIENT)
Dept: BONE DENSITY | Age: 60
Discharge: HOME | End: 2025-02-12
Payer: COMMERCIAL

## 2025-02-12 DIAGNOSIS — S32.010A CLOSED COMPRESSION FRACTURE OF L1 LUMBAR VERTEBRA, INITIAL ENCOUNTER (HCC): ICD-10-CM

## 2025-02-12 DIAGNOSIS — S22.080A T12 COMPRESSION FRACTURE, INITIAL ENCOUNTER (HCC): ICD-10-CM

## 2025-02-12 DIAGNOSIS — Z92.241 HISTORY OF RECENT STEROID USE: ICD-10-CM

## 2025-02-12 DIAGNOSIS — C83.30 DIFFUSE LARGE B-CELL LYMPHOMA, UNSPECIFIED BODY REGION (HCC): Primary | ICD-10-CM

## 2025-02-12 DIAGNOSIS — M85.80 OSTEOPENIA WITH HIGH RISK OF FRACTURE: ICD-10-CM

## 2025-02-12 DIAGNOSIS — M85.88 OSTEOPENIA OF LUMBAR SPINE: ICD-10-CM

## 2025-02-12 DIAGNOSIS — C83.38 DIFFUSE LARGE B-CELL LYMPHOMA OF LYMPH NODES OF MULTIPLE REGIONS (HCC): ICD-10-CM

## 2025-02-12 PROCEDURE — 77080 DXA BONE DENSITY AXIAL: CPT

## 2025-02-13 ENCOUNTER — TELEPHONE (OUTPATIENT)
Dept: ENDOCRINOLOGY CLINIC | Facility: CLINIC | Age: 60
End: 2025-02-13

## 2025-02-13 NOTE — TELEPHONE ENCOUNTER
Valeria Cantrell APRN P [facility] On[facility]; Fco Luther APRN Hello Dr. Isufi,  My name is Valeria Cantrell NP with Dr. Hidalgo at [facility]. Please can you or someone on your team consult with Ms. Damien tan bisphosphonate therapy, given lumbar fracture and osteopenia? She is a DLBCL completed treatment on 1/8/25. She called and was unable to get an appt.  Thank you for your consideration,  Valeria NARAYANAN NP        Routing to all doctors on our team to see if she can be added on to their schedule for a sooner apt.       Currently has apt with Dr. Gray on 5/21/2025      Thank you!

## 2025-02-14 ENCOUNTER — LAB ENCOUNTER (OUTPATIENT)
Dept: LAB | Facility: HOSPITAL | Age: 60
End: 2025-02-14
Attending: INTERNAL MEDICINE
Payer: COMMERCIAL

## 2025-02-14 ENCOUNTER — OFFICE VISIT (OUTPATIENT)
Dept: ENDOCRINOLOGY CLINIC | Facility: CLINIC | Age: 60
End: 2025-02-14

## 2025-02-14 VITALS
BODY MASS INDEX: 30.12 KG/M2 | HEIGHT: 63 IN | HEART RATE: 98 BPM | DIASTOLIC BLOOD PRESSURE: 82 MMHG | SYSTOLIC BLOOD PRESSURE: 129 MMHG | WEIGHT: 170 LBS

## 2025-02-14 DIAGNOSIS — E55.9 VITAMIN D DEFICIENCY: ICD-10-CM

## 2025-02-14 DIAGNOSIS — M81.0 AGE-RELATED OSTEOPOROSIS WITHOUT CURRENT PATHOLOGICAL FRACTURE: ICD-10-CM

## 2025-02-14 DIAGNOSIS — S32.010A COMPRESSION FRACTURE OF L1 VERTEBRA, INITIAL ENCOUNTER (HCC): ICD-10-CM

## 2025-02-14 DIAGNOSIS — M81.0 AGE-RELATED OSTEOPOROSIS WITHOUT CURRENT PATHOLOGICAL FRACTURE: Primary | ICD-10-CM

## 2025-02-14 DIAGNOSIS — C83.30 DIFFUSE LARGE B-CELL LYMPHOMA, UNSPECIFIED BODY REGION (HCC): ICD-10-CM

## 2025-02-14 LAB
ALBUMIN SERPL-MCNC: 4.4 G/DL (ref 3.2–4.8)
ALBUMIN/GLOB SERPL: 2 {RATIO} (ref 1–2)
ALP LIVER SERPL-CCNC: 100 U/L
ALT SERPL-CCNC: 21 U/L
ANION GAP SERPL CALC-SCNC: 10 MMOL/L (ref 0–18)
AST SERPL-CCNC: 22 U/L (ref ?–34)
BASOPHILS # BLD AUTO: 0 X10(3) UL (ref 0–0.2)
BASOPHILS NFR BLD AUTO: 0 %
BILIRUB SERPL-MCNC: 0.4 MG/DL (ref 0.3–1.2)
BUN BLD-MCNC: 12 MG/DL (ref 9–23)
BUN/CREAT SERPL: 16.4 (ref 10–20)
CALCIUM BLD-MCNC: 9.6 MG/DL (ref 8.7–10.4)
CHLORIDE SERPL-SCNC: 104 MMOL/L (ref 98–112)
CO2 SERPL-SCNC: 27 MMOL/L (ref 21–32)
CREAT BLD-MCNC: 0.73 MG/DL
DEPRECATED RDW RBC AUTO: 43.8 FL (ref 35.1–46.3)
EGFRCR SERPLBLD CKD-EPI 2021: 96 ML/MIN/1.73M2 (ref 60–?)
EOSINOPHIL # BLD AUTO: 0.06 X10(3) UL (ref 0–0.7)
EOSINOPHIL NFR BLD AUTO: 2.5 %
ERYTHROCYTE [DISTWIDTH] IN BLOOD BY AUTOMATED COUNT: 13 % (ref 11–15)
FASTING STATUS PATIENT QL REPORTED: NO
GLOBULIN PLAS-MCNC: 2.2 G/DL (ref 2–3.5)
GLUCOSE BLD-MCNC: 101 MG/DL (ref 70–99)
HCT VFR BLD AUTO: 37.5 %
HGB BLD-MCNC: 12.8 G/DL
IMM GRANULOCYTES # BLD AUTO: 0.04 X10(3) UL (ref 0–1)
IMM GRANULOCYTES NFR BLD: 1.7 %
LDH SERPL L TO P-CCNC: 213 U/L
LYMPHOCYTES # BLD AUTO: 0.63 X10(3) UL (ref 1–4)
LYMPHOCYTES NFR BLD AUTO: 26.7 %
MCH RBC QN AUTO: 31.1 PG (ref 26–34)
MCHC RBC AUTO-ENTMCNC: 34.1 G/DL (ref 31–37)
MCV RBC AUTO: 91.2 FL
MONOCYTES # BLD AUTO: 0.32 X10(3) UL (ref 0.1–1)
MONOCYTES NFR BLD AUTO: 13.6 %
NEUTROPHILS # BLD AUTO: 1.31 X10 (3) UL (ref 1.5–7.7)
NEUTROPHILS # BLD AUTO: 1.31 X10(3) UL (ref 1.5–7.7)
NEUTROPHILS NFR BLD AUTO: 55.5 %
OSMOLALITY SERPL CALC.SUM OF ELEC: 292 MOSM/KG (ref 275–295)
PLATELET # BLD AUTO: 165 10(3)UL (ref 150–450)
POTASSIUM SERPL-SCNC: 4 MMOL/L (ref 3.5–5.1)
PROT SERPL-MCNC: 6.6 G/DL (ref 5.7–8.2)
PTH-INTACT SERPL-MCNC: 34.8 PG/ML (ref 18.5–88)
RBC # BLD AUTO: 4.11 X10(6)UL
SODIUM SERPL-SCNC: 141 MMOL/L (ref 136–145)
VIT D+METAB SERPL-MCNC: 35 NG/ML (ref 30–100)
WBC # BLD AUTO: 2.4 X10(3) UL (ref 4–11)

## 2025-02-14 PROCEDURE — 83615 LACTATE (LD) (LDH) ENZYME: CPT

## 2025-02-14 PROCEDURE — 99204 OFFICE O/P NEW MOD 45 MIN: CPT | Performed by: INTERNAL MEDICINE

## 2025-02-14 PROCEDURE — 84080 ASSAY ALKALINE PHOSPHATASES: CPT

## 2025-02-14 PROCEDURE — 36415 COLL VENOUS BLD VENIPUNCTURE: CPT

## 2025-02-14 PROCEDURE — 85025 COMPLETE CBC W/AUTO DIFF WBC: CPT

## 2025-02-14 PROCEDURE — 80053 COMPREHEN METABOLIC PANEL: CPT

## 2025-02-14 PROCEDURE — 82306 VITAMIN D 25 HYDROXY: CPT

## 2025-02-14 PROCEDURE — 83970 ASSAY OF PARATHORMONE: CPT

## 2025-02-14 PROCEDURE — 85060 BLOOD SMEAR INTERPRETATION: CPT

## 2025-02-14 RX ORDER — PEN NEEDLE, DIABETIC 32GX 5/32"
NEEDLE, DISPOSABLE MISCELLANEOUS
Qty: 100 EACH | Refills: 1 | Status: SHIPPED | OUTPATIENT
Start: 2025-02-14

## 2025-02-14 RX ORDER — TERIPARATIDE 250 UG/ML
20 INJECTION, SOLUTION SUBCUTANEOUS DAILY
Qty: 2.24 ML | Refills: 5 | Status: SHIPPED | OUTPATIENT
Start: 2025-02-14 | End: 2025-02-17

## 2025-02-14 NOTE — PATIENT INSTRUCTIONS
START Forteo daily in AM     Blood work    Urine Testing: First urination in the morning flush, collect all the urine for the rest of the day including the first urination of the next day

## 2025-02-14 NOTE — PROGRESS NOTES
Name: Kimberly Quezada  Date: 2/14/2025    Referring Physician: No ref. provider found    Chief Complaint   Patient presents with    Consult     Patient was referred to establish care with Endocrine due to Osteopenia       HISTORY OF PRESENT ILLNESS   Kimberly Quezada is a 59 year old female who presents for   Chief Complaint   Patient presents with    Consult     Patient was referred to establish care with Endocrine due to Osteopenia     60 y/o F presents for initial evaluation of osteopenia. She has been diagnosed with B cell Lymphoma and she has completed therapy.  In 12/2024 she was diagnosed with multiple wedge fractures of T12 and L1, no h/o trauma.  She is undergoing physical therapy but chose not to undergo kyphoplasty.  She does continue to have back pain but improving.      She is taking calcium 600mg PO BID.  Vitamin D 2000 units daily.     DEXA demonstrated osteopenia.     No family h/o hip fractures.     REVIEW OF SYSTEM  Eyes: no change in vision  Neurologic: no headache, generalized or focal weakness or numbness.  Head: normal  ENT: normal  Lungs: no shortness of breath, wheezing or EDWARDS  Cardiovascular:  no chest pain or palpitations  Gastrointestinal:  no abdominal pain, bowel movement problems  Musculoskeletal: no muscle pain or arthralgia  /Gyne: no frequency or discomfort while urinating  Psychiatric:  no acute distress, anxiety  or depression  Skin: normal moisturized skin    Medications:     Current Outpatient Medications:     Calcium Carbonate-Vitamin D 600-10 MG-MCG Oral Tab, Take 1 tablet by mouth in the morning and 1 tablet before bedtime., Disp: 60 tablet, Rfl: 1    calcitonin, salmon, 200 UNIT/ACT Nasal Solution, 1 spray by Nasal route daily. 1 spray daily x14 days. Alternate nostril daily., Disp: 3.7 mL, Rfl: 0    acetaminophen-codeine 300-30 MG Oral Tab, Take 1 tablet by mouth 2 (two) times daily as needed for Pain., Disp: 14 tablet, Rfl: 0    lidocaine 5 % External Patch, Place 1  patch onto the skin daily., Disp: 20 patch, Rfl: 0    Ferrous Sulfate (FEROSUL) 325 (65 Fe) MG Oral Tab, Take 1 tablet (325 mg total) by mouth 2 (two) times daily., Disp: 60 tablet, Rfl: 5    atorvastatin 10 MG Oral Tab, Take 1 tablet (10 mg total) by mouth nightly., Disp: 90 tablet, Rfl: 4    acyclovir 400 MG Oral Tab, Take 1 tablet (400 mg total) by mouth 2 (two) times daily., Disp: 180 tablet, Rfl: 1     Allergies:   Allergies[1]    Social History:   Social History     Socioeconomic History    Marital status: Single   Tobacco Use    Smoking status: Never     Passive exposure: Yes    Smokeless tobacco: Never    Tobacco comments:     Social smoker. Pt does not remember when she stopped   Vaping Use    Vaping status: Never Used   Substance and Sexual Activity    Alcohol use: Yes     Comment: social    Drug use: No   Other Topics Concern    Pt has a pacemaker No    Pt has a defibrillator No    Reaction to local anesthetic No    Right Handed Yes       Medical History:   Past Medical History:    Diverticulosis large intestine w/o perforation or abscess w/o bleeding    Gastropathy    High cholesterol    History of blood transfusion    NO ADVERSE REACTIONS    Hx of motion sickness    Hyperlipidemia    Internal hemorrhoids without complication    Rosacea    Seasonal allergies    Visual impairment    GLASSES       Surgical history:   Past Surgical History:   Procedure Laterality Date    Colonoscopy N/A 12/13/2018    Procedure: COLONOSCOPY;  Surgeon: Elizabet Gordon MD;  Location: [facility] ENDOSCOPY    Colonoscopy N/A 6/4/2024    Procedure: COLONOSCOPY and Polypectomy;  Surgeon: Deepak He MD;  Location: [facility] ENDOSCOPY    Other surgical history      Repair of anal fistula 2011       PHYSICAL EXAMINATION:  /82   Pulse 98   Ht 5' 3\" (1.6 m)   Wt 170 lb (77.1 kg)   LMP 08/09/2018   BMI 30.11 kg/m²     General Appearance:  Alert, in no acute distress, well developed  Eyes: normal conjunctivae,  sclera.  Ears/Nose/Mouth/Throat/Neck:  normal hearing, normal speech   Musculoskeletal:  normal muscle strength and tone  PV: normal pulses of carotids, pedals  Skin:  normal moisture and skin texture  Hair & Nails:  normal scalp hair     Neuro:  sensory grossly intact and motor grossly intact  Psychiatric:  oriented to time, self, and place  Nutritional:  no abnormal weight gain or loss    ASSESSMENT/PLAN:    Osteoporosis  - Discussed diagnosis with patient  - Discussed importance of treatment  - Given current fractures and young age recommend anabolic therapy to build bone  - Start Forteo therapy, verbalized understanding of risks and benefits  - Demonstrated injection during OV  - Complete work up for secondary causes of osteoporosis  - Check 24 hour urine for calcium  - Check Vitamin D, PTH, Bone specific alk phos  - Further management based on above results    RTC 4 months         2/14/2025  Ritu Marcum MD       [1]   Allergies  Allergen Reactions    Meloxicam ANGIOEDEMA     Upper and lower lip throbbing, tongue swelling, tongue ulcers    Contrave [Naltrexone-Bupropion Hcl Er] RASH    Wellbutrin [Bupropion] RASH    Simvastatin RASH

## 2025-02-15 ENCOUNTER — PATIENT MESSAGE (OUTPATIENT)
Dept: ENDOCRINOLOGY CLINIC | Facility: CLINIC | Age: 60
End: 2025-02-15

## 2025-02-16 ENCOUNTER — LAB ENCOUNTER (OUTPATIENT)
Dept: LAB | Facility: HOSPITAL | Age: 60
End: 2025-02-16
Attending: INTERNAL MEDICINE
Payer: COMMERCIAL

## 2025-02-16 DIAGNOSIS — M81.0 AGE-RELATED OSTEOPOROSIS WITHOUT CURRENT PATHOLOGICAL FRACTURE: ICD-10-CM

## 2025-02-16 LAB
CALCIUM 24H UR-SRATE: 110 MG/24HR (ref 100–300)
CREAT UR-SCNC: 1.03 G/24 HR (ref 0.6–1.8)
SODIUM SERPL-SCNC: 149 MMOL/L/24HR (ref 40–220)
SPECIMEN VOL UR: 1100 ML

## 2025-02-16 PROCEDURE — 82570 ASSAY OF URINE CREATININE: CPT

## 2025-02-16 PROCEDURE — 84300 ASSAY OF URINE SODIUM: CPT

## 2025-02-16 PROCEDURE — 82340 ASSAY OF CALCIUM IN URINE: CPT

## 2025-02-17 RX ORDER — TERIPARATIDE 250 UG/ML
20 INJECTION, SOLUTION SUBCUTANEOUS DAILY
Qty: 2.24 ML | Refills: 5 | Status: SHIPPED | OUTPATIENT
Start: 2025-02-17 | End: 2025-03-17

## 2025-02-18 LAB — ALKALINE PHOSPHATASE BONE SPECIFIC: 20 UG/L

## 2025-02-21 ENCOUNTER — MED REC SCAN ONLY (OUTPATIENT)
Dept: ENDOCRINOLOGY CLINIC | Facility: CLINIC | Age: 60
End: 2025-02-21

## 2025-03-03 NOTE — PROGRESS NOTES
Hematology/Oncology Initial Consultation Note    Patient Name: Kimberly Quezada  Medical Record Number: [id number]    YOB: 1965   Date of Consultation: 9/3/2024   PCP: Hillary Prado MD   Other providers:      Reason for Consultation:  Kimberly Quezada was seen today for the diagnosis of DLBCL     Oncologic History:       March 2024: Anemia with MCV of 72.3.  He also endorsed having decreased appetite and weight loss of 25 pounds.  2024: Had EGD and colonoscopy which were normal.     Had a CT of the chest abdomen pelvis after she noted a neck masd which showed bilateral lymphadenopathy in her neck, chest, left axilla and retroperitoneal region.     July 2024: Axillary lymph node excisional biopsy.  Sent to [facility] for additional workup and was noted to have at DLBCL NOS.    August 14th : Cycle 1 Miguel-R-CHP  Sept 4th 2024: Cycle 2 Miguel RCHP - complicated with facial rash    Sept 24th. Interim PET after 2 cycles showed Deauville 3. With significant decrease in activity noted.    Oct 15th, 2024: Extensive rosacea noted    Dec 2024: Patient had PET CT and treatment that revealed stable intermittently active within the left parotid uptake.  Multiple new endplate fractures been noted in her spine.    MRI of the spine, close orthopedic DEXA scan that revealed osteoporosis.    ===================================================  History of Present Illness:      From detecting any symptoms of rituximab.  Denies having any constitutional symptoms. Hair is growing back.  She is following with physiatry for physical therapy          Past Medical History:  Past Medical History:    Diverticulosis large intestine w/o perforation or abscess w/o bleeding    Gastropathy    High cholesterol    History of blood transfusion    NO ADVERSE REACTIONS    Hx of motion sickness    Hyperlipidemia    Internal hemorrhoids without complication    Rosacea    Seasonal allergies    Visual impairment    GLASSES       Past  Surgical History:   Procedure Laterality Date    Colonoscopy N/A 12/13/2018    Procedure: COLONOSCOPY;  Surgeon: Elizabet Gordon MD;  Location: [facility] ENDOSCOPY    Colonoscopy N/A 6/4/2024    Procedure: COLONOSCOPY and Polypectomy;  Surgeon: Deepak He MD;  Location: [facility] ENDOSCOPY    Other surgical history      Repair of anal fistula 2011       Home Medications:   [DISCONTINUED] calcitonin, salmon, 200 UNIT/ACT Nasal Solution 1 spray by Nasal route daily. 1 spray daily x14 days. Alternate nostril daily. 3.7 mL 0    acetaminophen-codeine 300-30 MG Oral Tab Take 1 tablet by mouth 2 (two) times daily as needed for Pain. 14 tablet 0    lidocaine 5 % External Patch Place 1 patch onto the skin daily. 20 patch 0    Ferrous Sulfate (FEROSUL) 325 (65 Fe) MG Oral Tab Take 1 tablet (325 mg total) by mouth 2 (two) times daily. 60 tablet 5    atorvastatin 10 MG Oral Tab Take 1 tablet (10 mg total) by mouth nightly. 90 tablet 4    acyclovir 400 MG Oral Tab Take 1 tablet (400 mg total) by mouth 2 (two) times daily. 180 tablet 1     -------  Current Outpatient Medications on File Prior to Visit   Medication Sig Dispense Refill    acetaminophen-codeine 300-30 MG Oral Tab Take 1 tablet by mouth 2 (two) times daily as needed for Pain. 14 tablet 0    lidocaine 5 % External Patch Place 1 patch onto the skin daily. 20 patch 0    Ferrous Sulfate (FEROSUL) 325 (65 Fe) MG Oral Tab Take 1 tablet (325 mg total) by mouth 2 (two) times daily. 60 tablet 5    atorvastatin 10 MG Oral Tab Take 1 tablet (10 mg total) by mouth nightly. 90 tablet 4    acyclovir 400 MG Oral Tab Take 1 tablet (400 mg total) by mouth 2 (two) times daily. 180 tablet 1     No current facility-administered medications on file prior to visit.       Allergies:   Allergies   Allergen Reactions    Meloxicam ANGIOEDEMA     Upper and lower lip throbbing, tongue swelling, tongue ulcers    Contrave [Naltrexone-Bupropion Hcl Er] RASH    Wellbutrin [Bupropion]  RASH    Simvastatin RASH       Psychosocial History:  Social History     Social History Narrative    Not on file     Social History     Socioeconomic History    Marital status: Single   Tobacco Use    Smoking status: Never     Passive exposure: Yes    Smokeless tobacco: Never    Tobacco comments:     Social smoker. Pt does not remember when she stopped   Vaping Use    Vaping status: Never Used   Substance and Sexual Activity    Alcohol use: Yes     Comment: social    Drug use: No   Other Topics Concern    Pt has a pacemaker No    Pt has a defibrillator No    Reaction to local anesthetic No    Right Handed Yes     Social Drivers of Health     Food Insecurity: No Food Insecurity (8/15/2024)    Food Insecurity     Food Insecurity: Never true   Transportation Needs: No Transportation Needs (8/15/2024)    Transportation Needs     Lack of Transportation: No   Housing Stability: Low Risk  (8/15/2024)    Housing Stability     Housing Instability: No       Family Medical History:  Family History   Problem Relation Age of Onset    Cancer Self         Lymphoma    Breast Cancer Neg     Ovarian Cancer Neg     Prostate Cancer Neg     Pancreatic Cancer Neg     Colon Cancer Neg     Uterine Cancer Neg        Review of Systems:  A 10-point ROS was done with pertinent positives and negative per the HPI    Vital Signs:  Height: --  Weight: --  BSA (Calculated - sq m): --  Pulse: --  BP: --  Temp: --  Do Not Use - Resp Rate: --  SpO2: --    Wt Readings from Last 6 Encounters:   02/14/25 77.1 kg (170 lb)   01/29/25 76.7 kg (169 lb)   01/21/25 73.5 kg (162 lb)   12/18/24 73.5 kg (162 lb)   11/27/24 73 kg (161 lb)   11/26/24 72.9 kg (160 lb 11.2 oz)       ECOG PS: 0    Physical Examination:  General: Patient is alert and oriented, not in acute distress  Psych: Mood and affect are appropriate  Eyes: EOMI, PERRL  ENT: Oropharynx is clear, no adenopathy  CV: Regular rate and rhythm, normal S1S2, no murmurs, no LE edema  Respiratory: Lungs  clear to auscultation bilaterally  GI/Abd: Soft, non-tender with normoactive bowel sounds, no hepatosplenomegaly  Neurological: Grossly intact   Lymphatics: Left supraclavicular lymphadenopathy palpable.  No palpable axillary lymph nodes.  Skin: no rashes or petechiae  Lines: Mediport in tight chest.  No redness or swelling.    Laboratory:  Lab Results   Component Value Date    WBC 2.4 (L) 02/14/2025    WBC 2.5 (L) 01/29/2025    WBC 4.7 01/08/2025    HGB 12.8 02/14/2025    HGB 12.9 01/29/2025    HGB 12.2 01/08/2025    HCT 37.5 02/14/2025    MCV 91.2 02/14/2025    MCH 31.1 02/14/2025    MCHC 34.1 02/14/2025    RDW 13.0 02/14/2025    .0 02/14/2025    .0 01/29/2025    .0 01/08/2025     Lab Results   Component Value Date     (H) 02/14/2025    BUN 12 02/14/2025    BUNCREA 16.4 02/14/2025    CREATSERUM 0.73 02/14/2025    CREATSERUM 0.73 01/29/2025    CREATSERUM 0.73 11/26/2024    ANIONGAP 10 02/14/2025    GFRNAA 103 02/07/2022    GFRAA 120 02/07/2022    CA 9.6 02/14/2025    OSMOCALC 292 02/14/2025    ALKPHO 100 02/14/2025    AST 22 02/14/2025    ALT 21 02/14/2025    ALKPHOS 41 09/21/2015    BILT 0.4 02/14/2025    TP 6.6 02/14/2025    ALB 4.4 02/14/2025    GLOBULIN 2.2 02/14/2025    AGRATIO 0.8 (L) 03/18/2024     02/14/2025    K 4.0 02/14/2025     02/14/2025    CO2 27.0 02/14/2025     No results found for: \"PTT\", \"PT\", \"INR\"    Imaging:      PET CT from September 24 was reviewed with the patient.  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    Cycle 1 of Miguel- R-CHP on August  14.    Interim PET revealed Deauville 3, significant decrease in tumor burden     EOT PET after 6 cycles, revealed Deauville 3, activity noted\" iron-containing follow-up.  Completed 2  additional cycles of rituximab.  PET CT for follow-up in March 20225    Osteoporotic fracture  - on physical therapy  - endocrine referral was given       Skin lesion     Rash on her neck and the lower jaw around the end of the cycle 1.  Rash improved in most of her cheeks, new  break out noted   - worsening of rosacea with chemotherapy. On topical metronidazole, Ivermectin       Neutropenia  - likely from chemotherapy. We will continue to monitor      Return in 2 months with PET CT to follow up on the para-aortic LN     Dayna Hidalgo MD  Hematology/Medical Oncology

## 2025-03-04 ENCOUNTER — OFFICE VISIT (OUTPATIENT)
Dept: PHYSICAL MEDICINE AND REHAB | Facility: CLINIC | Age: 60
End: 2025-03-04
Payer: COMMERCIAL

## 2025-03-04 VITALS — HEIGHT: 63 IN | WEIGHT: 162 LBS | BODY MASS INDEX: 28.7 KG/M2

## 2025-03-04 DIAGNOSIS — R20.0 NUMBNESS AND TINGLING IN BOTH HANDS: Primary | ICD-10-CM

## 2025-03-04 DIAGNOSIS — S22.000A COMPRESSION FRACTURE OF THORACIC VERTEBRA (HCC): ICD-10-CM

## 2025-03-04 DIAGNOSIS — R20.2 NUMBNESS AND TINGLING IN BOTH HANDS: Primary | ICD-10-CM

## 2025-03-04 PROCEDURE — 99214 OFFICE O/P EST MOD 30 MIN: CPT | Performed by: PHYSICAL MEDICINE & REHABILITATION

## 2025-03-04 RX ORDER — ACETAMINOPHEN AND CODEINE PHOSPHATE 300; 30 MG/1; MG/1
1 TABLET ORAL 2 TIMES DAILY PRN
Qty: 14 TABLET | Refills: 0 | Status: SHIPPED | OUTPATIENT
Start: 2025-03-04

## 2025-03-04 NOTE — PROGRESS NOTES
Progress note    C/C:   Chief Complaint   Patient presents with    Follow - Up     LOV 1/21/2025 Patient is here for follow up on back.  Reports n/t throughout both hands. Takes tylenol prn to ease pain. Currently in physical therapy nd states she's getting relief. Reports soreness throughout back. Pain 7/10      HPI: 59-year-old female presents for follow-up.  Last seen in the end of January.  Prescribed calcitonin nasal spray, physical therapy.  Has been following with endocrinology, and is going to start Forteo soon for the treatment of osteopenia.    Hands are aching. Not sleeping well due to numbness, left worse than right hands, and aching. During the daytime numbness and tingling is more prevalent in the 1-3rd fingers of both hands. There is numbness and tingling intermittently, in both the hands and left foot. Oncologist saying to wait a few months and see if the symptoms will ease over time.     Lower back pain has eased somewhat.  Localized at about the waistline without radiation into the bilateral lower extremities.    Takes over-the-counter medications, lidocaine patch, and Tylenol 3 as needed.  I gave her 14 tablets, and she still has a few left from when it was prescribed to her about 6 weeks ago.    Pertinent allergies: Allergies[1]     Physical exam:  Ht 63\"   Wt 162 lb (73.5 kg)   LMP 08/09/2018   BMI 28.70 kg/m²      No intrinsic hand wasting  Slight weakness right ABP greater than left. Normal finger flexors, wrist extensors, elbow flexors.  Giveaway with shoulder abduction strength testing bilaterally  2/4 upper and lower extremity reflexes bilaterally  Roberts test negative bilaterally  Sensation intact to light touch median, radial, ulnar distributions of both hands  Mild tenderness to palpation bilateral lower lumbar paraspinals    Imaging: no new imaging to review    Assessment and plan  Bilateral hand pain, numbness and tingling, interimttent  B cell lymphoma s/p chemotherapy (no  radiation)  Lumbar compression fx with persistent though somewhat improved low back pain    Her symptoms seem more suspicious in the hands seem more suspicious for carpal tunnel syndrome.  Recommend EMG of the bilateral upper extremities to evaluate for this, along with polyneuropathy - might have to do some nerve conductions and one of the legs as well.  Encouraged her to continue physical therapy, proceed with Forteo injections.  She was prescribed Tylenol 3 no more than twice daily, to be taken as needed for severe pain.    F/u for EMG b/l UE    Tomy Christiansen DO  Physical Medicine and Rehabilitation  [facility]       [1]   Allergies  Allergen Reactions    Meloxicam ANGIOEDEMA     Upper and lower lip throbbing, tongue swelling, tongue ulcers    Contrave [Naltrexone-Bupropion Hcl Er] RASH    Wellbutrin [Bupropion] RASH    Simvastatin RASH

## 2025-03-07 ENCOUNTER — TELEPHONE (OUTPATIENT)
Dept: ENDOCRINOLOGY CLINIC | Facility: CLINIC | Age: 60
End: 2025-03-07

## 2025-03-07 NOTE — TELEPHONE ENCOUNTER
Current Outpatient Medications   Medication Sig Dispense Refill           Teriparatide (FORTEO) 600 MCG/2.4ML Subcutaneous Solution Pen-injector Inject 20 mcg into the skin daily for 28 days. 2.24 mL 5     Pharmacy message: Plan does not cover this medication.Please call plan at [phone] to initiate PA or call/fax pharmacy to change medication.Patient ID 3 is [id number]

## 2025-03-08 ENCOUNTER — OFFICE VISIT (OUTPATIENT)
Dept: FAMILY MEDICINE CLINIC | Facility: CLINIC | Age: 60
End: 2025-03-08
Payer: COMMERCIAL

## 2025-03-08 VITALS
DIASTOLIC BLOOD PRESSURE: 80 MMHG | SYSTOLIC BLOOD PRESSURE: 116 MMHG | BODY MASS INDEX: 30.12 KG/M2 | HEART RATE: 77 BPM | WEIGHT: 170 LBS | HEIGHT: 63 IN

## 2025-03-08 DIAGNOSIS — Z00.00 WELL ADULT EXAM: Primary | ICD-10-CM

## 2025-03-08 PROCEDURE — 99396 PREV VISIT EST AGE 40-64: CPT | Performed by: NURSE PRACTITIONER

## 2025-03-08 NOTE — PROGRESS NOTES
HPI    Patient presents for annual physical.      Mammogram - 2/2025, normal.    Diet - diet is healthy.  Exercise - exercises regularly.      Review of Systems   All other systems reviewed and are negative.       Vitals:    03/08/25 0956   BP: 116/80   Pulse: 77   Weight: 170 lb (77.1 kg)   Height: 5' 3\" (1.6 m)     Body mass index is 30.11 kg/m².    Health Maintenance   Topic Date Due    Zoster Vaccines (1 of 2) Never done    Pneumococcal Vaccine: 50+ Years (2 of 2 - PCV) 01/31/2012    DTaP,Tdap,and Td Vaccines (2 - Td or Tdap) 02/11/2021    COVID-19 Vaccine (3 - 2024-25 season) 09/01/2024    Influenza Vaccine (1) 10/01/2024    Annual Physical  03/06/2025    Pap Smear  01/24/2026    Mammogram  02/10/2026    Annual Depression Screening  Completed    Meningococcal B Vaccine  Aged Out    Colorectal Cancer Screening  Discontinued       Past Medical History:    Diverticulosis large intestine w/o perforation or abscess w/o bleeding    Gastropathy    High cholesterol    History of blood transfusion    NO ADVERSE REACTIONS    Hx of motion sickness    Hyperlipidemia    Internal hemorrhoids without complication    Rosacea    Seasonal allergies    Visual impairment    GLASSES       .  Past Surgical History:   Procedure Laterality Date    Colonoscopy N/A 12/13/2018    Procedure: COLONOSCOPY;  Surgeon: Elizabet Gordon MD;  Location: [facility] ENDOSCOPY    Colonoscopy N/A 6/4/2024    Procedure: COLONOSCOPY and Polypectomy;  Surgeon: Deepak He MD;  Location: [facility] ENDOSCOPY    Other surgical history      Repair of anal fistula 2011       Family History   Problem Relation Age of Onset    Cancer Self         Lymphoma    Breast Cancer Neg     Ovarian Cancer Neg     Prostate Cancer Neg     Pancreatic Cancer Neg     Colon Cancer Neg     Uterine Cancer Neg        Social History     Socioeconomic History    Marital status: Single     Spouse name: Not on file    Number of children: Not on file    Years of education: Not  on file    Highest education level: Not on file   Occupational History    Not on file   Tobacco Use    Smoking status: Never     Passive exposure: Yes    Smokeless tobacco: Never    Tobacco comments:     Social smoker. Pt does not remember when she stopped   Vaping Use    Vaping status: Never Used   Substance and Sexual Activity    Alcohol use: Yes     Alcohol/week: 1.0 standard drink of alcohol     Types: 1 Glasses of wine per week     Comment: Special occasions    Drug use: No    Sexual activity: Not on file   Other Topics Concern    Grew up on a farm Not Asked    History of tanning Not Asked    Outdoor occupation Not Asked    Pt has a pacemaker No    Pt has a defibrillator No    Breast feeding Not Asked    Reaction to local anesthetic No    Caffeine Concern Not Asked    Exercise Not Asked    Seat Belt Not Asked    Special Diet Not Asked    Stress Concern Not Asked    Weight Concern Not Asked    Left Handed Not Asked    Right Handed Yes    Currently spends a great deal of time in the sun Not Asked    Past Sunlamp Treatments for Acne Not Asked    Hx of Spending Great Deal of Time in Sun Not Asked    Bad sunburns in the past Not Asked    Tanning Salons in the Past Not Asked    Hx of Radiation Treatments Not Asked    Regular use of sun block Not Asked   Social History Narrative    Not on file     Social Drivers of Health     Food Insecurity: No Food Insecurity (8/15/2024)    Food Insecurity     Food Insecurity: Never true   Transportation Needs: No Transportation Needs (8/15/2024)    Transportation Needs     Lack of Transportation: No     Car Seat: Not on file   Stress: Not on file   Housing Stability: Low Risk  (8/15/2024)    Housing Stability     Housing Instability: No     Housing Instability Emergency: Not on file     Crib or Xeniatte: Not on file       Current Outpatient Medications   Medication Sig Dispense Refill    acetaminophen-codeine 300-30 MG Oral Tab Take 1 tablet by mouth 2 (two) times daily as needed  for Pain. 14 tablet 0    Teriparatide (FORTEO) 600 MCG/2.4ML Subcutaneous Solution Pen-injector Inject 20 mcg into the skin daily for 28 days. 2.24 mL 5    Calcium Carbonate-Vitamin D 600-10 MG-MCG Oral Tab Take 1 tablet by mouth in the morning and 1 tablet before bedtime. 60 tablet 1    lidocaine 5 % External Patch Place 1 patch onto the skin daily. 20 patch 0    Ferrous Sulfate (FEROSUL) 325 (65 Fe) MG Oral Tab Take 1 tablet (325 mg total) by mouth 2 (two) times daily. 60 tablet 5    atorvastatin 10 MG Oral Tab Take 1 tablet (10 mg total) by mouth nightly. 90 tablet 4    acyclovir 400 MG Oral Tab Take 1 tablet (400 mg total) by mouth 2 (two) times daily. 180 tablet 1    Insulin Pen Needle (BD PEN NEEDLE MARTINA U/F) 32G X 4 MM Does not apply Misc Inject once daily (Patient not taking: Reported on 3/8/2025) 100 each 1       Allergies:  Allergies[1]    Physical Exam  Vitals and nursing note reviewed.   Constitutional:       General: She is not in acute distress.     Appearance: Normal appearance. She is well-developed. She is not ill-appearing, toxic-appearing or diaphoretic.   HENT:      Head: Normocephalic and atraumatic.      Right Ear: Hearing, tympanic membrane, ear canal and external ear normal. There is no impacted cerumen.      Left Ear: Hearing, tympanic membrane, ear canal and external ear normal. There is no impacted cerumen.      Nose: Nose normal. No congestion or rhinorrhea.      Mouth/Throat:      Mouth: Mucous membranes are moist.      Pharynx: Oropharynx is clear. No oropharyngeal exudate or posterior oropharyngeal erythema.   Eyes:      General:         Right eye: No discharge.         Left eye: No discharge.      Conjunctiva/sclera: Conjunctivae normal.   Neck:      Thyroid: No thyromegaly.   Cardiovascular:      Rate and Rhythm: Normal rate and regular rhythm.      Pulses: Normal pulses.      Heart sounds: Normal heart sounds. No murmur heard.  Pulmonary:      Effort: Pulmonary effort is normal. No  respiratory distress.      Breath sounds: Normal breath sounds. No stridor. No wheezing, rhonchi or rales.   Chest:      Chest wall: No tenderness.   Abdominal:      General: Abdomen is flat. Bowel sounds are normal. There is no distension.      Palpations: Abdomen is soft. There is no mass.      Tenderness: There is no abdominal tenderness. There is no guarding or rebound.      Hernia: No hernia is present.   Musculoskeletal:         General: Normal range of motion.      Cervical back: Normal range of motion and neck supple.   Lymphadenopathy:      Cervical: No cervical adenopathy.   Skin:     General: Skin is warm and dry.   Neurological:      Mental Status: She is alert and oriented to person, place, and time.   Psychiatric:         Mood and Affect: Mood normal.         Behavior: Behavior normal.         Thought Content: Thought content normal.         Judgment: Judgment normal.          Assessment and Plan:   Problem List Items Addressed This Visit    None  Visit Diagnoses       Well adult exam    -  Primary    Relevant Orders    CBC With Differential With Platelet    Comp Metabolic Panel (14)    Lipid Panel    TSH W Reflex To Free T4           Follow up for fasting labs.      Discussed plan of care with patient and patient is in agreement.  All questions answered. Patient to call with questions or concerns.    Encouraged to sign up for My Chart if not already registered.        [1]   Allergies  Allergen Reactions    Meloxicam ANGIOEDEMA     Upper and lower lip throbbing, tongue swelling, tongue ulcers    Contrave [Naltrexone-Bupropion Hcl Er] RASH    Wellbutrin [Bupropion] RASH    Simvastatin RASH

## 2025-03-11 ENCOUNTER — LAB ENCOUNTER (OUTPATIENT)
Dept: LAB | Age: 60
End: 2025-03-11
Attending: NURSE PRACTITIONER
Payer: COMMERCIAL

## 2025-03-11 DIAGNOSIS — Z00.00 WELL ADULT EXAM: ICD-10-CM

## 2025-03-11 LAB
ALBUMIN SERPL-MCNC: 4.5 G/DL (ref 3.2–4.8)
ALBUMIN/GLOB SERPL: 2 {RATIO} (ref 1–2)
ALP LIVER SERPL-CCNC: 95 U/L
ALT SERPL-CCNC: 18 U/L
ANION GAP SERPL CALC-SCNC: 9 MMOL/L (ref 0–18)
AST SERPL-CCNC: 19 U/L (ref ?–34)
BASOPHILS # BLD AUTO: 0.01 X10(3) UL (ref 0–0.2)
BASOPHILS NFR BLD AUTO: 0.5 %
BILIRUB SERPL-MCNC: 0.5 MG/DL (ref 0.3–1.2)
BUN BLD-MCNC: 13 MG/DL (ref 9–23)
BUN/CREAT SERPL: 18.8 (ref 10–20)
CALCIUM BLD-MCNC: 9.4 MG/DL (ref 8.7–10.4)
CHLORIDE SERPL-SCNC: 106 MMOL/L (ref 98–112)
CHOLEST SERPL-MCNC: 229 MG/DL (ref ?–200)
CO2 SERPL-SCNC: 27 MMOL/L (ref 21–32)
CREAT BLD-MCNC: 0.69 MG/DL
DEPRECATED RDW RBC AUTO: 43.9 FL (ref 35.1–46.3)
EGFRCR SERPLBLD CKD-EPI 2021: 101 ML/MIN/1.73M2 (ref 60–?)
EOSINOPHIL # BLD AUTO: 0.06 X10(3) UL (ref 0–0.7)
EOSINOPHIL NFR BLD AUTO: 3.2 %
ERYTHROCYTE [DISTWIDTH] IN BLOOD BY AUTOMATED COUNT: 13.1 % (ref 11–15)
FASTING PATIENT LIPID ANSWER: YES
FASTING STATUS PATIENT QL REPORTED: YES
GLOBULIN PLAS-MCNC: 2.2 G/DL (ref 2–3.5)
GLUCOSE BLD-MCNC: 92 MG/DL (ref 70–99)
HCT VFR BLD AUTO: 40.1 %
HDLC SERPL-MCNC: 95 MG/DL (ref 40–59)
HGB BLD-MCNC: 13.4 G/DL
IMM GRANULOCYTES # BLD AUTO: 0 X10(3) UL (ref 0–1)
IMM GRANULOCYTES NFR BLD: 0 %
LDLC SERPL CALC-MCNC: 111 MG/DL (ref ?–100)
LYMPHOCYTES # BLD AUTO: 0.68 X10(3) UL (ref 1–4)
LYMPHOCYTES NFR BLD AUTO: 36.8 %
MCH RBC QN AUTO: 30.4 PG (ref 26–34)
MCHC RBC AUTO-ENTMCNC: 33.4 G/DL (ref 31–37)
MCV RBC AUTO: 90.9 FL
MONOCYTES # BLD AUTO: 0.35 X10(3) UL (ref 0.1–1)
MONOCYTES NFR BLD AUTO: 18.9 %
NEUTROPHILS # BLD AUTO: 0.75 X10 (3) UL (ref 1.5–7.7)
NEUTROPHILS # BLD AUTO: 0.75 X10(3) UL (ref 1.5–7.7)
NEUTROPHILS NFR BLD AUTO: 40.6 %
NONHDLC SERPL-MCNC: 134 MG/DL (ref ?–130)
OSMOLALITY SERPL CALC.SUM OF ELEC: 294 MOSM/KG (ref 275–295)
PLATELET # BLD AUTO: 198 10(3)UL (ref 150–450)
POTASSIUM SERPL-SCNC: 3.9 MMOL/L (ref 3.5–5.1)
PROT SERPL-MCNC: 6.7 G/DL (ref 5.7–8.2)
RBC # BLD AUTO: 4.41 X10(6)UL
SODIUM SERPL-SCNC: 142 MMOL/L (ref 136–145)
TRIGL SERPL-MCNC: 134 MG/DL (ref 30–149)
TSI SER-ACNC: 1.91 UIU/ML (ref 0.55–4.78)
VIT B12 SERPL-MCNC: 473 PG/ML (ref 211–911)
VIT D+METAB SERPL-MCNC: 32.9 NG/ML (ref 30–100)
VLDLC SERPL CALC-MCNC: 23 MG/DL (ref 0–30)
WBC # BLD AUTO: 1.9 X10(3) UL (ref 4–11)

## 2025-03-11 PROCEDURE — 82607 VITAMIN B-12: CPT

## 2025-03-11 PROCEDURE — 80061 LIPID PANEL: CPT

## 2025-03-11 PROCEDURE — 85060 BLOOD SMEAR INTERPRETATION: CPT

## 2025-03-11 PROCEDURE — 82306 VITAMIN D 25 HYDROXY: CPT

## 2025-03-11 PROCEDURE — 80053 COMPREHEN METABOLIC PANEL: CPT

## 2025-03-11 PROCEDURE — 36415 COLL VENOUS BLD VENIPUNCTURE: CPT

## 2025-03-11 PROCEDURE — 85025 COMPLETE CBC W/AUTO DIFF WBC: CPT

## 2025-03-11 PROCEDURE — 84443 ASSAY THYROID STIM HORMONE: CPT

## 2025-03-12 ENCOUNTER — APPOINTMENT (OUTPATIENT)
Dept: URBAN - METROPOLITAN AREA CLINIC 248 | Age: 60
Setting detail: DERMATOLOGY
End: 2025-03-12

## 2025-03-12 DIAGNOSIS — L71.8 OTHER ROSACEA: ICD-10-CM

## 2025-03-12 PROCEDURE — OTHER COUNSELING: OTHER

## 2025-03-12 PROCEDURE — OTHER PRESCRIPTION MEDICATION MANAGEMENT: OTHER

## 2025-03-12 PROCEDURE — OTHER MIPS QUALITY: OTHER

## 2025-03-12 PROCEDURE — OTHER ADDITIONAL NOTES: OTHER

## 2025-03-12 PROCEDURE — 99213 OFFICE O/P EST LOW 20 MIN: CPT

## 2025-03-12 ASSESSMENT — LOCATION ZONE DERM: LOCATION ZONE: FACE

## 2025-03-12 ASSESSMENT — LOCATION SIMPLE DESCRIPTION DERM
LOCATION SIMPLE: RIGHT CHEEK
LOCATION SIMPLE: LEFT CHEEK

## 2025-03-12 ASSESSMENT — LOCATION DETAILED DESCRIPTION DERM
LOCATION DETAILED: RIGHT CENTRAL MALAR CHEEK
LOCATION DETAILED: LEFT INFERIOR CENTRAL MALAR CHEEK

## 2025-03-12 NOTE — PROCEDURE: ADDITIONAL NOTES
Render Risk Assessment In Note?: no
Additional Notes: Pt is done with chemotherapy and in remission. Pt has noticed improvement with skin since chemotherapy has ended.\\n\\nPatient advised to use vanicream products and samples provided\\nLong history of rosacea treated in doxycycline in the past
Detail Level: Simple

## 2025-03-12 NOTE — PROCEDURE: PRESCRIPTION MEDICATION MANAGEMENT
Render In Strict Bullet Format?: No
Detail Level: Zone
Continue Regimen: Aveidaoxia 1 %-1 %-4 % topical gel \\nSig: Apply to AA QAM\\n\\nPt did not get refills today, will return to office & has 11 refills on file

## 2025-03-17 ENCOUNTER — HOSPITAL ENCOUNTER (OUTPATIENT)
Dept: NUCLEAR MEDICINE | Facility: HOSPITAL | Age: 60
Discharge: HOME | End: 2025-03-17
Attending: STUDENT IN AN ORGANIZED HEALTH CARE EDUCATION/TRAINING PROGRAM
Payer: COMMERCIAL

## 2025-03-17 DIAGNOSIS — C83.30 DIFFUSE LARGE B-CELL LYMPHOMA, UNSPECIFIED BODY REGION (HCC): ICD-10-CM

## 2025-03-17 LAB — GLUCOSE BLDC GLUCOMTR-MCNC: 95 MG/DL (ref 70–99)

## 2025-03-17 PROCEDURE — 82962 GLUCOSE BLOOD TEST: CPT

## 2025-03-17 PROCEDURE — 78815 PET IMAGE W/CT SKULL-THIGH: CPT | Performed by: STUDENT IN AN ORGANIZED HEALTH CARE EDUCATION/TRAINING PROGRAM

## 2025-03-24 ENCOUNTER — TELEPHONE (OUTPATIENT)
Dept: HEMATOLOGY/ONCOLOGY | Facility: CLINIC | Age: 60
End: 2025-03-24

## 2025-03-24 DIAGNOSIS — C83.30 DIFFUSE LARGE B-CELL LYMPHOMA, UNSPECIFIED BODY REGION (HCC): Primary | ICD-10-CM

## 2025-03-24 NOTE — TELEPHONE ENCOUNTER
Cld patient to confirm if she's gone back to work as of yet, no answer Left message to call back

## 2025-03-24 NOTE — TELEPHONE ENCOUNTER
Received  physical capabilities form via fax; logged for processing. Valid authorization for [employer]. Notified .

## 2025-03-26 ENCOUNTER — OFFICE VISIT (OUTPATIENT)
Age: 60
End: 2025-03-26
Attending: STUDENT IN AN ORGANIZED HEALTH CARE EDUCATION/TRAINING PROGRAM
Payer: COMMERCIAL

## 2025-03-26 VITALS
OXYGEN SATURATION: 97 % | HEART RATE: 88 BPM | HEIGHT: 63 IN | DIASTOLIC BLOOD PRESSURE: 89 MMHG | WEIGHT: 174 LBS | TEMPERATURE: 98 F | SYSTOLIC BLOOD PRESSURE: 140 MMHG | BODY MASS INDEX: 30.83 KG/M2 | RESPIRATION RATE: 16 BRPM

## 2025-03-26 DIAGNOSIS — E61.1 IRON DEFICIENCY: ICD-10-CM

## 2025-03-26 DIAGNOSIS — Z51.81 ENCOUNTER FOR THERAPEUTIC DRUG MONITORING: ICD-10-CM

## 2025-03-26 DIAGNOSIS — C83.38 DIFFUSE LARGE B-CELL LYMPHOMA OF LYMPH NODES OF MULTIPLE REGIONS (HCC): Primary | ICD-10-CM

## 2025-03-26 DIAGNOSIS — T45.1X5A CHEMOTHERAPY-INDUCED PERIPHERAL NEUROPATHY (HCC): ICD-10-CM

## 2025-03-26 DIAGNOSIS — G62.0 CHEMOTHERAPY-INDUCED PERIPHERAL NEUROPATHY (HCC): ICD-10-CM

## 2025-03-26 DIAGNOSIS — C83.30 DIFFUSE LARGE B-CELL LYMPHOMA, UNSPECIFIED BODY REGION (HCC): ICD-10-CM

## 2025-03-26 DIAGNOSIS — L71.9 ROSACEA: ICD-10-CM

## 2025-03-26 DIAGNOSIS — Z01.810 PRE-OPERATIVE CARDIOVASCULAR EXAMINATION: ICD-10-CM

## 2025-03-26 DIAGNOSIS — M54.50 BILATERAL LOW BACK PAIN, UNSPECIFIED CHRONICITY, UNSPECIFIED WHETHER SCIATICA PRESENT: ICD-10-CM

## 2025-03-26 DIAGNOSIS — M85.852 OSTEOPENIA OF NECK OF LEFT FEMUR: ICD-10-CM

## 2025-03-26 LAB
ALBUMIN SERPL-MCNC: 4.7 G/DL (ref 3.2–4.8)
ALBUMIN/GLOB SERPL: 2.5 {RATIO} (ref 1–2)
ALP LIVER SERPL-CCNC: 100 U/L
ALT SERPL-CCNC: 20 U/L
ANION GAP SERPL CALC-SCNC: 8 MMOL/L (ref 0–18)
AST SERPL-CCNC: 21 U/L (ref ?–34)
BASOPHILS # BLD AUTO: 0.01 X10(3) UL (ref 0–0.2)
BASOPHILS NFR BLD AUTO: 0.4 %
BILIRUB SERPL-MCNC: 0.5 MG/DL (ref 0.3–1.2)
BUN BLD-MCNC: 16 MG/DL (ref 9–23)
BUN/CREAT SERPL: 21.9 (ref 10–20)
CALCIUM BLD-MCNC: 9.6 MG/DL (ref 8.7–10.4)
CHLORIDE SERPL-SCNC: 108 MMOL/L (ref 98–112)
CO2 SERPL-SCNC: 27 MMOL/L (ref 21–32)
CREAT BLD-MCNC: 0.73 MG/DL
DEPRECATED RDW RBC AUTO: 43.5 FL (ref 35.1–46.3)
EGFRCR SERPLBLD CKD-EPI 2021: 96 ML/MIN/1.73M2 (ref 60–?)
EOSINOPHIL # BLD AUTO: 0.06 X10(3) UL (ref 0–0.7)
EOSINOPHIL NFR BLD AUTO: 2.1 %
ERYTHROCYTE [DISTWIDTH] IN BLOOD BY AUTOMATED COUNT: 13.2 % (ref 11–15)
GLOBULIN PLAS-MCNC: 1.9 G/DL (ref 2–3.5)
GLUCOSE BLD-MCNC: 124 MG/DL (ref 70–99)
HCT VFR BLD AUTO: 39.2 %
HGB BLD-MCNC: 13.1 G/DL
IMM GRANULOCYTES # BLD AUTO: 0.03 X10(3) UL (ref 0–1)
IMM GRANULOCYTES NFR BLD: 1.1 %
LYMPHOCYTES # BLD AUTO: 0.72 X10(3) UL (ref 1–4)
LYMPHOCYTES NFR BLD AUTO: 25.6 %
MCH RBC QN AUTO: 29.6 PG (ref 26–34)
MCHC RBC AUTO-ENTMCNC: 33.4 G/DL (ref 31–37)
MCV RBC AUTO: 88.7 FL
MONOCYTES # BLD AUTO: 0.21 X10(3) UL (ref 0.1–1)
MONOCYTES NFR BLD AUTO: 7.5 %
NEUTROPHILS # BLD AUTO: 1.78 X10 (3) UL (ref 1.5–7.7)
NEUTROPHILS # BLD AUTO: 1.78 X10(3) UL (ref 1.5–7.7)
NEUTROPHILS NFR BLD AUTO: 63.3 %
OSMOLALITY SERPL CALC.SUM OF ELEC: 299 MOSM/KG (ref 275–295)
PLATELET # BLD AUTO: 170 10(3)UL (ref 150–450)
POTASSIUM SERPL-SCNC: 3.8 MMOL/L (ref 3.5–5.1)
PROT SERPL-MCNC: 6.6 G/DL (ref 5.7–8.2)
RBC # BLD AUTO: 4.42 X10(6)UL
SODIUM SERPL-SCNC: 143 MMOL/L (ref 136–145)
WBC # BLD AUTO: 2.8 X10(3) UL (ref 4–11)

## 2025-03-26 RX ORDER — TERIPARATIDE 250 UG/ML
20 INJECTION, SOLUTION SUBCUTANEOUS EVERY EVENING
COMMUNITY

## 2025-03-26 NOTE — PROGRESS NOTES
Hematology/Oncology Progress Note    Patient Name: Kimberly Quezada  Medical Record Number: [id number]    YOB: 1965   Date : 3/26/25  PCP: Hillary Prado MD   Other providers:    Chief Complaint:  Kimberly Quezada was seen today for follow up post treatment for the diagnosis of DLBCL     Oncologic History:    -March 2024: Anemia with MCV of 72.3.  She endorsed  decreased appetite and weight loss of 25 pounds.  -2024: Had EGD and colonoscopy which were normal.   -Had a CT CAP after she noted a neck mass which showed bilateral lymphadenopathy in her neck, chest, left axilla and retroperitoneal region.   -July 2024: Axillary lymph node excisional biopsy.  Sent to [facility] for additional workup and was noted to have at DLBCL NOS.  -August 14th : Cycle 1 Miguel-R-CHP  -Sept 4th 2024: Cycle 2 Miguel RCHP - complicated with facial rash  -Sept 24th. Interim PET after 2 cycles showed Deauville 3. With significant decrease in activity noted.  -Oct 15th, 2024: Extensive rosacea noted  -Dec 2024: Patient had PET CT and treatment that revealed stable intermittently active within the left parotid uptake.  Multiple new endplate fractures been noted in her spine.  -MRI of the spine, close orthopedic DEXA scan that revealed osteoporosis.  -EOT PET after 6 cycles, revealed Deauville 3, activity noted   -Completed 2  additional cycles of rituximab, 12/18/25 and 1/8/25  -PET CT 3/17/25 stable 2x3 cm left periaortic/rp node+activity (Deauville 3), stable compression deformities T11-L1, showing response    ===================================================  History of Present Illness:      Patient here for follow up after completing treatment for DLBCL, last treatment rituximab on 1/8/25.  Discussed post treatment PET CT 3/17/25 stable 2x3 cm left periaortic/rp node+activity (Deauville 3), stable compression deformities T11-L1, showing response. Wt stable.  Reports continued angle lower back pain, intermittent left knee  pain, and left arm pain.  Reports continued angle hand neuropathy, and has right hand trigger finger. Hair is growing back nicely.  Denies having any constitutional symptoms. Denies fever, nightsweats, cough, sob, chest pain, N/V/D/C, rash, bleeding or new pain.     She is following with PM&R, PT and endocrinology, for history of compression fracture, and low back pain. She started Forteo with Dr. Marcum, reports takes at night and has some angle le pain that resolves, not endorsed today.      Past Medical History:  Past Medical History:    Diverticulosis large intestine w/o perforation or abscess w/o bleeding    Gastropathy    High cholesterol    History of blood transfusion    NO ADVERSE REACTIONS    Hx of motion sickness    Hyperlipidemia    Internal hemorrhoids without complication    Rosacea    Seasonal allergies    Visual impairment    GLASSES       Past Surgical History:   Procedure Laterality Date    Colonoscopy N/A 2018    Procedure: COLONOSCOPY;  Surgeon: Elizabet Gordon MD;  Location: [facility] ENDOSCOPY    Colonoscopy N/A 2024    Procedure: COLONOSCOPY and Polypectomy;  Surgeon: Deepak He MD;  Location: [facility] ENDOSCOPY    Other surgical history      Repair of anal fistula        Home Medications:  No outpatient medications have been marked as taking for the 3/26/25 encounter (Appointment) with Valeria Cantrell APRN.     -------  Current Outpatient Medications on File Prior to Visit   Medication Sig Dispense Refill    acetaminophen-codeine 300-30 MG Oral Tab Take 1 tablet by mouth 2 (two) times daily as needed for Pain. 14 tablet 0    [] Teriparatide (FORTEO) 600 MCG/2.4ML Subcutaneous Solution Pen-injector Inject 20 mcg into the skin daily for 28 days. 2.24 mL 5    Insulin Pen Needle (BD PEN NEEDLE MARTINA U/F) 32G X 4 MM Does not apply Misc Inject once daily (Patient not taking: Reported on 3/8/2025) 100 each 1    Calcium Carbonate-Vitamin D 600-10 MG-MCG Oral Tab Take 1  tablet by mouth in the morning and 1 tablet before bedtime. 60 tablet 1    lidocaine 5 % External Patch Place 1 patch onto the skin daily. 20 patch 0    Ferrous Sulfate (FEROSUL) 325 (65 Fe) MG Oral Tab Take 1 tablet (325 mg total) by mouth 2 (two) times daily. 60 tablet 5    atorvastatin 10 MG Oral Tab Take 1 tablet (10 mg total) by mouth nightly. 90 tablet 4    acyclovir 400 MG Oral Tab Take 1 tablet (400 mg total) by mouth 2 (two) times daily. 180 tablet 1     No current facility-administered medications on file prior to visit.       Allergies:   Allergies   Allergen Reactions    Meloxicam ANGIOEDEMA     Upper and lower lip throbbing, tongue swelling, tongue ulcers    Contrave [Naltrexone-Bupropion Hcl Er] RASH    Wellbutrin [Bupropion] RASH    Simvastatin RASH       Psychosocial History:  Social History     Social History Narrative    Not on file     Social History     Socioeconomic History    Marital status: Single   Tobacco Use    Smoking status: Never     Passive exposure: Yes    Smokeless tobacco: Never    Tobacco comments:     Social smoker. Pt does not remember when she stopped   Vaping Use    Vaping status: Never Used   Substance and Sexual Activity    Alcohol use: Yes     Alcohol/week: 1.0 standard drink of alcohol     Types: 1 Glasses of wine per week     Comment: Special occasions    Drug use: No   Other Topics Concern    Pt has a pacemaker No    Pt has a defibrillator No    Reaction to local anesthetic No    Right Handed Yes     Social Drivers of Health     Food Insecurity: No Food Insecurity (8/15/2024)    Food Insecurity     Food Insecurity: Never true   Transportation Needs: No Transportation Needs (8/15/2024)    Transportation Needs     Lack of Transportation: No   Housing Stability: Low Risk  (8/15/2024)    Housing Stability     Housing Instability: No       Family Medical History:  Family History   Problem Relation Age of Onset    Cancer Self         Lymphoma    Breast Cancer Neg     Ovarian  Cancer Neg     Prostate Cancer Neg     Pancreatic Cancer Neg     Colon Cancer Neg     Uterine Cancer Neg        Review of Systems:  A 10-point ROS was done with pertinent positives and negative per the HPI    Vital Signs:  Height: 160 cm (5' 3\") (03/26 1059)  Weight: 78.9 kg (174 lb) (03/26 1059)  BSA (Calculated - sq m): 1.82 sq meters (03/26 1059)  Pulse: 88 (03/26 1059)  BP: 140/89 (03/26 1059)  Temp: 98 °F (36.7 °C) (03/26 1059)  Do Not Use - Resp Rate: --  SpO2: 97 % (03/26 1059)    Wt Readings from Last 6 Encounters:   03/26/25 78.9 kg (174 lb)   03/08/25 77.1 kg (170 lb)   03/04/25 73.5 kg (162 lb)   02/14/25 77.1 kg (170 lb)   01/29/25 76.7 kg (169 lb)   01/21/25 73.5 kg (162 lb)       ECOG PS: 0    Physical Examination:  General: Patient is alert and oriented, not in acute distress  Psych: Mood and affect are appropriate  Eyes: EOMI, PERRL  ENT: Oropharynx is clear, no adenopathy  CV: Regular rate and rhythm, normal S1S2, no murmurs, no LE edema  Respiratory: Lungs clear to auscultation bilaterally  GI/Abd: Soft, non-tender with normoactive bowel sounds, no hepatosplenomegaly  Neurological: Grossly intact   Lymphatics: .  No palpable axillary lymph nodes.  No LAD angle cervical, supraclavicular, inguinal, or le.  Skin: no rashes or petechiae, angle face cheeks with erythema, healing acne like scars  Lines: Mediport in tight chest.  No redness or swelling.    Laboratory:  Lab Results   Component Value Date    WBC 2.8 (L) 03/26/2025    WBC 1.9 (L) 03/11/2025    WBC 2.4 (L) 02/14/2025    HGB 13.1 03/26/2025    HGB 13.4 03/11/2025    HGB 12.8 02/14/2025    HCT 39.2 03/26/2025    MCV 88.7 03/26/2025    MCH 29.6 03/26/2025    MCHC 33.4 03/26/2025    RDW 13.2 03/26/2025    .0 03/26/2025    .0 03/11/2025    .0 02/14/2025     Lab Results   Component Value Date     (H) 03/26/2025    BUN 16 03/26/2025    BUNCREA 21.9 (H) 03/26/2025    CREATSERUM 0.73 03/26/2025    CREATSERUM 0.69 03/11/2025     CREATSERUM 0.73 02/14/2025    ANIONGAP 8 03/26/2025    GFRNAA 103 02/07/2022    GFRAA 120 02/07/2022    CA 9.6 03/26/2025    OSMOCALC 299 (H) 03/26/2025    ALKPHO 100 03/26/2025    AST 21 03/26/2025    ALT 20 03/26/2025    ALKPHOS 41 09/21/2015    BILT 0.5 03/26/2025    TP 6.6 03/26/2025    ALB 4.7 03/26/2025    GLOBULIN 1.9 (L) 03/26/2025    AGRATIO 0.8 (L) 03/18/2024     03/26/2025    K 3.8 03/26/2025     03/26/2025    CO2 27.0 03/26/2025     No results found for: \"PTT\", \"PT\", \"INR\"    Imaging:      PET CT from September 24 was reviewed with the patient.  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  -Interim PET revealed Deauville 3, significant decrease in tumor burden   -EOT PET after 6 cycles, revealed Deauville 3, activity noted\" iron-containing follow-up.    -Completed 2  additional cycles of rituximab, 12/18/25 and 1/8/25    --Discussed EOT PET CT 3/17/25 stable 2x3 cm left periaoric/rp node+activity (Deauville 3), stable compression deformities T11-L1, showing response  --Today cmp , cbc stable, wt stable, no new complaints  -cont Post remission monitoring:   (Continue H&P and labs (cbc, cmp, LDH) every 3 months during the first year)   (every 3 to 6 months during the second year)   (once or twice annually in years three to five)   -due post treatment echo within 1 year post doxorubicin (last 11/2024), ordered in 6 months  -ordered survivorship clinic  -Patient requests port removal, we do not need the port at this time, patient in remission and she does not have difficulty with IV blood  draws, will order for removal at IR, patient can call to schedule  -RTC 3 months, 6/2025 H&P Dr. Hidalgo with labs prior (CBC, CMP, LDH)    #2 Osteoporotic fracture  -considered kyphoplasty with IR and declined,   -on physical therapy    -Dexa 2/12/25  shows osteopenia (T-Score for left fem neck  -1.2, and lumbar spine -1.8)  -continues calcium, vitamin D  - continues to follow Endocrinology, recently started Forteo with Dr. Marcum, plan to follow up dexa 1 yr 2/2026  --PET CT 3/17/25 shows stable compression deformities T11-L1 with improving FDG activity around the fracture sites favoring insufficiency injury.    #3 Radiculopathy  -low back pain, doing PT at this time, and seeing PM&R Dr. Christiansen  -continues with decreased endurance for standing     #4) peripheral neuropathy  Angle hands, and left arm , getting PT , using angle hand splints, getting some alternative therapy acupuncture/heat,  Also reports right hand middle finger with trigger finger    #5) MICHAEL  -history of iron deficiency anemia has needed iron infusions in the past.  -HGB stable today, will check iron studies next blood draw (cbc, ferritin, iron tibc)    #6 Skin lesion   - worsening of rosacea with chemotherapy.   On topical metronidazole,  -Today improved with continued mild rosasea angle cheecks, healing acne like scars      #7 Neutropenia  - likely from chemotherapy.   -improving, continue acyclovir prophylaxis, likely stop in 3 months if lymphocytes improve    #8 cancer screening  -BSM JERE 2/10/25 Dr. Prado  -colonoscopy 3/2024 colon polyp benign, diverticulosis, gastric polyps benign, hiatal hernia      DEL Ashton    [facility]  Sanjana Kramer [facility] [place]  [address]  [phone]

## 2025-04-04 ENCOUNTER — TELEPHONE (OUTPATIENT)
Age: 60
End: 2025-04-04

## 2025-04-06 ENCOUNTER — PATIENT MESSAGE (OUTPATIENT)
Dept: FAMILY MEDICINE CLINIC | Facility: CLINIC | Age: 60
End: 2025-04-06

## 2025-04-09 ENCOUNTER — PROCEDURE VISIT (OUTPATIENT)
Dept: PHYSICAL MEDICINE AND REHAB | Facility: CLINIC | Age: 60
End: 2025-04-09
Payer: COMMERCIAL

## 2025-04-09 ENCOUNTER — PATIENT MESSAGE (OUTPATIENT)
Dept: PHYSICAL MEDICINE AND REHAB | Facility: CLINIC | Age: 60
End: 2025-04-09

## 2025-04-09 DIAGNOSIS — S22.000D COMPRESSION FRACTURE OF THORACIC VERTEBRA WITH ROUTINE HEALING, UNSPECIFIED THORACIC VERTEBRAL LEVEL, SUBSEQUENT ENCOUNTER: Primary | ICD-10-CM

## 2025-04-09 DIAGNOSIS — R20.2 NUMBNESS AND TINGLING IN BOTH HANDS: ICD-10-CM

## 2025-04-09 DIAGNOSIS — C83.38 DIFFUSE LARGE B-CELL LYMPHOMA OF LYMPH NODES OF MULTIPLE REGIONS (HCC): ICD-10-CM

## 2025-04-09 DIAGNOSIS — R20.0 NUMBNESS AND TINGLING IN BOTH HANDS: ICD-10-CM

## 2025-04-09 PROCEDURE — 95912 NRV CNDJ TEST 11-12 STUDIES: CPT | Performed by: PHYSICAL MEDICINE & REHABILITATION

## 2025-04-09 PROCEDURE — 95886 MUSC TEST DONE W/N TEST COMP: CPT | Performed by: PHYSICAL MEDICINE & REHABILITATION

## 2025-04-09 NOTE — PROCEDURES
[address] [address]  Telephone number: [phone]  Fax number: [phone]        Full Name: Kimberly Quezada Gender: Female  Patient ID: [id number] YOB: 1965      Visit Date: 4/9/2025 1:43 PM  Examining Physician: Tomy Christiansen DO  Referring Physician: Tomy Christiansen DO  Height: 5 feet 3 inch  Weight: 170 lbs  History: 59-year-old female with history of B-cell lymphoma status postchemotherapy presents with numbness and tingling in both hands, left worse than right, most noticeable in the 1st through 3rd fingers of both hands.  Left hand is more affected than the right.  There is intermittent numbness and tingling in the right foot as well.  EMG was ordered to  be evaluated for polyneuropathy from chemotherapy, versus an entrapment neuropathy.      Physical exam:  No intrinsic hand wasting  2/4 upper and lower extremity reflexes bilaterally  Roberts test negative bilaterally  Sensation intact to light touch median, radial, ulnar distributions of both hands  ? Decreased muscle bulk of the left ABP; normal right.    Sensory NCS      Nerve / Sites Rec. Site Onset Lat Peak Lat NP Amp PP Amp Segments Distance Velocity Comment     ms ms µV µV  cm m/s    R Median - Dig II (Antidromic)      Wrist Index 4.06 4.95 12.8 20.5 Wrist - Index 14 34    L Median - Dig II (Antidromic)      Wrist Index 3.80 5.21 17.4 25.0 Wrist - Index 14 37    R Ulnar - Dig V (Antidromic)      Wrist Dig V 3.02 3.91 17.9 29.3 Wrist - Dig V 14 46    L Ulnar - Dig V (Antidromic)      Wrist Dig V 3.02 3.91 26.6 29.2 Wrist - Dig V 14 46    R Radial - Superficial (Antidromic)      Forearm Wrist 2.08 2.76 22.4 25.6 Forearm - Wrist 10 48    L Radial - Superficial (Antidromic)      Forearm Wrist 2.03 2.66 29.3 26.9 Forearm - Wrist 10 49    R Sural - (Antidromic)      Calf Ankle 2.24 3.07 17.3 17.6 Calf - Ankle 14 63        Motor NCS      Nerve / Sites Muscle Latency Amplitude Segments Dist. Lat  Diff Velocity Comments     ms mV  cm ms m/s    R Median - APB      Wrist APB 5.40 7.2 Wrist - APB 8         Elbow APB 9.71 6.8 Elbow - Wrist 21.5 4.31 49.9    L Median - APB      Wrist APB 6.25 2.4 Wrist - APB 8         Elbow APB 9.19 5.2 Elbow - Wrist 19 2.94 64.7       A.Elbow APB 5.15 2.3 A.Elbow - Elbow  -4.04     R Ulnar - ADM      Wrist ADM 3.71 7.1 Wrist - ADM 8         B.Elbow ADM 7.29 6.9 B.Elbow - Wrist 21.5 3.58 60.0       A.Elbow ADM 8.73 6.7 A.Elbow - B.Elbow 9 1.44 62.6    L Ulnar - ADM      Wrist ADM 3.48 8.6 Wrist - ADM 8         B.Elbow ADM 7.06 7.5 B.Elbow - Wrist 20 3.58 55.8       A.Elbow ADM 8.60 6.7 A.Elbow - B.Elbow 10 1.54 64.9        EMG Summary Table     Spontaneous MUAP Recruitment   Muscle IA Fib PSW Fasc H.F. Amp Dur. PPP Pattern   R. Deltoid N None None None None N N N N   R. Biceps brachii N None None None None N N N N   R. Triceps brachii N None None None None N N N N   R. First dorsal interosseous N None None None None N N N N   R. Abductor pollicis brevis N None None None None N N N Reduced   L. Deltoid N None None None None N N N N   L. Biceps brachii N None None None None N N N N   L. Triceps brachii N None None None None N N N N   L. First dorsal interosseous N None None None None N N N N   L. Abductor pollicis brevis N 1+ 1+ None None 1+ N N Reduced       Summary    4 motor nerve conduction studies were performed. 7 sensory nerve conduction studies were performed.    The right median motor study recording from the APB demonstrated prolonged distal motor latency, normal amplitude, normal conduction velocity.  The right ulnar motor study recording from the ADM was normal.  The right median sensory study recording from the second digit demonstrated prolonged peak latency and decreased amplitude.  The right ulnar sensory study recording from the fifth digit was normal.   The right radial sensory study recording from the anatomic snuffbox was normal.    The right sural sensory study  was normal.    The left median motor study recording from the APB demonstrated findings consistent with a Georgi-Senia anastomosis.  That being said there is also a significantly prolonged distal motor latency.  Normal conduction velocity.  The left ulnar motor study recording from the ADM was normal.  The left median sensory study recording from the second digit demonstrated prolonged peak latency and decreased amplitude.  The left ulnar sensory study recording from the fifth digit was normal.   The left radial sensory study recording from the anatomic snuffbox was normal.    Selected needle study was performed in 10 muscles, 5 in each upper extremity.  The right abductor pollicis brevis showed mildly reduced recruitment but was otherwise normal.  The left abductor pollicis brevis demonstrated fibrillations and positive sharp waves, increased amplitude of the motor unit action potential and reduced recruitment.          Conclusion:   1) This is an abnormal electrodiagnostic study.   2) There is electrodiagnostic evidence of bilateral median mononeuropathies across the wrists consistent with the clinical diagnosis of bilateral carpal tunnel syndrome. The electrodiagnostic findings are primarily demyelinating in the right upper extremity, demyelinating/axonal in the left upper extremity; moderate in the right upper extremity, severe in the left upper extremity.   3) There is no electrodiagnostic evidence of sensorimotor polyneuropathy. There is no electrodiagnostic evidence of cervical radiculopathy.   _______________________Isabelle Christiansen DO

## 2025-04-11 DIAGNOSIS — M85.852 OSTEOPENIA OF NECK OF LEFT FEMUR: Primary | ICD-10-CM

## 2025-04-11 DIAGNOSIS — M80.08XS FRACTURE OF VERTEBRA DUE TO OSTEOPOROSIS, SEQUELA: ICD-10-CM

## 2025-04-11 RX ORDER — CALCIUM CARBONATE/VITAMIN D3 600 MG-10
1 TABLET ORAL 2 TIMES DAILY
Qty: 60 TABLET | Refills: 11 | Status: SHIPPED | OUTPATIENT
Start: 2025-04-11

## 2025-04-14 ENCOUNTER — HOSPITAL ENCOUNTER (OUTPATIENT)
Dept: INTERVENTIONAL RADIOLOGY/VASCULAR | Facility: HOSPITAL | Age: 60
Discharge: HOME | End: 2025-04-14
Admitting: RADIOLOGY
Payer: COMMERCIAL

## 2025-04-14 VITALS
WEIGHT: 174 LBS | DIASTOLIC BLOOD PRESSURE: 92 MMHG | BODY MASS INDEX: 30.83 KG/M2 | RESPIRATION RATE: 15 BRPM | TEMPERATURE: 98 F | HEART RATE: 74 BPM | SYSTOLIC BLOOD PRESSURE: 136 MMHG | HEIGHT: 63 IN | OXYGEN SATURATION: 100 %

## 2025-04-14 DIAGNOSIS — M54.50 BILATERAL LOW BACK PAIN, UNSPECIFIED CHRONICITY, UNSPECIFIED WHETHER SCIATICA PRESENT: ICD-10-CM

## 2025-04-14 DIAGNOSIS — C83.38 DIFFUSE LARGE B-CELL LYMPHOMA OF LYMPH NODES OF MULTIPLE REGIONS (HCC): ICD-10-CM

## 2025-04-14 PROCEDURE — 36590 REMOVAL TUNNELED CV CATH: CPT | Performed by: RADIOLOGY

## 2025-04-14 PROCEDURE — 0HPPXYZ REMOVAL OF OTHER DEVICE FROM SKIN, EXTERNAL APPROACH: ICD-10-PCS | Performed by: NURSE PRACTITIONER

## 2025-04-14 PROCEDURE — 36415 COLL VENOUS BLD VENIPUNCTURE: CPT

## 2025-04-14 RX ORDER — MIDAZOLAM HYDROCHLORIDE 1 MG/ML
INJECTION INTRAMUSCULAR; INTRAVENOUS
Status: DISCONTINUED
Start: 2025-04-14 | End: 2025-04-14 | Stop reason: WASHOUT

## 2025-04-14 RX ORDER — LIDOCAINE HYDROCHLORIDE 20 MG/ML
INJECTION, SOLUTION INFILTRATION; PERINEURAL
Status: COMPLETED
Start: 2025-04-14 | End: 2025-04-14

## 2025-04-14 RX ORDER — SODIUM CHLORIDE 9 MG/ML
INJECTION, SOLUTION INTRAVENOUS CONTINUOUS
Status: DISCONTINUED | OUTPATIENT
Start: 2025-04-14 | End: 2025-04-14

## 2025-04-14 NOTE — BRIEF PROCEDURE NOTE
Patient in holding room 11. Patient A+Ox4, MD/APRN to bedside to confirm consent and explain procedure. Patient lying supine, draped in sterile fashion. Patient received 10mL of Lidocaine for local anesthetic. Port removed without issue and intact. Site covered in Dermabond. Site clean, dry, and intact without signs of bleeding or hematoma. Report given to Antonia MCNEILL.

## 2025-04-14 NOTE — IVS NOTE
Pt is able to sit up and ambulate without any difficulty. Procedure site remains dry and intact with no signs of bleeding and hematoma. Instructions given. Pt/Family verbalized understanding.  Pt discharge to home ambulatory. Local sedation post port removal.

## 2025-04-14 NOTE — INTERVAL H&P NOTE
The above referenced H&P was reviewed by DEL Perez on 4/14/2025, the patient was examined and no significant changes have occurred in the patient's condition since the H&P was performed.  Risks, benefits, alternative treatments and consequences of no treatment were discussed.  We will proceed with procedure as planned.      DEL Perez  4/14/2025  9:13 AM

## 2025-04-14 NOTE — DISCHARGE INSTRUCTIONS
INTERVENTIONAL RADIOLOGY  [facility]  [phone]     Patient Name:  Kimberly Quezada    Procedure:  Port Removal    Site Care: Dermabond (skin glue) has been applied to your incision.  Do not scrub the area.  Allow the Dermabond to flake off on its own.                                       Activity/Diet  No heavy lifting or strenuous activity for 48 hours.  Drink plenty of fluids, unless you have otherwise been told to restrict your fluid intake.  Do not drink alcohol for 24 hours.  Do not drive,  operate heavy machinery, make important decisions or sign legal documents today.    Medications:  Take acetaminophen if needed for pain. Do not exceed 4000mg of acetaminophen in a 24-hour period. and Make no changes to your existing medications.    Contact Interventional Radiology at [phone] if you have severe/unrelieved pain, fever, chills, dizziness/lightheadedness, or drainage/bleeding from your incision site.

## 2025-04-14 NOTE — PROCEDURES
[facility]  part of [facility]  Procedure Note    Kimberlyginny Quezada Patient Status:  Outpatient    10/31/1965 MRN [id number]   Location [facility] INTERVENTIONAL SUITES Attending Valeria Cantrell APRN    Day # 0 PCP Hillary Prado MD     Procedure: Port removal    Anesthesia:  Local    Blood Loss:  Minimal      Complications:  None    Conclusion:   Successful removal of right chest port    DEL Perez  2025       oral

## 2025-04-15 ENCOUNTER — NURSE TRIAGE (OUTPATIENT)
Dept: FAMILY MEDICINE CLINIC | Facility: CLINIC | Age: 60
End: 2025-04-15

## 2025-04-15 ENCOUNTER — OFFICE VISIT (OUTPATIENT)
Age: 60
End: 2025-04-15
Attending: STUDENT IN AN ORGANIZED HEALTH CARE EDUCATION/TRAINING PROGRAM
Payer: COMMERCIAL

## 2025-04-15 DIAGNOSIS — Z08 ENCOUNTER FOR FOLLOW-UP EXAMINATION AFTER COMPLETED TREATMENT FOR MALIGNANT NEOPLASM: ICD-10-CM

## 2025-04-15 DIAGNOSIS — Z71.9 COUNSELING, UNSPECIFIED: ICD-10-CM

## 2025-04-15 DIAGNOSIS — C83.38 DIFFUSE LARGE B-CELL LYMPHOMA OF LYMPH NODES OF MULTIPLE REGIONS (HCC): Primary | ICD-10-CM

## 2025-04-15 NOTE — TELEPHONE ENCOUNTER
Action Requested: Summary for Provider     []  Critical Lab, Recommendations Needed  [] Need Additional Advice  [x]   FYI    []   Need Orders  [] Need Medications Sent to Pharmacy  []  Other     SUMMARY: Bilateral ear congestion, left > right ear, since onset [3/23/25]. History of seasonal allergies. Taking Allegra OTC as directed, will implement OTC nasal sprays. Visit within 3 days advised--> agreeable and scheduled.[See below for more details]     Reason for call: Ear Problem  Onset: 3/23/25    Reason for Disposition   Ear congestion present > 48 hours    Protocols used: Ear - Congestion-A-OH      Patient called states she has had a feeling of clogged ears bilaterally, but predominately the left ear. Denies any pain, discharge, fevers, complete hearing loss. She had a history of seasonal allergies many years ago, but has not have a problem since [only skin tests performed, no intradermals]. She has tried some ear drops, she started to allegra OTC daily, started it last week. She will consider taking OTC Flonase/steroidal nasal spray and saline nasal spray to precede steroidal spay. Refer to system/assessment yes/no answers. Visit within 3 days advised--> agreeable and scheduled. Home Care Advice discussed, per protocol. Patient instructed any new or worsening symptoms [reviewed] seek immediate medical attention. Patient verbalized understanding. No further questions or concerns at this time.    Future Appointments   Date Time Provider Department Center   4/18/2025  2:00 PM Rory Arce APRN [facility]

## 2025-04-15 NOTE — PROGRESS NOTES
I met with Kimberly for a Survivorship Clinic visit to provide a survivorship care plan (SCP) and information related to post-treatment care. She presented in 3/24 with anemia with MCV of 72.3, decreased appetite and weight loss of 25 pounds. An EGD and colonoscopy were done in 6/2024 that were normal. She endorsed  decreased appetite and weight loss of 25 pounds. She had a CT done 5/24 after she noted a neck mass which showed bilateral lymphadenopathy in her neck, chest, left axilla and retroperitoneal region.     On 7/9/24 Dr. Marycruz Schaefer performed a left axillary lymph node excisional biopsy.  This was sent to [facility] for additional workup and showed diffuse large b-cell lymphoma, NOS. Clinical staging was Stage IV.    She was seen by Dr. Dayna Hidalgo who recommended treatment with Miguel-R-CHP. This was initiated on 8/14/24 and given x 6 cycles with growth factor support through 11/27/24. Interim PET scan was done after 2 cycles and showed Deauville 3 with significant decrease in activity noted. PET done on 12/24 revealed stable Deauville 3 activity in the left para-aortic note and multiple new endplate fractures in her thoracolumbar spine which were hypermetabolic. MRI on 12/30/24 showed T12 and L1 acute anterior wedge compression fractures with edema. Bone density was done on 2/12/25 that showed osteopenia. She has been seen and managed by Endocrinology, Dr. Ritu Marcum and Physical Medicine, Dr. Tomy Christiansen. She was started on Forteo by Dr. Marcum. She completed 2 additional cycles of Rituxan that completed on 1/8/25. Post treatment PET done on 3/17/25 showed stable 2x3 cm left periaortic/rp node (Deauville 3), stable compression deformities T11-L1, favoring insufficiency injury. Most recent CBC shows stable hemoglobin and she will get iron studies at next visit. She continues on oral iron BID. (See Oncology Treatment Summary SCP for details).      Current condition/issues: Kimberly had portacath  removed yesterday, has some mild discomfort/stiffness at site. She reports that she is doing well post-treatment despite post-treatment issues she is still having managed. She has been seeing Physical Medicine (Dr. Guajardo) for history of compression fracture and more recently had EMG to evaluate her hand issues and was noted to have bilateral carpal tunnel, with L>R and was referred to an orthopedic hand surgeon who she will see on 4/24/25. She has new bilateral wrist braces she wears at night. She also wears a splint on for her right trigger finger. She continues PT twice weekly for the low back and cervical discomfort and has noted improvements in her strength.  She has not returned to work yet due to the ongoing appointments. Her job requires standing and sitting which both are challenging now for long periods. She is hoping to get back brace order from Dr. Christiansen that may help for her job.  She was seen by Endocrinology (Dr. Marcum) and was started on daily Forteo. She is also taking Calcium/Vitamin D BID. She has noted bilateral leg and foot discomfort since starting this. For all of her pain management, she only takes Tylenol with Codeine once/week, otherwise takes nothing. The rosacea has improved and she will continue to see Dermatologist. She reports left ear feeling plugged and will contact her PCP office.    She has no exercise restrictions and hopes to soon resume exercise at the [employer] near her. She is concerned with weight gain since treatment.  Current BMI is 30.  She tries to eat healthy. She gets 6 hours of sleep/night, less than pretreatment.     She reports feeling depressed at time of diagnosis, but felt confident in her care here and had good family/friend support throughout. She has a positive attitude and appears to be doing well. She has used no support groups or counseling.     Survivorship Care Plan and letter: Kimberly was provided a hard copy of the SCP and a letter that outlined  the purpose of the visit and plan.  We reviewed all elements of both documents. She is aware that a letter and SCP will be sent to Dr. Hillary Prado.     Reviewed Cancer Surveillance and that Dr. Davis will oversee this care.  She  will see her next on 6/10/25 for bloodwork and office visit. She has an order for ECHO and will schedule in 6/25. Dr. Davis will determine timing of next imaging. She will continue to see Dr. Christiansen, PT, Dr. Marcum and Dermatology.      Reviewed Schedule of other clinic visits with Primary Care and specialists: Dr. Hillary Prado will continue to manage all general health care recommended for age and gender including cancer screening tests.  She is up-to-date with screening. Ongoing medical comorbidities will continue to be managed by Dr. Prado. She will continue to see her various specialists at usual intervals.     Reviewed concerning symptoms that she should report to any Provider.      Reviewed possible late and long-term effects related to the treatment that was received.       Reviewed common cancer survivor issues and resources available. Psychosocial resources, nutrition and exercise programs, stress management and survivorship programs were discussed.      Reviewed Lifestyle/behaviors that can affect ongoing health, including the risk for the cancer coming back or developing another cancer.  We reviewed importance of physical activity including aerobic, strength training and weight bearing exercise.  We reviewed a plant based diet.  We reviewed skin care and sun safety. She rarely drinks alcohol and does not smoke.     The patient received a take-home folder that included the following survivorship resources:   -SCP and patient letter  -ASCO Survivorship Guide  -[facility]-education, support groups, special programs, nutrition and exercise classes, movement classes, mind/body programs, survivorship programs, individual counseling  -[place] [facility]  -[facility]  -ChooseMy[employer].gov handout-nutrition, physical activity  -ACS Cancer Screening Guidelines  -Websites: American Cancer Society, [employer] for your [employer], Leukemia/Lymphoma Society, Tiff Reina     The patient was given the opportunity to ask questions.  She had a few questions but is very knowledgable about her diagnosis and follow-up plan and verbalized understanding of the information we discussed today.  Emotional support was provided. My total time spent caring for the patient on the day of the encounter: 60 minutes of face to face counseling regarding survivorship education, review of care plan and additional resources. She was encouraged to call with any further questions.   DEL Salazar

## 2025-04-17 NOTE — PROGRESS NOTES
Subjective:   Kimberly Quezada is a 59 year old female who presents for Ear Problem (bilateral ear congestion, feels clogged ears, tried ear drops, allegra,past 3 wks,history of seasonal allergies,   )   Patient is a pleasant 59-year-old female past medical history significant for diffuse large B-cell lymphoma, chemotherapy induced neuropathy, hyperlipidemia, vitamin D deficiency, obesity, diverticulosis, and allergic rhinitis.  Patient presents to office today new to this provider for evaluation of bilateral ear congestion.  Patient states via telephone encounter    \"Patient called states she has had a feeling of clogged ears bilaterally, but predominately the left ear. Denies any pain, discharge, fevers, complete hearing loss. She had a history of seasonal allergies many years ago, but has not have a problem since [only skin tests performed, no intradermals]. She has tried some ear drops, she started to allegra OTC daily, started it last week. She will consider taking OTC Flonase/steroidal nasal spray and saline nasal spray to precede steroidal spay. \"    Patient states she has been having clogged ears for about a month. She has hx of allergies. She has been taking allegra. She also tried sudafed started on4 /15. Nasocourt on 4/15/25and saline rinses. More issues on the left. Worse at night when laying flat. No fever, no chills. No PND. Does have ear pain. No tragal sensitivity. Both ears are occluded. Will treat for presumed OME and follow up for irrigaiton.         Past Medical History[1]   Past Surgical History[2]     History/Other:    Chief Complaint Reviewed and Verified  Nursing Notes Reviewed and   Verified  Tobacco Reviewed  Allergies Reviewed  Medications Reviewed    Problem List Reviewed  Medical History Reviewed  Surgical History   Reviewed  OB Status Reviewed  Family History Reviewed  Social History   Reviewed         Tobacco:  She has never smoked tobacco.    Current  Medications[3]      Review of Systems:  Review of Systems   Constitutional:  Negative for chills and fever.   HENT:  Positive for congestion, ear pain, rhinorrhea and sinus pressure.    Respiratory:  Negative for cough and shortness of breath.          Objective:   /72 (BP Location: Right arm, Patient Position: Sitting, Cuff Size: large)   Pulse 80   Ht 5' 3\" (1.6 m)   Wt 176 lb (79.8 kg)   LMP 08/09/2018   SpO2 98%   BMI 31.18 kg/m²  Estimated body mass index is 31.18 kg/m² as calculated from the following:    Height as of this encounter: 5' 3\" (1.6 m).    Weight as of this encounter: 176 lb (79.8 kg).  Physical Exam  Nursing note reviewed.   Constitutional:       Appearance: Normal appearance. She is normal weight.   HENT:      Head: Normocephalic and atraumatic.      Right Ear: External ear normal. There is impacted cerumen.      Left Ear: External ear normal. There is impacted cerumen.      Nose: Congestion and rhinorrhea present.      Mouth/Throat:      Mouth: Mucous membranes are moist.      Pharynx: Oropharynx is clear.   Cardiovascular:      Rate and Rhythm: Normal rate and regular rhythm.      Pulses: Normal pulses.      Heart sounds: Normal heart sounds. No murmur heard.  Pulmonary:      Effort: Pulmonary effort is normal.      Breath sounds: Normal breath sounds.   Skin:     Capillary Refill: Capillary refill takes less than 2 seconds.   Neurological:      Mental Status: She is alert and oriented to person, place, and time.           Assessment & Plan:   1. Bilateral impacted cerumen (Primary)  2. Left ear pain  -     Amoxicillin-Pot Clavulanate; Take 1 tablet by mouth 2 (two) times daily for 10 days.  Dispense: 20 tablet; Refill: 0  3. Allergic rhinitis, unspecified seasonality, unspecified trigger    1. Bilateral impacted cerumen  Debrox instructions given  Follow up for irrigation    2. Left ear pain  Presumed OME  Start abx  Continue flonase, sudafed , and antihistamine  - amoxicillin  clavulanate 875-125 MG Oral Tab; Take 1 tablet by mouth 2 (two) times daily for 10 days.  Dispense: 20 tablet; Refill: 0    3. Allergic rhinitis, unspecified seasonality, unspecified trigger  Patient with exam consistent for allergic rhinitis  Pale boggy turbinates, postnasal drip with cobblestoning, and congestion in back of throat.  Patient educated on allergic rhinitis.  Educated on the use of normal saline rinses to flush out allergens.  Continue antihistamine once daily.  Continue Flonase once daily bilateral nares.  Declined steroids  Red flags reviewed  Follow-up as needed    Patient aware of plan of care. All questions answered to satisfaction of the patient. Patient instructed to call office or reach out via makemojit if any issues arise. For urgent issues and/or reviewed red flags please proceed to the urgent care or ER.  Also, inform the nurse practitioner with any new symptoms or medication side effects.        Return in about 1 week (around 4/25/2025).    DEL Ramirez, 4/17/2025, 10:37 AM          [1]   Past Medical History:   Diverticulosis large intestine w/o perforation or abscess w/o bleeding    Gastropathy    High cholesterol    History of blood transfusion    NO ADVERSE REACTIONS    Hx of motion sickness    Hyperlipidemia    Internal hemorrhoids without complication    Rosacea    Seasonal allergies    Visual impairment    GLASSES   [2]   Past Surgical History:  Procedure Laterality Date    Colonoscopy N/A 12/13/2018    Procedure: COLONOSCOPY;  Surgeon: Elizabet Gordon MD;  Location: [facility] ENDOSCOPY    Colonoscopy N/A 6/4/2024    Procedure: COLONOSCOPY and Polypectomy;  Surgeon: Deepak He MD;  Location: [facility] ENDOSCOPY    Other surgical history      Repair of anal fistula 2011   [3]   Current Outpatient Medications   Medication Sig Dispense Refill    amoxicillin clavulanate 875-125 MG Oral Tab Take 1 tablet by mouth 2 (two) times daily for 10 days. 20 tablet 0     Fexofenadine HCl 30 MG Oral Tablet Dispersible Take 1 tablet (30 mg total) by mouth daily.      Calcium Carb-Cholecalciferol 600-10 MG-MCG Oral Tab Take 1 tablet by mouth in the morning and 1 tablet before bedtime. 60 tablet 11    Teriparatide (FORTEO) 600 MCG/2.4ML Subcutaneous Solution Pen-injector Inject 20 mcg into the skin every evening.      acetaminophen-codeine 300-30 MG Oral Tab Take 1 tablet by mouth 2 (two) times daily as needed for Pain. 14 tablet 0    lidocaine 5 % External Patch Place 1 patch onto the skin daily. 20 patch 0    Ferrous Sulfate (FEROSUL) 325 (65 Fe) MG Oral Tab Take 1 tablet (325 mg total) by mouth 2 (two) times daily. 60 tablet 5    atorvastatin 10 MG Oral Tab Take 1 tablet (10 mg total) by mouth nightly. 90 tablet 4    acyclovir 400 MG Oral Tab Take 1 tablet (400 mg total) by mouth 2 (two) times daily. 180 tablet 1    Insulin Pen Needle (BD PEN NEEDLE MARTINA U/F) 32G X 4 MM Does not apply Misc Inject once daily 100 each 1

## 2025-04-18 ENCOUNTER — OFFICE VISIT (OUTPATIENT)
Dept: FAMILY MEDICINE CLINIC | Facility: CLINIC | Age: 60
End: 2025-04-18
Payer: COMMERCIAL

## 2025-04-18 VITALS
BODY MASS INDEX: 31.18 KG/M2 | SYSTOLIC BLOOD PRESSURE: 110 MMHG | WEIGHT: 176 LBS | OXYGEN SATURATION: 98 % | HEART RATE: 80 BPM | HEIGHT: 63 IN | DIASTOLIC BLOOD PRESSURE: 72 MMHG

## 2025-04-18 DIAGNOSIS — H92.02 LEFT EAR PAIN: ICD-10-CM

## 2025-04-18 DIAGNOSIS — H61.23 BILATERAL IMPACTED CERUMEN: Primary | ICD-10-CM

## 2025-04-18 DIAGNOSIS — J30.9 ALLERGIC RHINITIS, UNSPECIFIED SEASONALITY, UNSPECIFIED TRIGGER: ICD-10-CM

## 2025-04-18 PROCEDURE — 99213 OFFICE O/P EST LOW 20 MIN: CPT

## 2025-04-19 NOTE — PROGRESS NOTES
Subjective:   Kimberly Quezada is a 59 year old female who presents for Ear Wax (both ears (Ear Lavage) )   Patient is a pleasant 59-year-old female past medical history significant for diffuse large B-cell lymphoma, chemotherapy induced neuropathy, hyperlipidemia, vitamin D deficiency, obesity, diverticulosis, and allergic rhinitis.  Patient presents to office today for follow up from recent appt 4/18/25. She was having left ear pain. On exam both ears were blocked with cerumen. Advised augmentin BID for presumed OME, advised debrox and follow up for irrigation. Patient follows up today and states she has completed debrox. Would like irrigation completed. Has new swelling to right foot as well. Has pain with this swelling. Pain and swelling started last night. She has hx of cancer. She has recent procedure with port being removed from chest on Tuesday. Advised stat US. Order placed. Further recs to follow.         Past Medical History[1]   Past Surgical History[2]     History/Other:    Chief Complaint Reviewed and Verified  Nursing Notes Reviewed and   Verified  Tobacco Reviewed  Allergies Reviewed  Medications Reviewed    Problem List Reviewed  Medical History Reviewed  Surgical History   Reviewed  OB Status Reviewed  Family History Reviewed  Social History   Reviewed         Tobacco:  She has never smoked tobacco.    Current Medications[3]      Review of Systems:  Review of Systems   Constitutional:  Negative for chills and fever.   Respiratory:  Negative for shortness of breath.    Cardiovascular:  Positive for leg swelling. Negative for chest pain.         Objective:   /70 (BP Location: Right arm, Patient Position: Sitting, Cuff Size: large)   Pulse 97   Ht 5' 3\" (1.6 m)   Wt 177 lb 12.8 oz (80.6 kg)   LMP 08/09/2018   SpO2 97%   BMI 31.50 kg/m²  Estimated body mass index is 31.5 kg/m² as calculated from the following:    Height as of this encounter: 5' 3\" (1.6 m).    Weight as of this  encounter: 177 lb 12.8 oz (80.6 kg).  Physical Exam  Vitals and nursing note reviewed.   Constitutional:       Appearance: Normal appearance. She is obese.   Cardiovascular:      Rate and Rhythm: Normal rate and regular rhythm.      Pulses: Normal pulses.      Heart sounds: Normal heart sounds. No murmur heard.  Musculoskeletal:        Feet:    Feet:      Comments: Swollen and tender  No redness  Skin:     Capillary Refill: Capillary refill takes less than 2 seconds.   Neurological:      Mental Status: She is alert.           Assessment & Plan:   1. Bilateral impacted cerumen (Primary)  -     Removal Impacted Cerumen Using Irrigation/Lavage, Bilateral  2. Left ear pain  3. Localized swelling of right foot  -     Cancel: US VENOUS DOPPLER LEG RIGHT - DIAG IMG (CPT=93971); Future; Expected date: 04/21/2025  -     US VENOUS DOPPLER LEG RIGHT - DIAG IMG (CPT=93971); Future; Expected date: 04/21/2025  -     Podiatry Referral    1. Bilateral impacted cerumen  See irrigation note    2. Left ear pain  Resolving on abx  CPM    3. Localized swelling of right foot  Hx B cell lymphoma  Recent procedure for port removal  Swelling and pain to left medial ffoot  Obtain stat US ro dvt  Await results for further recs  Red flags reviewed    Patient aware of plan of care. All questions answered to satisfaction of the patient. Patient instructed to call office or reach out via [employer] if any issues arise. For urgent issues and/or reviewed red flags please proceed to the urgent care or ER.  Also, inform the nurse practitioner with any new symptoms or medication side effects.      - US VENOUS DOPPLER LEG RIGHT - DIAG IMG (CPT=93971); Future  - Podiatry Referral - In Network        Return if symptoms worsen or fail to improve.    Rory Arce, APRN, 4/19/2025, 11:54 AM          [1]   Past Medical History:   Diverticulosis large intestine w/o perforation or abscess w/o bleeding    Gastropathy    High cholesterol    History of blood  transfusion    NO ADVERSE REACTIONS    Hx of motion sickness    Hyperlipidemia    Internal hemorrhoids without complication    Rosacea    Seasonal allergies    Visual impairment    GLASSES   [2]   Past Surgical History:  Procedure Laterality Date    Colonoscopy N/A 12/13/2018    Procedure: COLONOSCOPY;  Surgeon: Elizabet Gordon MD;  Location: [facility] ENDOSCOPY    Colonoscopy N/A 6/4/2024    Procedure: COLONOSCOPY and Polypectomy;  Surgeon: Deepak He MD;  Location: [facility] ENDOSCOPY    Other surgical history      Repair of anal fistula 2011   [3]   Current Outpatient Medications   Medication Sig Dispense Refill    amoxicillin clavulanate 875-125 MG Oral Tab Take 1 tablet by mouth 2 (two) times daily for 10 days. 20 tablet 0    Fexofenadine HCl 30 MG Oral Tablet Dispersible Take 1 tablet (30 mg total) by mouth daily.      Calcium Carb-Cholecalciferol 600-10 MG-MCG Oral Tab Take 1 tablet by mouth in the morning and 1 tablet before bedtime. 60 tablet 11    Teriparatide (FORTEO) 600 MCG/2.4ML Subcutaneous Solution Pen-injector Inject 20 mcg into the skin every evening.      acetaminophen-codeine 300-30 MG Oral Tab Take 1 tablet by mouth 2 (two) times daily as needed for Pain. 14 tablet 0    Insulin Pen Needle (BD PEN NEEDLE MARTINA U/F) 32G X 4 MM Does not apply Misc Inject once daily 100 each 1    lidocaine 5 % External Patch Place 1 patch onto the skin daily. 20 patch 0    Ferrous Sulfate (FEROSUL) 325 (65 Fe) MG Oral Tab Take 1 tablet (325 mg total) by mouth 2 (two) times daily. 60 tablet 5    atorvastatin 10 MG Oral Tab Take 1 tablet (10 mg total) by mouth nightly. 90 tablet 4    acyclovir 400 MG Oral Tab Take 1 tablet (400 mg total) by mouth 2 (two) times daily. 180 tablet 1

## 2025-04-21 ENCOUNTER — OFFICE VISIT (OUTPATIENT)
Dept: FAMILY MEDICINE CLINIC | Facility: CLINIC | Age: 60
End: 2025-04-21

## 2025-04-21 VITALS
DIASTOLIC BLOOD PRESSURE: 70 MMHG | HEART RATE: 97 BPM | HEIGHT: 63 IN | BODY MASS INDEX: 31.5 KG/M2 | WEIGHT: 177.81 LBS | OXYGEN SATURATION: 97 % | SYSTOLIC BLOOD PRESSURE: 104 MMHG

## 2025-04-21 DIAGNOSIS — H92.02 LEFT EAR PAIN: ICD-10-CM

## 2025-04-21 DIAGNOSIS — H61.23 BILATERAL IMPACTED CERUMEN: Primary | ICD-10-CM

## 2025-04-21 DIAGNOSIS — R22.41 LOCALIZED SWELLING OF RIGHT FOOT: ICD-10-CM

## 2025-04-21 PROCEDURE — 69209 REMOVE IMPACTED EAR WAX UNI: CPT

## 2025-04-21 NOTE — PROGRESS NOTES
Procedure Note Ear Irrigation     Upon visualization of auditory canal on right and left side, impacted cerumen was visualized, and the tympanic membranes cannot be visualized.  A micro curette and hand irrigation system was utilized with warm water and hydrogen peroxide with a pulsatile technique to dislodge the foreign body until it reached the external end of the auditory canal, at that point microcurette was used to remove.  Upon revisualization of auditory canal tympanic membranes intact no signs for otitis externa.   Patient educated on aftercare.  Patient states understanding.  See AVS.    Rory Arce, APRN

## 2025-04-22 ENCOUNTER — HOSPITAL ENCOUNTER (OUTPATIENT)
Dept: ULTRASOUND IMAGING | Facility: HOSPITAL | Age: 60
Discharge: HOME | End: 2025-04-22
Payer: COMMERCIAL

## 2025-04-22 DIAGNOSIS — R22.41 LOCALIZED SWELLING OF RIGHT FOOT: ICD-10-CM

## 2025-04-22 PROCEDURE — 93971 EXTREMITY STUDY: CPT

## 2025-04-24 ENCOUNTER — OFFICE VISIT (OUTPATIENT)
Age: 60
End: 2025-04-24
Payer: COMMERCIAL

## 2025-04-24 DIAGNOSIS — M65.341 TRIGGER RING FINGER OF RIGHT HAND: ICD-10-CM

## 2025-04-24 DIAGNOSIS — G56.03 BILATERAL CARPAL TUNNEL SYNDROME: Primary | ICD-10-CM

## 2025-04-24 PROCEDURE — 99203 OFFICE O/P NEW LOW 30 MIN: CPT | Performed by: STUDENT IN AN ORGANIZED HEALTH CARE EDUCATION/TRAINING PROGRAM

## 2025-04-24 NOTE — PROGRESS NOTES
Clinic Note           The following individual(s) verbally consented to be recorded using ambient AI listening technology and understand that they can each withdraw their consent to this listening technology at any point by asking the clinician to turn off or pause the recording:    Patient name: Kimberly Quezada    Allergies[1]    CC: bilateral hand numbness and tingling  History of Present Illness  Kimberly Quezada is a 59 year old female who presents with bilateral hand numbness and tingling, and a known diagnosis of bilateral carpal tunnel syndrome.    She has been experiencing symptoms of carpal tunnel syndrome since October, initially affecting her fingers and now causing significant discomfort during sleep. She frequently shakes out her hands, which does not alleviate the symptoms. Wrist braces have been used, providing some relief. Numbness and tingling are present, primarily affecting the thumb, index and middle fingers, with the left hand being worse. The ring and small fingers do not get numb.     She also reports a trigger finger in her right ring finger, which began over a month ago. A therapist advised her to use a splint, which she wears with some relief. She previously had a trigger finger in another finger, the right thumb, successfully treated with an injection a few years ago.    She recently completed chemotherapy in January.      Past Medical History[2]  Past Surgical History[3]  Current Medications[4]  Family History[5]  Social History     Occupational History    Not on file   Tobacco Use    Smoking status: Never     Passive exposure: Yes    Smokeless tobacco: Never    Tobacco comments:     Social smoker. Pt does not remember when she stopped   Vaping Use    Vaping status: Never Used   Substance and Sexual Activity    Alcohol use: Yes     Alcohol/week: 1.0 standard drink of alcohol     Types: 1 Glasses of wine per week     Comment: Special occasions    Drug use: No    Sexual activity: Not  on file        Review of Systems:  Negative unless stated in HPI    Physical Exam:      Physical Exam          Constitutional: NAD. AOx3. Well-developed and Well-nourished.   Psychiatric: Normal mood/ affect/ behavior. Judgment and thought content normal.     Bilateral Upper Extremity:   Inspection: Skin Intact. No skin lesions. No gross deformity.   Palpation: No focal areas of TTP aside from A1 pulley of right ring finger   Motion: Elbow: normal bilateral symmetric ext/flex  Wrist: normal bilateral symmetric ext/flex/sup/pro  Fingers: able to make a full composite fist   Vascular Brisk capillary refill in all digits       Median Nerve Exam Right Left   Durkan's + +   Tinel's at Wrist + +   Sensation normal normal   PCBr Sensation normal normal   APB Weakness Yes, 4/5 Yes, 4/5   Thenar Atrophy No Yes, mild   FDP to index finger weakness Yes, 4/5 Yes, 4/5     CTS-6 Evaluation Tool - RIGHT  Numbness predominantly or exclusively in median nerve territory  3.5 / (3.5) points  Nocturnal symptoms      4 / (4) points  Thenar atrophy and/or weakness     5 / (5) points  Positive Phalen's test      5 / (5) points  Loss of 2 point discrimination (>5mm)    0 / (4.5) points  Positive Tinel Sign       4 / (4) points            Total: 21.5 / (26) points      CTS-6 Evaluation Tool - LEFT  Numbness predominantly or exclusively in median nerve territory  3.5 / (3.5) points  Nocturnal symptoms      4 / (4) points  Thenar atrophy and/or weakness     5 / (5) points  Positive Phalen's test      5 / (5) points  Loss of 2 point discrimination (>5mm)    4.5 / (4.5) points  Positive Tinel Sign       4 / (4) points            Total: 26 / (26) points      Ulnar Nerve Exam Right Left   Sensation normal normal   Tinel's at Medial Elbow neg neg   Elbow Flexion, Nerve Compression Testing neg neg   1st TASIA Weakness No No   Hypothenar Atrophy No No   FDP to small finger weakness No No     Radial Nerve Exam  Right Left   Radial Sensory normal normal      2-point discrimination intact to 5mm in all digits of the right hand. Intact to 5mm in all digits of the left hand, with the exception of the index and middle fingers, where it is intact to 7mm.    Diagnostic Studies:  Results      EMG/NCV from 4/9/25 consistent with severe CTS on the left, and moderate CTS on the right.    Assessment/Plan:  59 year old female with bilateral carpal tunnel syndrome.    I reviewed the patient's electronic medical record, including the pertinent office notes, medical/surgical history, medications and images.    bilateral carpal tunnel syndrome    We had an extensive discussion today regarding the nature of a presumptive compressive neuropathy, and specifically the nature of carpal tunnel syndrome including signs and symptoms of the condition, general considerations for causation and progression of the condition, and strategies for treatment. The nature of progressive nerve dysfunction including reversible and irreversible changes have been discussed, along with the influence of timing on treatment considerations and prognosis for recovery. The potential for other or secondary sources of nerve compression or other nerve involvement have been reviewed. The indications and nature of surgical versus nonsurgical options have been discussed, as well as the inherent risks and benefits of each treatment. The patient verbalizes understanding of our discussion.     Patient wishes to continue with nonoperative care in the form of wrist braces at nighttime. Occupational therapy prescription was provided as well. If symptoms are stable to worsening, I cautioned the patient to see me in a timely manner for consideration of surgical intervention, especially with respect to the left upper extremity, as EMG findings were severe in this extremity. Kimberly understood.    2.     Triggering of right Ring finger -  I discussed with the patient surgical and non-surgical treatment options and their  success rates/risks. Using a shared decision-making process, patient elected to proceed with an occupational therapy referral and deferred steroid injection today. All questions answered. OT referral provided.        Assessment & Plan        Jelani Arriaza MD   Hand, Wrist, & Elbow Surgery  [email or web address]       [1]   Allergies  Allergen Reactions    Meloxicam ANGIOEDEMA     Upper and lower lip throbbing, tongue swelling, tongue ulcers    Contrave [Naltrexone-Bupropion Hcl Er] RASH    Wellbutrin [Bupropion] RASH    Simvastatin RASH   [2]   Past Medical History:   Diverticulosis large intestine w/o perforation or abscess w/o bleeding    Gastropathy    High cholesterol    History of blood transfusion    NO ADVERSE REACTIONS    Hx of motion sickness    Hyperlipidemia    Internal hemorrhoids without complication    Rosacea    Seasonal allergies    Visual impairment    GLASSES   [3]   Past Surgical History:  Procedure Laterality Date    Colonoscopy N/A 12/13/2018    Procedure: COLONOSCOPY;  Surgeon: Elizabet Gordon MD;  Location: [facility] ENDOSCOPY    Colonoscopy N/A 6/4/2024    Procedure: COLONOSCOPY and Polypectomy;  Surgeon: Deepak He MD;  Location: [facility] ENDOSCOPY    Other surgical history      Repair of anal fistula 2011   [4]   Current Outpatient Medications   Medication Sig Dispense Refill    amoxicillin clavulanate 875-125 MG Oral Tab Take 1 tablet by mouth 2 (two) times daily for 10 days. 20 tablet 0    Fexofenadine HCl 30 MG Oral Tablet Dispersible Take 1 tablet (30 mg total) by mouth daily.      Calcium Carb-Cholecalciferol 600-10 MG-MCG Oral Tab Take 1 tablet by mouth in the morning and 1 tablet before bedtime. 60 tablet 11    Teriparatide (FORTEO) 600 MCG/2.4ML Subcutaneous Solution Pen-injector Inject 20 mcg into the skin every evening.      acetaminophen-codeine 300-30 MG Oral Tab Take 1 tablet by mouth 2 (two) times daily as needed for Pain. 14 tablet 0    Insulin Pen  Needle (BD PEN NEEDLE MARTINA U/F) 32G X 4 MM Does not apply Misc Inject once daily 100 each 1    lidocaine 5 % External Patch Place 1 patch onto the skin daily. 20 patch 0    Ferrous Sulfate (FEROSUL) 325 (65 Fe) MG Oral Tab Take 1 tablet (325 mg total) by mouth 2 (two) times daily. 60 tablet 5    atorvastatin 10 MG Oral Tab Take 1 tablet (10 mg total) by mouth nightly. 90 tablet 4    acyclovir 400 MG Oral Tab Take 1 tablet (400 mg total) by mouth 2 (two) times daily. 180 tablet 1   [5]   Family History  Problem Relation Age of Onset    Cancer Self         Lymphoma    Breast Cancer Neg     Ovarian Cancer Neg     Prostate Cancer Neg     Pancreatic Cancer Neg     Colon Cancer Neg     Uterine Cancer Neg

## 2025-04-28 ENCOUNTER — TELEPHONE (OUTPATIENT)
Dept: PHYSICAL THERAPY | Age: 60
End: 2025-04-28

## 2025-04-28 ENCOUNTER — TELEPHONE (OUTPATIENT)
Dept: PHYSICAL THERAPY | Facility: HOSPITAL | Age: 60
End: 2025-04-28

## 2025-04-28 RX ORDER — ACYCLOVIR 400 MG/1
400 TABLET ORAL 2 TIMES DAILY
Qty: 180 TABLET | Refills: 3 | Status: SHIPPED | OUTPATIENT
Start: 2025-04-28

## 2025-04-28 NOTE — TELEPHONE ENCOUNTER
Refill passed per [facility] protocols.    Requested Prescriptions   Pending Prescriptions Disp Refills    ACYCLOVIR 400 MG Oral Tab [Pharmacy Med Name: ACYCLOVIR 400MG TABLETS] 180 tablet 3     Sig: TAKE 1 TABLET(400 MG) BY MOUTH TWICE DAILY       [facility] Protocol Passed - 4/28/2025  3:52 PM

## 2025-04-30 NOTE — TELEPHONE ENCOUNTER
DME order and last office visit note faxed via [employer] to both [facility] Fax # [phone] and [facility] Fax [phone].

## 2025-05-05 ENCOUNTER — TELEPHONE (OUTPATIENT)
Dept: PHYSICAL MEDICINE AND REHAB | Facility: CLINIC | Age: 60
End: 2025-05-05

## 2025-05-14 ENCOUNTER — OFFICE VISIT (OUTPATIENT)
Dept: PODIATRY CLINIC | Facility: CLINIC | Age: 60
End: 2025-05-14
Payer: COMMERCIAL

## 2025-05-14 DIAGNOSIS — M25.561 MEDIAL KNEE PAIN, RIGHT: ICD-10-CM

## 2025-05-14 DIAGNOSIS — M72.2 PLANTAR FASCIITIS, RIGHT: ICD-10-CM

## 2025-05-14 DIAGNOSIS — M76.821 POSTERIOR TIBIAL TENDINITIS OF RIGHT LOWER EXTREMITY: Primary | ICD-10-CM

## 2025-05-14 PROCEDURE — 99203 OFFICE O/P NEW LOW 30 MIN: CPT | Performed by: PODIATRIST

## 2025-05-17 NOTE — PROGRESS NOTES
Kimberly Quezada is a 59 year old female.   Chief Complaint   Patient presents with    New Patient     Right foot swelling for last month with heel pain and numbness to 2nd digit         HPI:   Patient presents complaining of right foot swelling pain for the last month also some pain in her knee.  Some additional numbness in the foot.  At the level of the second toe.  There is no history of injury or trauma.  At today's visit reviewed nurse's history as taken above, allergies medications and medical history as documented below.  All changes duly noted  Allergies: Meloxicam, Contrave [naltrexone-bupropion hcl er], Wellbutrin [bupropion], and Simvastatin   Current Medications[1]   Past Medical History[2]   Past Surgical History[3]   Family History[4]   Social Hx on file[5]        REVIEW OF SYSTEMS:   Today reviewed systens as documented below  GENERAL HEALTH: feels well otherwise  SKIN: Refer to exam below  RESPIRATORY: denies shortness of breath with exertion  CARDIOVASCULAR: denies chest pain on exertion  GI: denies abdominal pain and denies heartburn  NEURO: denies headaches    EXAM:   [facility] 08/09/2018   GENERAL: well developed, well nourished, in no apparent distress  EXTREMITIES:   1. Integument: Her right foot was evaluated is warm and dry.  There is some mild swelling in the tarsal tunnel area around the course of the posterior tibial tendon on the right which is also sensitive to palpation   2. Vascular: Patient has palpable pulses   3. Neurologic: Has intact sensorium on the right   4. Musculoskeletal: Has good muscle strength she is ambulatory she has pain on palpation of the posterior tibial tendon there is no weakness noted on inversion against resistance.  Patient also has pain on palpation of the plantar fascia of her right foot the patient was palpated on her knee and the medial side looks like it could be a meniscal issue I told her she will have to follow-up with orthopedics recommended she call her  PCP for referral.  X-rays were taken 3 views no fracture break or dislocation mild signs of pronation present but not severe.    ASSESSMENT AND PLAN:   Diagnoses and all orders for this visit:    Posterior tibial tendinitis of right lower extremity  -     US ANKLE RIGHT COMPLETE (CPT=76881); Future    Medial knee pain, right    Plantar fasciitis, right  -     US ANKLE RIGHT COMPLETE (CPT=76881); Future    Other orders  -     [facility] AMB POD XR - RT FOOT 3 VIEWS(AP,LAT,OBLIQUE)WT BEARING        Plan: At today's office visit I recommended she get an over-the-counter orthotic to help support her arch refrain from any strenuous physical activities we will send her for a diagnostic ultrasound to look at the plantar fascia as well as the posterior tibial tendon and see if anything more than that needs to be done if that is okay May proceed with physical therapy.    The patient indicates understanding of these issues and agrees to the plan.    Anton Rios DPM       [1]   Current Outpatient Medications   Medication Sig Dispense Refill    acyclovir 400 MG Oral Tab Take 1 tablet (400 mg total) by mouth 2 (two) times daily. 180 tablet 3    Fexofenadine HCl 30 MG Oral Tablet Dispersible Take 1 tablet (30 mg total) by mouth daily.      Calcium Carb-Cholecalciferol 600-10 MG-MCG Oral Tab Take 1 tablet by mouth in the morning and 1 tablet before bedtime. 60 tablet 11    Teriparatide (FORTEO) 600 MCG/2.4ML Subcutaneous Solution Pen-injector Inject 20 mcg into the skin every evening.      acetaminophen-codeine 300-30 MG Oral Tab Take 1 tablet by mouth 2 (two) times daily as needed for Pain. 14 tablet 0    Insulin Pen Needle (BD PEN NEEDLE MARTINA U/F) 32G X 4 MM Does not apply Misc Inject once daily 100 each 1    lidocaine 5 % External Patch Place 1 patch onto the skin daily. 20 patch 0    Ferrous Sulfate (FEROSUL) 325 (65 Fe) MG Oral Tab Take 1 tablet (325 mg total) by mouth 2 (two) times daily. 60 tablet 5    atorvastatin 10 MG  Oral Tab Take 1 tablet (10 mg total) by mouth nightly. 90 tablet 4   [2]   Past Medical History:   Diverticulosis large intestine w/o perforation or abscess w/o bleeding    Gastropathy    High cholesterol    History of blood transfusion    NO ADVERSE REACTIONS    Hx of motion sickness    Hyperlipidemia    Internal hemorrhoids without complication    Rosacea    Seasonal allergies    Visual impairment    GLASSES   [3]   Past Surgical History:  Procedure Laterality Date    Colonoscopy N/A 12/13/2018    Procedure: COLONOSCOPY;  Surgeon: Elizabet Gordon MD;  Location: [facility] ENDOSCOPY    Colonoscopy N/A 6/4/2024    Procedure: COLONOSCOPY and Polypectomy;  Surgeon: Deepak He MD;  Location: [facility] ENDOSCOPY    Other surgical history      Repair of anal fistula 2011   [4]   Family History  Problem Relation Age of Onset    Cancer Self         Lymphoma    Breast Cancer Neg     Ovarian Cancer Neg     Prostate Cancer Neg     Pancreatic Cancer Neg     Colon Cancer Neg     Uterine Cancer Neg    [5]   Social History  Socioeconomic History    Marital status: Single   Tobacco Use    Smoking status: Never     Passive exposure: Yes    Smokeless tobacco: Never    Tobacco comments:     Social smoker. Pt does not remember when she stopped   Vaping Use    Vaping status: Never Used   Substance and Sexual Activity    Alcohol use: Yes     Alcohol/week: 1.0 standard drink of alcohol     Types: 1 Glasses of wine per week     Comment: Special occasions    Drug use: No   Other Topics Concern    Pt has a pacemaker No    Pt has a defibrillator No    Reaction to local anesthetic No    Right Handed Yes

## 2025-05-19 ENCOUNTER — APPOINTMENT (OUTPATIENT)
Dept: PHYSICAL THERAPY | Age: 60
End: 2025-05-19
Attending: STUDENT IN AN ORGANIZED HEALTH CARE EDUCATION/TRAINING PROGRAM
Payer: COMMERCIAL

## 2025-05-19 ENCOUNTER — TELEPHONE (OUTPATIENT)
Dept: PHYSICAL THERAPY | Facility: HOSPITAL | Age: 60
End: 2025-05-19

## 2025-05-21 ENCOUNTER — OFFICE VISIT (OUTPATIENT)
Dept: PHYSICAL THERAPY | Age: 60
End: 2025-05-21
Attending: STUDENT IN AN ORGANIZED HEALTH CARE EDUCATION/TRAINING PROGRAM
Payer: COMMERCIAL

## 2025-05-21 DIAGNOSIS — G56.03 BILATERAL CARPAL TUNNEL SYNDROME: Primary | ICD-10-CM

## 2025-05-21 DIAGNOSIS — M65.341 TRIGGER RING FINGER OF RIGHT HAND: ICD-10-CM

## 2025-05-21 PROCEDURE — 97165 OT EVAL LOW COMPLEX 30 MIN: CPT

## 2025-05-21 PROCEDURE — 97110 THERAPEUTIC EXERCISES: CPT

## 2025-05-22 NOTE — PROGRESS NOTES
OT UE EVALUATION:     Diagnosis:   Bilateral carpal tunnel syndrome (G56.03)  Trigger ring finger of right hand (M65.341) Patient:  Kimberly Quezada (59 year old, female)        Referring Provider: Jelani Arriaza  Today's Date   5/22/2025    Precautions:  Cancer   Date of Evaluation: 05/21/25  Next MD visit: No data recorded  Date of Injury: No data recorded  Date of Surgery: N/A     PATIENT SUMMARY   Summary of chief complaints: Decreased function  History of current condition: Pt states she started chemo in August but started having numbness and tingling on the thumb, index, middle finger of both hands in October - had an EMG done sometime in April 2024 - still having numbness and tingling. She also reports a trigger finger in her right ring finger, which began over a month ago. A therapist advised her to use a splint, which she wears with some relief. She previously had a trigger finger in another finger, the right thumb, successfully treated with an injection a few years ago.   Pain level: current 7 /10, at best 1 /10, at worst 8 /10  Description of symptoms: Numbness and tingling   Occupation: Custome service (currently on disabilty)   Occupational Roles: other   Leisure activities/Hobbies: cooking, travel, shopping   Prior level of function: Independent  Current limitations: Lifting/carrying, prolonged sitting  Pt goals: Return to PLOF  Hand Dominance: right  Living Situation: other    Past medical history was reviewed with Kimberly.  Significant findings include: CA (lyphoma), L1, L3 fracture,ostepenia  Imaging/Tests: EMG   Kimberly  has a past medical history of Diverticulosis large intestine w/o perforation or abscess w/o bleeding (12/13/2018), Gastropathy (12/13/2018), High cholesterol, History of blood transfusion, motion sickness, Hyperlipidemia, Internal hemorrhoids without complication (12/13/2018), Rosacea, Seasonal allergies, and Visual impairment.  She  has a past surgical history that includes  other surgical history; colonoscopy (N/A, 12/13/2018); and colonoscopy (N/A, 6/4/2024).    ASSESSMENT  Kimberly presents to occupational therapy evaluation with primary c/o Decreased function. The results of the objective tests and measures show Decreased strength. Functional deficits include but are not limited to Lifting/carrying, prolonged sitting. Signs and symptoms are consistent with diagnosis of Bilateral carpal tunnel syndrome (G56.03)  Trigger ring finger of right hand (M65.341).  Mild triggering noted with active flexion. Advised pt to resume wearing Oval 8 splint at all time and only remove for exercises and bathing/ hygiene. Pt agreedPt and OT discussed evaluation findings, pathology, POC and HEP.  Pt voiced understanding and performs HEP correctly without reported pain. Skilled Occupational Therapy is medically necessary to address the above impairments and reach functional goals.  OBJECTIVE:    Musculoskeletal  Observation: Unremarkable Unremarkable       Orthotics: (B) prefabricated volar wrist immobilization splint        Cervical Screen: N/A            Hand Strength (lbs) R L      20 20     2 pt Pinch 5 4     3 pt Pinch 4 2     Lateral pinch 4 8       Flexibility:   WNL     Edema:  Circum Edema (cm) Wrist Crease     Right 15.5 cm      Left 15 cm        Neurological:  Sensory: numbness; tingling       Light Touch Ten Test: WNL    ADLs/IADLs:  ADL's    Bathing: Independent     Dressing: Independent     Feeding: Independent     Grooming: Independent  IADL's     Homemaking: Independent     Food Prep: Independent     Driving: Independent   Other Functional Mobility/ADL Comments: Independent      Today's Treatment and Response:   Pt education was provided on exam findings, treatment diagnosis, treatment plan, expectations, and prognosis.  Today's Treatment       5/21/2025   OT Treatment   Therapeutic Exercise Min 10   Eval Min 35   Total Timed Procedures 10   Total Service Procedures 45   Total Time  45         Patient was instructed in and issued a HEP for: [employer]  Median nerve glides   PROM digit (R) RF     Charges:  OT EVAL  , 1 TE, 1 OT eval   Based on analysis of data from a problem-focused assessment from a brief chart review, clinical presentation of physical, cognitive and psychosocial skills, as well as review of patient rated outcome measures, this evaluation involved Low complexity decision making, with 1-3 occupational performance component deficits, no comorbidities, and no need for modification of tasks or assistance with assessment.                                                                                    PLAN OF CARE:    Goals: (to be met in 8 visits)   Increase (B)  strength by 10-15% measured by dynamometer to support functional tasks such as lifting groceries.  Increase (B) 2pt pinch by 1-2 lbs to assist with buttoning or holding utensils.  Increase (B) 3pt pinch by 2-3 lbs to increase ability to grasp grooming tools  Increase (B) lateral pt pinch by 2-3 lbs to increase ability to turning keys, holding cards and opening water bottles.   Pt will verbalize 2/3 proper  principles with 100% accuracy.  Patient will be independent in final HEP to facilitate carryover of functional gains made in clinical therapy program.      Frequency / Duration: Patient will be seen 1-2 x week for 4-6 weeksx/week or a total of 8 visits over a 90 day period. Treatment will include: Manual Therapy; Neuromuscular Re-education; Self-Care Home Management; Home Exercise Program instruction; Therapeutic Exercise; Therapeutic Activities    Education or treatment limitation: None   Rehab Potential: fair     QuickDASH Outcome Score  Score: (Patient-Rptd) 27.27 % (5/18/2025  9:21 PM)      Patient/Family/Caregiver was advised of these findings, precautions, and treatment options and has agreed to actively participate in planning and for this course of care.    Thank you for your referral.  Please co-sign or sign and return this letter via fax as soon as possible to [phone]. If you have any questions, please contact me at Dept: [phone]    Sincerely,  Electronically signed by therapist: Lester Antoine, OT  Physician's certification required: Yes  I certify the need for these services furnished under this plan of treatment and while under my care.    X___________________________________________________ Date____________________    Certification From: 5/22/2025  To:8/20/2025

## 2025-05-23 ENCOUNTER — OFFICE VISIT (OUTPATIENT)
Dept: PHYSICAL THERAPY | Age: 60
End: 2025-05-23
Attending: STUDENT IN AN ORGANIZED HEALTH CARE EDUCATION/TRAINING PROGRAM
Payer: COMMERCIAL

## 2025-05-23 PROCEDURE — 97110 THERAPEUTIC EXERCISES: CPT

## 2025-05-23 PROCEDURE — 97140 MANUAL THERAPY 1/> REGIONS: CPT

## 2025-05-23 NOTE — PROGRESS NOTES
Patient: Kimberly Quezada (59 year old, female) Referring Provider:  Insurance:   Diagnosis: Bilateral carpal tunnel syndrome (G56.03)  Trigger ring finger of right hand (M65.341) Jelani Arriaza  [employer] [facility]   Date of Surgery: N/A Next MD visit:  N/A   Precautions:  Cancer No data recorded Referral Information:   Date of Injury: No data recorded Date of Evaluation: Req: 0, Auth: 0, Exp:     05/21/25 POC Auth Visits:  25       Today's Date   5/23/2025    Subjective   Pt states that she was a little confused with the exercises.       Pain:  7/10     Objective  See tx log for details.           Assessment  Performed HEP with pt. Pt performing as instructed. Mild tightness noted (L) thenar eminence. Pt wearing Oval 8 splint for (R) MF. Splint seemed a little tight. Advised pt wear one size larger. Pt agreed.    Goals (to be met in 8 visits)   Increase (B)  strength by 10-15% measured by dynamometer to support functional tasks such as lifting groceries.  Increase (B) 2pt pinch by 1-2 lbs to assist with buttoning or holding utensils.  Increase (B) 3pt pinch by 2-3 lbs to increase ability to grasp grooming tools  Increase (B) lateral pt pinch by 2-3 lbs to increase ability to turning keys, holding cards and opening water bottles.   Pt will verbalize 2/3 proper  principles with 100% accuracy.  Patient will be independent in final HEP to facilitate carryover of functional gains made in clinical therapy program.          Plan  Patient will be seen 1-2 x week for 4-6 weeksx/week or a total of 8 visits over a 90 day period.    Treatment Last 4 Visits        5/21/2025 5/23/2025   OT Treatment   Treatment Day  2   Therapeutic Exercise  AROM with fluidotherapy x 5\" (B)  Powerweb stretches x 3 min (B)  Frisbee pertubations CW/CCW x 2 min each  Sponges abd/add (B)   Manual Therapy  STM   Therapeutic Exercise Min 10 35   Manual Therapy Min  10   Eval Min 35    Total Timed Procedures 10 45   Total Service  Procedures 45 45   Total Time 45 45         Charges     1 MT, 2 TE    Lester Antoine,OTR/L

## 2025-05-28 ENCOUNTER — PATIENT MESSAGE (OUTPATIENT)
Dept: FAMILY MEDICINE CLINIC | Facility: CLINIC | Age: 60
End: 2025-05-28

## 2025-05-28 ENCOUNTER — HOSPITAL ENCOUNTER (OUTPATIENT)
Dept: CV DIAGNOSTICS | Facility: HOSPITAL | Age: 60
Discharge: HOME | End: 2025-05-28
Attending: STUDENT IN AN ORGANIZED HEALTH CARE EDUCATION/TRAINING PROGRAM
Payer: COMMERCIAL

## 2025-05-28 DIAGNOSIS — Z01.810 PRE-OPERATIVE CARDIOVASCULAR EXAMINATION: ICD-10-CM

## 2025-05-28 DIAGNOSIS — C83.38 DIFFUSE LARGE B-CELL LYMPHOMA OF LYMPH NODES OF MULTIPLE REGIONS (HCC): ICD-10-CM

## 2025-05-28 PROCEDURE — 93306 TTE W/DOPPLER COMPLETE: CPT | Performed by: STUDENT IN AN ORGANIZED HEALTH CARE EDUCATION/TRAINING PROGRAM

## 2025-05-28 PROCEDURE — 93356 MYOCRD STRAIN IMG SPCKL TRCK: CPT | Performed by: STUDENT IN AN ORGANIZED HEALTH CARE EDUCATION/TRAINING PROGRAM

## 2025-05-29 ENCOUNTER — NURSE TRIAGE (OUTPATIENT)
Dept: FAMILY MEDICINE CLINIC | Facility: CLINIC | Age: 60
End: 2025-05-29

## 2025-05-29 DIAGNOSIS — M25.562 ACUTE PAIN OF LEFT KNEE: Primary | ICD-10-CM

## 2025-05-29 NOTE — TELEPHONE ENCOUNTER
Action Requested: Summary for Provider     []  Critical Lab, Recommendations Needed  [x] Need Additional Advice  []   FYI    []   Need Orders  [] Need Medications Sent to Pharmacy  []  Other     SUMMARY: Please advise .    Patient states for past 2 months she has her left knee buckle under her, happens about 3 times a week. This will happen while she is walking. Pain only when leg raghav. No swelling or limping.    Patient asking for an xray. Please advise if patient should see ortho doctor or in office with provider here first.     Reason for call: Acute and Knee Pain  Onset: 2 months  Reason for Disposition   Knee giving way (or buckling) when walking is a chronic symptom (recurrent or ongoing AND lasting > 4 weeks)    Protocols used: Knee Pain-A-OH

## 2025-05-29 NOTE — TELEPHONE ENCOUNTER
Moy message sent.  I am unable to call the patient for I am working from home without a phone.     From 5/28/25 patient message:  Kimberly Sullivan Rn Triage (supporting Hillary Prado MD)15 hours ago (5:46 PM)       I am asking please for a referral to see a specialist since my left knee keeps on buckling.   The Oncologist’s RN Valeria advised to reach out to you for assistance.   Hope you can assist with this issue.      Thank you,  Kimberly

## 2025-05-30 ENCOUNTER — HOSPITAL ENCOUNTER (OUTPATIENT)
Dept: ULTRASOUND IMAGING | Facility: HOSPITAL | Age: 60
Discharge: HOME | End: 2025-05-30
Attending: PODIATRIST
Payer: COMMERCIAL

## 2025-05-30 DIAGNOSIS — M76.821 POSTERIOR TIBIAL TENDINITIS OF RIGHT LOWER EXTREMITY: ICD-10-CM

## 2025-05-30 DIAGNOSIS — M72.2 PLANTAR FASCIITIS, RIGHT: ICD-10-CM

## 2025-05-30 PROCEDURE — 76881 US COMPL JOINT R-T W/IMG: CPT | Performed by: PODIATRIST

## 2025-05-30 NOTE — TELEPHONE ENCOUNTER
I placed the order for an x-ray and can schedule with me for 2 weeks - res 24. Radiology has been taking about 1-2 weeks to send results.

## 2025-05-30 NOTE — TELEPHONE ENCOUNTER
Spoke with patient and provider message given.  Patient verbalizes understanding and agrees to plan    Office visit scheduled 6/12/25

## 2025-05-31 ENCOUNTER — HOSPITAL ENCOUNTER (OUTPATIENT)
Dept: GENERAL RADIOLOGY | Age: 60
Discharge: HOME | End: 2025-05-31
Attending: FAMILY MEDICINE
Payer: COMMERCIAL

## 2025-05-31 DIAGNOSIS — M25.562 ACUTE PAIN OF LEFT KNEE: ICD-10-CM

## 2025-05-31 PROCEDURE — 73562 X-RAY EXAM OF KNEE 3: CPT | Performed by: FAMILY MEDICINE

## 2025-06-02 ENCOUNTER — OFFICE VISIT (OUTPATIENT)
Dept: PHYSICAL THERAPY | Age: 60
End: 2025-06-02
Attending: STUDENT IN AN ORGANIZED HEALTH CARE EDUCATION/TRAINING PROGRAM
Payer: COMMERCIAL

## 2025-06-02 PROCEDURE — 97110 THERAPEUTIC EXERCISES: CPT

## 2025-06-02 PROCEDURE — 97140 MANUAL THERAPY 1/> REGIONS: CPT

## 2025-06-02 NOTE — PROGRESS NOTES
Patient: Kimberly Quezada (59 year old, female) Referring Provider:  Insurance:   Diagnosis: Bilateral carpal tunnel syndrome (G56.03)  Trigger ring finger of right hand (M65.341) Jelani Arriaza  [employer]   Date of Surgery: N/A Next MD visit:  N/A   Precautions:  Cancer No data recorded Referral Information:   Date of Injury: No data recorded Date of Evaluation: Req: 0, Auth: 0, Exp:     05/21/25 POC Auth Visits:  25       Today's Date   6/2/2025    Subjective  Pt states that her left shoulder is bothering her.       Pain: 3/10     Objective  See tx log for details.           Assessment  Pain reported at (R) SF. Pt also having tighness in left shoulder and upper arm. Pt had several lymph nodes removed in left and scar tissue could be contributing to muscle tightness and pain. Pain with palpation noted aling bracbialis and biceps tendon. Recommeded scar massage to decrease tightness. Pt continuing to wear splints which has decreased symptoms but overall weakness from chemotherapy. Advised pt to continue with strengthening to improve overall wellness.    Goals (to be met in 8 visits)   Increase (B)  strength by 10-15% measured by dynamometer to support functional tasks such as lifting groceries.  Increase (B) 2pt pinch by 1-2 lbs to assist with buttoning or holding utensils.  Increase (B) 3pt pinch by 2-3 lbs to increase ability to grasp grooming tools  Increase (B) lateral pt pinch by 2-3 lbs to increase ability to turning keys, holding cards and opening water bottles.   Pt will verbalize 2/3 proper  principles with 100% accuracy.  Patient will be independent in final HEP to facilitate carryover of functional gains made in clinical therapy program.              Plan  Patient will be seen 1-2 x week for 4-6 weeksx/week or a total of 8 visits over a 90 day period.    Treatment Last 4 Visits        5/21/2025 5/23/2025 6/2/2025   OT Treatment   Treatment Day  2 3   Therapeutic Exercise   AROM with fluidotherapy x 5\" (B)  Powerweb stretches x 3 min (B)  Frisbee pertubations CW/CCW x 2 min each  Sponges abd/add (B) AROM with fluidotherapy x 5\" (B)  Powerweb stretches x 3 min (B)  Frisbee pertubations CW/CCW x 2 min each  Sponges abd/add (B)     Manual Therapy  STM STM   Therapeutic Exercise Min 10 35 35   Manual Therapy Min  10 10   Eval Min 35     Total Timed Procedures 10 45 45   Total Service Procedures 45 45 45   Total Time 45 45 45           Charges     1 MT, 2 ASAEL Antoine,OTR/L

## 2025-06-04 ENCOUNTER — OFFICE VISIT (OUTPATIENT)
Dept: PHYSICAL THERAPY | Age: 60
End: 2025-06-04
Attending: STUDENT IN AN ORGANIZED HEALTH CARE EDUCATION/TRAINING PROGRAM
Payer: COMMERCIAL

## 2025-06-04 PROCEDURE — 97140 MANUAL THERAPY 1/> REGIONS: CPT

## 2025-06-04 PROCEDURE — 97110 THERAPEUTIC EXERCISES: CPT

## 2025-06-04 NOTE — PROGRESS NOTES
Mild arthritis was seen on x-ray. Recommend physical therapy - see you are already going - you can ask if they can combine treatments. - Dr. Prado

## 2025-06-04 NOTE — PROGRESS NOTES
Patient: Kimberly Quezada (59 year old, female) Referring Provider:  Insurance:   Diagnosis: Bilateral carpal tunnel syndrome (G56.03)  Trigger ring finger of right hand (M65.341) Jelani Arriaza  [email or web address]   Date of Surgery: N/A Next MD visit:  N/A   Precautions:  Cancer No data recorded Referral Information:   Date of Injury: No data recorded Date of Evaluation: Req: 0, Auth: 0, Exp:     05/21/25 POC Auth Visits:  25       Today's Date   6/4/2025    Subjective  Pt states that her hands are sore but not in pain.       Pain: 3/10     Objective  See tx log for details.           Assessment  Pt continues to have soreness in hands. Increased pain reported during activities such as cleaning and housework. Recommeded paraffin for soreness in hands.    Goals (to be met in 8 visits)   Increase (B)  strength by 10-15% measured by dynamometer to support functional tasks such as lifting groceries.  Increase (B) 2pt pinch by 1-2 lbs to assist with buttoning or holding utensils.  Increase (B) 3pt pinch by 2-3 lbs to increase ability to grasp grooming tools  Increase (B) lateral pt pinch by 2-3 lbs to increase ability to turning keys, holding cards and opening water bottles.   Pt will verbalize 2/3 proper  principles with 100% accuracy.  Patient will be independent in final HEP to facilitate carryover of functional gains made in clinical therapy program.                  Plan  Patient will be seen 1-2 x week for 4-6 weeksx/week or a total of 8 visits over a 90 day period.    Treatment Last 4 Visits        5/21/2025 5/23/2025 6/2/2025 6/4/2025   OT Treatment   Treatment Day  2 3 4   Therapeutic Exercise  AROM with fluidotherapy x 5\" (B)  Powerweb stretches x 3 min (B)  Frisbee pertubations CW/CCW x 2 min each  Sponges abd/add (B) AROM with fluidotherapy x 5\" (B)  Powerweb stretches x 3 min (B)  Frisbee pertubations CW/CCW x 2 min each  Sponges abd/add (B)   AROM with fluidotherapy x 5\" (B)  Powerweb  stretches x 3 min (B)  Frisbee pertubations CW/CCW x 2 min each  Sponges abd/add (B)     Manual Therapy  STM STM STM   Therapeutic Exercise Min 10 35 35 35   Manual Therapy Min  10 10 10   Eval Min 35      Total Timed Procedures 10 45 45 45   Total Service Procedures 45 45 45 45   Total Time 45 45 45 45           Charges     1 MT, 2 ASAEL Antoine,OTR/L

## 2025-06-06 ENCOUNTER — PATIENT MESSAGE (OUTPATIENT)
Dept: PODIATRY CLINIC | Facility: CLINIC | Age: 60
End: 2025-06-06

## 2025-06-06 DIAGNOSIS — M76.821 POSTERIOR TIBIAL TENDINITIS, RIGHT: Primary | ICD-10-CM

## 2025-06-06 DIAGNOSIS — M72.2 PLANTAR FASCIITIS, RIGHT: ICD-10-CM

## 2025-06-06 NOTE — TELEPHONE ENCOUNTER
Dr Rios- Please review US results from 5/30/25 and advise- Note from 5/14/25 states, \"At today's office visit I recommended she get an over-the-counter orthotic to help support her arch refrain from any strenuous physical activities we will send her for a diagnostic ultrasound to look at the plantar fascia as well as the posterior tibial tendon and see if anything more than that needs to be done if that is okay May proceed with physical therapy.\"    Please see results from US and advise on recommendations for patient

## 2025-06-09 ENCOUNTER — OFFICE VISIT (OUTPATIENT)
Dept: PHYSICAL THERAPY | Age: 60
End: 2025-06-09
Attending: STUDENT IN AN ORGANIZED HEALTH CARE EDUCATION/TRAINING PROGRAM
Payer: COMMERCIAL

## 2025-06-09 PROCEDURE — 97140 MANUAL THERAPY 1/> REGIONS: CPT

## 2025-06-09 PROCEDURE — 97110 THERAPEUTIC EXERCISES: CPT

## 2025-06-09 NOTE — PROGRESS NOTES
Patient: Kimberly Quezada (59 year old, female) Referring Provider:  Insurance:   Diagnosis: Bilateral carpal tunnel syndrome (G56.03)  Trigger ring finger of right hand (M65.341) Jelani Arriaza  [employer]   Date of Surgery: N/A Next MD visit:  N/A   Precautions:  Cancer No data recorded Referral Information:   Date of Injury: No data recorded Date of Evaluation: Req: 0, Auth: 0, Exp:     05/21/25 POC Auth Visits:  25       Today's Date   6/9/2025    Subjective  Pt states that her left arm is really botheringher.       Pain: 3/10     Objective  See tx log for details.           Assessment  Numbness and tinglign present at night when pt not wearing splints. Pt also not wearing Oval 8 at all times for trigger finger as isntructed. Discussed imprtance of complying withrecommendations to mange symtoms. Pt having icnreased pain and soreess i (L) UE. Recommneded stretches and pendulum exercises. Pt may benefit from women's health PT for management of (L) shoulder issues from lymph node removal. Pt has returned to gym to increase strength as she was deconditioned from chemotherapy. Advised pt to monitor gripping and it can increase symptoms. Pt agreed.    Goals (to be met in 8 visits)   Increase (B)  strength by 10-15% measured by dynamometer to support functional tasks such as lifting groceries.  Increase (B) 2pt pinch by 1-2 lbs to assist with buttoning or holding utensils.  Increase (B) 3pt pinch by 2-3 lbs to increase ability to grasp grooming tools  Increase (B) lateral pt pinch by 2-3 lbs to increase ability to turning keys, holding cards and opening water bottles.   Pt will verbalize 2/3 proper  principles with 100% accuracy.  Patient will be independent in final HEP to facilitate carryover of functional gains made in clinical therapy program.                      Plan  Patient will be seen 1-2 x week for 4-6 weeksx/week or a total of 8 visits over a 90 day period.    Treatment Last 4  Visits        5/23/2025 6/2/2025 6/4/2025 6/9/2025   OT Treatment   Treatment Day 2 3 4 5   Therapeutic Exercise AROM with fluidotherapy x 5\" (B)  Powerweb stretches x 3 min (B)  Frisbee pertubations CW/CCW x 2 min each  Sponges abd/add (B) AROM with fluidotherapy x 5\" (B)  Powerweb stretches x 3 min (B)  Frisbee pertubations CW/CCW x 2 min each  Sponges abd/add (B)   AROM with fluidotherapy x 5\" (B)  Powerweb stretches x 3 min (B)  Frisbee pertubations CW/CCW x 2 min each  Sponges abd/add (B)   AROM with fluidotherapy x 5\" (B)  Powerweb stretches x 3 min (B)  SB wall walk ups x 3 min  Pendulum (R) shoulder  Pt education: splints, body mechanics   Manual Therapy STM STM STM (B) STM   Therapeutic Exercise Min 35 35 35 35   Manual Therapy Min 10 10 10 10   Total Timed Procedures 45 45 45 45   Total Service Procedures 45 45 45 45   Total Time 45 45 45 45              Charges     1MT, 2 ASAEL Antoine,OTR/L

## 2025-06-10 ENCOUNTER — OFFICE VISIT (OUTPATIENT)
Age: 60
End: 2025-06-10
Attending: STUDENT IN AN ORGANIZED HEALTH CARE EDUCATION/TRAINING PROGRAM
Payer: COMMERCIAL

## 2025-06-10 VITALS
BODY MASS INDEX: 33.13 KG/M2 | RESPIRATION RATE: 18 BRPM | SYSTOLIC BLOOD PRESSURE: 128 MMHG | HEART RATE: 73 BPM | HEIGHT: 63 IN | WEIGHT: 187 LBS | OXYGEN SATURATION: 99 % | DIASTOLIC BLOOD PRESSURE: 84 MMHG | TEMPERATURE: 98 F

## 2025-06-10 DIAGNOSIS — M54.50 BILATERAL LOW BACK PAIN, UNSPECIFIED CHRONICITY, UNSPECIFIED WHETHER SCIATICA PRESENT: ICD-10-CM

## 2025-06-10 DIAGNOSIS — E61.1 IRON DEFICIENCY: ICD-10-CM

## 2025-06-10 DIAGNOSIS — C83.33 DIFFUSE LARGE B-CELL LYMPHOMA OF INTRA-ABDOMINAL LYMPH NODES (HCC): Primary | ICD-10-CM

## 2025-06-10 DIAGNOSIS — C83.38 DIFFUSE LARGE B-CELL LYMPHOMA OF LYMPH NODES OF MULTIPLE REGIONS (HCC): ICD-10-CM

## 2025-06-10 LAB
ALBUMIN SERPL-MCNC: 4.7 G/DL (ref 3.2–4.8)
ALBUMIN/GLOB SERPL: 2.4 {RATIO} (ref 1–2)
ALP LIVER SERPL-CCNC: 85 U/L (ref 46–118)
ALT SERPL-CCNC: 22 U/L (ref 10–49)
ANION GAP SERPL CALC-SCNC: 8 MMOL/L (ref 0–18)
AST SERPL-CCNC: 21 U/L (ref ?–34)
BASOPHILS # BLD AUTO: 0.01 X10(3) UL (ref 0–0.2)
BASOPHILS NFR BLD AUTO: 0.3 %
BILIRUB SERPL-MCNC: 0.6 MG/DL (ref 0.3–1.2)
BUN BLD-MCNC: 11 MG/DL (ref 9–23)
BUN/CREAT SERPL: 13.4 (ref 10–20)
CALCIUM BLD-MCNC: 10 MG/DL (ref 8.7–10.4)
CHLORIDE SERPL-SCNC: 104 MMOL/L (ref 98–112)
CO2 SERPL-SCNC: 30 MMOL/L (ref 21–32)
CREAT BLD-MCNC: 0.82 MG/DL (ref 0.55–1.02)
DEPRECATED HBV CORE AB SER IA-ACNC: 518 NG/ML (ref 50–306)
DEPRECATED RDW RBC AUTO: 45.5 FL (ref 35.1–46.3)
EGFRCR SERPLBLD CKD-EPI 2021: 83 ML/MIN/1.73M2 (ref 60–?)
EOSINOPHIL # BLD AUTO: 0.1 X10(3) UL (ref 0–0.7)
EOSINOPHIL NFR BLD AUTO: 3.4 %
ERYTHROCYTE [DISTWIDTH] IN BLOOD BY AUTOMATED COUNT: 14 % (ref 11–15)
GLOBULIN PLAS-MCNC: 2 G/DL (ref 2–3.5)
GLUCOSE BLD-MCNC: 99 MG/DL (ref 70–99)
HCT VFR BLD AUTO: 38.9 % (ref 35–48)
HGB BLD-MCNC: 12.9 G/DL (ref 12–16)
IMM GRANULOCYTES # BLD AUTO: 0.01 X10(3) UL (ref 0–1)
IMM GRANULOCYTES NFR BLD: 0.3 %
IRON SATN MFR SERPL: 42 % (ref 15–50)
IRON SERPL-MCNC: 114 UG/DL (ref 50–170)
LDH SERPL L TO P-CCNC: 185 U/L (ref 120–246)
LYMPHOCYTES # BLD AUTO: 0.74 X10(3) UL (ref 1–4)
LYMPHOCYTES NFR BLD AUTO: 25.5 %
MCH RBC QN AUTO: 29.5 PG (ref 26–34)
MCHC RBC AUTO-ENTMCNC: 33.2 G/DL (ref 31–37)
MCV RBC AUTO: 88.8 FL (ref 80–100)
MONOCYTES # BLD AUTO: 0.33 X10(3) UL (ref 0.1–1)
MONOCYTES NFR BLD AUTO: 11.4 %
NEUTROPHILS # BLD AUTO: 1.71 X10 (3) UL (ref 1.5–7.7)
NEUTROPHILS # BLD AUTO: 1.71 X10(3) UL (ref 1.5–7.7)
NEUTROPHILS NFR BLD AUTO: 59.1 %
OSMOLALITY SERPL CALC.SUM OF ELEC: 293 MOSM/KG (ref 275–295)
PLATELET # BLD AUTO: 188 10(3)UL (ref 150–450)
POTASSIUM SERPL-SCNC: 4 MMOL/L (ref 3.5–5.1)
PROT SERPL-MCNC: 6.7 G/DL (ref 5.7–8.2)
RBC # BLD AUTO: 4.38 X10(6)UL (ref 3.8–5.3)
SODIUM SERPL-SCNC: 142 MMOL/L (ref 136–145)
TOTAL IRON BINDING CAPACITY: 270 UG/DL (ref 250–425)
TRANSFERRIN SERPL-MCNC: 205 MG/DL (ref 250–380)
WBC # BLD AUTO: 2.9 X10(3) UL (ref 4–11)

## 2025-06-11 ENCOUNTER — OFFICE VISIT (OUTPATIENT)
Dept: PHYSICAL THERAPY | Age: 60
End: 2025-06-11
Attending: STUDENT IN AN ORGANIZED HEALTH CARE EDUCATION/TRAINING PROGRAM
Payer: COMMERCIAL

## 2025-06-11 PROCEDURE — 97140 MANUAL THERAPY 1/> REGIONS: CPT

## 2025-06-11 PROCEDURE — 97110 THERAPEUTIC EXERCISES: CPT

## 2025-06-11 NOTE — PROGRESS NOTES
Patient: Kimberly Quezada (59 year old, female) Referring Provider:  Insurance:   Diagnosis: Bilateral carpal tunnel syndrome (G56.03)  Trigger ring finger of right hand (M65.341) Jelani Arriaza  [employer] [facility]   Date of Surgery: N/A Next MD visit:  N/A   Precautions:  Cancer No data recorded Referral Information:   Date of Injury: No data recorded Date of Evaluation: Req: 0, Auth: 0, Exp:     05/21/25 POC Auth Visits:  25       Today's Date   6/11/2025    Subjective  Pt states that she hasn't been wearing the Oval 8 splint all the time.       Pain:  /10     Objective  See tx log for details.           Assessment  No significant change reported in hands at this time. Reviewed importance of consistency with wearing splints and refraining from activties involving gripping and repetition to manage symptoms. Pt verbalized good understanding.    Goals (to be met in 8 visits)   Increase (B)  strength by 10-15% measured by dynamometer to support functional tasks such as lifting groceries.  Increase (B) 2pt pinch by 1-2 lbs to assist with buttoning or holding utensils.  Increase (B) 3pt pinch by 2-3 lbs to increase ability to grasp grooming tools  Increase (B) lateral pt pinch by 2-3 lbs to increase ability to turning keys, holding cards and opening water bottles.   Pt will verbalize 2/3 proper  principles with 100% accuracy.  Patient will be independent in final HEP to facilitate carryover of functional gains made in clinical therapy program.                          Plan  Patient will be seen 1-2 x week for 4-6 weeksx/week or a total of 8 visits over a 90 day period.    Treatment Last 4 Visits        6/2/2025 6/4/2025 6/9/2025 6/11/2025   OT Treatment   Treatment Day 3 4 5 6   Therapeutic Exercise AROM with fluidotherapy x 5\" (B)  Powerweb stretches x 3 min (B)  Frisbee pertubations CW/CCW x 2 min each  Sponges abd/add (B)   AROM with fluidotherapy x 5\" (B)  Powerweb stretches x 3 min  (B)  Frisbee pertubations CW/CCW x 2 min each  Sponges abd/add (B)   AROM with fluidotherapy x 5\" (B)  Powerweb stretches x 3 min (B)  SB wall walk ups x 3 min  Pendulum (R) shoulder  Pt education: splints, body mechanics AROM with fluidotherapy x 5\" (B)  Powerweb stretches x 3 min (B)  Sponges abd/add (B)  Frisbee perturbations CW/CCW x 2 min in each direction   Manual Therapy STM STM (B) STM STM (B)   Therapeutic Exercise Min 35 35 35 35   Manual Therapy Min 10 10 10 10   Total Timed Procedures 45 45 45 45   Total Service Procedures 45 45 45 45   Total Time 45 45 45 45              Charges  1 MT, 2 ASAEL Antoine,OTR/L

## 2025-06-11 NOTE — PROGRESS NOTES
Hematology/Oncology Follow Up  Note    Patient Name: Kimberly Quezada  Medical Record Number: [id number]    YOB: 1965   Date of Consultation: 9/3/2024   PCP: Hillary Prado MD   Other providers:      Reason for Consultation:  Kimberly Quezada was seen today for the diagnosis of DLBCL     Oncologic History:       March 2024: Anemia with MCV of 72.3.  He also endorsed having decreased appetite and weight loss of 25 pounds.  2024: Had EGD and colonoscopy which were normal.     Had a CT of the chest abdomen pelvis after she noted a neck masd which showed bilateral lymphadenopathy in her neck, chest, left axilla and retroperitoneal region.     July 2024: Axillary lymph node excisional biopsy.  Sent to [facility] for additional workup and was noted to have at DLBCL NOS.    August 14th : Cycle 1 Miguel-R-CHP  Sept 4th 2024: Cycle 2 Miguel RCHP - complicated with facial rash    Sept 24th. Interim PET after 2 cycles showed Deauville 3. With significant decrease in activity noted.    Oct 15th, 2024: Extensive rosacea noted    Dec 2024: Patient had PET CT and treatment that revealed stable intermittently active within the left parotid uptake.  Multiple new endplate fractures been noted in her spine.    MRI of the spine, close orthopedic DEXA scan that revealed osteoporosis.    PET CT in March 2025: Stable 2 x 3 cm left periaortic/retroperitoneal node     ===================================================  History of Present Illness:      Patient presented for follow-up.  Denies having any fevers, chills.  Notices some thickening in her left upper extremity.  No lymphadenopathy noted.  No recurrent infections.  Has back pain for which she is doing physical therapy.  Noted to have pain in her ankle and her feet.          Past Medical History:  Past Medical History:    Diverticulosis large intestine w/o perforation or abscess w/o bleeding    Gastropathy    High cholesterol    History of blood transfusion    NO  ADVERSE REACTIONS    Hx of motion sickness    Hyperlipidemia    Internal hemorrhoids without complication    Rosacea    Seasonal allergies    Visual impairment    GLASSES       Past Surgical History:   Procedure Laterality Date    Colonoscopy N/A 12/13/2018    Procedure: COLONOSCOPY;  Surgeon: Elizabet Gordon MD;  Location: [facility] ENDOSCOPY    Colonoscopy N/A 6/4/2024    Procedure: COLONOSCOPY and Polypectomy;  Surgeon: Deepak He MD;  Location: [facility] ENDOSCOPY    Other surgical history      Repair of anal fistula 2011       Home Medications:   acyclovir 400 MG Oral Tab Take 1 tablet (400 mg total) by mouth 2 (two) times daily. 180 tablet 3    Calcium Carb-Cholecalciferol 600-10 MG-MCG Oral Tab Take 1 tablet by mouth in the morning and 1 tablet before bedtime. 60 tablet 11    Teriparatide (FORTEO) 600 MCG/2.4ML Subcutaneous Solution Pen-injector Inject 20 mcg into the skin every evening.      acetaminophen-codeine 300-30 MG Oral Tab Take 1 tablet by mouth 2 (two) times daily as needed for Pain. 14 tablet 0    Insulin Pen Needle (BD PEN NEEDLE MARTINA U/F) 32G X 4 MM Does not apply Misc Inject once daily 100 each 1    lidocaine 5 % External Patch Place 1 patch onto the skin daily. 20 patch 0    Ferrous Sulfate (FEROSUL) 325 (65 Fe) MG Oral Tab Take 1 tablet (325 mg total) by mouth 2 (two) times daily. 60 tablet 5    atorvastatin 10 MG Oral Tab Take 1 tablet (10 mg total) by mouth nightly. 90 tablet 4     -------  Current Outpatient Medications on File Prior to Visit   Medication Sig Dispense Refill    acyclovir 400 MG Oral Tab Take 1 tablet (400 mg total) by mouth 2 (two) times daily. 180 tablet 3    Calcium Carb-Cholecalciferol 600-10 MG-MCG Oral Tab Take 1 tablet by mouth in the morning and 1 tablet before bedtime. 60 tablet 11    Teriparatide (FORTEO) 600 MCG/2.4ML Subcutaneous Solution Pen-injector Inject 20 mcg into the skin every evening.      acetaminophen-codeine 300-30 MG Oral Tab Take 1  tablet by mouth 2 (two) times daily as needed for Pain. 14 tablet 0    Insulin Pen Needle (BD PEN NEEDLE MARTINA U/F) 32G X 4 MM Does not apply Misc Inject once daily 100 each 1    lidocaine 5 % External Patch Place 1 patch onto the skin daily. 20 patch 0    Ferrous Sulfate (FEROSUL) 325 (65 Fe) MG Oral Tab Take 1 tablet (325 mg total) by mouth 2 (two) times daily. 60 tablet 5    atorvastatin 10 MG Oral Tab Take 1 tablet (10 mg total) by mouth nightly. 90 tablet 4    Fexofenadine HCl 30 MG Oral Tablet Dispersible Take 1 tablet (30 mg total) by mouth daily. (Patient not taking: Reported on 6/10/2025)       No current facility-administered medications on file prior to visit.       Allergies:   Allergies   Allergen Reactions    Meloxicam ANGIOEDEMA     Upper and lower lip throbbing, tongue swelling, tongue ulcers    Contrave [Naltrexone-Bupropion Hcl Er] RASH    Wellbutrin [Bupropion] RASH    Simvastatin RASH       Psychosocial History:  Social History     Social History Narrative    Not on file     Social History     Socioeconomic History    Marital status: Single   Tobacco Use    Smoking status: Never     Passive exposure: Yes    Smokeless tobacco: Never    Tobacco comments:     Social smoker. Pt does not remember when she stopped   Vaping Use    Vaping status: Never Used   Substance and Sexual Activity    Alcohol use: Yes     Alcohol/week: 1.0 standard drink of alcohol     Types: 1 Glasses of wine per week     Comment: Special occasions    Drug use: No   Other Topics Concern    Pt has a pacemaker No    Pt has a defibrillator No    Reaction to local anesthetic No    Right Handed Yes     Social Drivers of Health     Food Insecurity: No Food Insecurity (8/15/2024)    Food Insecurity     Food Insecurity: Never true   Transportation Needs: No Transportation Needs (8/15/2024)    Transportation Needs     Lack of Transportation: No   Housing Stability: Low Risk  (8/15/2024)    Housing Stability     Housing Instability: No        Family Medical History:  Family History   Problem Relation Age of Onset    Cancer Self         Lymphoma    Breast Cancer Neg     Ovarian Cancer Neg     Prostate Cancer Neg     Pancreatic Cancer Neg     Colon Cancer Neg     Uterine Cancer Neg        Review of Systems:  A 10-point ROS was done with pertinent positives and negative per the HPI    Vital Signs:  Height: --  Weight: --  BSA (Calculated - sq m): --  Pulse: --  BP: --  Temp: --  Do Not Use - Resp Rate: --  SpO2: --    Wt Readings from Last 6 Encounters:   06/10/25 84.8 kg (187 lb)   04/21/25 80.6 kg (177 lb 12.8 oz)   04/18/25 79.8 kg (176 lb)   03/27/25 78.9 kg (174 lb)   03/26/25 78.9 kg (174 lb)   03/08/25 77.1 kg (170 lb)       ECOG PS: 0    Physical Examination:  General: Patient is alert and oriented, not in acute distress  Psych: Mood and affect are appropriate  Eyes: EOMI, PERRL  ENT: Oropharynx is clear, no adenopathy  CV: Regular rate and rhythm, normal S1S2, no murmurs, no LE edema  Respiratory: Lungs clear to auscultation bilaterally  GI/Abd: Soft, non-tender with normoactive bowel sounds, no hepatosplenomegaly  Neurological: Grossly intact   Lymphatics: No palpable cervical or supraclavicular lymph nodes.  No palpable axillary lymph nodes.  Skin: no rashes or petechiae. No skin thickening noted in the left arm    Laboratory:  Lab Results   Component Value Date    WBC 2.9 (L) 06/10/2025    WBC 2.8 (L) 03/26/2025    WBC 1.9 (L) 03/11/2025    HGB 12.9 06/10/2025    HGB 13.1 03/26/2025    HGB 13.4 03/11/2025    HCT 38.9 06/10/2025    MCV 88.8 06/10/2025    MCH 29.5 06/10/2025    MCHC 33.2 06/10/2025    RDW 14.0 06/10/2025    .0 06/10/2025    .0 03/26/2025    .0 03/11/2025     Lab Results   Component Value Date    GLU 99 06/10/2025    BUN 11 06/10/2025    BUNCREA 13.4 06/10/2025    CREATSERUM 0.82 06/10/2025    CREATSERUM 0.73 03/26/2025    CREATSERUM 0.69 03/11/2025    ANIONGAP 8 06/10/2025    GFRNAA 103 02/07/2022     GFRAA 120 02/07/2022    CA 10.0 06/10/2025    OSMOCALC 293 06/10/2025    ALKPHO 85 06/10/2025    AST 21 06/10/2025    ALT 22 06/10/2025    ALKPHOS 41 09/21/2015    BILT 0.6 06/10/2025    TP 6.7 06/10/2025    ALB 4.7 06/10/2025    GLOBULIN 2.0 06/10/2025    AGRATIO 0.8 (L) 03/18/2024     06/10/2025    K 4.0 06/10/2025     06/10/2025    CO2 30.0 06/10/2025     No results found for: \"PTT\", \"PT\", \"INR\"    Imaging:      PET CT reviewed    Impression & Plan:     58-year-old female with diffuse large B cell NOS, non GCB likely stage IVB with IPI of 2 advanced clinical stage and more than 1 extranodal site disease      Pathology describes that there was an evaluation or spectrum of TCHRLBL/DLBCL      Was started on Miguel R-CHP based on the Polarix trial showing better PFS to R-CHOP and better activity in non GCB subtype.  Interim PET revealed Deauville 3, significant decrease in tumor burden   EOT PET after 6 cycles, revealed Deauville 3, activity noted\"  and recommended follow-up.  Completed 2  additional cycles of rituximab.      PET CT from March 2025: Showed stable left jakub-aortic lymph node.     Continues to remain lymphocytopenia.  Continue acyclovir prophylaxis.  End of treatment echocardiogram shows preserved EF.      Osteoporotic fracture  - on physical therapy  - Was evaluated by endocrinology  , Currently on teriparatide.    Skin lesion   -Improving  - worsening of rosacea with chemotherapy. On topical metronidazole, Ivermectin   Follow-up with repeat PET/CT, CBC and CMP in 3 months.    Dayna Hidalgo MD  Hematology/Medical Oncology

## 2025-06-12 ENCOUNTER — TELEPHONE (OUTPATIENT)
Dept: FAMILY MEDICINE CLINIC | Facility: CLINIC | Age: 60
End: 2025-06-12

## 2025-06-12 ENCOUNTER — OFFICE VISIT (OUTPATIENT)
Dept: FAMILY MEDICINE CLINIC | Facility: CLINIC | Age: 60
End: 2025-06-12
Payer: COMMERCIAL

## 2025-06-12 VITALS
DIASTOLIC BLOOD PRESSURE: 85 MMHG | HEIGHT: 63 IN | HEART RATE: 84 BPM | SYSTOLIC BLOOD PRESSURE: 126 MMHG | WEIGHT: 186.63 LBS | BODY MASS INDEX: 33.07 KG/M2

## 2025-06-12 DIAGNOSIS — M25.562 ACUTE PAIN OF LEFT KNEE: ICD-10-CM

## 2025-06-12 DIAGNOSIS — C83.30 DIFFUSE LARGE B-CELL LYMPHOMA, UNSPECIFIED BODY REGION (HCC): Primary | ICD-10-CM

## 2025-06-12 DIAGNOSIS — M54.50 CHRONIC BILATERAL LOW BACK PAIN WITHOUT SCIATICA: ICD-10-CM

## 2025-06-12 DIAGNOSIS — G89.29 CHRONIC BILATERAL LOW BACK PAIN WITHOUT SCIATICA: ICD-10-CM

## 2025-06-12 PROCEDURE — 99214 OFFICE O/P EST MOD 30 MIN: CPT | Performed by: FAMILY MEDICINE

## 2025-06-12 RX ORDER — CELECOXIB 100 MG/1
100 CAPSULE ORAL DAILY
Qty: 30 CAPSULE | Refills: 0 | Status: SHIPPED | OUTPATIENT
Start: 2025-06-12

## 2025-06-12 NOTE — PROGRESS NOTES
The following individual(s) verbally consented to be recorded using ambient AI listening technology and understand that they can each withdraw their consent to this listening technology at any point by asking the clinician to turn off or pause the recording:    Patient name: Kimberly Quezada  Additional names:  Kimberly Quezada is a 59 year old female.   Chief Complaint   Patient presents with    Follow - Up     HPI:        Having lot of joint issues since chemo for B cell lymphoma. Back pain due to compression fractures from chemo. PT for back, hand and foot. Would like to go for left knee pain also.   Recent x-ray - mild arthritis.   Taking vitamin D with calcium. Tylenol prn    Right hand trigger finger - going to OT.   Reports had reaction to meloxicam - ulcers under tongue but not lip swelling and took ibuprofen in the past   Medications Ordered Prior to Encounter[1]   Past Medical History[2]   Social History:  Short Social Hx on File[3]     REVIEW OF SYSTEMS:   Review of Systems   See HPI     EXAM:   /85   Pulse 84   Ht 5' 3\" (1.6 m)   Wt 186 lb 9.6 oz (84.6 kg)   LMP 08/09/2018   BMI 33.05 kg/m²   Wt Readings from Last 3 Encounters:   06/12/25 186 lb 9.6 oz (84.6 kg)   06/10/25 187 lb (84.8 kg)   04/21/25 177 lb 12.8 oz (80.6 kg)     GENERAL: well developed, well nourished,in no apparent distress  SKIN: no rashes,no suspicious lesions  HEENT: atraumatic, normocephalic,ears and throat are clear  NECK: supple,no adenopathy,no bruits  LUNGS: clear to auscultation  CARDIO: RRR without murmur  GI: good BS's,no masses, HSM or tenderness  EXTREMITIES:right hand 4th digit brace at PIP joint.      ASSESSMENT AND PLAN:     1. Diffuse large B-cell lymphoma, unspecified body region (HCC)  Still in recovery - now going to many therapies. Will extend FMLA for 3 months so end of september. Works for [employer] and stands all day.   - Physical Therapy Referral - [facility]    2. Chronic bilateral low  back pain without sciatica  Continue PT.   Add magnesium glycinate 500 mg nightly, tumeric and glucosamine. Celebrex once a day with food.   - Physical Therapy Referral - [facility]    3. Acute pain of left knee    - Physical Therapy Referral - [facility]                The patient indicates understanding of these issues and agrees to the plan.      Hillary Prado MD  6/12/2025  1:11 PM          [1]   Current Outpatient Medications on File Prior to Visit   Medication Sig Dispense Refill    acyclovir 400 MG Oral Tab Take 1 tablet (400 mg total) by mouth 2 (two) times daily. 180 tablet 3    Calcium Carb-Cholecalciferol 600-10 MG-MCG Oral Tab Take 1 tablet by mouth in the morning and 1 tablet before bedtime. 60 tablet 11    Teriparatide (FORTEO) 600 MCG/2.4ML Subcutaneous Solution Pen-injector Inject 20 mcg into the skin every evening.      lidocaine 5 % External Patch Place 1 patch onto the skin daily. 20 patch 0    Ferrous Sulfate (FEROSUL) 325 (65 Fe) MG Oral Tab Take 1 tablet (325 mg total) by mouth 2 (two) times daily. 60 tablet 5    atorvastatin 10 MG Oral Tab Take 1 tablet (10 mg total) by mouth nightly. 90 tablet 4    Fexofenadine HCl 30 MG Oral Tablet Dispersible Take 1 tablet (30 mg total) by mouth daily. (Patient not taking: Reported on 6/10/2025)      acetaminophen-codeine 300-30 MG Oral Tab Take 1 tablet by mouth 2 (two) times daily as needed for Pain. 14 tablet 0    Insulin Pen Needle (BD PEN NEEDLE MARTINA U/F) 32G X 4 MM Does not apply Misc Inject once daily 100 each 1     No current facility-administered medications on file prior to visit.   [2]   Past Medical History:   Diverticulosis large intestine w/o perforation or abscess w/o bleeding    Gastropathy    High cholesterol    History of blood transfusion    NO ADVERSE REACTIONS    Hx of motion sickness    Hyperlipidemia    Internal hemorrhoids without complication    Rosacea    Seasonal allergies    Visual impairment    GLASSES   [3]    Social History  Socioeconomic History    Marital status: Single   Tobacco Use    Smoking status: Never     Passive exposure: Yes    Smokeless tobacco: Never    Tobacco comments:     Social smoker. Pt does not remember when she stopped   Vaping Use    Vaping status: Never Used   Substance and Sexual Activity    Alcohol use: Yes     Alcohol/week: 1.0 standard drink of alcohol     Types: 1 Glasses of wine per week     Comment: Special occasions    Drug use: No   Other Topics Concern    Pt has a pacemaker No    Pt has a defibrillator No    Reaction to local anesthetic No    Right Handed Yes     Social Drivers of Health     Food Insecurity: No Food Insecurity (8/15/2024)    Food Insecurity     Food Insecurity: Never true   Transportation Needs: No Transportation Needs (8/15/2024)    Transportation Needs     Lack of Transportation: No   Housing Stability: Low Risk  (8/15/2024)    Housing Stability     Housing Instability: No

## 2025-06-12 NOTE — TELEPHONE ENCOUNTER
Sent [facility] continuation letter to Lizzy Attn: Amber Okeefe fax# [phone]. Confirmation rec'd

## 2025-06-16 ENCOUNTER — OFFICE VISIT (OUTPATIENT)
Dept: PHYSICAL THERAPY | Age: 60
End: 2025-06-16
Attending: STUDENT IN AN ORGANIZED HEALTH CARE EDUCATION/TRAINING PROGRAM
Payer: COMMERCIAL

## 2025-06-16 ENCOUNTER — LAB ENCOUNTER (OUTPATIENT)
Dept: LAB | Facility: HOSPITAL | Age: 60
End: 2025-06-16
Attending: INTERNAL MEDICINE
Payer: COMMERCIAL

## 2025-06-16 ENCOUNTER — OFFICE VISIT (OUTPATIENT)
Dept: ENDOCRINOLOGY CLINIC | Facility: CLINIC | Age: 60
End: 2025-06-16
Payer: COMMERCIAL

## 2025-06-16 VITALS
SYSTOLIC BLOOD PRESSURE: 124 MMHG | HEART RATE: 83 BPM | BODY MASS INDEX: 33 KG/M2 | DIASTOLIC BLOOD PRESSURE: 81 MMHG | WEIGHT: 188 LBS

## 2025-06-16 DIAGNOSIS — M81.0 AGE-RELATED OSTEOPOROSIS WITHOUT CURRENT PATHOLOGICAL FRACTURE: Primary | ICD-10-CM

## 2025-06-16 DIAGNOSIS — E55.9 VITAMIN D DEFICIENCY: ICD-10-CM

## 2025-06-16 DIAGNOSIS — M81.0 AGE-RELATED OSTEOPOROSIS WITHOUT CURRENT PATHOLOGICAL FRACTURE: ICD-10-CM

## 2025-06-16 LAB
PTH-INTACT SERPL-MCNC: 47.5 PG/ML (ref 18.5–88)
VIT D+METAB SERPL-MCNC: 34.8 NG/ML (ref 30–100)

## 2025-06-16 PROCEDURE — 97140 MANUAL THERAPY 1/> REGIONS: CPT

## 2025-06-16 PROCEDURE — 84080 ASSAY ALKALINE PHOSPHATASES: CPT

## 2025-06-16 PROCEDURE — 82306 VITAMIN D 25 HYDROXY: CPT

## 2025-06-16 PROCEDURE — 83970 ASSAY OF PARATHORMONE: CPT

## 2025-06-16 PROCEDURE — 97110 THERAPEUTIC EXERCISES: CPT

## 2025-06-16 PROCEDURE — 36415 COLL VENOUS BLD VENIPUNCTURE: CPT

## 2025-06-16 PROCEDURE — 99213 OFFICE O/P EST LOW 20 MIN: CPT | Performed by: INTERNAL MEDICINE

## 2025-06-16 NOTE — PROGRESS NOTES
Patient: Kimberly Quezada (59 year old, female) Referring Provider:  Insurance:   Diagnosis: Bilateral carpal tunnel syndrome (G56.03)  Trigger ring finger of right hand (M65.341) Jelani Arriaza  [email or web address] [facility]   Date of Surgery: N/A Next MD visit:  N/A   Precautions:  Cancer No data recorded Referral Information:   Date of Injury: No data recorded Date of Evaluation: Req: 0, Auth: 0, Exp:     05/21/25 POC Auth Visits:  25       Today's Date   6/16/2025    Subjective  Pt states that her right hand was triggering and numb this morning.       Pain: 3/10     Objective  See tx log for details.           Assessment  Reviewed HEP and importance of mangaing sleeping positions, body mechancis and limiting AROM of (R) RF to decrease symttoms. Increased tightness noted in (B) hands mot likely due to excessive gripping due to weakness in (L) shoulder. Recommended wering padded gloves while strenghening at the gym and also to use light weights. Pt also advised to request PT for neck and shoulder.    Goals (to be met in 8 visits)   Increase (B)  strength by 10-15% measured by dynamometer to support functional tasks such as lifting groceries.  Increase (B) 2pt pinch by 1-2 lbs to assist with buttoning or holding utensils.  Increase (B) 3pt pinch by 2-3 lbs to increase ability to grasp grooming tools  Increase (B) lateral pt pinch by 2-3 lbs to increase ability to turning keys, holding cards and opening water bottles.   Pt will verbalize 2/3 proper  principles with 100% accuracy.  Patient will be independent in final HEP to facilitate carryover of functional gains made in clinical therapy program.                              Plan  Patient will be seen 1-2 x week for 4-6 weeksx/week or a total of 8 visits over a 90 day period.    Treatment Last 4 Visits        6/4/2025 6/9/2025 6/11/2025 6/16/2025   OT Treatment   Treatment Day 4 5 6 7   Therapeutic Exercise AROM with fluidotherapy x 5\" (B)  Powerweb  stretches x 3 min (B)  Frisbee pertubations CW/CCW x 2 min each  Sponges abd/add (B)   AROM with fluidotherapy x 5\" (B)  Powerweb stretches x 3 min (B)  SB wall walk ups x 3 min  Pendulum (R) shoulder  Pt education: splints, body mechanics AROM with fluidotherapy x 5\" (B)  Powerweb stretches x 3 min (B)  Sponges abd/add (B)  Frisbee perturbations CW/CCW x 2 min in each direction AROM with fluidotherapy x 5\" (B)  Powerweb stretches x 3 min (B)  Frisbee pertubations CW/CCW x 2 min each  Sponges abd/add (B)     Manual Therapy STM (B) STM STM (B) STM (B)   Therapeutic Exercise Min 35 35 35 35   Manual Therapy Min 10 10 10 10   Total Timed Procedures 45 45 45 45   Total Service Procedures 45 45 45 45   Total Time 45 45 45 45              Charges     1MT, 2 TE      Lester Antoine,OTR/L

## 2025-06-16 NOTE — PROGRESS NOTES
Name: Kimberly Quezada  Date: 6/16/2025    Referring Physician: No ref. provider found    Chief Complaint   Patient presents with    Osteoporosis       HISTORY OF PRESENT ILLNESS   Kimberly Quezada is a 59 year old female who presents for   Chief Complaint   Patient presents with    Osteoporosis     60 y/o F presents for follow up evaluation of osteopenia. She has been diagnosed with B cell Lymphoma and she has completed therapy.  In 12/2024 she was diagnosed with multiple wedge fractures of T12 and L1, no h/o trauma.  She is undergoing physical therapy but chose not to undergo kyphoplasty.  She does continue to have back pain but improving.      She continues to have back pain with house chores and occasionally using back brace.  She has finished physical therapy.  She is tolerating forteo injection although some mild myalgias.  She started therapy 3/5/2025.  No recurrent fractures since last visit.     She is taking calcium 600mg PO BID.  Vitamin D 2000 units daily.     DEXA demonstrated osteopenia.     No family h/o hip fractures.     REVIEW OF SYSTEM  Eyes: no change in vision  Neurologic: no headache, generalized or focal weakness or numbness.  Head: normal  ENT: normal  Lungs: no shortness of breath, wheezing or EDWARDS  Cardiovascular:  no chest pain or palpitations  Gastrointestinal:  no abdominal pain, bowel movement problems  Musculoskeletal: no muscle pain or arthralgia  /Gyne: no frequency or discomfort while urinating  Psychiatric:  no acute distress, anxiety  or depression  Skin: normal moisturized skin    Medications:     Current Outpatient Medications:     celecoxib (CELEBREX) 100 MG Oral Cap, Take 1 capsule (100 mg total) by mouth daily., Disp: 30 capsule, Rfl: 0    acyclovir 400 MG Oral Tab, Take 1 tablet (400 mg total) by mouth 2 (two) times daily., Disp: 180 tablet, Rfl: 3    Calcium Carb-Cholecalciferol 600-10 MG-MCG Oral Tab, Take 1 tablet by mouth in the morning and 1 tablet before bedtime.,  Disp: 60 tablet, Rfl: 11    Teriparatide (FORTEO) 600 MCG/2.4ML Subcutaneous Solution Pen-injector, Inject 20 mcg into the skin every evening., Disp: , Rfl:     acetaminophen-codeine 300-30 MG Oral Tab, Take 1 tablet by mouth 2 (two) times daily as needed for Pain., Disp: 14 tablet, Rfl: 0    Insulin Pen Needle (BD PEN NEEDLE MARTINA U/F) 32G X 4 MM Does not apply Misc, Inject once daily, Disp: 100 each, Rfl: 1    lidocaine 5 % External Patch, Place 1 patch onto the skin daily., Disp: 20 patch, Rfl: 0    Ferrous Sulfate (FEROSUL) 325 (65 Fe) MG Oral Tab, Take 1 tablet (325 mg total) by mouth 2 (two) times daily., Disp: 60 tablet, Rfl: 5    atorvastatin 10 MG Oral Tab, Take 1 tablet (10 mg total) by mouth nightly., Disp: 90 tablet, Rfl: 4    Fexofenadine HCl 30 MG Oral Tablet Dispersible, Take 1 tablet (30 mg total) by mouth daily. (Patient not taking: Reported on 6/16/2025), Disp: , Rfl:      Allergies:   Allergies[1]    Social History:   Social History     Socioeconomic History    Marital status: Single   Tobacco Use    Smoking status: Never     Passive exposure: Yes    Smokeless tobacco: Never    Tobacco comments:     Social smoker. Pt does not remember when she stopped   Vaping Use    Vaping status: Never Used   Substance and Sexual Activity    Alcohol use: Yes     Alcohol/week: 1.0 standard drink of alcohol     Types: 1 Glasses of wine per week     Comment: Special occasions    Drug use: No   Other Topics Concern    Pt has a pacemaker No    Pt has a defibrillator No    Reaction to local anesthetic No    Right Handed Yes       Medical History:   Past Medical History:    Diverticulosis large intestine w/o perforation or abscess w/o bleeding    Gastropathy    High cholesterol    History of blood transfusion    NO ADVERSE REACTIONS    Hx of motion sickness    Hyperlipidemia    Internal hemorrhoids without complication    Rosacea    Seasonal allergies    Visual impairment    GLASSES       Surgical history:   Past  Surgical History:   Procedure Laterality Date    Colonoscopy N/A 12/13/2018    Procedure: COLONOSCOPY;  Surgeon: Elizabet Gordon MD;  Location: [facility] ENDOSCOPY    Colonoscopy N/A 6/4/2024    Procedure: COLONOSCOPY and Polypectomy;  Surgeon: Deepak He MD;  Location: [facility] ENDOSCOPY    Other surgical history      Repair of anal fistula 2011       PHYSICAL EXAMINATION:  /81   Pulse 83   Wt 188 lb (85.3 kg)   LMP 08/09/2018   BMI 33.30 kg/m²     General Appearance:  Alert, in no acute distress, well developed  Eyes: normal conjunctivae, sclera.  Ears/Nose/Mouth/Throat/Neck:  normal hearing, normal speech   Musculoskeletal:  normal muscle strength and tone  PV: normal pulses of carotids, pedals  Skin:  normal moisture and skin texture  Hair & Nails:  normal scalp hair     Neuro:  sensory grossly intact and motor grossly intact  Psychiatric:  oriented to time, self, and place  Nutritional:  no abnormal weight gain or loss    ASSESSMENT/PLAN:    Osteoporosis  - Discussed diagnosis with patient  - Discussed importance of treatment  - Given current fractures and young age recommend anabolic therapy to build bone  - Normal 24 hour urine   - Continue Forteo therapy, will plan for one year of therapy   - ReCheck Vitamin D, PTH, Bone specific alk phos  - Further management based on above results    RTC 6 months     6/16/2025  Ritu Marcum MD         [1]   Allergies  Allergen Reactions    Meloxicam OTHER (SEE COMMENTS)      tongue ulcers    Contrave [Naltrexone-Bupropion Hcl Er] RASH    Wellbutrin [Bupropion] RASH    Simvastatin RASH

## 2025-06-18 ENCOUNTER — OFFICE VISIT (OUTPATIENT)
Dept: PHYSICAL THERAPY | Age: 60
End: 2025-06-18
Attending: STUDENT IN AN ORGANIZED HEALTH CARE EDUCATION/TRAINING PROGRAM
Payer: COMMERCIAL

## 2025-06-18 PROCEDURE — 97110 THERAPEUTIC EXERCISES: CPT

## 2025-06-18 PROCEDURE — 97140 MANUAL THERAPY 1/> REGIONS: CPT

## 2025-06-18 NOTE — PROGRESS NOTES
Patient: Kimberly Quezada (59 year old, female) Referring Provider:  Insurance:   Diagnosis: Bilateral carpal tunnel syndrome (G56.03)  Trigger ring finger of right hand (M65.341) Jelani Arriaza  [employer] [facility]   Date of Surgery: N/A Next MD visit:  N/A   Precautions:  Cancer No data recorded Referral Information:   Date of Injury: No data recorded Date of Evaluation: Req: 0, Auth: 0, Exp:     05/21/25 POC Auth Visits:  25       Today's Date   6/18/2025    Subjective  Pt states that she has been trying to keep her arms straight at night.       Pain: 0/10     Objective  See tx log for details.           Assessment  Pt attempting to keep (B)UE in proper positions atnight but pt having difficulty sleeping due to constant monitoring. Discussed importance of wearing splint to allow for proper sleep. Pt purchased recommeded splints and is awaiting delivery. Mild tighntess in left thenar eminece noted. Advised pt to refrain from excessive gripping. Pt agreed.    Goals (to be met in 15 visits)   Increase (B)  strength by 10-15% measured by dynamometer to support functional tasks such as lifting groceries.  Increase (B) 2pt pinch by 1-2 lbs to assist with buttoning or holding utensils.  Increase (B) 3pt pinch by 2-3 lbs to increase ability to grasp grooming tools  Increase (B) lateral pt pinch by 2-3 lbs to increase ability to turning keys, holding cards and opening water bottles.   Pt will verbalize 2/3 proper  principles with 100% accuracy.  Patient will be independent in final HEP to facilitate carryover of functional gains made in clinical therapy program.                                  Plan  Patient will be seen 1-2 x week for 4-6 weeksx/week or a total of 15 visits over a 90 day period.    Treatment Last 4 Visits        6/9/2025 6/11/2025 6/16/2025 6/18/2025   OT Treatment   Treatment Day 5 6 7 8   Therapeutic Exercise AROM with fluidotherapy x 5\" (B)  Powerweb stretches x 3 min (B)  SB wall  walk ups x 3 min  Pendulum (R) shoulder  Pt education: splints, body mechanics AROM with fluidotherapy x 5\" (B)  Powerweb stretches x 3 min (B)  Sponges abd/add (B)  Frisbee perturbations CW/CCW x 2 min in each direction AROM with fluidotherapy x 5\" (B)  Powerweb stretches x 3 min (B)  Frisbee pertubations CW/CCW x 2 min each  Sponges abd/add (B)   AROM with fluidotherapy x 5\" (B)  Powerweb stretches x 3 min (B)  Sponges abd/add (B)     Manual Therapy (B) STM STM (B) STM (B) STM (B)   Therapeutic Exercise Min 35 35 35 35   Manual Therapy Min 10 10 10 10   Total Timed Procedures 45 45 45 45   Total Service Procedures 45 45 45 45   Total Time 45 45 45 45           Charges     1MT, 2 TE    Lester AntoineOTR/L

## 2025-06-20 LAB — ALKALINE PHOSPHATASE BONE SPECIFIC: 15.2 UG/L

## 2025-06-23 ENCOUNTER — OFFICE VISIT (OUTPATIENT)
Dept: PHYSICAL THERAPY | Age: 60
End: 2025-06-23
Payer: COMMERCIAL

## 2025-06-23 PROCEDURE — 97140 MANUAL THERAPY 1/> REGIONS: CPT

## 2025-06-23 PROCEDURE — 97110 THERAPEUTIC EXERCISES: CPT

## 2025-06-23 NOTE — PROGRESS NOTES
Patient: Kimberly Quezada (59 year old, female) Referring Provider:  Insurance:   Diagnosis: Bilateral carpal tunnel syndrome (G56.03)  Trigger ring finger of right hand (M65.341) Hillarygreg Prado  [employer]   Date of Surgery: N/A Next MD visit:  N/A   Precautions:  Cancer No data recorded Referral Information:   Date of Injury: No data recorded Date of Evaluation: Req: 0, Auth: 0, Exp:     05/21/25 POC Auth Visits:  25       Today's Date   6/23/2025    Subjective  Pt states that her (R) IF was bothering her over the weekend.       Pain: 4/10     Objective  See tx log for details.           Assessment  Soreness reported at palmar (R) MCP of IF and at (L) wrist. Pt not sure as to cause. Pt was wearing padded gloves at gym to minimize excessive gripping. Pt not sure if gripping steering wheel or grocery bag may have contributed to soreness in hand. No excessive tightness noted with palpation. Pt massaging hand to alleviate soreness.    Goals (to be met in 15 visits)   Increase (B)  strength by 10-15% measured by dynamometer to support functional tasks such as lifting groceries.  Increase (B) 2pt pinch by 1-2 lbs to assist with buttoning or holding utensils.  Increase (B) 3pt pinch by 2-3 lbs to increase ability to grasp grooming tools  Increase (B) lateral pt pinch by 2-3 lbs to increase ability to turning keys, holding cards and opening water bottles.   Pt will verbalize 2/3 proper  principles with 100% accuracy.  Patient will be independent in final HEP to facilitate carryover of functional gains made in clinical therapy program.                                      Plan  Patient will be seen 1-2 x week for 4-6 weeksx/week or a total of 15 visits over a 90 day period.    Treatment Last 4 Visits        6/11/2025 6/16/2025 6/18/2025 6/23/2025   OT Treatment   Treatment Day 6 7 8 9   Therapeutic Exercise AROM with fluidotherapy x 5\" (B)  Powerweb stretches x 3 min (B)  Sponges abd/add  (B)  Frisbee perturbations CW/CCW x 2 min in each direction AROM with fluidotherapy x 5\" (B)  Powerweb stretches x 3 min (B)  Frisbee pertubations CW/CCW x 2 min each  Sponges abd/add (B)   AROM with fluidotherapy x 5\" (B)  Powerweb stretches x 3 min (B)  Sponges abd/add (B)   Powerweb stretches x 3 min  Sponges with lumbrical bar (B)   Manual Therapy STM (B) STM (B) STM (B) STM (B)   Modalities    MHP x 5\" (B)   Therapeutic Exercise Min 35 35 35 20   Manual Therapy Min 10 10 10 10   Total Timed Procedures 45 45 45 30   Total Service Procedures 45 45 45 30   Total Time 45 45 45 30           Charges     1 MT, 1 ASAEL Antoine,OTR/L

## 2025-06-30 ENCOUNTER — APPOINTMENT (OUTPATIENT)
Dept: PHYSICAL THERAPY | Facility: HOSPITAL | Age: 60
End: 2025-06-30
Attending: PODIATRIST
Payer: COMMERCIAL

## 2025-06-30 ENCOUNTER — APPOINTMENT (OUTPATIENT)
Dept: PHYSICAL THERAPY | Age: 60
End: 2025-06-30
Payer: COMMERCIAL

## 2025-06-30 ENCOUNTER — OFFICE VISIT (OUTPATIENT)
Dept: PHYSICAL THERAPY | Age: 60
End: 2025-06-30
Payer: COMMERCIAL

## 2025-06-30 PROCEDURE — 97140 MANUAL THERAPY 1/> REGIONS: CPT

## 2025-06-30 PROCEDURE — 97110 THERAPEUTIC EXERCISES: CPT

## 2025-06-30 NOTE — PROGRESS NOTES
Patient: Kimberly Quezada (59 year old, female) Referring Provider:  Insurance:   Diagnosis: Bilateral carpal tunnel syndrome (G56.03)  Trigger ring finger of right hand (M65.341) Hillary Garcia Prado  [employer]   Date of Surgery: N/A Next MD visit:  N/A   Precautions:  Cancer No data recorded Referral Information:   Date of Injury: No data recorded Date of Evaluation: Req: 0, Auth: 0, Exp:     05/21/25 POC Auth Visits:  25       Today's Date   6/30/2025    Subjective  Pt states that her left thumb is bothering her.       Pain: 3/10     Objective  See tx log for details.           Assessment  Pt reports symptomatic improvement with consistent nighttime use of wrist splints. Decreased triggering is noted with use of Oval-8 splint on the right ring finger. Patient reports new onset pain at the base of the left thumb, likely related to overuse and compensatory gripping. Advised patient to minimize forceful and repetitive gripping activities to reduce strain, and patient verbalized understanding and agreement. Left shoulder pain appears to be positional and may be related to sleeping habits; patient was educated on sleep positioning strategies. Overall, patient is responding well to conservative management and remains compliant with recommendations.    Goals (to be met in 15 visits)   Increase (B)  strength by 10-15% measured by dynamometer to support functional tasks such as lifting groceries.  Increase (B) 2pt pinch by 1-2 lbs to assist with buttoning or holding utensils.  Increase (B) 3pt pinch by 2-3 lbs to increase ability to grasp grooming tools  Increase (B) lateral pt pinch by 2-3 lbs to increase ability to turning keys, holding cards and opening water bottles.   Pt will verbalize 2/3 proper  principles with 100% accuracy.  Patient will be independent in final HEP to facilitate carryover of functional gains made in clinical therapy program.                                           Plan  Patient will be seen 1-2 x week for 4-6 weeksx/week or a total of 15 visits over a 90 day period.    Treatment Last 4 Visits        6/16/2025 6/18/2025 6/23/2025 6/30/2025   OT Treatment   Treatment Day 7 8 9 10   Therapeutic Exercise AROM with fluidotherapy x 5\" (B)  Powerweb stretches x 3 min (B)  Frisbee pertubations CW/CCW x 2 min each  Sponges abd/add (B)   AROM with fluidotherapy x 5\" (B)  Powerweb stretches x 3 min (B)  Sponges abd/add (B)   Powerweb stretches x 3 min  Sponges with lumbrical bar (B) AROM with fluidotherapy x 5\" (B)  Powerweb stretches x 3 min (B)  Sponges with lumbrical bar (B)  SB walk ups x 3 min  Frisbee perturbations CW/CCW x 2 min each     Manual Therapy STM (B) STM (B) STM (B) STM (B)   Modalities   MHP x 5\" (B)    Therapeutic Exercise Min 35 35 20 35   Manual Therapy Min 10 10 10 10   Total Timed Procedures 45 45 30 45   Total Service Procedures 45 45 30 45   Total Time 45 45 30 45           Charges     1MT, 2TE    Lester AntoineOTR/L

## 2025-07-02 ENCOUNTER — APPOINTMENT (OUTPATIENT)
Dept: PHYSICAL THERAPY | Facility: HOSPITAL | Age: 60
End: 2025-07-02
Attending: PODIATRIST

## 2025-07-03 ENCOUNTER — TELEPHONE (OUTPATIENT)
Dept: PHYSICAL THERAPY | Facility: HOSPITAL | Age: 60
End: 2025-07-03

## 2025-07-07 ENCOUNTER — OFFICE VISIT (OUTPATIENT)
Dept: PHYSICAL THERAPY | Age: 60
End: 2025-07-07
Payer: COMMERCIAL

## 2025-07-07 PROCEDURE — 97110 THERAPEUTIC EXERCISES: CPT

## 2025-07-07 PROCEDURE — 97140 MANUAL THERAPY 1/> REGIONS: CPT

## 2025-07-07 NOTE — PROGRESS NOTES
Patient: Kimberly Quezada (59 year old, female) Referring Provider:  Insurance:   Diagnosis: Bilateral carpal tunnel syndrome (G56.03)  Trigger ring finger of right hand (M65.341) Hillarygreg Prado  [employer]   Date of Surgery: N/A Next MD visit:  N/A   Precautions:  Cancer No data recorded Referral Information:   Date of Injury: No data recorded Date of Evaluation: Req: 0, Auth: 0, Exp:     05/21/25 POC Auth Visits:  25       Today's Date   7/7/2025    Subjective  Pt states that her hands are feeling good today.       Pain: 1/10     Objective  See tx log for details.           Assessment  Nighttime splint use has significantly reduced symptoms. Patient continues to wear Oval-8 splint during the day to address intermittent stiffness in the right ring finger. Recent soreness following grocery carrying helped reinforce the importance of avoiding heavy loads in the hands. Patient is utilizing heat for pain management with reported benefit. Continues to demonstrate insight into symptom triggers and is making appropriate activity modifications. Will continue to reinforce joint protection strategies and monitor progress.    Goals (to be met in 15 visits)   Increase (B)  strength by 10-15% measured by dynamometer to support functional tasks such as lifting groceries.  Increase (B) 2pt pinch by 1-2 lbs to assist with buttoning or holding utensils.  Increase (B) 3pt pinch by 2-3 lbs to increase ability to grasp grooming tools  Increase (B) lateral pt pinch by 2-3 lbs to increase ability to turning keys, holding cards and opening water bottles.   Pt will verbalize 2/3 proper  principles with 100% accuracy.  Patient will be independent in final HEP to facilitate carryover of functional gains made in clinical therapy program.                                              Plan  Patient will be seen 1-2 x week for 4-6 weeksx/week or a total of 15 visits over a 90 day period.    Treatment Last 4 Visits         6/18/2025 6/23/2025 6/30/2025 7/7/2025   OT Treatment   Treatment Day 8 9 10 11   Therapeutic Exercise AROM with fluidotherapy x 5\" (B)  Powerweb stretches x 3 min (B)  Sponges abd/add (B)   Powerweb stretches x 3 min  Sponges with lumbrical bar (B) AROM with fluidotherapy x 5\" (B)  Powerweb stretches x 3 min (B)  Sponges with lumbrical bar (B)  SB walk ups x 3 min  Frisbee perturbations CW/CCW x 2 min each   AROM with fluidotherapy x 5\" (B)  Powerweb stretches x 3 min (B)  Sponges abd/add   Frisbee perturbations CW/CCW x 2 min each     Manual Therapy STM (B) STM (B) STM (B) STM (B)   Modalities  MHP x 5\" (B)     Therapeutic Exercise Min 35 20 35    Manual Therapy Min 10 10 10    Total Timed Procedures 45 30 45 0   Total Service Procedures 45 30 45 0   Total Time 45 30 45 0              Charges     1 MT, 2 ASAEL AntoineOTR/L

## 2025-07-08 ENCOUNTER — APPOINTMENT (OUTPATIENT)
Dept: PHYSICAL THERAPY | Facility: HOSPITAL | Age: 60
End: 2025-07-08
Attending: PODIATRIST

## 2025-07-09 ENCOUNTER — OFFICE VISIT (OUTPATIENT)
Dept: PHYSICAL THERAPY | Age: 60
End: 2025-07-09
Payer: COMMERCIAL

## 2025-07-09 PROCEDURE — 97110 THERAPEUTIC EXERCISES: CPT

## 2025-07-09 PROCEDURE — 97140 MANUAL THERAPY 1/> REGIONS: CPT

## 2025-07-09 NOTE — PROGRESS NOTES
Patient: Kimberly Quezada (59 year old, female) Referring Provider:  Insurance:   Diagnosis: Bilateral carpal tunnel syndrome (G56.03)  Trigger ring finger of right hand (M65.341) No ref. provider found  [employer]   Date of Surgery: N/A Next MD visit:  N/A   Precautions:  Cancer No data recorded Referral Information:   Date of Injury: No data recorded Date of Evaluation: Req: 0, Auth: 0, Exp:     05/21/25 POC Auth Visits:  25       Today's Date   7/9/2025    Subjective  Pt states that her hands are not as numb anymore.       Pain: 1/10     Objective  See tx log for details.         Assessment  No reports of nocturnal numbness or tingling, attributed to consistent use of wrist splints. Triggering has been effectively managed with appropriate use of the Oval-8 splint. Patient demonstrates good compliance with joint protection strategies and has met most therapy goals. Discharge is appropriate at next appointment.    Goals (to be met in 15 visits)   Increase (B)  strength by 10-15% measured by dynamometer to support functional tasks such as lifting groceries.  Increase (B) 2pt pinch by 1-2 lbs to assist with buttoning or holding utensils.  Increase (B) 3pt pinch by 2-3 lbs to increase ability to grasp grooming tools  Increase (B) lateral pt pinch by 2-3 lbs to increase ability to turning keys, holding cards and opening water bottles.   Pt will verbalize 2/3 proper  principles with 100% accuracy.  Patient will be independent in final HEP to facilitate carryover of functional gains made in clinical therapy program.                                                  Plan  Patient will be seen 1-2 x week for 4-6 weeksx/week or a total of 15 visits over a 90 day period.    Treatment Last 4 Visits        6/23/2025 6/30/2025 7/7/2025 7/9/2025   OT Treatment   Treatment Day 9 10 11 12   Therapeutic Exercise Powerweb stretches x 3 min  Sponges with lumbrical bar (B) AROM with fluidotherapy x 5\"  (B)  Powerweb stretches x 3 min (B)  Sponges with lumbrical bar (B)  SB walk ups x 3 min  Frisbee perturbations CW/CCW x 2 min each   AROM with fluidotherapy x 5\" (B)  Powerweb stretches x 3 min (B)  Sponges abd/add   Frisbee perturbations CW/CCW x 2 min each   AROM with fluidotherapy x 5\" (B)  Powerweb stretches x 3 min (B)  [employer]lex  (red) (B) x 2 min  Handhelper 2 Y RB, 1 R RB x 3 min (B)   Manual Therapy STM (B) STM (B) STM (B) STM (B)   Modalities MHP x 5\" (B)      Therapeutic Exercise Min 20 35  35   Manual Therapy Min 10 10  10   Total Timed Procedures 30 45 0 45   Total Service Procedures 30 45 0 45   Total Time 30 45 0 45              Charges     1 MT,  2 ASAEL Antoine,OTR/L

## 2025-07-11 ENCOUNTER — APPOINTMENT (OUTPATIENT)
Dept: PHYSICAL THERAPY | Facility: HOSPITAL | Age: 60
End: 2025-07-11
Attending: PODIATRIST

## 2025-07-14 ENCOUNTER — OFFICE VISIT (OUTPATIENT)
Dept: PHYSICAL THERAPY | Age: 60
End: 2025-07-14
Payer: COMMERCIAL

## 2025-07-14 PROCEDURE — 97110 THERAPEUTIC EXERCISES: CPT

## 2025-07-14 NOTE — PROGRESS NOTES
Patient: Kimberly Quezada (59 year old, female) Referring Provider:  Insurance:   Diagnosis: Bilateral carpal tunnel syndrome (G56.03)  Trigger ring finger of right hand (M65.341) No ref. provider found  [employer]   Date of Surgery: N/A Next MD visit:  N/A   Precautions:  Cancer No data recorded Referral Information:   Date of Injury: No data recorded Date of Evaluation: Req: 0, Auth: 0, Exp:     05/21/25 POC Auth Visits:  25       Today's Date   7/14/2025    Subjective  Pt states that she feels good.       Pain: 0/10     Objective  See tx log for details.    Hand Strength       5/21/2025 7/14/2025   Hand Strength    Rt 20 21    Lt 20 22   2 Pt Pinch Rt 5 5   2 Pt Pinch Lt 4 5   3 Pt Pinch Rt 4 5   3 Pt Pinch Lt 2 5   Lateral Pinch Rt 4 7   Lateral Pinch Lt 8 10           Assessment  Pt has seen for 13/15 visits since initial evaluation on 5/21/2025. Pt has completed a full course of therapy and has demonstrated overall clinical improvement. Although bilateral  strength remains unchanged, objective gains were noted in 2-point, 3-point, and lateral pinch strength. Pt denies any numbness or tingling at this time. Triggering of the right ring finger has resolved with consistent use of the Oval-8 splint during the day. Pt is also wearing bilateral wrist splints at night, which she reports have helped with symptom control.  Pt has been compliant with her home exercise program and demonstrated good understanding of joint protection strategies and activity modifications. Functional use of both hands has improved, with increased ease performing daily activities. Interventions provided during therapy included: therapeutic exercises, progressive resistive strengthening, neuromuscular re-education, manual therapy, nerve gliding techniques, instruction in proper ergonomics and body mechanics, splint education and use, and pain management strategies including use of heat and rest breaks.  Pt is  appropriate for discharge at this time as she has met her goals, is able to manage symptoms independently, and demonstrates the ability to maintain gains through continued adherence to her home program.    Goals (to be met in 15 visits)   Increase (B)  strength by 10-15% measured by dynamometer to support functional tasks such as lifting groceries. PARTIALLY MET  Increase (B) 2pt pinch by 1-2 lbs to assist with buttoning or holding utensils. MET  Increase (B) 3pt pinch by 2-3 lbs to increase ability to grasp grooming tools MET  Increase (B) lateral pt pinch by 2-3 lbs to increase ability to turning keys, holding cards and opening water bottles. MET  Pt will verbalize 2/3 proper  principles with 100% accuracy. MET  Patient will be independent in final HEP to facilitate carryover of functional gains made in clinical therapy program. MET         Plan  Discharge pt from OT. Pt to continue with HEP and follow up with MD as needed.    Treatment Last 4 Visits        6/30/2025 7/7/2025 7/9/2025 7/14/2025   OT Treatment   Treatment Day 10 11 12 13   Therapeutic Exercise AROM with fluidotherapy x 5\" (B)  Powerweb stretches x 3 min (B)  Sponges with lumbrical bar (B)  SB walk ups x 3 min  Frisbee perturbations CW/CCW x 2 min each   AROM with fluidotherapy x 5\" (B)  Powerweb stretches x 3 min (B)  Sponges abd/add   Frisbee perturbations CW/CCW x 2 min each   AROM with fluidotherapy x 5\" (B)  Powerweb stretches x 3 min (B)  Digiflex  (red) (B) x 2 min  Handhelper 2 Y RB, 1 R RB x 3 min (B) AROM with fluidotherapy x 5\" (B)  Objective measurements x 10\"   Manual Therapy STM (B) STM (B) STM (B)    Therapeutic Exercise Min 35  35 30   Manual Therapy Min 10  10    Total Timed Procedures 45 0 45 30   Total Service Procedures 45 0 45 30   Total Time 45 0 45 30              Charges     2 ASAEL Antoine,OTR/L

## 2025-07-15 ENCOUNTER — APPOINTMENT (OUTPATIENT)
Dept: PHYSICAL THERAPY | Facility: HOSPITAL | Age: 60
End: 2025-07-15
Attending: PODIATRIST

## 2025-07-24 RX ORDER — TERIPARATIDE 250 UG/ML
20 INJECTION, SOLUTION SUBCUTANEOUS DAILY
Qty: 3 ML | Refills: 3 | Status: SHIPPED | OUTPATIENT
Start: 2025-07-24

## 2025-07-24 NOTE — TELEPHONE ENCOUNTER
Endocrine refill protocol for anti-resorptive and anabolic agents:     Protocol Criteria: PASSED Reason: N/A    If all below requirements are met, send a 90-day supply with 1 refill per provider protocol.    Verify appointment with Endocrinology completed in the last 12 months or scheduled in the next 6 months.  Confirm timeline for Tymlos and Forteo- patient's time on medication should not have exceeded 2 years.    Start date for Tymlos/Forteo: N/A  Last completed office visit:6/16/2025 Ritu Marcum MD   Last completed telemed visit: Visit date not found  Next scheduled follow up:   Future Appointments   Date Time Provider Department Center   9/8/2025 10:30 AM [facility] PET DOSE RM [facility] PET SCAN EM [facility]   9/8/2025 11:45 AM [facility] PET RM1 [facility] PET SCAN EM [facility]   9/10/2025 10:30 AM Hillary Prado MD [facility]   12/15/2025 11:30 AM Ritu Marcum MD [facility]

## 2025-07-28 RX ORDER — CELECOXIB 100 MG/1
100 CAPSULE ORAL DAILY
Qty: 90 CAPSULE | Refills: 0 | Status: SHIPPED | OUTPATIENT
Start: 2025-07-28

## 2025-07-28 NOTE — TELEPHONE ENCOUNTER
Dr Prado, please advise    Patient (name and  verified) is asking for a refill for the Celebrex for her back pain.    Patient states that the medication is helping her back pain. Patient is finishing up with physical therapy this week.     Patient also states that she has been receiving letters from her insurance requesting additional information for the physical therapy recommendations. Has the office received or completed any requests by patient's insurance?

## 2025-07-29 ENCOUNTER — APPOINTMENT (OUTPATIENT)
Dept: PHYSICAL THERAPY | Facility: HOSPITAL | Age: 60
End: 2025-07-29
Attending: PODIATRIST

## 2025-07-31 ENCOUNTER — PATIENT MESSAGE (OUTPATIENT)
Dept: FAMILY MEDICINE CLINIC | Facility: CLINIC | Age: 60
End: 2025-07-31

## 2025-08-19 ENCOUNTER — RX ONLY (RX ONLY)
Age: 60
End: 2025-08-19

## 2025-08-19 RX ORDER — IVERMECTIN/METRONIDAZOLE/NIACI 1 %-1 %-4%
GEL (GRAM) TOPICAL
Qty: 30 | Refills: 11 | Status: ERX

## 2025-08-20 ENCOUNTER — RX ONLY (RX ONLY)
Age: 60
End: 2025-08-20

## 2025-08-20 RX ORDER — IVERMECTIN/METRONIDAZOLE/NIACI 1 %-1 %-4%
GEL (GRAM) TOPICAL
Qty: 30 | Refills: 0 | Status: CANCELLED

## (undated) DEVICE — MEDI-VAC NON-CONDUCTIVE SUCTION TUBING: Brand: CARDINAL HEALTH

## (undated) DEVICE — CONMED SCOPE SAVER BITE BLOCK, 20X27 MM: Brand: SCOPE SAVER

## (undated) DEVICE — E-Z CLEAN, NON-STICK, PTFE COATED, ELECTROSURGICAL BLADE ELECTRODE, 2.75 INCH (7 CM): Brand: MEGADYNE

## (undated) DEVICE — ENDOSCOPY PACK UPPER: Brand: MEDLINE INDUSTRIES, INC.

## (undated) DEVICE — GIJAW SINGLE-USE BIOPSY FORCEPS WITH NEEDLE: Brand: GIJAW

## (undated) DEVICE — LINE MNTR ADLT SET O2 INTMD

## (undated) DEVICE — KIT ENDO ORCAPOD 160/180/190

## (undated) DEVICE — ANTIBACTERIAL UNDYED BRAIDED (POLYGLACTIN 910), SYNTHETIC ABSORBABLE SUTURE: Brand: COATED VICRYL

## (undated) DEVICE — SHEET, T, LAPAROTOMY, STERILE: Brand: MEDLINE

## (undated) DEVICE — 60 ML SYRINGE REGULAR TIP: Brand: MONOJECT

## (undated) DEVICE — FORCEP RADIAL JAW 4

## (undated) DEVICE — CONTAINER,SPECIMEN,OR STERILE,4OZ: Brand: MEDLINE

## (undated) DEVICE — MINOR GENERAL: Brand: MEDLINE INDUSTRIES, INC.

## (undated) DEVICE — SYRINGE/GUAGE ASSEMBLY

## (undated) DEVICE — YANKAUER,BULB TIP,W/O VENT,RIGID,STERILE: Brand: MEDLINE

## (undated) DEVICE — MEDI-VAC NON-CONDUCTIVE SUCTION TUBING 6MM X 1.8M (6FT.) L: Brand: CARDINAL HEALTH

## (undated) DEVICE — Device: Brand: DEFENDO AIR/WATER/SUCTION AND BIOPSY VALVE

## (undated) DEVICE — Device: Brand: DUAL NARE NASAL CANNULAE FEMALE LUER CON 7FT O2 TUBE

## (undated) DEVICE — GLOVE SUR 6.5 SENSICARE PI MIC PIP CRM PWD F

## (undated) DEVICE — UNDYED BRAIDED (POLYGLACTIN 910), SYNTHETIC ABSORBABLE SUTURE: Brand: COATED VICRYL

## (undated) DEVICE — STERILE LATEX POWDER-FREE SURGICAL GLOVESWITH NITRILE COATING: Brand: PROTEXIS

## (undated) DEVICE — INTENDED TO BE USED TO OCCLUDE, RETRACT AND IDENTIFY ARTERIES, VEINS, TENDONS AND NERVES IN SURGICAL PROCEDURES: Brand: STERION®  VESSEL LOOP

## (undated) DEVICE — ENDOSCOPY PACK - LOWER: Brand: MEDLINE INDUSTRIES, INC.

## (undated) DEVICE — KIT CLEAN ENDOKIT 1.1OZ GOWNX2

## (undated) DEVICE — YANKAUER,FLEXIBLE HANDLE,REGLR CAPACITY: Brand: MEDLINE INDUSTRIES, INC.

## (undated) DEVICE — GLOVE SUR 6.5 SENSICARE PI PIP GRN PWD F

## (undated) DEVICE — SUT PERMA- 0 30IN SH NABSRB BLK 26MM 1/2 C

## (undated) NOTE — LETTER
Date: 11/10/2017    Patient Name: Fatemeh Mcintosh          To Whom it may concern: This letter has been written at the patient's request. The above patient was seen at the [facility] for treatment of a medical condition.     This patient

## (undated) NOTE — Clinical Note
Doing well despite all going on with her. Now with bilat carpal tunnel, managed by Dr. Christiansen and will see ortho surgeon. Port out yesterday. Sees you on 6/10. thx

## (undated) NOTE — Clinical Note
I saw Khanh Paige in the DEPARTMENT [facility] today for Viral uri with cough  (primary encounter diagnosis),  Acute pharyngitis, unspecified etiology. she was treated with bromfed DM. Khanh Paige will follow up with you if no better or as needed.  Than

## (undated) NOTE — LETTER
Date: 1/13/2020    Patient Name: Michael Fisher          To Whom it may concern: This letter has been written at the patient's request. The above patient was seen at the [facility] for treatment of a medical condition.     This patient s

## (undated) NOTE — MR AVS SNAPSHOT
[facility]  [address] [place] [address], [address] [place] [address]  Usman Pel[employer]ostein               Thank you for choosing us for your health care visit with César Saunders. DO Leroy.   We are glad to serve you and happy to provide you with this summary Commonly known as:  MONODOX           ergocalciferol 05699 units Caps   TAKE 1 CAPSULE BY MOUTH ONCE A WEEK. Commonly known as:  DRISDOL/VITAMIN D2           multivitamin Tabs   Take 1 tablet by mouth daily.            OMEGA 3 OR   Take 1 capsule by mouth Start activities slowly and build up over time Do what you like   Get your heart pumping – brisk walking, biking, swimming Even 10 minute increments are effective and add up over the week   2 ½ hours per week – spread out over time Use a joel to keep you

## (undated) NOTE — LETTER
[facility]   Date:   7/12/2023     Name:   Kevon Medina    YOB: 1965   MRN:   [id number]       WHERE IS YOUR PAIN NOW? Kaylin the areas on your body where you feel the described sensations. Use the appropriate symbol. Checo Nunn the areas of radiation. Include all affected areas. Just to complete the picture, please draw in the face. ACHE:  ^ ^ ^   NUMBNESS:  0000   PINS & NEEDLES:  = = = =                              ^ ^ ^                       0000              = = = =                                    ^ ^ ^                       0000            = = = =      BURNING:  XXXX   STABBING: ////                  XXXX                ////                         XXXX          ////     Please kaylin the line below indicating your degree of pain right now  with 0 being no pain 10 being the worst pain possible.                                          0             1             2              3             4              5              6              7             8             9             10         Patient Signature:

## (undated) NOTE — LETTER
Date: 1/29/2019    Patient Name: Andrew Handy          To Whom it may concern: This letter has been written at the patient's request. The above patient was seen at the [facility] for treatment of a medical condition.     This patient s

## (undated) NOTE — LETTER
Date: 2/19/2019    Patient Name: Jose R Jones          To Whom it may concern: This letter has been written at the patient's request. The above patient was seen at the [facility] for treatment of a medical condition.     The patient ma

## (undated) NOTE — Clinical Note
I saw Marissatimmy German in the DEPARTMENT [facility] today for Viral uri with cough  (primary encounter diagnosis). she was treated with bromfed dm and comfort care. Blas Porter will follow up with you if no better or as needed.  Thank you for the opportunity

## (undated) NOTE — LETTER
Date: 12/27/2019    Patient Name: Rex Woodard          To Whom it may concern: This letter has been written at the patient's request. The above patient was seen at the [facility] for treatment of a medical condition.     This patient

## (undated) NOTE — LETTER
[facility] [address] , [place]   Date:   8/10/2023     Name:   Marina Higginbotham    YOB: 1965   MRN:   [id number]       [facility]? Kaylin the areas on your body where you feel the described sensations. Use the appropriate symbol. [place] the areas of radiation. Include all affected areas. Just to complete the picture, please draw in the face. ACHE:  ^ ^ ^   NUMBNESS:  0000   PINS & NEEDLES:  = = = =                              ^ ^ ^                       0000              = = = =                                    ^ ^ ^                       0000            = = = =      BURNING:  XXXX   STABBING: ////                  XXXX                ////                         XXXX          ////     Please kaylin the line below indicating your degree of pain right now  with 0 being no pain 10 being the worst pain possible.                                          0             1             2              3             4              5              6              7             8             9             10         Patient Signature:

## (undated) NOTE — LETTER
6/12/2025          To Whom It May Concern:    Kimberly Quezada is currently under my medical care and should continue FMLA until 9/30/2025 due to recent lymphoma treatment. Now in multiple therapies due to side effects from her treatment.     If you require additional information please contact our office.        Sincerely,    Hillary Prado MD          Document generated by:  Hillary Prado MD

## (undated) NOTE — MR AVS SNAPSHOT
[address] [facility] [address]  [place] [address]  [phone]               Thank you for choosing us for your health care visit with Kal Dunn NP.   We are glad to serve you and happy to provide you with this summary of your You have a viral upper respiratory illness (URI), which is another term for the common cold. This illness is contagious during the first few days. It is spread through the air by coughing and sneezing.  It may also be spread by direct contact (touching the · Cough with lots of colored sputum (mucus)  · Severe headache; face, neck, or ear pain  · Difficulty swallowing due to throat pain  · Fever of 100.4°F (38°C)  Call 911, or get immediate medical care  Call emergency services right away if any of these occu · For sore throats caused by allergies, try antihistamines to block the allergic reaction. · Remember: unless a sore throat is caused by a bacterial infection, antibiotics won’t help you.   Prevent future sore throats  Prevention tips include the following Commonly known as:  DRISDOL/VITAMIN D2           * ergocalciferol 17293 units Caps   TAKE 1 CAPSULE BY MOUTH ONCE A WEEK. Commonly known as:  DRISDOL/VITAMIN D2           multivitamin Tabs   Take 1 tablet by mouth daily.            OMEGA 3 OR   Take 1 cap

## (undated) NOTE — LETTER
[facility]  [address], [place], [place]    Authorization for Surgical Operation and Procedure                               I hereby authorize * Surgery not found *, my physician and his/her assistants (if applicable), which may include medical students, residents, and/or fellows, to perform the following surgical operation/ procedure and administer such anesthesia as may be determined necessary by my physician: Operation/Procedure name (s)  on Kimberly Quezada   2.   I recognize that during the surgical operation/procedure, unforeseen conditions may necessitate additional or different procedures than those listed above.  I, therefore, further authorize and request that the above-named surgeon, assistants, or designees perform such procedures as are, in their judgment, necessary and desirable.    3.   My surgeon/physician has discussed prior to my surgery the potential benefits, risks and side effects of this procedure; the likelihood of achieving goals; and potential problems that might occur during recuperation.  They also discussed reasonable alternatives to the procedure, including risks, benefits, and side effects related to the alternatives and risks related to not receiving this procedure.  I have had all my questions answered and I acknowledge that no guarantee has been made as to the result that may be obtained.    4.   Should the need arise during my operation/procedure, which includes change of level of care prior to discharge, I also consent to the administration of blood and/or blood products.  Further, I understand that despite careful testing and screening of blood or blood products by collecting agencies, I may still be subject to ill effects as a result of receiving a blood transfusion and/or blood products.  The following are some, but not all, of the potential risks that can occur: fever and allergic reactions, hemolytic reactions, transmission of diseases such as  Hepatitis, AIDS and Cytomegalovirus (CMV) and fluid overload.  In the event that I wish to have an autologous transfusion of my own blood, or a directed donor transfusion, I will discuss this with my physician.  Check only if Refusing Blood or Blood Products  I understand refusal of blood or blood products as deemed necessary by my physician may have serious consequences to my condition to include possible death. I hereby assume responsibility for my refusal and release the hospital, its personnel, and my physicians from any responsibility for the consequences of my refusal.    o  Refuse   5.   I authorize the use of any specimen, organs, tissues, body parts or foreign objects that may be removed from my body during the operation/procedure for diagnosis, research or teaching purposes and their subsequent disposal by hospital authorities.  I also authorize the release of specimen test results and/or written reports to my treating physician on the hospital medical staff or other referring or consulting physicians involved in my care, at the discretion of the Pathologist or my treating physician.    6.   I consent to the photographing or videotaping of the operations or procedures to be performed, including appropriate portions of my body for medical, scientific, or educational purposes, provided my identity is not revealed by the pictures or by descriptive texts accompanying them.  If the procedure has been photographed/videotaped, the surgeon will obtain the original picture, image, videotape or CD.  The hospital will not be responsible for storage, release or maintenance of the picture, image, tape or CD.    7.   I consent to the presence of a  or observers in the operating room as deemed necessary by my physician or their designees.    8.   I recognize that in the event my procedure results in extended X-Ray/fluoroscopy time, I may develop a skin reaction.    9. If I have a Do Not Attempt  Resuscitation (DNAR) order in place, that status will be suspended while in the operating room, procedural suite, and during the recovery period unless otherwise explicitly stated by me (or a person authorized to consent on my behalf). The surgeon or my attending physician will determine when the applicable recovery period ends for purposes of reinstating the DNAR order.  10. Patients having a sterilization procedure: I understand that if the procedure is successful the results will be permanent and it will therefore be impossible for me to inseminate, conceive, or bear children.  I also understand that the procedure is intended to result in sterility, although the result has not been guaranteed.   11. I acknowledge that my physician has explained sedation/analgesia administration to me including the risk and benefits I consent to the administration of sedation/analgesia as may be necessary or desirable in the judgment of my physician.    I CERTIFY THAT I HAVE READ AND FULLY UNDERSTAND THE ABOVE CONSENT TO OPERATION and/or OTHER PROCEDURE.     ____________________________________  _________________________________        ______________________________  Signature of Patient    Signature of Responsible Person                Printed Name of Responsible Person                                      ____________________________________  _____________________________                ________________________________  Signature of Witness        Date  Time         Relationship to Patient    STATEMENT OF PHYSICIAN My signature below affirms that prior to the time of the procedure; I have explained to the patient and/or his/her legal representative, the risks and benefits involved in the proposed treatment and any reasonable alternative to the proposed treatment. I have also explained the risks and benefits involved in refusal of the proposed treatment and alternatives to the proposed treatment and have answered the patient's  questions. If I have a significant financial interest in a co-management agreement or a significant financial interest in any product or implant, or other significant relationship used in this procedure/surgery, I have disclosed this and had a discussion with my patient.     _____________________________________________________              _____________________________  (Signature of Physician)                                                                                         (Date)                                   (Time)  Patient Name: Kimberly Quezada      : 10/31/1965      Printed: 8/15/2024     Medical Record #: [id number]                                      Page 1 of 1

## (undated) NOTE — LETTER
20      Patient: Abbey Berg  : 10/31/1965 Visit date: 2020    Dear Chirag Robledo,      I examined your patient in consultation today. She has a left thumb trigger with locking. We performed a steroid injection today.     Ronak Hinton

## (undated) NOTE — LETTER
Date: 7/26/2019    Patient Name: Portia Lizama          To Whom it may concern: This letter has been written at the patient's request. The above patient was seen at the [facility] for treatment of a medical condition.     This patient s

## (undated) NOTE — Clinical Note
Date: 5/30/2017    Patient Name: Amy Allison          To Whom it may concern: This letter has been written at the patient's request. The above patient was seen at the [facility] for treatment of a medical condition.     This patient s

## (undated) NOTE — LETTER
[facility]  [address], [place], [place]    Authorization for Surgical Operation and Procedure                               I hereby authorize * Surgery not found *, my physician and his/her assistants (if applicable), which may include medical students, residents, and/or fellows, to perform the following surgical operation/ procedure and administer such anesthesia as may be determined necessary by my physician: Operation/Procedure name (s)  on Kimberly Quezada   2.   I recognize that during the surgical operation/procedure, unforeseen conditions may necessitate additional or different procedures than those listed above.  I, therefore, further authorize and request that the above-named surgeon, assistants, or designees perform such procedures as are, in their judgment, necessary and desirable.    3.   My surgeon/physician has discussed prior to my surgery the potential benefits, risks and side effects of this procedure; the likelihood of achieving goals; and potential problems that might occur during recuperation.  They also discussed reasonable alternatives to the procedure, including risks, benefits, and side effects related to the alternatives and risks related to not receiving this procedure.  I have had all my questions answered and I acknowledge that no guarantee has been made as to the result that may be obtained.    4.   Should the need arise during my operation/procedure, which includes change of level of care prior to discharge, I also consent to the administration of blood and/or blood products.  Further, I understand that despite careful testing and screening of blood or blood products by collecting agencies, I may still be subject to ill effects as a result of receiving a blood transfusion and/or blood products.  The following are some, but not all, of the potential risks that can occur: fever and allergic reactions, hemolytic reactions, transmission of diseases such as  Hepatitis, AIDS and Cytomegalovirus (CMV) and fluid overload.  In the event that I wish to have an autologous transfusion of my own blood, or a directed donor transfusion, I will discuss this with my physician.  Check only if Refusing Blood or Blood Products  I understand refusal of blood or blood products as deemed necessary by my physician may have serious consequences to my condition to include possible death. I hereby assume responsibility for my refusal and release the hospital, its personnel, and my physicians from any responsibility for the consequences of my refusal.    o  Refuse   5.   I authorize the use of any specimen, organs, tissues, body parts or foreign objects that may be removed from my body during the operation/procedure for diagnosis, research or teaching purposes and their subsequent disposal by hospital authorities.  I also authorize the release of specimen test results and/or written reports to my treating physician on the hospital medical staff or other referring or consulting physicians involved in my care, at the discretion of the Pathologist or my treating physician.    6.   I consent to the photographing or videotaping of the operations or procedures to be performed, including appropriate portions of my body for medical, scientific, or educational purposes, provided my identity is not revealed by the pictures or by descriptive texts accompanying them.  If the procedure has been photographed/videotaped, the surgeon will obtain the original picture, image, videotape or CD.  The hospital will not be responsible for storage, release or maintenance of the picture, image, tape or CD.    7.   I consent to the presence of a  or observers in the operating room as deemed necessary by my physician or their designees.    8.   I recognize that in the event my procedure results in extended X-Ray/fluoroscopy time, I may develop a skin reaction.    9. If I have a Do Not Attempt  Resuscitation (DNAR) order in place, that status will be suspended while in the operating room, procedural suite, and during the recovery period unless otherwise explicitly stated by me (or a person authorized to consent on my behalf). The surgeon or my attending physician will determine when the applicable recovery period ends for purposes of reinstating the DNAR order.  10. Patients having a sterilization procedure: I understand that if the procedure is successful the results will be permanent and it will therefore be impossible for me to inseminate, conceive, or bear children.  I also understand that the procedure is intended to result in sterility, although the result has not been guaranteed.   11. I acknowledge that my physician has explained sedation/analgesia administration to me including the risk and benefits I consent to the administration of sedation/analgesia as may be necessary or desirable in the judgment of my physician.    I CERTIFY THAT I HAVE READ AND FULLY UNDERSTAND THE ABOVE CONSENT TO OPERATION and/or OTHER PROCEDURE.     ____________________________________  _________________________________        ______________________________  Signature of Patient    Signature of Responsible Person                Printed Name of Responsible Person                                      ____________________________________  _____________________________                ________________________________  Signature of Witness        Date  Time         Relationship to Patient    STATEMENT OF PHYSICIAN My signature below affirms that prior to the time of the procedure; I have explained to the patient and/or his/her legal representative, the risks and benefits involved in the proposed treatment and any reasonable alternative to the proposed treatment. I have also explained the risks and benefits involved in refusal of the proposed treatment and alternatives to the proposed treatment and have answered the patient's  questions. If I have a significant financial interest in a co-management agreement or a significant financial interest in any product or implant, or other significant relationship used in this procedure/surgery, I have disclosed this and had a discussion with my patient.     _____________________________________________________              _____________________________  (Signature of Physician)                                                                                         (Date)                                   (Time)  Patient Name: Kimberly Quezada      : 10/31/1965      Printed: 8/15/2024     Medical Record #: [id number]                                      Page 1 of 1

## (undated) NOTE — LETTER
WHERE IS YOUR PAIN NOW?  Kaylin the areas on your body where you feel the described sensations.  Use the appropriate symbol.  Kaylin the areas of radiation.  Include all affected areas.  Just to complete the picture, please draw in the face.     ACHE:  ^ ^ ^   NUMBNESS:  0000   PINS & NEEDLES:  = = = =                              ^ ^ ^                       0000              = = = =                                    ^ ^ ^                       0000            = = = =      BURNING:  XXXX   STABBING: ////                  XXXX                ////                         XXXX          ////     Please kaylin the line below indicating your degree of pain right now  with 0 being no pain 10 being the worst pain possible.                                         0             1             2              3             4              5              6              7             8             9             10         Patient Signature:

## (undated) NOTE — LETTER
[address] [address]  [phone]             REFERRAL ORDER         Patient Name:   Kimberly Quezada, ([id number])   Sex: female  : 10/31/1965       Order Date:  2025  Authorizing Provider:   TOMY SOLANO     Procedure:  DME - EXTERNAL  [[id number]]         Order #:   [id number]  Qty:  1     Priority:  Routine                   Class:   Ext - RFL     Standing Interval:          Standing Occurrences:          Expires on:            Expected by:    Associated DX:  Compression fracture of thoracic vertebra with routine healing, unspecified thoracic vertebral level, subsequent encounter (S22.000D)     Order summary:  Dx: thoracic compression fracture TLSO, soft or semi-rigid, Ext - RFL, Routine  What DME is needed: TLSO  Notify staff to enter order in [place]? Yes  1 visit  What DME is needed: TLSO  Notify staff to enter order in [place]? Yes         Comments:  Dx: thoracic compression fracture  TLSO, soft or semi-rigid           Scheduling Instructions:  **REFERRAL REQUEST**     Your physician has referred you to a specialist.  Your physician or the clinic staff will provide you with the phone number you should call to schedule your appointment.      If you are confident that your benefit plan will not require a referral or authorization, such as [place] Medicaid, please feel free to schedule your appointment immediately. However, if you are unsure about the requirements for authorization, please wait 5-7 days and then contact your physician's office. At that time, you will be provided with any authorization numbers or be assured that none are required. You can then schedule your appointment. Failure to obtain required authorization numbers can create reimbursement difficulties for you.     Electronically Signed By: Tomy Solano DO [NPI: [id number]]  Order Date: 2025 at 1:03  PM                                                                                                   Tomy Christiansen DO   Physician  Specialty: Physical Medicine     Progress Notes     Signed     Encounter Date: 3/4/2025     Signed         Progress note     C/C:        Chief Complaint   Patient presents with    Follow - Up       LOV 1/21/2025 Patient is here for follow up on back.  Reports n/t throughout both hands. Takes tylenol prn to ease pain. Currently in physical therapy nd states she's getting relief. Reports soreness throughout back. Pain 7/10      HPI: 59-year-old female presents for follow-up.  Last seen in the end of January.  Prescribed calcitonin nasal spray, physical therapy.  Has been following with endocrinology, and is going to start Forteo soon for the treatment of osteopenia.     Hands are aching. Not sleeping well due to numbness, left worse than right hands, and aching. During the daytime numbness and tingling is more prevalent in the 1-3rd fingers of both hands. There is numbness and tingling intermittently, in both the hands and left foot. Oncologist saying to wait a few months and see if the symptoms will ease over time.      Lower back pain has eased somewhat.  Localized at about the waistline without radiation into the bilateral lower extremities.     Takes over-the-counter medications, lidocaine patch, and Tylenol 3 as needed.  I gave her 14 tablets, and she still has a few left from when it was prescribed to her about 6 weeks ago.     Pertinent allergies: [Allergies]     [Allergies]        Allergen Reactions    Meloxicam ANGIOEDEMA       Upper and lower lip throbbing, tongue swelling, tongue ulcers    Contrave [Naltrexone-Bupropion Hcl Er] RASH    Wellbutrin [Bupropion] RASH    Simvastatin RASH        Physical exam:  Ht 63\"   Wt 162 lb (73.5 kg)   LMP 08/09/2018   BMI 28.70 kg/m²       No intrinsic hand wasting  Slight weakness right ABP greater than left. Normal finger flexors, wrist  extensors, elbow flexors.  Giveaway with shoulder abduction strength testing bilaterally  2/4 upper and lower extremity reflexes bilaterally  Roberts test negative bilaterally  Sensation intact to light touch median, radial, ulnar distributions of both hands  Mild tenderness to palpation bilateral lower lumbar paraspinals     Imaging: no new imaging to review     Assessment and plan  Bilateral hand pain, numbness and tingling, interimttent  B cell lymphoma s/p chemotherapy (no radiation)  Lumbar compression fx with persistent though somewhat improved low back pain     Her symptoms seem more suspicious in the hands seem more suspicious for carpal tunnel syndrome.  Recommend EMG of the bilateral upper extremities to evaluate for this, along with polyneuropathy - might have to do some nerve conductions and one of the legs as well.  Encouraged her to continue physical therapy, proceed with Forteo injections.  She was prescribed Tylenol 3 no more than twice daily, to be taken as needed for severe pain.     F/u for EMG b/l SAURAV Christiansen DO  Physical Medicine and Rehabilitation  [facility]            Electronically signed by Tomy Christiansen DO at 3/4/2025  2:02 PM

## (undated) NOTE — Clinical Note
AUTHORIZATION FOR SURGICAL OPERATION OR OTHER PROCEDURE    1. I hereby authorize  {[facility] PM&R Phys:[id number]} and the [facility] staff assigned to my case to perform the following operation and/or procedure at the [facility]:    _______________________________________________________________________________________________      _______________________________________________________________________________________________    2.  My physician has explained the nature and purpose of the operation or other procedure, possible alternative methods of treatment, the risks involved, and the possibility of complication to me.  I acknowledge that no guarantee has been made as to the result that may be obtained.  3.  I recognize that, during the course of this operation, or other procedure, unforseen conditions may necessitate additional or different procedure than those listed above.  I, therefore, further authorize and request that the above named physician, his/her physician assistants or designees perform such procedures as are, in his/her professional opinion, necessary and desirable.  4.  Any tissue or organs removed in the operation or other procedure may be disposed of by and at the discretion of the [facility] staff and [facility].  5.  I understand that in the event of a medical emergency, I will be transported by local paramedics to [facility] or other hospital emergency department.  6.  I certify that I have read and fully understand the above consent to operation and/or other procedure.    7.  I acknowledge that my physician has explained sedation/analgesia administration to me including the risks and benefits.  I consent to the administration of sedation/analgesia as may be necessary or desirable in the judgement of my physician.    Witness signature: ___________________________________________________ Date:  ______/______/_____                    Time:  ________ A.M.  P.M.        Patient Name:  ______________________________________________________  (please print)       Patient signature:  ___________________________________________________             Relationship to Patient:           []  Parent    Responsible person                          []  Spouse  In case of minor or                    [] Other  _____________   Incompetent name:  __________________________________________________                               (please print)      _____________      Responsible person  In case of minor or  Incompetent signature:  _______________________________________________    Statement of Physician  My signature below affirms that prior to the time of the procedure, I have explained to the patient and/or his/her guardian, the risks and benefits involved in the proposed treatment and any reasonable alternative to the proposed treatment.  I have also explained the risks and benefits involved in the refusal of the proposed treatment and have answered the patient's questions.                        Date:  ______/______/_______  Provider                      Signature:  __________________________________________________________       Time:  ___________ A.M    P.M.

## (undated) NOTE — LETTER
[facility] [address], [place], [place]    Authorization for Surgical Operation and Procedure                               I hereby authorize Deepak He MD, my physician and his/her assistants (if applicable), which may include medical students, residents, and/or fellows, to perform the following surgical operation/ procedure and administer such anesthesia as may be determined necessary by my physician: Operation/Procedure name (s) COLONOSCOPY / ESOPHAGOGASTRODUODENOSCOPY on Kimberly Quezada   2.   I recognize that during the surgical operation/procedure, unforeseen conditions may necessitate additional or different procedures than those listed above.  I, therefore, further authorize and request that the above-named surgeon, assistants, or designees perform such procedures as are, in their judgment, necessary and desirable.    3.   My surgeon/physician has discussed prior to my surgery the potential benefits, risks and side effects of this procedure; the likelihood of achieving goals; and potential problems that might occur during recuperation.  They also discussed reasonable alternatives to the procedure, including risks, benefits, and side effects related to the alternatives and risks related to not receiving this procedure.  I have had all my questions answered and I acknowledge that no guarantee has been made as to the result that may be obtained.    4.   Should the need arise during my operation/procedure, which includes change of level of care prior to discharge, I also consent to the administration of blood and/or blood products.  Further, I understand that despite careful testing and screening of blood or blood products by collecting agencies, I may still be subject to ill effects as a result of receiving a blood transfusion and/or blood products.  The following are some, but not all, of the potential risks that can occur: fever and allergic reactions, hemolytic reactions,  transmission of diseases such as Hepatitis, AIDS and Cytomegalovirus (CMV) and fluid overload.  In the event that I wish to have an autologous transfusion of my own blood, or a directed donor transfusion, I will discuss this with my physician.  Check only if Refusing Blood or Blood Products  I understand refusal of blood or blood products as deemed necessary by my physician may have serious consequences to my condition to include possible death. I hereby assume responsibility for my refusal and release the hospital, its personnel, and my physicians from any responsibility for the consequences of my refusal.    o  Refuse   5.   I authorize the use of any specimen, organs, tissues, body parts or foreign objects that may be removed from my body during the operation/procedure for diagnosis, research or teaching purposes and their subsequent disposal by hospital authorities.  I also authorize the release of specimen test results and/or written reports to my treating physician on the hospital medical staff or other referring or consulting physicians involved in my care, at the discretion of the Pathologist or my treating physician.    6.   I consent to the photographing or videotaping of the operations or procedures to be performed, including appropriate portions of my body for medical, scientific, or educational purposes, provided my identity is not revealed by the pictures or by descriptive texts accompanying them.  If the procedure has been photographed/videotaped, the surgeon will obtain the original picture, image, videotape or CD.  The hospital will not be responsible for storage, release or maintenance of the picture, image, tape or CD.    7.   I consent to the presence of a  or observers in the operating room as deemed necessary by my physician or their designees.    8.   I recognize that in the event my procedure results in extended X-Ray/fluoroscopy time, I may develop a skin reaction.    9. If  I have a Do Not Attempt Resuscitation (DNAR) order in place, that status will be suspended while in the operating room, procedural suite, and during the recovery period unless otherwise explicitly stated by me (or a person authorized to consent on my behalf). The surgeon or my attending physician will determine when the applicable recovery period ends for purposes of reinstating the DNAR order.  10. Patients having a sterilization procedure: I understand that if the procedure is successful the results will be permanent and it will therefore be impossible for me to inseminate, conceive, or bear children.  I also understand that the procedure is intended to result in sterility, although the result has not been guaranteed.   11. I acknowledge that my physician has explained sedation/analgesia administration to me including the risk and benefits I consent to the administration of sedation/analgesia as may be necessary or desirable in the judgment of my physician.    I CERTIFY THAT I HAVE READ AND FULLY UNDERSTAND THE ABOVE CONSENT TO OPERATION and/or OTHER PROCEDURE.     ____________________________________  _________________________________        ______________________________  Signature of Patient    Signature of Responsible Person                Printed Name of Responsible Person                                      ____________________________________  _____________________________                ________________________________  Signature of Witness        Date  Time         Relationship to Patient    STATEMENT OF PHYSICIAN My signature below affirms that prior to the time of the procedure; I have explained to the patient and/or his/her legal representative, the risks and benefits involved in the proposed treatment and any reasonable alternative to the proposed treatment. I have also explained the risks and benefits involved in refusal of the proposed treatment and alternatives to the proposed treatment and have  answered the patient's questions. If I have a significant financial interest in a co-management agreement or a significant financial interest in any product or implant, or other significant relationship used in this procedure/surgery, I have disclosed this and had a discussion with my patient.     _____________________________________________________              _____________________________  (Signature of Physician)                                                                                         (Date)                                   (Time)  Patient Name: Kimberly Quezada      : 10/31/1965      Printed: 2024     Medical Record #: [id number]                                      Page 1 of 1

## (undated) NOTE — LETTER
[facility] 4W/SW/SE  [address]  [facility] [address]  [phone]    Blood Transfusion Consent    In the course of your treatment, it may become necessary to administer a transfusion of blood or blood components. This form provides basic information concerning this procedure and, if signed by you, authorizes its administration. By signing this form, you agree that all of your questions about the administration of blood or blood products have been answered by the ordering medical professional or designee.    Description of Procedure  Blood is introduced into one of your veins, commonly in the arm, using a sterilized disposable needle. The amount of blood transfused, and whether the transfusion will be of blood or blood components is a judgement the physician will make based on your particular needs.    Risks  The transfusion is a common procedure of low risk.  MINOR AND TEMPORARY REACTIONS ARE NOT UNCOMMON, including a slight bruise, swelling or local reaction in the area where the needle pierces your skin, or a nonserious reaction to the transfused material itself, including headache, fever or mild skin reaction, such as rash.  Serious reactions are possible, though very unlikely, and include severe allergic reaction (shock) and destruction (hemolysis) of transfused blood cells.  Infectious diseases which are known to be transmitted by blood transfusion include certain types of viral Hepatitis(liver infection from a virus), Human Immunodeficiency Virus (HIV-1,2) infection, a viral infection known to cause Acquired Immunodeficiency Syndrome (AIDS), as well as certain other bacterial, viral, and parasitic diseases. While a minimal risk of acquiring an infectious disease from transfused blood exists, in accordance with the Federal and State law, all due care has been taken in donor selection and testing to avoid transmission of disease.    Alternatives  If loss of blood poses serious threats during your  treatment, THERE IS NO EFFECTIVE ALTERNATIVE TO BLOOD TRANSFUSION. However, if you have any further questions on this matter, your provider will fully explain the alternatives to you if it has not already been done.    I, ______________________________, have read/had read to me the above. I understand the matters bearing on the decision whether or not to authorize a transfusion of blood or blood components. I have no questions which have not been answered to my full satisfaction. I hereby consent to such transfusion as my physician may deem necessary or advisable in the course of my treatment.    ______________________________________________                    ___________________________  (Signature of Patient or Responsible party in case of minor,                 (Printed Name of Patient or incompetent, or unconscious patient)              Responsible Party)    ___________________________               _____________________  (Relationship to Patient if not self)                                    (Date and Time)    __________________________                                                           ______________________              (Signature of Witness)               (Printed Name of Witness)     Language line ()    Telephone/Verbal/Video Consent    __________________________                     ____________________  (Signature of 2nd Witness           (Printed Name of 2nd  Telephone/Verbal/Video Consent)           Witness)    Patient Name: Kimberly Quezada     : 10/31/1965                 Printed: 2024     Medical Record #: [id number]      Rev: 2023

## (undated) NOTE — LETTER
[facility]  [address], [place], [place]    Authorization for Surgical Operation and Procedure                               I hereby authorize * Surgery not found *, my physician and his/her assistants (if applicable), which may include medical students, residents, and/or fellows, to perform the following surgical operation/ procedure and administer such anesthesia as may be determined necessary by my physician: Operation/Procedure name (s)  on Kimberly Quezada   2.   I recognize that during the surgical operation/procedure, unforeseen conditions may necessitate additional or different procedures than those listed above.  I, therefore, further authorize and request that the above-named surgeon, assistants, or designees perform such procedures as are, in their judgment, necessary and desirable.    3.   My surgeon/physician has discussed prior to my surgery the potential benefits, risks and side effects of this procedure; the likelihood of achieving goals; and potential problems that might occur during recuperation.  They also discussed reasonable alternatives to the procedure, including risks, benefits, and side effects related to the alternatives and risks related to not receiving this procedure.  I have had all my questions answered and I acknowledge that no guarantee has been made as to the result that may be obtained.    4.   Should the need arise during my operation/procedure, which includes change of level of care prior to discharge, I also consent to the administration of blood and/or blood products.  Further, I understand that despite careful testing and screening of blood or blood products by collecting agencies, I may still be subject to ill effects as a result of receiving a blood transfusion and/or blood products.  The following are some, but not all, of the potential risks that can occur: fever and allergic reactions, hemolytic reactions, transmission of diseases such as  Hepatitis, AIDS and Cytomegalovirus (CMV) and fluid overload.  In the event that I wish to have an autologous transfusion of my own blood, or a directed donor transfusion, I will discuss this with my physician.  Check only if Refusing Blood or Blood Products  I understand refusal of blood or blood products as deemed necessary by my physician may have serious consequences to my condition to include possible death. I hereby assume responsibility for my refusal and release the hospital, its personnel, and my physicians from any responsibility for the consequences of my refusal.    o  Refuse   5.   I authorize the use of any specimen, organs, tissues, body parts or foreign objects that may be removed from my body during the operation/procedure for diagnosis, research or teaching purposes and their subsequent disposal by hospital authorities.  I also authorize the release of specimen test results and/or written reports to my treating physician on the hospital medical staff or other referring or consulting physicians involved in my care, at the discretion of the Pathologist or my treating physician.    6.   I consent to the photographing or videotaping of the operations or procedures to be performed, including appropriate portions of my body for medical, scientific, or educational purposes, provided my identity is not revealed by the pictures or by descriptive texts accompanying them.  If the procedure has been photographed/videotaped, the surgeon will obtain the original picture, image, videotape or CD.  The hospital will not be responsible for storage, release or maintenance of the picture, image, tape or CD.    7.   I consent to the presence of a  or observers in the operating room as deemed necessary by my physician or their designees.    8.   I recognize that in the event my procedure results in extended X-Ray/fluoroscopy time, I may develop a skin reaction.    9. If I have a Do Not Attempt  Resuscitation (DNAR) order in place, that status will be suspended while in the operating room, procedural suite, and during the recovery period unless otherwise explicitly stated by me (or a person authorized to consent on my behalf). The surgeon or my attending physician will determine when the applicable recovery period ends for purposes of reinstating the DNAR order.  10. Patients having a sterilization procedure: I understand that if the procedure is successful the results will be permanent and it will therefore be impossible for me to inseminate, conceive, or bear children.  I also understand that the procedure is intended to result in sterility, although the result has not been guaranteed.   11. I acknowledge that my physician has explained sedation/analgesia administration to me including the risk and benefits I consent to the administration of sedation/analgesia as may be necessary or desirable in the judgment of my physician.    I CERTIFY THAT I HAVE READ AND FULLY UNDERSTAND THE ABOVE CONSENT TO OPERATION and/or OTHER PROCEDURE.     ____________________________________  _________________________________        ______________________________  Signature of Patient    Signature of Responsible Person                Printed Name of Responsible Person                                      ____________________________________  _____________________________                ________________________________  Signature of Witness        Date  Time         Relationship to Patient    STATEMENT OF PHYSICIAN My signature below affirms that prior to the time of the procedure; I have explained to the patient and/or his/her legal representative, the risks and benefits involved in the proposed treatment and any reasonable alternative to the proposed treatment. I have also explained the risks and benefits involved in refusal of the proposed treatment and alternatives to the proposed treatment and have answered the patient's  questions. If I have a significant financial interest in a co-management agreement or a significant financial interest in any product or implant, or other significant relationship used in this procedure/surgery, I have disclosed this and had a discussion with my patient.     _____________________________________________________              _____________________________  (Signature of Physician)                                                                                         (Date)                                   (Time)  Patient Name: Kimberly Quezada      : 10/31/1965      Printed: 8/15/2024     Medical Record #: [id number]                                      Page 1 of 1

## (undated) NOTE — Clinical Note
Hi, She needs EGD and colonoscopy much sooner as she is symptomatic and has iron deficiency anemia with Hgb of 7.5 that is new. Thanks, AG

## (undated) NOTE — LETTER
[facility]  Administration of Anesthesia  Kimberly BHATTI agree to be cared for by a physician anesthesiologist alone and/or with a nurse anesthetist, who is specially trained to monitor me and give me medicine to put me to sleep or keep me comfortable during my procedure    I understand that my anesthesiologist and/or anesthetist is not an employee or agent of [facility] or [employer]. He or she works for [facility]    As the patient asking for anesthesia services, I agree to:  Allow the anesthesiologist (anesthesia doctor) to give me medicine and do additional procedures as necessary. Some examples are: Starting or using an “IV” to give me medicine, fluids or blood during my procedure, and having a breathing tube placed to help me breathe when I’m asleep (intubation). In the event that my heart stops working properly, I understand that my anesthesiologist will make every effort to sustain my life, unless otherwise directed by [facility] Do Not Resuscitate documents.  Tell my anesthesia doctor before my procedure:  If I am pregnant.  The last time that I ate or drank.  iii. All of the medicines I take (including prescriptions, herbal supplements, and pills I can buy without a prescription (including street drugs/illegal medications). Failure to inform my anesthesiologist about these medicines may increase my risk of anesthetic complications.  iv.If I am allergic to anything or have had a reaction to anesthesia before.  I understand how the anesthesia medicine will help me (benefits).  I understand that with any type of anesthesia medicine there are risks:  The most common risks are: nausea, vomiting, sore throat, muscle soreness, damage to my eyes, mouth, or teeth (from breathing tube placement).  Rare risks include: remembering what happened during my procedure, allergic reactions to medications, injury to my airway, heart, lungs, vision, nerves, or  muscles and in extremely rare instances death.  My doctor has explained to me other choices available to me for my care (alternatives).  Pregnant Patients (“epidural”):  I understand that the risks of having an epidural (medicine given into my back to help control pain during labor), include itching, low blood pressure, difficulty urinating, headache or slowing of the baby’s heart. Very rare risks include infection, bleeding, seizure, irregular heart rhythms and nerve injury.  Regional Anesthesia (“spinal”, “epidural”, & “nerve blocks”):  I understand that rare but potential complications include headache, bleeding, infection, seizure, irregular heart rhythms, and nerve injury.    _____________________________________________________________________________  Patient (or Representative) Signature/Relationship to Patient  Date   Time    _____________________________________________________________________________   Name (if used)    Language/Organization   Time    _____________________________________________________________________________  Nurse Anesthetist Signature     Date   Time  _____________________________________________________________________________  Anesthesiologist Signature     Date   Time  I have discussed the procedure and information above with the patient (or patient’s representative) and answered their questions. The patient or their representative has agreed to have anesthesia services.    _____________________________________________________________________________  Witness        Date   Time  I have verified that the signature is that of the patient or patient’s representative, and that it was signed before the procedure  Patient Name: Kimberly Quezada     : 10/31/1965                 Printed: 2024 at 11:10 AM    Medical Record #: [id number]                                            Page 1 of 1  ----------ANESTHESIA CONSENT----------